# Patient Record
Sex: FEMALE | Race: WHITE | NOT HISPANIC OR LATINO | Employment: FULL TIME | URBAN - METROPOLITAN AREA
[De-identification: names, ages, dates, MRNs, and addresses within clinical notes are randomized per-mention and may not be internally consistent; named-entity substitution may affect disease eponyms.]

---

## 2017-03-22 ENCOUNTER — OFFICE VISIT (OUTPATIENT)
Dept: INTERNAL MEDICINE | Facility: CLINIC | Age: 32
End: 2017-03-22
Payer: COMMERCIAL

## 2017-03-22 VITALS
RESPIRATION RATE: 17 BRPM | WEIGHT: 101 LBS | HEART RATE: 78 BPM | SYSTOLIC BLOOD PRESSURE: 110 MMHG | DIASTOLIC BLOOD PRESSURE: 72 MMHG | BODY MASS INDEX: 16.83 KG/M2 | TEMPERATURE: 98 F | HEIGHT: 65 IN

## 2017-03-22 DIAGNOSIS — Z23 NEED FOR TDAP VACCINATION: ICD-10-CM

## 2017-03-22 DIAGNOSIS — Z00.00 ANNUAL PHYSICAL EXAM: Primary | ICD-10-CM

## 2017-03-22 DIAGNOSIS — B00.1 FEVER BLISTER: ICD-10-CM

## 2017-03-22 PROCEDURE — 90715 TDAP VACCINE 7 YRS/> IM: CPT | Mod: S$GLB,,, | Performed by: INTERNAL MEDICINE

## 2017-03-22 PROCEDURE — 99999 PR PBB SHADOW E&M-NEW PATIENT-LVL III: CPT | Mod: PBBFAC,,, | Performed by: INTERNAL MEDICINE

## 2017-03-22 PROCEDURE — 90471 IMMUNIZATION ADMIN: CPT | Mod: S$GLB,,, | Performed by: INTERNAL MEDICINE

## 2017-03-22 PROCEDURE — 99385 PREV VISIT NEW AGE 18-39: CPT | Mod: 25,S$GLB,, | Performed by: INTERNAL MEDICINE

## 2017-03-22 RX ORDER — SODIUM SULFACETAMIDE AND SULFUR 80; 40 MG/ML; MG/ML
SOLUTION TOPICAL
COMMUNITY
Start: 2017-03-21 | End: 2022-06-22

## 2017-03-22 RX ORDER — VALACYCLOVIR HYDROCHLORIDE 500 MG/1
TABLET, FILM COATED ORAL
Qty: 30 TABLET | Refills: 0 | Status: SHIPPED | OUTPATIENT
Start: 2017-03-22 | End: 2021-06-04

## 2017-03-22 RX ORDER — VALACYCLOVIR HYDROCHLORIDE 1 G/1
TABLET, FILM COATED ORAL
COMMUNITY
Start: 2017-03-11 | End: 2017-03-22

## 2017-03-22 RX ORDER — DOXYCYCLINE 100 MG/1
100 CAPSULE ORAL DAILY PRN
COMMUNITY
Start: 2017-03-13 | End: 2020-06-10

## 2017-03-22 RX ORDER — TRETINOIN 0.05 G/100G
GEL TOPICAL 2 TIMES DAILY PRN
COMMUNITY
Start: 2017-01-17

## 2017-03-22 NOTE — PROGRESS NOTES
INTERNAL MEDICINE INITIAL VISIT NOTE      CHIEF COMPLAINT     Chief Complaint   Patient presents with    [facility]     HPI     Michelle Gage is a 31 y.o. c female who presents to establish care.     Took a new job within the last yr. Stress related. Eats sandwiches at the desk. Reports eats a good amount. Weight loss of 5lbs over the past 6 months. Always w/ fast metabolism and BMI was always low.     Smokes about 5 cigarettes socially (not every week)    Acne - takes doxy prn acne.  Valtrex prn for fever blisters.     Past Medical History:  History reviewed. No pertinent past medical history.    Past Surgical History:  Past Surgical History:   Procedure Laterality Date    WISDOM TOOTH EXTRACTION         Allergies:  Review of patient's allergies indicates:  No Known Allergies    Home Medications:  Prior to Admission medications    Medication Sig Start Date End Date Taking? Authorizing Provider   ATRALIN 0.05 % gel  1/17/17  Yes Historical Provider, MD   doxycycline (VIBRAMYCIN) 100 MG Cap Take 100 mg by mouth daily as needed. 3/13/17  Yes Historical Provider, MD   sulfacetamide sodium-sulfur 8-4 % Susp  3/21/17  Yes Historical Provider, MD   valacyclovir (VALTREX) 1000 MG tablet  3/11/17  Yes Historical Provider, MD       Family History:  Family History   Problem Relation Age of Onset    Osteoporosis Mother     No Known Problems Father     Alzheimer's disease Maternal Grandmother     Diabetes Maternal Grandfather     Cancer Paternal Grandmother 55     breast CA    Diabetes Paternal Grandfather        Social History:  Social History   Substance Use Topics    Smoking status: Light Tobacco Smoker    Smokeless tobacco: None    Alcohol use Yes      Comment: socially       Review of Systems:  Review of Systems   Constitutional: Negative for chills, fever and unexpected weight change.   Eyes: Negative for visual disturbance.   Respiratory: Negative for chest tightness, shortness of breath and wheezing.   "  Cardiovascular: Negative for chest pain and palpitations.   Gastrointestinal: Negative for abdominal pain, blood in stool, constipation, diarrhea, nausea and vomiting.   Endocrine: Negative for polydipsia and polyuria.   Genitourinary: Negative for dysuria, frequency and hematuria.   Musculoskeletal: Negative for arthralgias and joint swelling.   Skin: Negative for rash.        4 yrs ago, was given medrol dosepack and tamiflu. Caused acne break out.  Reports gaining hair around the hairline   Neurological: Positive for headaches (during menses. takes excedrin/advil prn and works. no morning HA). Negative for dizziness, weakness and light-headedness.   Psychiatric/Behavioral: Negative for dysphoric mood and sleep disturbance. The patient is not nervous/anxious.        Health Maintainence:   Immunizations:   TDap is up to date (8/2007), Influenza is up to date  Cancer Screening:  PAP: is up to date. Dr. Montelongo at  - Pap about 2 yrs ago.     PHYSICAL EXAM     /72  Pulse 78  Temp 98.4 °F (36.9 °C) (Oral)   Resp 17  Ht 5' 5" (1.651 m)  Wt 45.8 kg (100 lb 15.5 oz)  LMP 03/21/2017  BMI 16.8 kg/m2    GEN - A+OX4, NAD   HEENT - PERRL, EOMI, OP clear. MMM. TM normal.  Neck - No thyromegaly or cervical LAD. No thyroid masses felt.  CV - RRR, no m/r   Chest - CTAB, no wheezing or rhonchi  Abd - S/NT/ND/+BS.   Ext - 2+BDP and radial pulses. No LE edema.   Neuro - PERRL, EOMI, no nystagmus, eyebrow raise, facial sensation, hearing, m of mastication, smile, palatal raise, shoulder shrug, tongue protrusion symmetric and intact. 5/5 BUE and BLE strength. Sensation to light touch intact throughout. 2+ DTRs.   LN - No axillary LAD appreciated.  Skin - No rash.      LABS     No labs in system.     ASSESSMENT/PLAN     Michelle Gage is a 31 y.o. female with  Michelle was seen today for establish care.    Diagnoses and all orders for this visit:    Annual physical exam  -     CBC auto differential; Future; Expected " date: 3/22/17  -     Comprehensive metabolic panel; Future; Expected date: 3/22/17  -     Hemoglobin A1c; Future; Expected date: 3/22/17  -     Lipid panel; Future; Expected date: 3/22/17  -     TSH; Future; Expected date: 3/22/17    Fever blister  -     valacyclovir (VALTREX) 500 MG tablet; Take 1 pill twice daily x 3 days as needed for fever blister.    Need for Tdap vaccination  -     Tdap Vaccine    Other orders  -     doxycycline (VIBRAMYCIN) 100 MG Cap; Take 100 mg by mouth daily as needed.  -     sulfacetamide sodium-sulfur 8-4 % Susp;   -     ATRALIN 0.05 % gel;   -     Discontinue: valacyclovir (VALTREX) 1000 MG tablet;     RTC in 12 months, sooner if needed and depending on labs.    Gwen Garcia MD  Department of Internal Medicine - [facility] [facility]  8:18 AM

## 2017-03-22 NOTE — MR AVS SNAPSHOT
"    [facility]  [address]  [place] [address]  Phone: [phone]  Fax: [phone]                  Michelle Gage   3/22/2017 8:00 AM   Office Visit    Description:  Female : 1985   Provider:  Gwen Garcia MD   Department:  [facility]           Reason for Visit     Establish Care           Diagnoses this Visit        Comments    Annual physical exam    -  Primary     Fever blister         Need for Tdap vaccination                To Do List           Goals (5 Years of Data)     None      [facility]     [facility] -  Assistance  Registered nurses in the [facility] provide clinical advisement, health education, appointment booking, and other advisory services.  Call for this free service at [phone].             Medications           Message regarding Medications     Verify the changes and/or additions to your medication regime listed below are the same as discussed with your clinician today.  If any of these changes or additions are incorrect, please notify your healthcare provider.             Verify that the below list of medications is an accurate representation of the medications you are currently taking.  If none reported, the list may be blank. If incorrect, please contact your healthcare provider. Carry this list with you in case of emergency.           Current Medications     ATRALIN 0.05 % gel     doxycycline (VIBRAMYCIN) 100 MG Cap Take 100 mg by mouth daily as needed.    sulfacetamide sodium-sulfur 8-4 % Susp     valacyclovir (VALTREX) 1000 MG tablet            Clinical Reference Information           Your Vitals Were     BP Pulse Temp Resp Height Weight    110/72 78 98.4 °F (36.9 °C) (Oral) 17 5' 5" (1.651 m) 45.8 kg (100 lb 15.5 oz)    Last Period BMI             2017 16.8 kg/m2         Blood Pressure          Most Recent Value    BP  110/72      Allergies as of 3/22/2017     No Known " Allergies      Immunizations Administered on Date of Encounter - 3/22/2017     Name Date Dose VIS Date Route    TDAP  Incomplete 0.5 mL 2/24/2015 Intramuscular      Orders Placed During Today's Visit      Normal Orders This Visit    Tdap Vaccine     Future Labs/Procedures Expected by Expires    CBC auto differential  3/22/2017 5/21/2018    Comprehensive metabolic panel  3/22/2017 5/21/2018    Hemoglobin A1c  3/22/2017 5/21/2018    Lipid panel  3/22/2017 5/21/2018    TSH  3/22/2017 5/21/2018      [facility] Sign-Up     Activating your [facility] account is as easy as 1-2-3!     1) Visit [email or web address], select Sign Up Now, enter this activation code and your date of birth, then select Next.  [email or web address]  Expires: 5/6/2017  8:40 AM      2) Create a username and password to use when you visit [facility] in the future and select a security question in case you lose your password and select Next.    3) Enter your e-mail address and click Sign Up!    Additional Information  If you have questions, please e-mail [email or web address] or call [phone] to talk to our [facility] staff. Remember, [facility] is NOT to be used for urgent needs. For medical emergencies, dial 911.         Smoking Cessation     If you would like to quit smoking:   You may be eligible for free services if you are a [place] resident and started smoking cigarettes before September 1, 1988.  Call the Smoking Cessation Trust ([facility]) toll free at (413) 445-7199 or (021) 737-0100.   Call 1-800-QUIT-NOW if you do not meet the above criteria.            Language Assistance Services     ATTENTION: Language assistance services are available, free of charge. Please call [phone].      ATENCIÓN: Si habla jarrett, tiene a llamas disposición servicios gratuitos de asistencia lingüística. Llame al [phone].     CHÚ Ý: N?u b?n nói Ti?ng Vi?t, có các d?ch v? h? tr? ngôn ng? mi?n phí dành cho b?n. G?i s? [phone].         [facility] complies with applicable Federal civil rights laws and does not discriminate on the basis of race, color, national origin, age, disability, or sex.

## 2019-04-16 ENCOUNTER — OFFICE VISIT (OUTPATIENT)
Dept: INTERNAL MEDICINE | Facility: CLINIC | Age: 34
End: 2019-04-16
Payer: COMMERCIAL

## 2019-04-16 ENCOUNTER — LAB VISIT (OUTPATIENT)
Dept: LAB | Facility: HOSPITAL | Age: 34
End: 2019-04-16
Payer: COMMERCIAL

## 2019-04-16 VITALS
BODY MASS INDEX: 17.96 KG/M2 | HEART RATE: 60 BPM | OXYGEN SATURATION: 99 % | WEIGHT: 107.81 LBS | DIASTOLIC BLOOD PRESSURE: 62 MMHG | HEIGHT: 65 IN | SYSTOLIC BLOOD PRESSURE: 104 MMHG

## 2019-04-16 DIAGNOSIS — R10.13 EPIGASTRIC PAIN: ICD-10-CM

## 2019-04-16 DIAGNOSIS — R10.13 EPIGASTRIC PAIN: Primary | ICD-10-CM

## 2019-04-16 DIAGNOSIS — R10.10 UPPER ABDOMINAL PAIN: ICD-10-CM

## 2019-04-16 LAB
ALBUMIN SERPL BCP-MCNC: 3.7 G/DL (ref 3.5–5.2)
ALP SERPL-CCNC: 35 U/L (ref 55–135)
ALT SERPL W/O P-5'-P-CCNC: 15 U/L (ref 10–44)
ANION GAP SERPL CALC-SCNC: 8 MMOL/L (ref 8–16)
AST SERPL-CCNC: 14 U/L (ref 10–40)
BASOPHILS # BLD AUTO: 0.03 K/UL (ref 0–0.2)
BASOPHILS NFR BLD: 0.6 % (ref 0–1.9)
BILIRUB SERPL-MCNC: 0.2 MG/DL (ref 0.1–1)
BUN SERPL-MCNC: 6 MG/DL (ref 6–20)
CALCIUM SERPL-MCNC: 9.6 MG/DL (ref 8.7–10.5)
CHLORIDE SERPL-SCNC: 107 MMOL/L (ref 95–110)
CO2 SERPL-SCNC: 26 MMOL/L (ref 23–29)
CREAT SERPL-MCNC: 0.8 MG/DL (ref 0.5–1.4)
DIFFERENTIAL METHOD: ABNORMAL
EOSINOPHIL # BLD AUTO: 0.4 K/UL (ref 0–0.5)
EOSINOPHIL NFR BLD: 6.8 % (ref 0–8)
ERYTHROCYTE [DISTWIDTH] IN BLOOD BY AUTOMATED COUNT: 14.4 % (ref 11.5–14.5)
EST. GFR  (AFRICAN AMERICAN): >60 ML/MIN/1.73 M^2
EST. GFR  (NON AFRICAN AMERICAN): >60 ML/MIN/1.73 M^2
GLUCOSE SERPL-MCNC: 92 MG/DL (ref 70–110)
HCT VFR BLD AUTO: 41.6 % (ref 37–48.5)
HGB BLD-MCNC: 14.1 G/DL (ref 12–16)
LIPASE SERPL-CCNC: 36 U/L (ref 4–60)
LYMPHOCYTES # BLD AUTO: 2.3 K/UL (ref 1–4.8)
LYMPHOCYTES NFR BLD: 44.5 % (ref 18–48)
MCH RBC QN AUTO: 32.2 PG (ref 27–31)
MCHC RBC AUTO-ENTMCNC: 33.9 G/DL (ref 32–36)
MCV RBC AUTO: 95 FL (ref 82–98)
MONOCYTES # BLD AUTO: 0.8 K/UL (ref 0.3–1)
MONOCYTES NFR BLD: 15.1 % (ref 4–15)
NEUTROPHILS # BLD AUTO: 1.7 K/UL (ref 1.8–7.7)
NEUTROPHILS NFR BLD: 33 % (ref 38–73)
PLATELET # BLD AUTO: 129 K/UL (ref 150–350)
PMV BLD AUTO: 12.1 FL (ref 9.2–12.9)
POTASSIUM SERPL-SCNC: 3.8 MMOL/L (ref 3.5–5.1)
PROT SERPL-MCNC: 7.1 G/DL (ref 6–8.4)
RBC # BLD AUTO: 4.38 M/UL (ref 4–5.4)
SODIUM SERPL-SCNC: 141 MMOL/L (ref 136–145)
WBC # BLD AUTO: 5.17 K/UL (ref 3.9–12.7)

## 2019-04-16 PROCEDURE — 86677 HELICOBACTER PYLORI ANTIBODY: CPT

## 2019-04-16 PROCEDURE — 99214 OFFICE O/P EST MOD 30 MIN: CPT | Mod: S$GLB,,, | Performed by: PHYSICIAN ASSISTANT

## 2019-04-16 PROCEDURE — 3008F PR BODY MASS INDEX (BMI) DOCUMENTED: ICD-10-PCS | Mod: CPTII,S$GLB,, | Performed by: PHYSICIAN ASSISTANT

## 2019-04-16 PROCEDURE — 99999 PR PBB SHADOW E&M-EST. PATIENT-LVL IV: CPT | Mod: PBBFAC,,, | Performed by: PHYSICIAN ASSISTANT

## 2019-04-16 PROCEDURE — 83690 ASSAY OF LIPASE: CPT

## 2019-04-16 PROCEDURE — 80053 COMPREHEN METABOLIC PANEL: CPT

## 2019-04-16 PROCEDURE — 85025 COMPLETE CBC W/AUTO DIFF WBC: CPT

## 2019-04-16 PROCEDURE — 36415 COLL VENOUS BLD VENIPUNCTURE: CPT

## 2019-04-16 PROCEDURE — 3008F BODY MASS INDEX DOCD: CPT | Mod: CPTII,S$GLB,, | Performed by: PHYSICIAN ASSISTANT

## 2019-04-16 PROCEDURE — 99214 PR OFFICE/OUTPT VISIT, EST, LEVL IV, 30-39 MIN: ICD-10-PCS | Mod: S$GLB,,, | Performed by: PHYSICIAN ASSISTANT

## 2019-04-16 PROCEDURE — 99999 PR PBB SHADOW E&M-EST. PATIENT-LVL IV: ICD-10-PCS | Mod: PBBFAC,,, | Performed by: PHYSICIAN ASSISTANT

## 2019-04-16 RX ORDER — DICYCLOMINE HYDROCHLORIDE 10 MG/1
10 CAPSULE ORAL 3 TIMES DAILY
Qty: 10 CAPSULE | Refills: 0 | Status: SHIPPED | OUTPATIENT
Start: 2019-04-16 | End: 2019-04-19

## 2019-04-16 RX ORDER — VALACYCLOVIR HYDROCHLORIDE 1 G/1
1000 TABLET, FILM COATED ORAL 2 TIMES DAILY
COMMUNITY
End: 2020-11-19 | Stop reason: SDUPTHER

## 2019-04-16 NOTE — PROGRESS NOTES
Subjective:       Patient ID: Michelle Gage is a 33 y.o. female.    Chief Complaint: Abdominal Pain (Since saturday); Diarrhea; Fever; and Generalized Body Aches    Established pt of Gwen Garcia MD (new to me)    C/o upper abdominal pain and diarrhea that started 4 days ago. Seen at outside Urgent Care, diagnosed with viral gastroenteritis. Treated with zofran and zantac. Abdominal pain has improved but concerned that no lab testing/imaging was done at urgent care visit. Tested for Flu as well which was negative per pt. Concerned about gallbladder.     Abdominal Pain   This is a new problem. The current episode started in the past 7 days. The problem occurs daily. The problem has been gradually improving. The pain is located in the epigastric region. The pain is at a severity of 4/10. The quality of the pain is cramping. Associated symptoms include arthralgias (now resolved), belching, diarrhea, a fever (on Saturday 101F (now resolved)) and nausea. Pertinent negatives include no anorexia, constipation, dysuria, headaches, hematochezia, hematuria, melena, vomiting or weight loss. Nothing aggravates the pain. The pain is relieved by nothing. Treatments tried: Zantac, Zofran, OTC imodium. The treatment provided moderate relief. There is no history of abdominal surgery, GERD or irritable bowel syndrome. Patient's medical history does not include kidney stones and UTI.       PMH: none    Social History     Tobacco Use    Smoking status: Light Tobacco Smoker   Substance Use Topics    Alcohol use: Yes     Comment: socially    Drug use: No         Review of Systems   Constitutional: Positive for fever (on Saturday 101F (now resolved)). Negative for weight loss.   Gastrointestinal: Positive for abdominal pain, diarrhea and nausea. Negative for anorexia, constipation, hematochezia, melena and vomiting.   Genitourinary: Negative for dysuria and hematuria.   Musculoskeletal: Positive for arthralgias (now resolved).  "  Neurological: Negative for headaches.       Objective: /62   Pulse 60   Ht 5' 5" (1.651 m)   Wt 48.9 kg (107 lb 12.9 oz)   LMP 04/02/2019   SpO2 99%   BMI 17.94 kg/m²         Physical Exam   Constitutional: She is oriented to person, place, and time. She appears well-developed and well-nourished. No distress.   HENT:   Head: Normocephalic and atraumatic.   Mouth/Throat: Oropharynx is clear and moist.   Eyes: Pupils are equal, round, and reactive to light.   Cardiovascular: Normal rate and regular rhythm. Exam reveals no friction rub.   No murmur heard.  Pulmonary/Chest: Effort normal and breath sounds normal. She has no wheezes. She has no rales.   Abdominal: Soft. Normal appearance and bowel sounds are normal. She exhibits no distension and no mass. There is tenderness in the epigastric area. There is no rigidity and no guarding.   Musculoskeletal: Normal range of motion.   Neurological: She is alert and oriented to person, place, and time.   Skin: Skin is warm and dry. Capillary refill takes less than 2 seconds. No rash noted.   Psychiatric: She has a normal mood and affect.   Vitals reviewed.      Assessment:       1. Epigastric pain    2. Upper abdominal pain        Plan:         Michelle was seen today for abdominal pain, diarrhea, fever and generalized body aches.    Diagnoses and all orders for this visit:    Epigastric pain  Upper abdominal pain  -Likely bout of viral gastroenteritis that is improving  -Lab/imaging as below for further eval  -Trial of Bentyl prn  -Continue imodium  -Increase fluids  -[place] diet (avoid fried, fatty, processed foods)  -RTC if symptoms worsen or fail to improve  -ED precautions discussed  -     Comprehensive metabolic panel; Future  -     CBC auto differential; Future  -     H. PYLORI ANTIBODY, IGG; Future  -     US Abdomen Complete; Future  -     Lipase; Future  -     dicyclomine (BENTYL) 10 MG capsule; Take 1 capsule (10 mg total) by mouth 3 (three) times daily. " for 3 days        Zohreh Garcia PA-C

## 2019-04-16 NOTE — PATIENT INSTRUCTIONS
Please follow the instructions below to securely access your online medical record. [facility] allows you to send messages to your doctor, view your test results, renew your prescriptions, schedule appointments, and more.     How Do I Sign Up?  1. In your Internet browser, go to [email or web address]  2. In the lower right of the page, click the Activate Now link located under the Have Access Code? .  3. Enter your [facility] Access Code exactly as it appears below. You will not need to use this code after youve completed the sign-up process.  [facility] Access Code: [email or web address]  Expires: 5/31/2019  2:33 PM    4. Enter Date of Birth (mm/dd/yyyy) as indicated and click the Next button. You will be taken to the next sign-up page.  5. Create a [facility] ID. This will be your new [facility] login ID and cannot be changed, so think of one that is secure and easy to remember.  6. Create a [facility] password.  Your password must be at least 8 characters long and contain at least 1 letter and 1 number.  You can change your password at any time.  Your password is case sensitive.  7. Enter your Password Reset Question and Answer, then click the Next button.   8. Enter your e-mail address. You will receive e-mail notification when new information is available in [facility].  9. Click Sign Up. You can now view your medical record.     Additional Information  If you have questions, you can e-mail [email or web address] or call [phone] to talk to our [facility] staff. Remember, [facility] is NOT to be used for urgent needs. For medical emergencies, dial 911.    Thank you for enrolling in [facility].

## 2019-04-17 LAB — H PYLORI IGG SERPL QL IA: NEGATIVE

## 2019-04-24 ENCOUNTER — HOSPITAL ENCOUNTER (OUTPATIENT)
Dept: RADIOLOGY | Facility: HOSPITAL | Age: 34
Discharge: HOME | End: 2019-04-24
Attending: PHYSICIAN ASSISTANT
Payer: COMMERCIAL

## 2019-04-24 DIAGNOSIS — R10.13 EPIGASTRIC PAIN: ICD-10-CM

## 2019-04-24 PROCEDURE — 76700 US ABDOMEN COMPLETE: ICD-10-PCS | Mod: 26,,, | Performed by: RADIOLOGY

## 2019-04-24 PROCEDURE — 76700 US EXAM ABDOM COMPLETE: CPT | Mod: TC

## 2019-04-24 PROCEDURE — 76700 US EXAM ABDOM COMPLETE: CPT | Mod: 26,,, | Performed by: RADIOLOGY

## 2020-04-29 DIAGNOSIS — N63.0 BREAST MASS: Primary | ICD-10-CM

## 2020-05-01 ENCOUNTER — TELEPHONE (OUTPATIENT)
Dept: SURGERY | Facility: CLINIC | Age: 35
End: 2020-05-01

## 2020-05-01 NOTE — TELEPHONE ENCOUNTER
Left VM with my direct contact information, patient advised to give a return call with confirmation of appointment date and time (5/11/20 0900).

## 2020-05-10 NOTE — PROGRESS NOTES
[facility]  [facility]  5/11/2020      REFERRING PROVIDER: Kristin Blount MD  [address] [place] [address]    SUBJECTIVE:   Ms. Michelle Gage is a 34 y.o. female who presents today complaining of a LEFT breast mass.    History of Present Illness: Patient states she first noticed this mass at her LEFT breast about 1.5 years ago.  She describes it as tender, itchy, and growing.  She presented to her OBGYN at [facility] who ordered imaging.  On 5/2019 she had a left breast u/s which showed an 0.6 cm round nodule at the left axillary tail, 9 cm from the nipple.  It was read as BIRADS 3, probably benign. In 9/2019 she had a follow up u/s which showed a 1 cm hypoechoic lesion at the 1 o'clock left axillary tail.  Several small cysts were also seen bilaterally.  This was also read as BIRADS 3, probably benign and follow up imaging in 6 months was recommended.    Patient states she thinks this mass has increased in size and she wants it removed.    She denies any history of breast biopsies.  She denies any nipple discharge or nipple inversion.  She denies palpating any right breast masses and denies any skin changes.  She has no personal history of cancer.    History reviewed. No pertinent past medical history.  Past Surgical History:   Procedure Laterality Date    WISDOM TOOTH EXTRACTION       Social History     Socioeconomic History    Marital status:      Spouse name: Not on file    Number of children: Not on file    Years of education: Not on file    Highest education level: Not on file   Occupational History    Occupation:    Social Needs    Financial resource strain: Not on file    Food insecurity:     Worry: Not on file     Inability: Not on file    Transportation needs:     Medical: Not on file     Non-medical: Not on file   Tobacco Use    Smoking status: Light Tobacco Smoker   Substance and Sexual Activity    Alcohol use: Yes     Comment: socially     Drug use: No    Sexual activity: Yes     Partners: Male     Birth control/protection: None, Other-see comments     Comment: spouse   Lifestyle    Physical activity:     Days per week: Not on file     Minutes per session: Not on file    Stress: Not on file   Relationships    Social connections:     Talks on phone: Not on file     Gets together: Not on file     Attends Latter day service: Not on file     Active member of club or organization: Not on file     Attends meetings of clubs or organizations: Not on file     Relationship status: Not on file   Other Topics Concern    Not on file   Social History Narrative    Not on file     Review of patient's allergies indicates:  No Known Allergies     Family History: Paternal grandmother had breast cancer in her late 40's and again at age 60.   Genetic testing: none  OB/Gyn history:    Number of pregnancies & age at first gestation:    Age of menarche: 11   Last menstrual period: 3 weeks ago   Body mass index is 17.33 kg/m².   Contraceptive Use/ HRT: OCP since age 15; denies HRT.   Breastfeeding: N/A   Number of previous biopsies:0    Tyrer-Cuzick Score: not calculated in clinic today.    Review of Systems: Denies any chest pain or shortness of breath.  Denies any fever or chills.  See HPI/ Interval History for other systems reviewed.    OBJECTIVE:   Vitals:    20 0902   BP: 105/73   Pulse: 77      Physical Exam:  HEENT: Normocephalic, atraumatic.    General: alert and oriented; no acute distress.  Breast: 1.5 cm firm, mobile mass at the UOQ of the left breast.  No right breast masses. Normal color and contour of right and left breast.  No nipple inversion or discharge bilaterally.    Lymph: No palpable adjacent axillary lymph nodes.      ASSESSMENT:  Ms. Michelle Gage is a 34 y.o. year old female with a LEFT breast mass.    PLAN:   We discussed that this is likely a fibroadenoma; however it is somewhat firm, suspicious for malignancy.  We will repeat  her imaging today at [facility] with a left diagnostic mammogram and ultrasound with subsequent biopsy.  We will also request her previous imaging from [facility].    We discussed that a biopsy is preferred rather than surgical excision due to the possibility of positive margins for a breast cancer.  We will call her with the results and discuss the treatment plan from there.      ~Elizabeth Hayden PA-C      Surgical Oncology            5/11/2020

## 2020-05-11 ENCOUNTER — OFFICE VISIT (OUTPATIENT)
Dept: SURGERY | Facility: CLINIC | Age: 35
End: 2020-05-11
Payer: COMMERCIAL

## 2020-05-11 ENCOUNTER — HOSPITAL ENCOUNTER (OUTPATIENT)
Dept: RADIOLOGY | Facility: HOSPITAL | Age: 35
Discharge: HOME | End: 2020-05-11
Attending: SURGERY
Payer: COMMERCIAL

## 2020-05-11 VITALS
WEIGHT: 107.38 LBS | BODY MASS INDEX: 17.26 KG/M2 | HEART RATE: 77 BPM | DIASTOLIC BLOOD PRESSURE: 73 MMHG | SYSTOLIC BLOOD PRESSURE: 105 MMHG | HEIGHT: 66 IN

## 2020-05-11 DIAGNOSIS — N63.20 LEFT BREAST MASS: ICD-10-CM

## 2020-05-11 DIAGNOSIS — N63.0 BREAST MASS: ICD-10-CM

## 2020-05-11 DIAGNOSIS — R92.8 ABNORMAL MAMMOGRAM: ICD-10-CM

## 2020-05-11 PROCEDURE — 3008F BODY MASS INDEX DOCD: CPT | Mod: CPTII,S$GLB,, | Performed by: SURGERY

## 2020-05-11 PROCEDURE — 19083 BX BREAST 1ST LESION US IMAG: CPT | Mod: LT,,, | Performed by: RADIOLOGY

## 2020-05-11 PROCEDURE — 99999 PR PBB SHADOW E&M-EST. PATIENT-LVL III: ICD-10-PCS | Mod: PBBFAC,,, | Performed by: SURGERY

## 2020-05-11 PROCEDURE — 99999 PR PBB SHADOW E&M-EST. PATIENT-LVL III: CPT | Mod: PBBFAC,,, | Performed by: SURGERY

## 2020-05-11 PROCEDURE — 19083 US BREAST BIOPSY WITH IMAGING 1ST SITE LEFT: ICD-10-PCS | Mod: LT,,, | Performed by: RADIOLOGY

## 2020-05-11 PROCEDURE — 25000003 PHARM REV CODE 250: Mod: PO | Performed by: SURGERY

## 2020-05-11 PROCEDURE — 77062 BREAST TOMOSYNTHESIS BI: CPT | Mod: TC,PO

## 2020-05-11 PROCEDURE — 99204 OFFICE O/P NEW MOD 45 MIN: CPT | Mod: S$GLB,,, | Performed by: SURGERY

## 2020-05-11 PROCEDURE — 38505 NEEDLE BIOPSY LYMPH NODES: CPT | Mod: ,,, | Performed by: RADIOLOGY

## 2020-05-11 PROCEDURE — 38505 US BIOPSY LYMPH NODE AXILLA: ICD-10-PCS | Mod: ,,, | Performed by: RADIOLOGY

## 2020-05-11 PROCEDURE — 76642 US BREAST LEFT LIMITED: ICD-10-PCS | Mod: 26,LT,, | Performed by: RADIOLOGY

## 2020-05-11 PROCEDURE — 27201068 US BREAST BIOPSY WITH IMAGING 1ST SITE LEFT: Mod: PO

## 2020-05-11 PROCEDURE — 88360 PR  TUMOR IMMUNOHISTOCHEM/MANUAL: ICD-10-PCS | Mod: 26,,, | Performed by: PATHOLOGY

## 2020-05-11 PROCEDURE — 77066 MAMMO DIGITAL DIAGNOSTIC BILAT WITH TOMOSYNTHESIS_CAD: ICD-10-PCS | Mod: 26,,, | Performed by: RADIOLOGY

## 2020-05-11 PROCEDURE — 3008F PR BODY MASS INDEX (BMI) DOCUMENTED: ICD-10-PCS | Mod: CPTII,S$GLB,, | Performed by: SURGERY

## 2020-05-11 PROCEDURE — 76642 ULTRASOUND BREAST LIMITED: CPT | Mod: TC,PO,LT

## 2020-05-11 PROCEDURE — 88305 TISSUE EXAM BY PATHOLOGIST: CPT | Mod: 26,,, | Performed by: PATHOLOGY

## 2020-05-11 PROCEDURE — 38505 NEEDLE BIOPSY LYMPH NODES: CPT | Mod: PO

## 2020-05-11 PROCEDURE — 77066 DX MAMMO INCL CAD BI: CPT | Mod: 26,,, | Performed by: RADIOLOGY

## 2020-05-11 PROCEDURE — 76642 ULTRASOUND BREAST LIMITED: CPT | Mod: 26,LT,, | Performed by: RADIOLOGY

## 2020-05-11 PROCEDURE — 88360 TUMOR IMMUNOHISTOCHEM/MANUAL: CPT | Mod: 26,,, | Performed by: PATHOLOGY

## 2020-05-11 PROCEDURE — 88360 TUMOR IMMUNOHISTOCHEM/MANUAL: CPT | Performed by: PATHOLOGY

## 2020-05-11 PROCEDURE — 88305 TISSUE EXAM BY PATHOLOGIST: CPT | Mod: 59 | Performed by: PATHOLOGY

## 2020-05-11 PROCEDURE — 77062 BREAST TOMOSYNTHESIS BI: CPT | Mod: 26,,, | Performed by: RADIOLOGY

## 2020-05-11 PROCEDURE — 99204 PR OFFICE/OUTPT VISIT, NEW, LEVL IV, 45-59 MIN: ICD-10-PCS | Mod: S$GLB,,, | Performed by: SURGERY

## 2020-05-11 PROCEDURE — 77062 MAMMO DIGITAL DIAGNOSTIC BILAT WITH TOMOSYNTHESIS_CAD: ICD-10-PCS | Mod: 26,,, | Performed by: RADIOLOGY

## 2020-05-11 PROCEDURE — 27201068 MAMMO DIGITAL DIAGNOSTIC LEFT WITH CAD: Mod: PO

## 2020-05-11 PROCEDURE — 88305 TISSUE EXAM BY PATHOLOGIST: ICD-10-PCS | Mod: 26,,, | Performed by: PATHOLOGY

## 2020-05-11 RX ORDER — LIDOCAINE HYDROCHLORIDE 10 MG/ML
5 INJECTION, SOLUTION EPIDURAL; INFILTRATION; INTRACAUDAL; PERINEURAL ONCE
Status: COMPLETED | OUTPATIENT
Start: 2020-05-11 | End: 2020-05-11

## 2020-05-11 RX ORDER — LIDOCAINE HYDROCHLORIDE AND EPINEPHRINE 20; 10 MG/ML; UG/ML
10 INJECTION, SOLUTION INFILTRATION; PERINEURAL ONCE
Status: COMPLETED | OUTPATIENT
Start: 2020-05-11 | End: 2020-05-11

## 2020-05-11 RX ADMIN — LIDOCAINE HYDROCHLORIDE,EPINEPHRINE BITARTRATE 20 ML: 20; .01 INJECTION, SOLUTION INFILTRATION; PERINEURAL at 01:05

## 2020-05-11 RX ADMIN — LIDOCAINE HYDROCHLORIDE 60 MG: 10 INJECTION, SOLUTION EPIDURAL; INFILTRATION; INTRACAUDAL; PERINEURAL at 01:05

## 2020-05-11 NOTE — LETTER
May 11, 2020      Kristin Blount MD  [address] [facility] [address]           [facility]  [address]  [facility] [address]  Phone: [phone]  Fax: [phone]          Patient: Michelle Gage   MR Number: [id number]   YOB: 1985   Date of Visit: 5/11/2020       Dear Dr. Kristin Blount:    Thank you for referring Michelle Gage to me for evaluation. Attached you will find relevant portions of my assessment and plan of care.    If you have questions, please do not hesitate to call me. I look forward to following Michelle Gage along with you.    Sincerely,    Jessica Dumont MD    Enclosure  CC:  No Recipients    If you would like to receive this communication electronically, please contact [email or web address] or [phone] to request more information on [employer] access.    For providers and/or their staff who would like to refer a patient to [facility], please contact us through our one-stop-shop provider referral line, [facility], at [phone].    If you feel you have received this communication in error or would no longer like to receive these types of communications, please e-mail [email or web address]

## 2020-05-13 ENCOUNTER — DOCUMENTATION ONLY (OUTPATIENT)
Dept: SURGERY | Facility: CLINIC | Age: 35
End: 2020-05-13

## 2020-05-13 LAB
COMMENT: NORMAL
FINAL PATHOLOGIC DIAGNOSIS: NORMAL
GROSS: NORMAL
Lab: NORMAL
SUPPLEMENTAL DIAGNOSIS: NORMAL

## 2020-05-13 NOTE — NURSING
Called Patient with results of breast biopsy from 5/11/2020.  Explained that the biopsy showed invasive ductal carcinoma . Discussed what this means and that the next step is to meet with a breast surgeon. An appt was made for 5/14/2020 with Dr. Dumont.  Reviewed location of breast center. Patient verbalized understanding.  E mailed patient appt and educational information.  Oncology Navigation   Intake  Date of Diagnosis: 05/11/20  Cancer Type: Breast  MD Assigned: Dr. Dumont  Internal / External Referral: Internal  Initial Nurse Navigator Contact: 05/13/20  Diagnosis to Initial Contact Timeline (days): 2 days  Contact Method: Phone  Date Worked: 05/13/20  First Appointment Available: 05/14/20  Appointment Date: 05/14/20  Schedule to Appointment Timeline (days): 1  First Available Date vs. Scheduled Date (days): 0     Treatment  Current Status: Staging work-up       Procedures: Biopsy  Biopsy Schedule Date: 05/11/20           Acuity      Follow Up  Follow up in about 1 day (around 5/14/2020) for meet with patient after Dr visit.

## 2020-05-14 ENCOUNTER — DOCUMENTATION ONLY (OUTPATIENT)
Dept: SURGERY | Facility: CLINIC | Age: 35
End: 2020-05-14

## 2020-05-14 ENCOUNTER — LAB VISIT (OUTPATIENT)
Dept: LAB | Facility: HOSPITAL | Age: 35
End: 2020-05-14
Attending: SURGERY
Payer: COMMERCIAL

## 2020-05-14 ENCOUNTER — OFFICE VISIT (OUTPATIENT)
Dept: SURGERY | Facility: CLINIC | Age: 35
End: 2020-05-14
Payer: COMMERCIAL

## 2020-05-14 VITALS
BODY MASS INDEX: 17.73 KG/M2 | HEIGHT: 66 IN | SYSTOLIC BLOOD PRESSURE: 108 MMHG | HEART RATE: 67 BPM | WEIGHT: 110.31 LBS | DIASTOLIC BLOOD PRESSURE: 67 MMHG

## 2020-05-14 DIAGNOSIS — C50.912 INFILTRATING DUCTAL CARCINOMA OF LEFT BREAST: Primary | ICD-10-CM

## 2020-05-14 DIAGNOSIS — C50.912 INFILTRATING DUCTAL CARCINOMA OF LEFT BREAST: ICD-10-CM

## 2020-05-14 PROCEDURE — 99999 PR PBB SHADOW E&M-EST. PATIENT-LVL III: ICD-10-PCS | Mod: PBBFAC,,, | Performed by: SURGERY

## 2020-05-14 PROCEDURE — 3008F PR BODY MASS INDEX (BMI) DOCUMENTED: ICD-10-PCS | Mod: CPTII,S$GLB,, | Performed by: SURGERY

## 2020-05-14 PROCEDURE — 36415 COLL VENOUS BLD VENIPUNCTURE: CPT

## 2020-05-14 PROCEDURE — 99215 PR OFFICE/OUTPT VISIT, EST, LEVL V, 40-54 MIN: ICD-10-PCS | Mod: S$GLB,,, | Performed by: SURGERY

## 2020-05-14 PROCEDURE — 99215 OFFICE O/P EST HI 40 MIN: CPT | Mod: S$GLB,,, | Performed by: SURGERY

## 2020-05-14 PROCEDURE — 99999 PR PBB SHADOW E&M-EST. PATIENT-LVL III: CPT | Mod: PBBFAC,,, | Performed by: SURGERY

## 2020-05-14 PROCEDURE — 3008F BODY MASS INDEX DOCD: CPT | Mod: CPTII,S$GLB,, | Performed by: SURGERY

## 2020-05-14 NOTE — NURSING
Nurse Navigator Note:     Met with patient during her consult with Dr. Dumont.  Patient and I reviewed the information she discussed with Dr. Dumont, including treatment options, diagnosis, and future plans for workup. Patient and I went through the new patient binder, explained some of the information and why it is provided.     Also offered patient consults with our other specialty clinics: Dr. Warren for gynecological health during treatment, our breast physical therapy department for pre-op and post-operative assessments, Dr. Hess for psychological support, and Susie Boothe for nutritional counseling. Explained to patient that all of these support services are completely optional. Discussed that physical therapy may call patient to offer pre-op appt, and what that appt would entail.     Patient was given a copy of her appointments, Dr. Dumont's card, and my card. Encouraged her to call me if she has any questions or concerns or would like to schedule any additional appointments. Verbalized understanding of all information.     MRI  And appt with Dr. Salazar scheduled. Information for oncofertility provided to the patient.     Oncology Navigation   Intake  Date of Diagnosis: 05/11/20  Cancer Type: Breast  MD Assigned: Dr. Dumont  Internal / External Referral: Internal  Initial Nurse Navigator Contact: 05/13/20  Diagnosis to Initial Contact Timeline (days): 2 days  Contact Method: Phone  Date Worked: 05/13/20  First Appointment Available: 05/14/20  Appointment Date: 05/14/20  Schedule to Appointment Timeline (days): 1  First Available Date vs. Scheduled Date (days): 0     Treatment  Current Status: Staging work-up       Procedures: Genetic test;MRI  Biopsy Schedule Date: 05/11/20  Genetic Test Schedule Date: 05/14/20  MRI Schedule Date: 05/18/20    General Referrals: Fertility specialist;Other (5/15/20 appt  Dr. Salazar)    ER: Positive  MO: Positive  Her2: Postive     Acuity      Follow Up  Follow up in about 5  days (around 5/19/2020) for f/u after MRI.

## 2020-05-14 NOTE — PROGRESS NOTES
[facility]  [facility] [place]  5/14/2020    REFERRING PROVIDER: Jessica Dumont MD  [address]  [facility] [address]    SUBJECTIVE:   Ms. Michelle Gage is a 34 y.o. female with invasive ductal carcinoma, grade 3, ER positive/AL positive/Eac0uqv positive    History of Present Illness: Patient states she first noticed this mass at her left breast about 1.5 years ago.  She describes it as tender, itchy, and growing.  She presented to her OBGYN at [facility] who ordered imaging. Left breast U/S 5/2019  showed an 0.6 cm round nodule at the left axillary tail, 9 cm from the nipple, BIRADS 3. In 9/2019 she had a follow up u/s which showed a 1 cm hypoechoic lesion at the 1 o'clock left axillary tail.  Several small cysts were also seen bilaterally, BIRADS 3. She denies any history of breast biopsies.  She denies any nipple discharge or nipple inversion.  She denies palpating any right breast masses and denies any skin changes. She has no personal history of cancer.    At last clinic visit on 5/11/20 with YUMIKO, repeated imaging at [facility], left diagnostic mammogram and ultrasound with subsequent biopsy.  We will also request her previous imaging from [facility]. MMG reveals irregular left breast density at 1 OC posterior depth with indistinct margins and mild left axillary adenopathy. U/S correlates with 1 OC  Mass, 8 cm from nipple, irregular hypoechoic mass, posterior shadowing, with indistinct margins measuring 1.4cm.     Biopsy of 1 OC left breast lesion and left axillary lymph node 5/11/20. Pathology reveals invasive ductal carcinoma, grade 3, ER positive/AL positive/Cdg5xay positive. Ki-67 25%.    Findings at that time were the following:   Tumor size: 1.4 cm   Tumor thgthrthathdtheth:th th4th Estrogen Receptor: Positive (95%)  Progesterone Receptor: Positive (90%)  Her-2 delphine: Positive (3+)  Lymph node status: Negative for carcinoma      Family History: Paternal  grandmother had breast cancer in her late 40's and again at age 60.   Genetic testing: none  OB/Gyn history:    Number of pregnancies & age at first gestation:    Age of menarche: 11   Last menstrual period: 3 weeks ago   There is no height or weight on file to calculate BMI.   Contraceptive Use/ HRT: OCP since age 15; denies HRT.   Breastfeeding: N/A   Number of previous biopsies:0    No past medical history on file.  Past Surgical History:   Procedure Laterality Date    WISDOM TOOTH EXTRACTION       Social History     Socioeconomic History    Marital status:      Spouse name: Not on file    Number of children: Not on file    Years of education: Not on file    Highest education level: Not on file   Occupational History    Occupation:    Social Needs    Financial resource strain: Not on file    Food insecurity:     Worry: Not on file     Inability: Not on file    Transportation needs:     Medical: Not on file     Non-medical: Not on file   Tobacco Use    Smoking status: Light Tobacco Smoker   Substance and Sexual Activity    Alcohol use: Yes     Comment: socially    Drug use: No    Sexual activity: Yes     Partners: Male     Birth control/protection: None, Other-see comments     Comment: spouse   Lifestyle    Physical activity:     Days per week: Not on file     Minutes per session: Not on file    Stress: Not on file   Relationships    Social connections:     Talks on phone: Not on file     Gets together: Not on file     Attends Taoism service: Not on file     Active member of club or organization: Not on file     Attends meetings of clubs or organizations: Not on file     Relationship status: Not on file   Other Topics Concern    Not on file   Social History Narrative    Not on file     Review of patient's allergies indicates:  No Known Allergies       Review of Systems: Denies any chest pain or shortness of breath.  Denies any fever or chills.  See HPI/ Interval History for  other systems reviewed.    OBJECTIVE:   Vitals:    05/14/20 1418   BP: 108/67   Pulse: 67      Physical Exam:  HEENT: Normocephalic, atraumatic.    General: alert and oriented; no acute distress.  Breast: 1.5 cm firm, mobile mass at the UOQ of the left breast.  No right breast masses. Normal color and contour of right and left breast.  No nipple inversion or discharge bilaterally.    Lymph: No palpable adjacent axillary lymph nodes.      IMAGING:  MMG (5/11/20)  Findings:  This procedure was performed using tomosynthesis. Computer-aided detection was utilized in the interpretation of this examination.  The breasts are extremely dense, which lowers the sensitivity of mammography.      The mammogram images are mildly degraded by motion artifacts.  The patient was having trouble holding still for the mammogram images.  Best possible mammogram images were taken.      There are milk of calcium type calcifications in both breasts, benign.  No suspicious mammographic finding in the right breast.      In the left breast upper outer quadrant 01:00 o'clock axis posterior depth, there is an irregular equal density mass with indistinct margins.  This corresponds to the area of palpable concern.      Limited left breast ultrasound:   In the left breast 01:00 o'clock axis 8 cm from the nipple, there is an irregular hypoechoic mass with indistinct margins measuring 1.4 cm.  This corresponds to the area of palpable concern.  Associated mild posterior acoustic enhancement.  Mild rim hypervascularity.      In the left breast 12:00 o'clock axis 3 cm from the nipple, there is an oval complicated cyst with internal layering milk of calcium, benign.  This corresponds to some of the milk of calcium seen by mammogram.      Mild left axillary adenopathy.  The most abnormal node measures 0.9 cm, and demonstrates abnormal morphology with cortical thickening and loss of fatty hilum.      Impression:  1. Left breast 01:00 o'clock axis mass  corresponds to the area of palpable concern. BI-RADS 4: Suspicious. Recommend ultrasound-guided biopsy.      2. Mild left axillary adenopathy. BI-RADS 4: Suspicious. Recommend ultrasound-guided biopsy for the most abnormal node.      Both biopsies will be performed today.     BI-RADS Category:   Overall: 4 - Suspicious     Recommendation:  1. Ultrasound-guided biopsy for the left breast 01:00 o'clock axis mass.   2. Ultrasound-guided biopsy for the most abnormal left axillary node.     U/S (5/11/20)  1. Left breast 01:00 o'clock axis mass corresponds to the area of palpable concern. BI-RADS 4: Suspicious. Recommend ultrasound-guided biopsy.      2. Mild left axillary adenopathy. BI-RADS 4: Suspicious. Recommend ultrasound-guided biopsy for the most abnormal node.       ASSESSMENT:  Ms. Michelle Gage is a 34 y.o. year old female with a new left breast 1.4cm grade 3 ER +/ID+/Ghu1hxo positive invasive ductal carcinoma; here today to discuss treatment options including chemotherapy, radiation, and surgical options lumpectomy versus mastectomy.     Plan:    Options for management were discussed with the patient and her family. We reviewed the existing data noting the equivalency of breast conserving surgery with radiation therapy and mastectomy. We also reviewed the guidelines of the National Comprehensive Cancer Network for Stage 1 breast carcinoma. We discussed the need for lumpectomy margins to be negative for carcinoma, the necessity for postoperative radiation therapy after breast conservation in most cases, the possibility of a failed or false negative sentinel lymph node biopsy and the potential need for complete lymphadenectomy for a failed or positive sentinel lymph node biopsy were fully discussed. In the setting of mastectomy, delayed or immediate reconstruction options are available and were discussed.     In the setting of lumpectomy, radiation therapy would be recommended majority of the time.  The  duration and treatment side effects were discussed with the patient.  This will coordinated with the radiation oncologist pending final pathology.    We also discussed the role of systemic therapy in the treatment of early stage breast cancer.  We discussed that this is based on tumor biology and aamir status and will be determined based on final pathology.  We discussed that if the cancer is hormone positive, endocrine therapy would be recommended in most cases and its use can reduce the risk of recurrence as well as improve survival. Side effects of treatment were briefly discussed. We also discussed the potential role for chemotherapy based on a number of factors such as tumor phenotype (ER+ vs. triple negative vs. Cwn9xox+) and this would be determined in coordination with the medical oncologist.    Patient was educated on breast cancer, receptors, wire localization lumpectomy, mastectomy, sentinel lymph node mapping and biopsy, axillary lymph node dissection, reconstruction, breast prosthesis with post-mastectomy bra and radiation therapy. Patient was given patient information binder including [facility] breast cancer treatment brochure.  All her questions were answered.    Total time spent with the patient: 60 minutes.  45 minutes of face to face consultation and 15 minutes of chart review and coordination of care.        - Referral to Dr. Salazar to discuss chemotherapy (possible neoadjuvant) and endocrine therapy  - Discussed fertility options --she is interested.  - Genetics today  - Breast MRI    I do lean towards neoadjuvant for her given the location, I think it will help improve margins.

## 2020-05-15 ENCOUNTER — OFFICE VISIT (OUTPATIENT)
Dept: HEMATOLOGY/ONCOLOGY | Facility: CLINIC | Age: 35
End: 2020-05-15
Payer: COMMERCIAL

## 2020-05-15 ENCOUNTER — TELEPHONE (OUTPATIENT)
Dept: HEMATOLOGY/ONCOLOGY | Facility: CLINIC | Age: 35
End: 2020-05-15

## 2020-05-15 DIAGNOSIS — Z17.0 MALIGNANT NEOPLASM OF UPPER-OUTER QUADRANT OF LEFT BREAST IN FEMALE, ESTROGEN RECEPTOR POSITIVE: ICD-10-CM

## 2020-05-15 DIAGNOSIS — C50.912 INFILTRATING DUCTAL CARCINOMA OF LEFT BREAST: Primary | ICD-10-CM

## 2020-05-15 DIAGNOSIS — T45.1X5A CHEMOTHERAPY INDUCED NEUTROPENIA: ICD-10-CM

## 2020-05-15 DIAGNOSIS — D70.1 CHEMOTHERAPY INDUCED NEUTROPENIA: ICD-10-CM

## 2020-05-15 DIAGNOSIS — T45.1X5A CHEMOTHERAPY-INDUCED NAUSEA: ICD-10-CM

## 2020-05-15 DIAGNOSIS — C50.412 MALIGNANT NEOPLASM OF UPPER-OUTER QUADRANT OF LEFT BREAST IN FEMALE, ESTROGEN RECEPTOR POSITIVE: ICD-10-CM

## 2020-05-15 DIAGNOSIS — R11.0 CHEMOTHERAPY-INDUCED NAUSEA: ICD-10-CM

## 2020-05-15 DIAGNOSIS — Z51.11 ENCOUNTER FOR CHEMOTHERAPY MANAGEMENT: ICD-10-CM

## 2020-05-15 DIAGNOSIS — E28.39 SUPPRESSION OF OVARIAN SECRETION: ICD-10-CM

## 2020-05-15 PROCEDURE — 99205 OFFICE O/P NEW HI 60 MIN: CPT | Mod: 95,,, | Performed by: INTERNAL MEDICINE

## 2020-05-15 PROCEDURE — 99205 PR OFFICE/OUTPT VISIT, NEW, LEVL V, 60-74 MIN: ICD-10-PCS | Mod: 95,,, | Performed by: INTERNAL MEDICINE

## 2020-05-15 RX ORDER — ONDANSETRON HYDROCHLORIDE 8 MG/1
8 TABLET, FILM COATED ORAL EVERY 8 HOURS PRN
Qty: 30 TABLET | Refills: 2 | Status: SHIPPED | OUTPATIENT
Start: 2020-05-15 | End: 2022-06-22

## 2020-05-15 RX ORDER — DEXAMETHASONE 4 MG/1
TABLET ORAL
Qty: 32 TABLET | Refills: 0 | Status: SHIPPED | OUTPATIENT
Start: 2020-05-15 | End: 2020-09-08 | Stop reason: SDUPTHER

## 2020-05-15 NOTE — PROGRESS NOTES
History:     Reason For Consultation:   1. Stage I ([id number]) invasive ductal carcinoma of left breast, upper outer quadrant, ER 95%, IL 90%, Her2 3+, Grade 3, Ki67 25%    Referring Provider:   Jessica Dumont MD  [address] [facility] [place] [address]    Records Obtained: Records of the patients history including those obtained from the referring provider were reviewed and summarized in detail.    HPI:   Michelle Gage presents for consultation breast cancer. She is premenopausal. Her oncologic history is detailed below. Today, she feels well without new concerns. She is interested in egg harvesting but doesn't want it to significantly delay chemo. She has MRI scheduled. No bony pain, shortness of breath. Is an  and has some flexible scheduling available. Discussed case with Dr Dumont yesterday.     Oncologic History:  Presentation  - [id number] - presented for palpable left breast mass around 2018 - was being watched on imaging at outside hospital.    - 5/11/2020 - Diagnostic bilateral mammogram and left breast US at [facility] showed left breast 1:00 mass, 1.4 cm, 8 CFN, mild left axillary adenopathy  - 5/11/2020 - Biopsy - Grade 3 IDC, estrogen receptor 95%, progesterone receptor 90%, Her2 delphine 3+, Ki67 25%. LN biopsy negative  Surgery consultation with Dr Dumont on 5/14. Breast cancer is far in axillary tail and felt to be difficult to obtain clear margins without neoadjuvant therapy.    - 5/14/2020 - Genetics [employer] [facility] ELLISONAbielis pending   Medical oncology consultation on 5/15/2020 -  This case was discussed with Dr. Dumont.  She does feel like the patient will benefit from neoadjuvant therapy to help improve her surgical margins as patient is wanting mastectomy.  Since the tumor is less than 2 cm and clinically lymph node negative (MRI pending), I recommend treatment with Taxotere, carboplatin and Herceptin without Perjeta.  Discussed with her  that there is data in tumors less than 2 cm to consider Taxol/Herceptin however would not typically use this in a neoadjuvant setting for concern for under treatment.         Past Medical   No past medical history on file.  Patient Active Problem List   Diagnosis    Upper abdominal pain    Malignant neoplasm of upper-outer quadrant of left breast in female, estrogen receptor positive    Chemotherapy-induced nausea    Chemotherapy induced neutropenia    Encounter for chemotherapy management     Social History   Social History     Socioeconomic History    Marital status:      Spouse name: Not on file    Number of children: Not on file    Years of education: Not on file    Highest education level: Not on file   Occupational History    Occupation:    Social Needs    Financial resource strain: Not on file    Food insecurity:     Worry: Not on file     Inability: Not on file    Transportation needs:     Medical: Not on file     Non-medical: Not on file   Tobacco Use    Smoking status: Light Tobacco Smoker    Smokeless tobacco: Never Used   Substance and Sexual Activity    Alcohol use: Yes     Comment: socially    Drug use: No    Sexual activity: Yes     Partners: Male     Birth control/protection: None, Other-see comments     Comment: spouse   Lifestyle    Physical activity:     Days per week: Not on file     Minutes per session: Not on file    Stress: Not on file   Relationships    Social connections:     Talks on phone: Not on file     Gets together: Not on file     Attends Gnosticist service: Not on file     Active member of club or organization: Not on file     Attends meetings of clubs or organizations: Not on file     Relationship status: Not on file   Other Topics Concern    Not on file   Social History Narrative    Not on file     Family History  Family History   Problem Relation Age of Onset    Osteoporosis Mother     No Known Problems Father     Alzheimer's disease  Maternal Grandmother     Diabetes Maternal Grandfather     Cancer Paternal Grandmother 55        breast CA    Breast cancer Paternal Grandmother     Diabetes Paternal Grandfather      Medications    Current Outpatient Medications:     ATRALIN 0.05 % gel, , Disp: , Rfl:     doxycycline (VIBRAMYCIN) 100 MG Cap, Take 100 mg by mouth daily as needed., Disp: , Rfl:     norgestrel-ethinyl estradiol (LO/OVRAL) 0.3-30 mg-mcg per tablet, Take 1 tablet by mouth once daily., Disp: , Rfl:     sulfacetamide sodium-sulfur 8-4 % Susp, , Disp: , Rfl:     valACYclovir (VALTREX) 1000 MG tablet, Take 1,000 mg by mouth 2 (two) times daily., Disp: , Rfl:     valacyclovir (VALTREX) 500 MG tablet, Take 1 pill twice daily x 3 days as needed for fever blister., Disp: 30 tablet, Rfl: 0  Allergies  Review of patient's allergies indicates:  No Known Allergies  Review of Systems  Review of Systems   Constitutional: Negative for activity change, appetite change, chills, fatigue, fever and unexpected weight change.   HENT: Negative for congestion, hearing loss, nosebleeds, sinus pressure and trouble swallowing.    Eyes: Negative for pain, discharge and itching.   Respiratory: Negative for cough, chest tightness and shortness of breath.    Cardiovascular: Negative for chest pain, palpitations and leg swelling.   Gastrointestinal: Negative for abdominal distention, abdominal pain, blood in stool, diarrhea, nausea, rectal pain and vomiting.   Endocrine: Negative for heat intolerance and polydipsia.   Genitourinary: Negative for difficulty urinating, dysuria, flank pain, frequency, hematuria and urgency.   Musculoskeletal: Negative for arthralgias, back pain and neck pain.   Skin: Negative for color change, pallor and rash.   Neurological: Negative for dizziness, numbness and headaches.   Hematological: Negative for adenopathy. Does not bruise/bleed easily.   Psychiatric/Behavioral: Negative for confusion and decreased concentration. The  patient is not nervous/anxious.         Objective     Objective:     There were no vitals filed for this visit.  There is no height or weight on file to calculate BSA.  Physical Exam    Breast exam by Dr Dumont on 5/14/2020 -   Breast: 1.5 cm firm, mobile mass at the UOQ of the left breast.  No right breast masses. Normal color and contour of right and left breast.  No nipple inversion or discharge bilaterally.    Lymph: No palpable adjacent axillary lymph nodes.      Labs and Imaging  Results for orders placed or performed during the hospital encounter of 05/11/20   Specimen to Pathology, Radiology Breast, needle biopsy (Left breast)   Result Value Ref Range    Final Pathologic Diagnosis       1.  BREAST, LEFT, 01:00, 8 CM FROM NIPPLE, MASS, ULTRASOUND-GUIDED BIOPSY:  - Invasive ductal carcinoma of breast.  - Size of invasive carcinoma:  8 MM in greatest linear dimension within a  single core biopsy fragment.  - Ciara Histologic Score: Grade 3 of 3.                    Tubule Formation:  3                    Nuclear Pleomorphism:  3                    Mitotic Activity:  3  - No definitive in-situ component identified.  - No lymphovascular invasion.  - Microcalcifications:  Not identified.  - Breast biomarkers are pending and the findings will be included in a  supplemental report.  2.  AXILLARY LYMPH NODE, LEFT, ULTRASOUND-GUIDED BIOPSY:  - Negative for metastatic carcinoma.  - Benign lymphoid tissue with reactive changes.      Supplemental Diagnosis       BREAST BIOMARKERS:  ER:  Positive (95% of tumor cells demonstrating moderate to strong nuclear  immunoreactivity).  DE:  Positive (90% of tumor cells demonstrating moderate nuclear  immunoreactivity).  HER2 IHC:  Positive (3+).  Ki-67 proliferative index:  25%.  Immunohistochemical staining is interpreted in the setting of appropriate  positive and negative controls.      Gross       Received are 2 containers:  Container Label and Clinic/AP Number:   "[id number]  Part 1  Received in formalin labeled "left breast" is a white Telfa pad containing  approximately 5 tan-white, 0.2 cm-diameter fibrous needle cores ranging from  0.1-1.2 cm in length.  Entirely submitted in 65852-9.  Part 2  Received in formalin labeled "left axilla" is a white Telfa pad containing 4  pink-tan to tan-yellow, 0.1 cm-diameter fibrofatty needle cores ranging from  0.3-0.6 cm in length.  Entirely submitted in 50932-9.  Cold ischemic time:  6 min  Gross by: Barbi Monreal      Comment       Dr. JASON La has reviewed the case and concurs with the diagnosis.    Disclaimer       ER immunohistochemical staining (clone SP1, DAB detection method) is  performed on formalin-fixed, parafin embedded tissue sections. The percentage  of cell nuclei stained and the strength of staining is reported (weak,  moderate, strong), using the 2010 ACSO/CAP scoring guidelines. Tumors used  for determing predictive markers are fixed 10% buffered formalin for 6-72  hours. It has been cleared by the [place].S. FDA for use as an IVD test. This assay  has not been validated decalcified tissues. Results should be interpreted  with caution given the likelihood of falst negativity on decalcified  specimens.  HER-2/delphine IHC (4B5) clone, DAB detection method) is done on 10% buffered  formalin-fixed (for 6-72 hrs), paraffin embedded tissue sections. The scoring  is completed on a 4-tiered scoring system of membrane staining using the 2014  ASCO/CAP scoring guidelines.. It has been cleared by the [place] FDA for [place] as  an IVD test. This assay has not been validated on decalcified tissues.  Results sh ould be interpreted with caution given the likelihood of false  negativity on decalcified specimens..  PgR immunohistochemical staining (clone 1E2, DAB detection method) is  performed on formalin-fixed, parafin embedded tissue sections. The percentage  of cell nuclei stained and the strength of staining is report (weak,  moderate, strong), " using 2010 ASCO/CAP scoring guidelines. Tumors used for  determing predictive markers are fixed in 10% neutral buffered formalin for  6-72 hours. It has been cleared by the [place] FDA for use as an IVD test. This  assay has not octavio valided on decalcified tissues. Results should be  interpreted with caution given the likelihood of false negativity on  decalfified specimens.  ER immunohistochemical staining (clone SP1, DAB detection method) is  performed on formalin-fixed, parafin embedded tissue sections. The percentage  of cell nuclei stained and the strength of staining is reported (weak,  moderate, strong), using the 2010 ACSO/CAP scoring guidelines.  Tumors used  for determing predictive markers are fixed 10% buffered formalin for 6-72  hours. It has been cleared by the [place].S. FDA for use as an IVD test. This assay  has not been validated decalcified tissues. Results should be interpreted  with caution given the likelihood of falst negativity on decalcified  specimens.  HER-2/delphine IHC (4B5) clone, DAB detection method) is done on 10% buffered  formalin-fixed (for 6-72 hrs), paraffin embedded tissue sections. The scoring  is completed on a 4-tiered scoring system of membrane staining using the 2014  ASCO/CAP scoring guidelines.. It has been cleared by the [place] FDA for [place] as  an IVD test. This assay has not been validated on decalcified tissues.  Results should be interpreted with caution given the likelihood of false  negativity on decalcified specimens..  PgR immunohistochemical staining (clone 1E2, DAB detection method) is  performed on formalin-fixed, parafin embedded tissue sections. The percentage  of cell nuclei stained and the  strength of staining is report (weak,  moderate, strong), using 2010 ASCO/CAP scoring guidelines. Tumors used for  determing predictive markers are fixed in 10% neutral buffered formalin for  6-72 hours. It has been cleared by the [place].S. FDA for use as an IVD test. This  assay has not octavio  valided on decalcified tissues. Results should be  interpreted with caution given the likelihood of false negativity on  decalfified specimens.         Breast pathology and imaging as above.     Assessment     Assessment:     1. Stage I ([id number]) invasive ductal carcinoma of left breast, upper outer quadrant, ER 95%, DE 90%, Her2 3+, Grade 3, Ki67 25%   * Genetics pending   * We discussed the prognosis for her breast cancer, particularly in terms of risks of local and distant recurrences. We reviewed treatment recommendations as detailed below.    * Preservation of fertility desired. Need for contraception during chemotherapy discussed. Urine pregnancy test prior to chemo.   * This case was discussed with Dr. Dumont.  She does feel like the patient will benefit from neoadjuvant therapy to help improve her surgical margins as patient is wanting mastectomy.  Since the tumor is less than 2 cm and clinically lymph node negative (MRI pending), I recommend treatment with Taxotere, carboplatin and Herceptin without Perjeta.  I did discuss with her that there is data in tumors less than 2 cm to consider Taxol/Herceptin however would not typically use this in a neoadjuvant setting for concern for under treatment.     Plan:     1. Echo  2. Port - IR  3. Chemo education and prior auth for Taxotere/Carboplatin/herceptin  4. Antiemetics/steroids to pharmacy  5. Fertility - referral to Dr Weston with [facility]  6. Start zoladex 1 wk before chemo starts but after egg preservation  7. Follow up MRI breast      Follow-up in with me to start neoadjuvant TCH. I asked the patient to call for any questions, concerns, or new symptoms.    The patient location is: home  The chief complaint leading to consultation is: breast ca  Visit type: audiovisual  Total time spent with patient: 35 min with additional 45 min in coordination of care.   Each patient to whom he or she provides medical services by telemedicine is:  (1) informed of  the relationship between the physician and patient and the respective role of any other health care provider with respect to management of the patient; and (2) notified that he or she may decline to receive medical services by telemedicine and may withdraw from such care at any time.

## 2020-05-15 NOTE — PLAN OF CARE
START OFF PATHWAY REGIMEN - Breast            Trastuzumab-xxxx       Trastuzumab-xxxx       Docetaxel (Taxotere)       Carboplatin (Paraplatin)           Additional Orders: Premedicate with dexamethasone 8 mg PO bid for three   days beginning 1 day prior to therapy  * All AUC calculations intended to be   used in Dunbar formula.    **Always confirm dose/schedule in your pharmacy ordering system**    Patient Characteristics:  Preoperative or Nonsurgical Candidate (Clinical Staging), Neoadjuvant Therapy   followed by Surgery, Invasive Disease, Chemotherapy, HER2 Positive, ER Positive  Therapeutic Status: Preoperative or Nonsurgical Candidate (Clinical Staging)  AJCC M Category: cM0  AJCC Grade: G3  Breast Surgical Plan: Neoadjuvant Therapy followed by Surgery  ER Status: Positive (+)  AJCC 8 Stage Grouping: IA  HER2 Status: Positive (+)  AJCC T Category: cT1c  AJCC N Category: cN0  NC Status: Positive (+)  Intent of Therapy:  Curative Intent, Discussed with Patient

## 2020-05-15 NOTE — TELEPHONE ENCOUNTER
Call to [facility] for appt with Dr. Weston.   Records faxed.   [facility] to call pt to schedule pt and will f/u with our office with time/date.        ----- Message from Telma Cortez sent at 5/15/2020  9:04 AM CDT -----  Echo   Port - IR   Chemo education and prior auth for Taxotere/Carboplatin/herceptin   Fertility - referral to Dr Weston with [facility] - please call today   Start zoladex 1 wk before chemo starts but after egg preservation     Appt with me on first day of chemo     Cbc, cmp today and then first day of chemo

## 2020-05-15 NOTE — Clinical Note
Marcelino - IRChemo education and prior auth for Taxotere/Carboplatin/herceptinFertility - referral to Dr Weston with [facility] - please call todayStart zoladex 1 wk before chemo starts but after egg preservationAppt with me on first day of chemo Cbc, cmp today and then first day of chemo

## 2020-05-15 NOTE — LETTER
May 15, 2020      Jessica Dumont MD  [address]  [facility] [address]           [place] H[facility]ology [facility]cology  [address]  [facility] [address]  Phone: [phone]          Patient: Michelle Gage   MR Number: [id number]   YOB: 1985   Date of Visit: 5/15/2020       Dear Dr. Jessica Dumont:    Thank you for referring Michelle Gage to me for evaluation. Attached you will find relevant portions of my assessment and plan of care.    If you have questions, please do not hesitate to call me. I look forward to following Michelle Gage along with you.    Sincerely,    Christy Salazar MD    Enclosure  CC:  No Recipients    If you would like to receive this communication electronically, please contact [email or web address] or [phone] to request more information on [employer] Link access.    For providers and/or their staff who would like to refer a patient to [facility], please contact us through our one-stop-shop provider referral line, [facility], at [phone].    If you feel you have received this communication in error or would no longer like to receive these types of communications, please e-mail [email or web address]

## 2020-05-18 ENCOUNTER — HOSPITAL ENCOUNTER (OUTPATIENT)
Dept: CARDIOLOGY | Facility: CLINIC | Age: 35
Discharge: HOME | End: 2020-05-18
Attending: INTERNAL MEDICINE
Payer: COMMERCIAL

## 2020-05-18 ENCOUNTER — CLINICAL SUPPORT (OUTPATIENT)
Dept: HEMATOLOGY/ONCOLOGY | Facility: CLINIC | Age: 35
End: 2020-05-18
Payer: COMMERCIAL

## 2020-05-18 ENCOUNTER — HOSPITAL ENCOUNTER (OUTPATIENT)
Dept: RADIOLOGY | Facility: HOSPITAL | Age: 35
Discharge: HOME | End: 2020-05-18
Attending: PHYSICIAN ASSISTANT
Payer: COMMERCIAL

## 2020-05-18 VITALS
SYSTOLIC BLOOD PRESSURE: 100 MMHG | HEART RATE: 68 BPM | HEIGHT: 66 IN | BODY MASS INDEX: 17.68 KG/M2 | WEIGHT: 110 LBS | DIASTOLIC BLOOD PRESSURE: 60 MMHG

## 2020-05-18 DIAGNOSIS — C50.412 MALIGNANT NEOPLASM OF UPPER-OUTER QUADRANT OF LEFT BREAST IN FEMALE, ESTROGEN RECEPTOR POSITIVE: ICD-10-CM

## 2020-05-18 DIAGNOSIS — Z17.0 MALIGNANT NEOPLASM OF UPPER-OUTER QUADRANT OF LEFT BREAST IN FEMALE, ESTROGEN RECEPTOR POSITIVE: ICD-10-CM

## 2020-05-18 DIAGNOSIS — C50.912 INFILTRATING DUCTAL CARCINOMA OF LEFT BREAST: ICD-10-CM

## 2020-05-18 DIAGNOSIS — Z51.11 ENCOUNTER FOR CHEMOTHERAPY MANAGEMENT: ICD-10-CM

## 2020-05-18 DIAGNOSIS — C50.912 INFILTRATING DUCTAL CARCINOMA OF LEFT BREAST: Primary | ICD-10-CM

## 2020-05-18 LAB
ASCENDING AORTA: 2.35 CM
AV INDEX (PROSTH): 0.84
AV MEAN GRADIENT: 3 MMHG
AV PEAK GRADIENT: 5 MMHG
AV VALVE AREA: 2.41 CM2
AV VELOCITY RATIO: 0.84
BSA FOR ECHO PROCEDURE: 1.52 M2
CV ECHO LV RWT: 0.3 CM
DOP CALC AO PEAK VEL: 1.07 M/S
DOP CALC AO VTI: 22.7 CM
DOP CALC LVOT AREA: 2.9 CM2
DOP CALC LVOT DIAMETER: 1.91 CM
DOP CALC LVOT PEAK VEL: 0.9 M/S
DOP CALC LVOT STROKE VOLUME: 54.76 CM3
DOP CALCLVOT PEAK VEL VTI: 19.12 CM
E WAVE DECELERATION TIME: 157.33 MSEC
E/A RATIO: 1.51
E/E' RATIO: 5.7 M/S
ECHO LV POSTERIOR WALL: 0.67 CM (ref 0.6–1.1)
FRACTIONAL SHORTENING: 25 % (ref 28–44)
INTERVENTRICULAR SEPTUM: 0.6 CM (ref 0.6–1.1)
LA MAJOR: 3.92 CM
LA MINOR: 3.9 CM
LA WIDTH: 3.66 CM
LEFT ATRIUM SIZE: 2.82 CM
LEFT ATRIUM VOLUME INDEX: 22.1 ML/M2
LEFT ATRIUM VOLUME: 34.3 CM3
LEFT INTERNAL DIMENSION IN SYSTOLE: 3.28 CM (ref 2.1–4)
LEFT VENTRICLE DIASTOLIC VOLUME INDEX: 56.58 ML/M2
LEFT VENTRICLE DIASTOLIC VOLUME: 87.77 ML
LEFT VENTRICLE MASS INDEX: 52 G/M2
LEFT VENTRICLE SYSTOLIC VOLUME INDEX: 28.1 ML/M2
LEFT VENTRICLE SYSTOLIC VOLUME: 43.57 ML
LEFT VENTRICULAR INTERNAL DIMENSION IN DIASTOLE: 4.4 CM (ref 3.5–6)
LEFT VENTRICULAR MASS: 81.39 G
LV LATERAL E/E' RATIO: 4.81 M/S
LV SEPTAL E/E' RATIO: 7 M/S
MV PEAK A VEL: 0.51 M/S
MV PEAK E VEL: 0.77 M/S
PISA TR MAX VEL: 1.97 M/S
PULM VEIN S/D RATIO: 1.14
PV PEAK D VEL: 0.42 M/S
PV PEAK S VEL: 0.48 M/S
RA MAJOR: 3.86 CM
RA PRESSURE: 3 MMHG
RA WIDTH: 3.17 CM
RETIRED EF AND QEF - SEE NOTES: 60 %
RIGHT VENTRICULAR END-DIASTOLIC DIMENSION: 2.92 CM
RV TISSUE DOPPLER FREE WALL SYSTOLIC VELOCITY 1 (APICAL 4 CHAMBER VIEW): 12.33 CM/S
SINUS: 2.77 CM
STJ: 2.33 CM
TDI LATERAL: 0.16 M/S
TDI SEPTAL: 0.11 M/S
TDI: 0.14 M/S
TR MAX PG: 16 MMHG
TRICUSPID ANNULAR PLANE SYSTOLIC EXCURSION: 2.12 CM
TV REST PULMONARY ARTERY PRESSURE: 19 MMHG

## 2020-05-18 PROCEDURE — 93306 TTE W/DOPPLER COMPLETE: CPT | Mod: 26,,, | Performed by: INTERNAL MEDICINE

## 2020-05-18 PROCEDURE — 93306 TTE W/DOPPLER COMPLETE: CPT

## 2020-05-18 PROCEDURE — 25500020 PHARM REV CODE 255: Performed by: PHYSICIAN ASSISTANT

## 2020-05-18 PROCEDURE — A9577 INJ MULTIHANCE: HCPCS | Performed by: PHYSICIAN ASSISTANT

## 2020-05-18 PROCEDURE — 93356 MYOCRD STRAIN IMG SPCKL TRCK: CPT

## 2020-05-18 PROCEDURE — 77049 MRI BREAST C-+ W/CAD BI: CPT | Mod: TC

## 2020-05-18 PROCEDURE — 93356 PR IMG, MYOCARDIAL STRAIN, SPECKLE TRACKING: ICD-10-PCS | Mod: ,,, | Performed by: INTERNAL MEDICINE

## 2020-05-18 PROCEDURE — 93306 ECHO (CUPID ONLY): ICD-10-PCS | Mod: 26,,, | Performed by: INTERNAL MEDICINE

## 2020-05-18 PROCEDURE — 77049 MRI BREAST C-+ W/CAD BI: CPT | Mod: 26,,, | Performed by: RADIOLOGY

## 2020-05-18 PROCEDURE — 93356 MYOCRD STRAIN IMG SPCKL TRCK: CPT | Mod: ,,, | Performed by: INTERNAL MEDICINE

## 2020-05-18 PROCEDURE — 77049 MRI BREAST W/WO CONTRAST, W/CAD, BILATERAL: ICD-10-PCS | Mod: 26,,, | Performed by: RADIOLOGY

## 2020-05-18 PROCEDURE — 99999 PR PBB SHADOW E&M-EST. PATIENT-LVL I: CPT | Mod: PBBFAC,,,

## 2020-05-18 PROCEDURE — 99999 PR PBB SHADOW E&M-EST. PATIENT-LVL I: ICD-10-PCS | Mod: PBBFAC,,,

## 2020-05-18 RX ADMIN — GADOBENATE DIMEGLUMINE 11 ML: 529 INJECTION, SOLUTION INTRAVENOUS at 01:05

## 2020-05-18 NOTE — NURSING
Oncology Navigation   Intake  Date of Diagnosis: 05/11/20  Cancer Type: Breast  MD Assigned: Dr.Blakely Salazar  Internal / External Referral: Internal  Initial Nurse Navigator Contact: 05/13/20  Diagnosis to Initial Contact Timeline (days): 2 days  Contact Method: Phone  Date Worked: 05/13/20  First Appointment Available: 05/14/20  Appointment Date: 05/14/20  Schedule to Appointment Timeline (days): 1  First Available Date vs. Scheduled Date (days): 0     Treatment  Current Status: Active  Treatment Type(s): Chemotherapy  Chemotherapy Regimen: Taxotere, Carboplatin, Herceptin       Procedures: Port / PICC;MRI  Biopsy Schedule Date: 05/11/20  Genetic Test Schedule Date: 05/14/20  MRI Schedule Date: 05/18/20    General Referrals: Fertility specialist    ER: Positive  MS: Positive  Her2: Postive     Acuity  Stage: 1  Systemic Treatment - predicted or initiated: Chemotherapy regimen with multiple drugs (+1)  Treatment Tolerability: Has not started treatment yet/treatment fully completed and side effects resolved  Psychological Factors (+1 each): Emotional during conversation  Navigation Acuity: 3     Follow Up  Follow up in about 2 weeks (around 6/1/2020) for check in.

## 2020-05-18 NOTE — PROGRESS NOTES
Chemotherapy Education    Michelle Gage attended chemotherapy class at [facility] by herself.  We reviewed the [facility] chemotherapy education power point in detail. She watched the video presentation. Topics discussed include: infection prevention, contraception, proper hydration, food safety, good nutrition, exercise, frequency of regimen & length of infusion. Patient was also educated on most common side effects including hair loss, nausea, vomiting, diarrhea, abnormal blood counts, fatigue, peripheral neuropathy & allergic reaction. She was given an [facility] patient binder with specific drug handouts from [employer]. She was allowed time to ask questions & all questions were answered. I informed her of all of our supportive services available including the dietitian, , psychologist, financial counselor,& nurse navigators. Maximum support offered. Verified that pt was given steroid prescription, antiemetics, & lidocaine cream for port & that she understands instructions for use. She was also given information on the [facility] cold cap. Provided her with my direct contact information should she have any future questions, needs, or concerns. Lastly, offered her information for online/virtual support groups that are available. Approximate length of appt one hour.

## 2020-05-19 ENCOUNTER — TELEPHONE (OUTPATIENT)
Dept: INTERVENTIONAL RADIOLOGY/VASCULAR | Facility: HOSPITAL | Age: 35
End: 2020-05-19

## 2020-05-19 DIAGNOSIS — Z17.0 MALIGNANT NEOPLASM OF UPPER-OUTER QUADRANT OF LEFT BREAST, ESTROGEN RECEPTOR POSITIVE: Primary | ICD-10-CM

## 2020-05-19 DIAGNOSIS — C50.412 MALIGNANT NEOPLASM OF UPPER-OUTER QUADRANT OF LEFT BREAST, ESTROGEN RECEPTOR POSITIVE: Primary | ICD-10-CM

## 2020-05-21 ENCOUNTER — TELEPHONE (OUTPATIENT)
Dept: HEMATOLOGY/ONCOLOGY | Facility: CLINIC | Age: 35
End: 2020-05-21

## 2020-05-21 ENCOUNTER — PATIENT MESSAGE (OUTPATIENT)
Dept: HEMATOLOGY/ONCOLOGY | Facility: CLINIC | Age: 35
End: 2020-05-21

## 2020-05-21 DIAGNOSIS — F41.9 ANXIETY: Primary | ICD-10-CM

## 2020-05-21 DIAGNOSIS — R45.89 ANXIETY ABOUT HEALTH: Primary | ICD-10-CM

## 2020-05-21 DIAGNOSIS — Z17.0 MALIGNANT NEOPLASM OF UPPER-OUTER QUADRANT OF LEFT BREAST IN FEMALE, ESTROGEN RECEPTOR POSITIVE: Primary | ICD-10-CM

## 2020-05-21 DIAGNOSIS — C50.412 MALIGNANT NEOPLASM OF UPPER-OUTER QUADRANT OF LEFT BREAST IN FEMALE, ESTROGEN RECEPTOR POSITIVE: Primary | ICD-10-CM

## 2020-05-21 DIAGNOSIS — R45.89 ANXIETY ABOUT HEALTH: ICD-10-CM

## 2020-05-21 RX ORDER — ALPRAZOLAM 0.5 MG/1
0.5 TABLET ORAL 3 TIMES DAILY PRN
Qty: 30 TABLET | Refills: 0 | Status: SHIPPED | OUTPATIENT
Start: 2020-05-21 | End: 2020-08-24 | Stop reason: SDUPTHER

## 2020-05-21 NOTE — TELEPHONE ENCOUNTER
"Returned call to pt.   Pt stated Dr. Weston' office just faxed over referral form for assistance for fertility payments.   Informed pt would monitor for fax and have completed.  Pt verbalized understanding.     ----- Message from Shannon Hunt sent at 5/21/2020 10:08 AM CDT -----  Patient Assist    Name of caller: Michelle   Provider name: Martin Harrison MD   Contact Preference:  [phone]  Is this regarding current patient or new patient?:  What is the nature of the call?    Form faxed from [facility]/ Dr. Weston today, and   pt states that its important that its returned and if possible to status her.      Additional Notes:   "Thank you for all that you do for our patients'"       "

## 2020-05-21 NOTE — TELEPHONE ENCOUNTER
"Returned call to pt.   Pt stated that she is very overwhelmed with new dx and asked if Dr. Salazar can send in rx for anxiety.   Informed pt that dept has 2 [facility] psych[facility] and asked if pt agreeable to referral- pt agreeable.   Informed pt would also send message to Dr. Salazar's NP to see if willing to send in rx to help as well.   Informed pt that has not received her paperwork but would continue to monitor for- pt stated if not received she will come drop off in clinic; nurse provided dept location.   Pt verbalized understanding.       Message fwd.           ----- Message from María Elena Nova sent at 5/21/2020 12:23 PM CDT -----  Contact: Michelle  Consult/Advisory:    Name Of Caller: Michelle  Provider Name: Dr. Salazar  Does patient feel the need to be seen today? No  Relationship to the Pt?: Self  Contact Preference?: [phone]  What is the nature of the call?:  Patient request a callback to discuss medication for anxiety    Additional Notes:  "Thank you for all that you do for our patients'"        "

## 2020-05-22 ENCOUNTER — TELEPHONE (OUTPATIENT)
Dept: OBSTETRICS AND GYNECOLOGY | Facility: CLINIC | Age: 35
End: 2020-05-22

## 2020-05-22 ENCOUNTER — PATIENT MESSAGE (OUTPATIENT)
Dept: HEMATOLOGY/ONCOLOGY | Facility: CLINIC | Age: 35
End: 2020-05-22

## 2020-05-22 DIAGNOSIS — Z13.9 SCREENING FOR CONDITION: Primary | ICD-10-CM

## 2020-05-22 NOTE — TELEPHONE ENCOUNTER
Patient phoned to discuss Dr. Warren's referrral and her role in supporting patient through Survivorship. Patient will begin egg harvesting 5/25/20 prior to chemotherapy under the care of Dr. Weston at [facility]. Patient offered an appt for 06/12/20 at 11am with Dr. Warren. Will discuss patient's case with Dr. Warren if earlier availability. PEYTON Deras.

## 2020-05-27 ENCOUNTER — PATIENT MESSAGE (OUTPATIENT)
Dept: SURGERY | Facility: CLINIC | Age: 35
End: 2020-05-27

## 2020-05-27 RX ORDER — FENTANYL CITRATE 50 UG/ML
50 INJECTION, SOLUTION INTRAMUSCULAR; INTRAVENOUS
Status: CANCELLED | OUTPATIENT
Start: 2020-05-27

## 2020-05-27 RX ORDER — MIDAZOLAM HYDROCHLORIDE 1 MG/ML
1 INJECTION INTRAMUSCULAR; INTRAVENOUS
Status: CANCELLED | OUTPATIENT
Start: 2020-05-27

## 2020-05-28 ENCOUNTER — HOSPITAL ENCOUNTER (OUTPATIENT)
Facility: HOSPITAL | Age: 35
Discharge: HOME | End: 2020-05-28
Attending: INTERNAL MEDICINE | Admitting: INTERNAL MEDICINE
Payer: COMMERCIAL

## 2020-05-28 VITALS
OXYGEN SATURATION: 100 % | SYSTOLIC BLOOD PRESSURE: 101 MMHG | HEART RATE: 74 BPM | BODY MASS INDEX: 18.33 KG/M2 | TEMPERATURE: 98 F | DIASTOLIC BLOOD PRESSURE: 56 MMHG | WEIGHT: 110 LBS | HEIGHT: 65 IN | RESPIRATION RATE: 17 BRPM

## 2020-05-28 DIAGNOSIS — Z17.0 MALIGNANT NEOPLASM OF UPPER-OUTER QUADRANT OF LEFT BREAST IN FEMALE, ESTROGEN RECEPTOR POSITIVE: ICD-10-CM

## 2020-05-28 DIAGNOSIS — C50.412 MALIGNANT NEOPLASM OF UPPER-OUTER QUADRANT OF LEFT BREAST IN FEMALE, ESTROGEN RECEPTOR POSITIVE: ICD-10-CM

## 2020-05-28 LAB
B-HCG UR QL: POSITIVE
BASOPHILS # BLD AUTO: 0.07 K/UL (ref 0–0.2)
BASOPHILS NFR BLD: 1 % (ref 0–1.9)
CTP QC/QA: YES
DIFFERENTIAL METHOD: ABNORMAL
EOSINOPHIL # BLD AUTO: 0.4 K/UL (ref 0–0.5)
EOSINOPHIL NFR BLD: 5.8 % (ref 0–8)
ERYTHROCYTE [DISTWIDTH] IN BLOOD BY AUTOMATED COUNT: 13.9 % (ref 11.5–14.5)
HCT VFR BLD AUTO: 45.3 % (ref 37–48.5)
HGB BLD-MCNC: 14.8 G/DL (ref 12–16)
IMM GRANULOCYTES # BLD AUTO: 0.01 K/UL (ref 0–0.04)
IMM GRANULOCYTES NFR BLD AUTO: 0.1 % (ref 0–0.5)
INR PPP: 2 (ref 0.8–1.2)
INTEGRATED BRAC ANALYSIS: NORMAL
LYMPHOCYTES # BLD AUTO: 3.5 K/UL (ref 1–4.8)
LYMPHOCYTES NFR BLD: 53.1 % (ref 18–48)
MCH RBC QN AUTO: 32 PG (ref 27–31)
MCHC RBC AUTO-ENTMCNC: 32.7 G/DL (ref 32–36)
MCV RBC AUTO: 98 FL (ref 82–98)
MONOCYTES # BLD AUTO: 0.5 K/UL (ref 0.3–1)
MONOCYTES NFR BLD: 7.2 % (ref 4–15)
NEUTROPHILS # BLD AUTO: 2.2 K/UL (ref 1.8–7.7)
NEUTROPHILS NFR BLD: 32.8 % (ref 38–73)
NRBC BLD-RTO: 0 /100 WBC
PLATELET # BLD AUTO: 122 K/UL (ref 150–350)
PMV BLD AUTO: 12.9 FL (ref 9.2–12.9)
PROTHROMBIN TIME: 19.5 SEC (ref 9–12.5)
RBC # BLD AUTO: 4.63 M/UL (ref 4–5.4)
WBC # BLD AUTO: 6.67 K/UL (ref 3.9–12.7)

## 2020-05-28 PROCEDURE — 63600175 PHARM REV CODE 636 W HCPCS: Performed by: RADIOLOGY

## 2020-05-28 PROCEDURE — 85610 PROTHROMBIN TIME: CPT

## 2020-05-28 PROCEDURE — 81025 URINE PREGNANCY TEST: CPT | Performed by: FAMILY MEDICINE

## 2020-05-28 PROCEDURE — 63600175 PHARM REV CODE 636 W HCPCS: Performed by: FAMILY MEDICINE

## 2020-05-28 PROCEDURE — 85025 COMPLETE CBC W/AUTO DIFF WBC: CPT

## 2020-05-28 PROCEDURE — 25000003 PHARM REV CODE 250: Performed by: FAMILY MEDICINE

## 2020-05-28 RX ORDER — LIDOCAINE HYDROCHLORIDE 5 MG/ML
INJECTION, SOLUTION INFILTRATION; PERINEURAL CODE/TRAUMA/SEDATION MEDICATION
Status: COMPLETED | OUTPATIENT
Start: 2020-05-28 | End: 2020-05-28

## 2020-05-28 RX ORDER — SODIUM CHLORIDE 9 MG/ML
500 INJECTION, SOLUTION INTRAVENOUS ONCE
Status: COMPLETED | OUTPATIENT
Start: 2020-05-28 | End: 2020-05-28

## 2020-05-28 RX ORDER — CEFAZOLIN SODIUM 1 G/3ML
1 INJECTION, POWDER, FOR SOLUTION INTRAMUSCULAR; INTRAVENOUS
Status: COMPLETED | OUTPATIENT
Start: 2020-05-28 | End: 2020-05-28

## 2020-05-28 RX ORDER — HEPARIN 100 UNIT/ML
SYRINGE INTRAVENOUS CODE/TRAUMA/SEDATION MEDICATION
Status: COMPLETED | OUTPATIENT
Start: 2020-05-28 | End: 2020-05-28

## 2020-05-28 RX ORDER — FENTANYL CITRATE 50 UG/ML
INJECTION, SOLUTION INTRAMUSCULAR; INTRAVENOUS CODE/TRAUMA/SEDATION MEDICATION
Status: COMPLETED | OUTPATIENT
Start: 2020-05-28 | End: 2020-05-28

## 2020-05-28 RX ORDER — MIDAZOLAM HYDROCHLORIDE 1 MG/ML
INJECTION INTRAMUSCULAR; INTRAVENOUS CODE/TRAUMA/SEDATION MEDICATION
Status: COMPLETED | OUTPATIENT
Start: 2020-05-28 | End: 2020-05-28

## 2020-05-28 RX ORDER — HEPARIN SODIUM 200 [USP'U]/100ML
INJECTION, SOLUTION INTRAVENOUS
Status: COMPLETED | OUTPATIENT
Start: 2020-05-28 | End: 2020-05-28

## 2020-05-28 RX ADMIN — MIDAZOLAM HYDROCHLORIDE 1 MG: 1 INJECTION, SOLUTION INTRAMUSCULAR; INTRAVENOUS at 09:05

## 2020-05-28 RX ADMIN — FENTANYL CITRATE 50 MCG: 50 INJECTION, SOLUTION INTRAMUSCULAR; INTRAVENOUS at 09:05

## 2020-05-28 RX ADMIN — SODIUM CHLORIDE 500 ML: 0.9 INJECTION, SOLUTION INTRAVENOUS at 08:05

## 2020-05-28 RX ADMIN — LIDOCAINE HYDROCHLORIDE 5 ML: 5 INJECTION, SOLUTION INFILTRATION; PERINEURAL at 09:05

## 2020-05-28 RX ADMIN — HEPARIN SODIUM (PORCINE) LOCK FLUSH IV SOLN 100 UNIT/ML 5 ML: 100 SOLUTION at 09:05

## 2020-05-28 RX ADMIN — CEFAZOLIN 1 G: 330 INJECTION, POWDER, FOR SOLUTION INTRAMUSCULAR; INTRAVENOUS at 08:05

## 2020-05-28 RX ADMIN — HEPARIN SODIUM IN SODIUM CHLORIDE 500 ML/HR: 200 INJECTION INTRAVENOUS at 09:05

## 2020-05-28 NOTE — PROGRESS NOTES
UPT had faint positive result, UPT repeated with same result, pt states she has not been sexually active in 3 months and was just started on fertility treatment last week, she also stated that she has a vaginal US last week with fertility MD and was not pregnant, notified IR MD and she stated she would discuss with patients fertility MD and let me know if she would like to draw a serum HCG level.

## 2020-05-28 NOTE — PROCEDURES
Radiology Post-Procedure Note    Pre Op Diagnosis: Breast cancer  Post Op Diagnosis: Same    Procedure: Port placement    Procedure performed by: Jorge Uribe MD    Written Informed Consent Obtained: Yes  Specimen Removed: NO  Estimated Blood Loss: Minimal    Findings:   R arm port placed without complication.  Total catheter length is 39cm.  No complications.  Port is ready for use.    Patient tolerated procedure well.    Jorge Uribe MD  Diagnostic and Interventional Radiologist  Department of Radiology  Pager: [phone]

## 2020-05-28 NOTE — PLAN OF CARE
Patient arrives to [facility]U bay 1.  Report received from PEYTON Gordon.  Patient awake, alert and oriented resting quietly in stretcher.  Site CDI.  Respirations even and unlabored, no apparent distress.  Stretcher locked, in lowest position, side rail up.  Patient placed on continuous cardiac, BP and pulse ox monitoring.  Plan of care and discharge criteria/plan reviewed with patient.  Denies needs at this time.  Will continue to monitor.

## 2020-05-28 NOTE — PLAN OF CARE
Port placement completed. Pt tolerated well. NAD noted or reported. Site dressed, CDI. Pt to recover in [facility], report given at the bedside. Family notified of d/c time.

## 2020-05-28 NOTE — PLAN OF CARE
PIV removed, bleeding controlled, pressure dressing placed.  Site CDI.  Patient calm and stable, denies needs.  Discharge instruction, and follow up care reviewed.  Patient verbalized understanding, and denies further questions.  Provided with [facility] and after hours number.  Patient awaiting transport via wheelchair.

## 2020-05-28 NOTE — PROGRESS NOTES
Patient spoke to NP in fertility clinic and was told that she received an HCG trigger injection 7 days ago and was told that would cause a false positive UPT, Dr. Antunez spoke to patient and the decision was made to proceed with procedure and that it was unnecessary to do HCG quantitative blood test, pt agreed with decision, pt will go to procedure soon.

## 2020-05-28 NOTE — H&P
Radiology History & Physical      SUBJECTIVE:     Chief Complaint: invasive ductal carcinoma of left breast     History of Present Illness:  Michelle Gage is a 34 y.o. female who presents for arm port placement for chemotherapy.    Past Medical History:   Diagnosis Date    Cancer 05/13/2020    Breast     Past Surgical History:   Procedure Laterality Date    BREAST SURGERY      Breast biopsy    WISDOM TOOTH EXTRACTION         Home Meds:   Prior to Admission medications    Medication Sig Start Date End Date Taking? Authorizing Provider   ALPRAZolam (XANAX) 0.5 MG tablet Take 1 tablet (0.5 mg total) by mouth 3 (three) times daily as needed for Insomnia or Anxiety. 5/21/20 5/21/21 Yes Tali Cueva, NP   ATRALIN 0.05 % gel  1/17/17  Yes Historical Provider, MD   valACYclovir (VALTREX) 1000 MG tablet Take 1,000 mg by mouth 2 (two) times daily.   Yes Historical Provider, MD   valacyclovir (VALTREX) 500 MG tablet Take 1 pill twice daily x 3 days as needed for fever blister. 3/22/17  Yes Gwen Garcia MD   dexAMETHasone (DECADRON) 4 MG Tab Two tabs by mouth twice daily day before and day after Taxotere. 5/15/20   Christy Salazar MD   doxycycline (VIBRAMYCIN) 100 MG Cap Take 100 mg by mouth daily as needed. 3/13/17   Historical Provider, MD   norgestrel-ethinyl estradiol (LO/OVRAL) 0.3-30 mg-mcg per tablet Take 1 tablet by mouth once daily.    Historical Provider, MD   ondansetron (ZOFRAN) 8 MG tablet Take 1 tablet (8 mg total) by mouth every 8 (eight) hours as needed for Nausea. 5/15/20   Christy Salazar MD   sulfacetamide sodium-sulfur 8-4 % Susp  3/21/17   Historical Provider, MD     Anticoagulants/Antiplatelets: no anticoagulation    Allergies: Review of patient's allergies indicates:  No Known Allergies  Sedation History:  no adverse reactions    Review of Systems:   Hematological: no known coagulopathies  Respiratory: no shortness of breath  Cardiovascular: no chest pain  Gastrointestinal: no abdominal  pain  Genito-Urinary: no dysuria  Musculoskeletal: negative  Neurological: no TIA or stroke symptoms         OBJECTIVE:     Vital Signs (Most Recent)  Temp: 98.5 °F (36.9 °C) (05/28/20 0713)  Pulse: 66 (05/28/20 0713)  Resp: 18 (05/28/20 0713)  BP: 98/65 (05/28/20 0713)  SpO2: 100 % (05/28/20 0713)    Physical Exam:  ASA: II  Mallampati: II    General: no acute distress  Mental Status: alert and oriented to person, place and time  HEENT: normocephalic, atraumatic  Chest: unlabored breathing  Heart: regular heart rate  Abdomen: nondistended  Extremity: moves all extremities    Laboratory  No results found for: INR    Lab Results   Component Value Date    WBC 5.17 04/16/2019    HGB 14.1 04/16/2019    HCT 41.6 04/16/2019    MCV 95 04/16/2019     (L) 04/16/2019      Lab Results   Component Value Date    GLU 92 04/16/2019     04/16/2019    K 3.8 04/16/2019     04/16/2019    CO2 26 04/16/2019    BUN 6 04/16/2019    CREATININE 0.8 04/16/2019    CALCIUM 9.6 04/16/2019    ALT 15 04/16/2019    AST 14 04/16/2019    ALBUMIN 3.7 04/16/2019    BILITOT 0.2 04/16/2019       ASSESSMENT/PLAN:     Sedation Plan: up to moderate  Patient will undergo right arm port placement for chemotherapy for invasive ductal carcinoma of the left breast.    Note is made that patient had two faint positive pregnancy tests; however, patient was spoken with and has not been sexually active in 3-4 months and is currently undergoing follicular harvesting and received HCG therapy one week prior. She spoke with Dr. Weston at [facility] who stated that receiving this medication may cause false positive pregnancy tests for 10 days.   Patient understands that although this is true, the fact that she may be pregnant remains and that there are risks of sedation medications in pregnancy and states that she still wishes to proceed with the placement of the port.       Juany Antunez, PGY3

## 2020-05-28 NOTE — DISCHARGE INSTRUCTIONS
"  **After hours and weekends: Call [phone] and ask for "Radiology Resident on call".    For immediate concerns that are not emergent, you may call our radiology clinic at: [phone]    Attached and reviewed Home Instructions for Port Care    "

## 2020-05-28 NOTE — DISCHARGE SUMMARY
Radiology Discharge Summary      Hospital Course: No complications    Admit Date: 5/28/2020  Discharge Date: 05/28/2020     Instructions Given to Patient: Yes  Diet: Resume prior diet  Activity: activity as tolerated    Description of Condition on Discharge: Stable  Vital Signs (Most Recent): Temp: 98.2 °F (36.8 °C) (05/28/20 1000)  Pulse: 74 (05/28/20 1045)  Resp: 17 (05/28/20 1045)  BP: (!) 101/56 (05/28/20 1045)  SpO2: 100 % (05/28/20 1045)    Discharge Disposition: Home    Discharge Diagnosis: Breast cancer s/p port placement     Follow-up: SUKHI Uribe MD  Diagnostic and Interventional Radiologist  Department of Radiology  Pager: [phone]

## 2020-05-29 ENCOUNTER — PATIENT MESSAGE (OUTPATIENT)
Dept: SURGERY | Facility: CLINIC | Age: 35
End: 2020-05-29

## 2020-05-29 ENCOUNTER — PATIENT MESSAGE (OUTPATIENT)
Dept: HEMATOLOGY/ONCOLOGY | Facility: CLINIC | Age: 35
End: 2020-05-29

## 2020-06-05 ENCOUNTER — PATIENT MESSAGE (OUTPATIENT)
Dept: PSYCHIATRY | Facility: CLINIC | Age: 35
End: 2020-06-05

## 2020-06-05 ENCOUNTER — TELEPHONE (OUTPATIENT)
Dept: INFUSION THERAPY | Facility: HOSPITAL | Age: 35
End: 2020-06-05

## 2020-06-05 ENCOUNTER — OFFICE VISIT (OUTPATIENT)
Dept: PSYCHIATRY | Facility: CLINIC | Age: 35
End: 2020-06-05
Payer: COMMERCIAL

## 2020-06-05 ENCOUNTER — TELEPHONE (OUTPATIENT)
Dept: HEMATOLOGY/ONCOLOGY | Facility: CLINIC | Age: 35
End: 2020-06-05

## 2020-06-05 ENCOUNTER — TELEPHONE (OUTPATIENT)
Dept: PHARMACY | Facility: CLINIC | Age: 35
End: 2020-06-05

## 2020-06-05 DIAGNOSIS — Z51.11 CHEMOTHERAPY MANAGEMENT, ENCOUNTER FOR: Primary | ICD-10-CM

## 2020-06-05 DIAGNOSIS — F43.22 ADJUSTMENT DISORDER WITH ANXIOUS MOOD: Primary | ICD-10-CM

## 2020-06-05 DIAGNOSIS — R45.89 ANXIETY ABOUT HEALTH: ICD-10-CM

## 2020-06-05 PROCEDURE — 90791 PSYCH DIAGNOSTIC EVALUATION: CPT | Mod: 95,,, | Performed by: PSYCHOLOGIST

## 2020-06-05 PROCEDURE — 90791 PR PSYCHIATRIC DIAGNOSTIC EVALUATION: ICD-10-PCS | Mod: 95,,, | Performed by: PSYCHOLOGIST

## 2020-06-05 RX ORDER — LIDOCAINE AND PRILOCAINE 25; 25 MG/G; MG/G
CREAM TOPICAL
Qty: 30 G | Refills: 0 | Status: SHIPPED | OUTPATIENT
Start: 2020-06-05 | End: 2020-10-07 | Stop reason: SDUPTHER

## 2020-06-05 NOTE — PROGRESS NOTES
TELEMEDICINE PSYCHO-ONCOLOGY INTAKE    Consultation started: 6/5/2020 at 9:01 AM   The chief complaint leading to consultation is: anxiety about illness  Virtual visit with synchronous audio and video    The patient location is: Patient home    Patient alone at the time of consultation.    Each patient provided medical services by telemedicine is:  (1) informed of the relationship between the physician and patient and the respective role of any other health care provider with respect to management of the patient; and (2) notified that he or she may decline to receive medical services by telemedicine and may withdraw from such care at any time    Crisis Disclaimer: Patient was informed that due to the virtual nature of the visit, that if a crisis develops, protocols will be implemented to ensure patient safety, including but not limited to: 1) Initiating a welfare check with local Law Enforcement, 2) Calling [facility]/National Crisis Hotline, and/or 3) Initiating PEC/CEC procedures.     Closest Hospital:  [facility]  Crisis Contact: Elio Luong (spouse)-[phone]    Diagnostic Interview - CPT 42578    Date: 6/5/2020  Site of Clinician: [facility]     Evaluation Length (direct face-to-face time):  1 hour     Referral Source: Christy Salazar MD   Oncologist:   PCP: Primary Doctor No    Clinical status of patient: Outpatient    Michelle Gage, a 34 y.o. female, seen for initial evaluation visit.  Met with patient.    Chief complaint/reason for encounter: adjustment to illness, anxiety and sleep    Medical/Surgical History:    Patient Active Problem List   Diagnosis    Upper abdominal pain    Malignant neoplasm of upper-outer quadrant of left breast in female, estrogen receptor positive    Chemotherapy-induced nausea    Chemotherapy induced neutropenia    Encounter for chemotherapy management    Suppression of ovarian secretion    Adjustment disorder with anxious mood       Health Behaviors:       ETOH  Use: Yes (social)       Tobacco Use: None since diagnosis, former occasional use while drinking    Illicit Drug Use:  No     Prescription Misuse:No   Caffeine: moderate- 3 c caffeine/day   Exercise:The patient engages in environmental activity only.   Advanced directives:No     Family History:   Psychiatric illness: Yes  (MGM- bipolar)   Alcohol/Drug Abuse: Yes  (MGM-etoh)   Suicide: No      Past Psychiatric History:   Inpatient treatment: No     Outpatient treatment: No     Prior substance abuse treatment: No     Suicide Attempts: No     Psychotropic Medications:  Current: Xanax (since day of fertility consult, PRN- has used 4x)       Past: none    Current medications as per below, allergies reviewed in chart.    Current Outpatient Medications   Medication    ALPRAZolam (XANAX) 0.5 MG tablet    ATRALIN 0.05 % gel    dexAMETHasone (DECADRON) 4 MG Tab    doxycycline (VIBRAMYCIN) 100 MG Cap    norgestrel-ethinyl estradiol (LO/OVRAL) 0.3-30 mg-mcg per tablet    ondansetron (ZOFRAN) 8 MG tablet    sulfacetamide sodium-sulfur 8-4 % Susp    valACYclovir (VALTREX) 1000 MG tablet    valacyclovir (VALTREX) 500 MG tablet     No current facility-administered medications for this visit.         CAM Therapies: None     Screening:   Erin: Denies Psychosis: Denies    Generalized anxiety: Denies Panic Disorder: Denies    Social/specific phobia: Denies OCD: Denies   Sexual Dysfunction:  Denies Trauma: Denies      Social situation/Stressors: Michelle Gage lives with her , Elio, in [place], [place].  She is a full-time construction .  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 She enjoys her work and her firm is being supportive of her during her illness.   Michelle Gage has been  1x (4 years) and she and Elio have been together for 16 years. She describes the relationship as solid and supportive. They have no children, but have undergone fertility preservation and have 3 stored  embryos. Her spouse is an HR professional and can easily work from home to care for her during her illness. Ms. Gage has a number of close friends (couple and individual) in [place] and a supportive extended family in [place].  The patient reports excellent social support.  Michelle Gage's hobbies include reading, spending time with friends, and watching classic movies and [place] mysteries.  Additional stressors:  Cat  week she was diagnosed with cancer    Strengths:Steady employment, financial stability, Housing stability, Able to vocalize needs, Motivation, readiness for change, Setting and pursuing goals, hopes, dreams, aspirations, Resources - social, interpersonal, monetary, Vocational interests, hobbies and/or talents and Interpersonal relationships and supports available - family, relatives, friends  Liabilities: Complicated medical illness    Current Evaluation:     Mental Status Exam: Michelle Gage arrived promptly for the assessment session. The patient was fully cooperative throughout the interview and was an adequate historian   Appearance: age appropriate, casually  dressed, well groomed  Behavior/Cooperation: friendly and cooperative  Speech: normal in rate, volume, and tone and appropriate quality, quantity and organization of sentences  Mood: anxious, dysthymic  Affect: anxious and tearful  Thought Process: goal-directed, logical  Thought Content: normal, no suicidality, no homicidality, delusions, or paranoia;did not appear to be responding to internal stimuli during the interview.   Orientation: grossly intact  Memory: Grossly intact  Attention Span/Concentration: Attends to interview without distraction; reports subjective difficulty  Fund of Knowledge: average  Estimate of Intelligence: above average from verbal skills and history  Cognition: grossly intact  Insight: patient has awareness of illness; good insight into own behavior and behavior of others  Judgment: the patient's  behavior is adequate to circumstances    Distress Score    Distress Score: 7        Practical Problems Physical Problems   : No Appearance: Yes   Housing: No Bathing / Dressing: Yes   Insurance / Financial: No Breathing: No    Transportation: No  Changes in Urination: No    Work / School: Yes  Constipation: Yes   Treatment Decisions: Yes  Diarrhea: No     Eating: No    Family Problems Fatigue: No    Dealing with Children: No Feeling Swollen: No    Dealing with Partner: No Fevers: No    Ability to Have Children: Yes  Getting Around: No       Indigestion: No     Memory / Concentration: Yes   Emotional Problems Mouth Sores: No    Depression: No  Nausea: No    Fears: Yes  Nose Dry / Congested: No    Nervousness: Yes  Pain: No    Sadness: Yes Sexual: No    Worry: Yes Skin Dry / Itchy: No    Loss of Interest in Usual Activities: Yes Sleep: Yes     Substance Abuse: No    Spiritual/Religions Concerns Tingling in Hands / Feet: No   Spritual / Jew Concerns: No         Other Problems    Other Problems:: Anxiety, worry, do not feel good. Had a couple of little surgerys (port and egg retrival) and feel bad. Very worried that if i feel bad now, treatment is going to be awful.       PHQ ANSWERS    Q1. Little interest or pleasure in doing things: Several days (06/03/20 1939)  Q2. Feeling down, depressed, or hopeless: Nearly every day (06/03/20 1939)  Q3. Trouble falling or staying asleep, or sleeping too much: Several days (06/03/20 1939)  Q4. Feeling tired or having little energy: Several days (06/03/20 1939)  Q5. Poor appetite or overeating: Not at all (06/03/20 1939)  Q6. Feeling bad about yourself - or that you are a failure or have let yourself or your family down: Not at all (06/03/20 1939)  Q7. Trouble concentrating on things, such as reading the newspaper or watching television: Several days (06/03/20 1939)  Q8. Moving or speaking so slowly that other people could have noticed. Or the opposite - being so  fidgety or restless that you have been moving around a lot more than usual: Not at all (06/03/20 1939)  Q9.      PHQ8 Score : 7 (06/03/20 1939)  PHQ-9 Total Score: 7 (06/03/20 1939)     JENIFER-7 Answers    GAD7 6/3/2020   1. Feeling nervous, anxious, or on edge? 3   2. Not being able to stop or control worrying? 2   3. Worrying too much about different things? 1   4. Trouble relaxing? 1   5. Being so restless that it is hard to sit still? 0   6. Becoming easily annoyed or irritable? 0   7. Feeling afraid as if something awful might happen? 0   JENIFER-7 Score 7     JENIFER-7 Score: 7  Interpretation: Mild Anxiety     MMSE:     Pain:    History of present illness:       Malignant neoplasm of upper-outer quadrant of left breast in female, estrogen receptor positive    5/15/2020 Initial Diagnosis     Malignant neoplasm of upper-outer quadrant of left breast in female, estrogen receptor positive      5/15/2020 Cancer Staged     Staging form: Breast, AJCC 8th Edition  - Clinical stage from 5/15/2020: Stage IA (cT1c, cN0(f), cM0, G3, ER+, NE+, HER2+)      5/29/2020 -  Chemotherapy     Treatment Summary   Plan Name: OP BREAST DOCETAXEL CARBOPLATIN TRASTUZUMAB Q3W  Treatment Goal: Curative  Status: Active  Start Date: 5/29/2020 (Planned)  End Date: 5/21/2021 (Planned)  Provider: Christy Salazar MD  Chemotherapy: CARBOplatin (PARAPLATIN) in sodium chloride 0.9% 250 mL chemo infusion, , Intravenous, Clinic/HOD 1 time, 0 of 6 cycles  DOCEtaxeL (TAXOTERE) in sodium chloride 0.9% 250 mL chemo infusion, 75 mg/m2, Intravenous, Clinic/HOD 1 time, 0 of 6 cycles  trastuzumab in sodium chloride 0.9% chemo infusion, 8 mg/kg, Intravenous, Clinic/HOD 1 time, 0 of 18 cycles         Michelle Gage has adjusted to illness with difficulty primarily through active coping strategies and focus on work. She has engaged in limited independent information gathering because she does not want to worry herself, but she is receptive to information provided by  "her team. Ms. Gage is "very, very scared" about her ability to cope with treatment as she has "never been sick, at all" and had significant pain with port placement ("If I can't deal with this, how can I deal with chemo?"). She is also very concerned about changes in appearance related to hair loss (but has purchased a "suprisingly good" wig and plans to do Dignicap). The patient has excellent family/friend support.  Her support system is coping very well with the diagnosis/treatment/prognosis. Illness-related psychosocial stressors include changes in fertility options .  The patient has a good partnership with her [facility] [place] [place] [facility]. The patient reports the following barriers to cancer care:none.   Symptoms:   · Mood: depressed mood, insomnia, fatigue, poor concentration and tearfulness;  no prior and no SI/HI; PHQ-9=7  · Anxiety: Feeling nervous, anxious, or on edge, Uncontrollable worry (about illness, coping, and physical symptoms of anxiety), Excessive worry (interfering with sleep, enjoyment) and Difficulty relaxing; no prior;  JENIFER-7=7  · Substance abuse: denied  · Cognitive functioning: none  · Health behaviors: noncontributory   · Sleep: no prior sleep difficulty; non-restorative sleep , no sleep maintenance difficulty and 1 hour+ extended sleep onset latency, no EDS , no reported apneic events and no naps , AM caffeine, PM caffeine and (+) psychophysiological factors no use of OTC/melatonin/hypnotics/benzodiazepines  (Xanax PRN prescribed, but does not use for sleep)      Assessment - Diagnosis - Goals:       ICD-10-CM ICD-9-CM   1. Anxiety about health F41.8 300.09   2. Adjustment disorder with anxious mood F43.22 309.24       Plan:individual psychotherapy and medication management by physician    Summary and Recommendations  Michelle Gage is a 34 y.o. female referred by Dr. Salazar for psychological evaluation and treatment.  Ms. Gage appears to be experiencing increased anxiety due to her " diagnosis and proposed treatment course. She has no history of mental health complaints and has generally good stress management habits.  She is interested in supportive therapy for stress management and will follow up with me for that purpose. Mood protective strategies during cancer treatment were discussed. Pattern of stress coping was discussed.   She was encouraged to continue with pleasant events scheduling and to increase exercise. She was given information about relaxation strategies to combat psychophysiological stress and insomnia.  She was encouraged to update me or her physician if her Xanax increases in frequency.    GOALS:   Increase exercise  Pleasant events scheduling  Be Well Audio relaxation exercises  YouNight          Damian Foote, PhD  Clinical Psychologist  [place] License #[id number]

## 2020-06-05 NOTE — LETTER
June 5, 2020        Christy Salazar MD  [address] [facility] [address]             [facility]  [address] [facility] [address]  Phone: [phone]  Fax: [phone]   Patient: Michelle Gage   MR Number: [id number]   YOB: 1985   Date of Visit: 6/5/2020       Dear Dr. Salazar:    Thank you for referring Michelle Gage to me for evaluation. Below are the relevant portions of my assessment and plan of care.     Michelle Gage is a 34 y.o. female referred by Dr. Salazar for psychological evaluation and treatment.  Ms. Gage appears to be experiencing increased anxiety due to her diagnosis and proposed treatment course. She has no history of mental health complaints and has generally good stress management habits.  She is interested in supportive therapy for stress management and will follow up with me for that purpose. Mood protective strategies during cancer treatment were discussed. Pattern of stress coping was discussed.   She was encouraged to continue with pleasant events scheduling and to increase exercise. She was given information about relaxation strategies to combat psychophysiological stress and insomnia.  She was encouraged to update me or her physician if her Xanax increases in frequency.     If you have questions, please do not hesitate to call me. I look forward to following Michelle along with you.    Sincerely,      Damian Hess, PhD           CC  No Recipients

## 2020-06-10 ENCOUNTER — INFUSION (OUTPATIENT)
Dept: INFUSION THERAPY | Facility: HOSPITAL | Age: 35
End: 2020-06-10
Attending: INTERNAL MEDICINE
Payer: COMMERCIAL

## 2020-06-10 DIAGNOSIS — E28.39 SUPPRESSION OF OVARIAN SECRETION: Primary | ICD-10-CM

## 2020-06-10 PROCEDURE — 96402 CHEMO HORMON ANTINEOPL SQ/IM: CPT

## 2020-06-10 PROCEDURE — 63600175 PHARM REV CODE 636 W HCPCS: Mod: JG | Performed by: INTERNAL MEDICINE

## 2020-06-10 RX ORDER — NORETHINDRONE ACETATE AND ETHINYL ESTRADIOL AND FERROUS FUMARATE 1MG-20(21)
KIT ORAL
COMMUNITY
Start: 2020-06-07 | End: 2020-06-25

## 2020-06-10 RX ORDER — LETROZOLE 2.5 MG/1
TABLET, FILM COATED ORAL
COMMUNITY
Start: 2020-05-21 | End: 2020-09-08

## 2020-06-10 RX ORDER — NAPROXEN SODIUM 550 MG/1
TABLET ORAL
COMMUNITY
Start: 2020-06-02 | End: 2020-11-19 | Stop reason: SDUPTHER

## 2020-06-10 RX ADMIN — GOSERELIN ACETATE 3.6 MG: 3.6 IMPLANT SUBCUTANEOUS at 09:06

## 2020-06-10 NOTE — NURSING
Pt arrrived for 1st zoladex injection.  Medication information given and explained to pt,states understanding.  Pt tolerated injuection SQ to LLA. Pt fitted for cool cap.  Yellow/Yellow with pad to back.  Pt discharged to home with bag of supplies for cool cap.

## 2020-06-12 ENCOUNTER — OFFICE VISIT (OUTPATIENT)
Dept: OBSTETRICS AND GYNECOLOGY | Facility: CLINIC | Age: 35
End: 2020-06-12
Attending: OBSTETRICS & GYNECOLOGY
Payer: COMMERCIAL

## 2020-06-12 ENCOUNTER — TELEPHONE (OUTPATIENT)
Dept: OBSTETRICS AND GYNECOLOGY | Facility: CLINIC | Age: 35
End: 2020-06-12

## 2020-06-12 VITALS
SYSTOLIC BLOOD PRESSURE: 100 MMHG | HEIGHT: 65 IN | DIASTOLIC BLOOD PRESSURE: 60 MMHG | WEIGHT: 103.81 LBS | BODY MASS INDEX: 17.3 KG/M2

## 2020-06-12 DIAGNOSIS — R11.0 CHEMOTHERAPY-INDUCED NAUSEA: ICD-10-CM

## 2020-06-12 DIAGNOSIS — C50.412 MALIGNANT NEOPLASM OF UPPER-OUTER QUADRANT OF LEFT BREAST IN FEMALE, ESTROGEN RECEPTOR POSITIVE: Primary | ICD-10-CM

## 2020-06-12 DIAGNOSIS — F41.9 ANXIETY: ICD-10-CM

## 2020-06-12 DIAGNOSIS — Z17.0 MALIGNANT NEOPLASM OF UPPER-OUTER QUADRANT OF LEFT BREAST IN FEMALE, ESTROGEN RECEPTOR POSITIVE: Primary | ICD-10-CM

## 2020-06-12 DIAGNOSIS — R11.2 CINV (CHEMOTHERAPY-INDUCED NAUSEA AND VOMITING): ICD-10-CM

## 2020-06-12 DIAGNOSIS — T45.1X5A CINV (CHEMOTHERAPY-INDUCED NAUSEA AND VOMITING): ICD-10-CM

## 2020-06-12 DIAGNOSIS — Z30.09 ENCOUNTER FOR OTHER GENERAL COUNSELING AND ADVICE ON CONTRACEPTION: ICD-10-CM

## 2020-06-12 DIAGNOSIS — T45.1X5A CHEMOTHERAPY-INDUCED NAUSEA: ICD-10-CM

## 2020-06-12 PROCEDURE — 99203 PR OFFICE/OUTPT VISIT, NEW, LEVL III, 30-44 MIN: ICD-10-PCS | Mod: S$GLB,,, | Performed by: OBSTETRICS & GYNECOLOGY

## 2020-06-12 PROCEDURE — 3008F PR BODY MASS INDEX (BMI) DOCUMENTED: ICD-10-PCS | Mod: CPTII,S$GLB,, | Performed by: OBSTETRICS & GYNECOLOGY

## 2020-06-12 PROCEDURE — 3008F BODY MASS INDEX DOCD: CPT | Mod: CPTII,S$GLB,, | Performed by: OBSTETRICS & GYNECOLOGY

## 2020-06-12 PROCEDURE — 99203 OFFICE O/P NEW LOW 30 MIN: CPT | Mod: S$GLB,,, | Performed by: OBSTETRICS & GYNECOLOGY

## 2020-06-12 NOTE — PROGRESS NOTES
"SUBJECTIVE:   34 y.o. female   presents today to establish care in survivorship and discuss contraception prior to neoadjuvant chemo. Patient's last menstrual period was 2020. she has infiltrating ductal carcinoma of the lef,t breast ER positive, [place] po,sitive her 2 positive.  She  Reports that she underwent and carb assisting on .  She reports that she has "2 fair embryos".  She did not want to go through any more cycles because she can feel the mass in her left breast and it is enlarging   she will be starting chemotherapy on  and  Will start Zoladex injections   she does report having a "heavy uncomfortable feeling in her pelvis earlier this week   she does report having anxiety about developi ng neuropathy and she has "scared of getting sick during chemotherapy.        Past Medical History:   Diagnosis Date    Breast cancer     Cancer 2020    Breast    Hx of psychiatric care     Xanax     Past Surgical History:   Procedure Laterality Date    BREAST SURGERY      Breast biopsy    INSERTION OF TUNNELED CENTRAL VENOUS CATHETER (CVC) WITH SUBCUTANEOUS PORT N/A 2020    Procedure: [id number];  Surgeon: Dosc Diagnostic Provider;  Location: [facility] OR [address] [place] [address];  Service: Radiology;  Laterality: N/A;  189 port placement    WISDOM TOOTH EXTRACTION       Social History     Socioeconomic History    Marital status:      Spouse name: Elio    Number of children: Not on file    Years of education: Not on file    Highest education level: Not on file   Occupational History    Occupation:    Social Needs    Financial resource strain: Not on file    Food insecurity:     Worry: Not on file     Inability: Not on file    Transportation needs:     Medical: Not on file     Non-medical: Not on file   Tobacco Use    Smoking status: Former Smoker    Smokeless tobacco: Never Used    Tobacco comment: occasional past while drinking   Substance and Sexual Activity "    Alcohol use: Yes     Comment: socially    Drug use: No    Sexual activity: Yes     Partners: Male     Birth control/protection: None, Other-see comments     Comment: spouse   Lifestyle    Physical activity:     Days per week: Not on file     Minutes per session: Not on file    Stress: Not on file   Relationships    Social connections:     Talks on phone: Not on file     Gets together: Not on file     Attends Baptism service: Not on file     Active member of club or organization: Not on file     Attends meetings of clubs or organizations: Not on file     Relationship status: Not on file   Other Topics Concern    Patient feels they ought to cut down on drinking/drug use Not Asked    Patient annoyed by others criticizing their drinking/drug use Not Asked    Patient has felt bad or guilty about drinking/drug use Not Asked    Patient has had a drink/used drugs as an eye opener in the AM Not Asked   Social History Narrative    , no children, construction , originally from [place]     Family History   Problem Relation Age of Onset    Osteoporosis Mother     No Known Problems Father     Alzheimer's disease Maternal Grandmother     Bipolar disorder Maternal Grandmother     Alcohol abuse Maternal Grandmother     Diabetes Maternal Grandfather     Breast cancer Paternal Grandmother 55    Diabetes Paternal Grandfather     Cancer Neg Hx     Colon cancer Neg Hx     Ovarian cancer Neg Hx      OB History    Para Term  AB Living   0 0 0 0 0 0   SAB TAB Ectopic Multiple Live Births   0 0 0 0 0           Current Outpatient Medications   Medication Sig Dispense Refill    ALPRAZolam (XANAX) 0.5 MG tablet Take 1 tablet (0.5 mg total) by mouth 3 (three) times daily as needed for Insomnia or Anxiety. 30 tablet 0    dexAMETHasone (DECADRON) 4 MG Tab Two tabs by mouth twice daily day before and day after Taxotere. 32 tablet 0    lidocaine-prilocaine (EMLA) cream Apply topically as  needed. 30 g 0    ondansetron (ZOFRAN) 8 MG tablet Take 1 tablet (8 mg total) by mouth every 8 (eight) hours as needed for Nausea. 30 tablet 2    sulfacetamide sodium-sulfur 8-4 % Susp       valACYclovir (VALTREX) 1000 MG tablet Take 1,000 mg by mouth 2 (two) times daily.      valacyclovir (VALTREX) 500 MG tablet Take 1 pill twice daily x 3 days as needed for fever blister. 30 tablet 0    ATRALIN 0.05 % gel       letrozole (FEMARA) 2.5 mg Tab       MICROGESTIN FE 1/20, 28, 1 mg-20 mcg (21)/75 mg (7) per tablet       naproxen sodium (ANAPROX) 550 MG tablet       norgestrel-ethinyl estradiol (LO/OVRAL) 0.3-30 mg-mcg per tablet Take 1 tablet by mouth once daily.       No current facility-administered medications for this visit.      Allergies: Patient has no known allergies.     The ASCVD Risk score (Jason DC Jr., et al., 2013) failed to calculate for the following reasons:    The 2013 ASCVD risk score is only valid for ages 40 to 79      ROS:  Constitutional: no weight loss, weight gain, fever, fatigue  Eyes:  No vision changes, glasses/contacts  ENT/Mouth: No ulcers, sinus problems, ears ringing, headache  Cardiovascular: No inability to lie flat, chest pain, exercise intolerance, swelling, heart palpitations  Respiratory: No wheezing, coughing blood, shortness of breath, or cough  Gastrointestinal: No diarrhea, bloody stool, nausea/vomiting, constipation, gas, hemorrhoids  Genitourinary: No blood in urine, painful urination, urgency of urination, frequency of urination, incomplete emptying, incontinence, abnormal bleeding, painful periods, heavy periods, vaginal discharge, vaginal odor, painful intercourse, sexual problems, bleeding after intercourse.  Musculoskeletal: No muscle weakness  Skin/Breast:+breast cancer  Neurological: No passing out, seizures, numbness, headache  Endocrine: No diabetes, hypothyroid, hyperthyroid, hot flashes, hair loss, abnormal hair growth, acne  Psychiatric: No depression,  crying, +anxiety  Hematologic: No bruises, bleeding, swollen lymph nodes, anemia.      Physical Exam  General- well developed, well nourished  Vulva- no masses, no lesions  Vagina-  no masses, no lesions  Cervix- no Cervical motion tenderness, no lesions  Uterus- normal size, nontender  Adnexa- nontender, no masses   abdomen- no re,bound no guarding    ASSESSMENT:    breast cancer   anxiety   contraception    PLAN:  Face-to-face time 30 min majority spent in counseling and arranging follow-up   counseled patient on options for contraception including ParaGard IUD> and c ondoms she would like to pursue ParaGard IUD   counseled her on potential utility of acupuncture to help with ,her anx iety to help prevent chemotherapy-induced ,nausea vomiting and to help with neuropathy.  She is interested in pursuing acupuncture   gave her information on the [employer] blas   reass-urance given pelvic pressure that she felt earlier in the week with likely related to her recent egg retrieval   counseled her on meditation and stress reduction   she is currently taking Xanax p.r.n. which was given to her by her onc.ologist  She does not desire daily medications such as Zoloft at this time   she has seen Dr. Hess and this was helpful

## 2020-06-15 ENCOUNTER — TELEPHONE (OUTPATIENT)
Dept: OBSTETRICS AND GYNECOLOGY | Facility: CLINIC | Age: 35
End: 2020-06-15

## 2020-06-15 NOTE — PROGRESS NOTES
History:     Reason For Follow Up:   1. Stage I ([id number]) invasive ductal carcinoma of left breast, upper outer quadrant, ER 95%, [place] 90%, Her2 3+, Grade 3, Ki67 25%    HPI:   Michelle Gage presents for follow up of breast cancer. She is premenopausal. She harvested her eggs and has two good embryos and is ready to start neoadjuvant chemo. Started her period this morning. Uses Xanax prn, but only taking half tablet and rarely because it makes her quite tired. Echo reviewed.     Oncologic History:  Presentation  - [id number] - presented for palpable left breast mass around 2018 - was being watched on imaging at outside hospital.    - 5/11/2020 - Diagnostic bilateral mammogram and left breast US at [facility] showed left breast 1:00 mass, 1.4 cm, 8 CFN, mild left axillary adenopathy  - 5/11/2020 - Biopsy - Grade 3 IDC, estrogen receptor 95%, progesterone receptor 90%, Her2 delphine 3+, Ki67 25%. LN biopsy negative  Surgery consultation with Dr Dumont on 5/14. Breast cancer is far in axillary tail and felt to be difficult to obtain clear margins without neoadjuvant therapy.    - 5/14/2020 - Genetics [facility] BRACAnalysis neg; VUS AP   Medical oncology consultation on 5/15/2020 -  This case was discussed with Dr. Dumont.  She does feel like the patient will benefit from neoadjuvant therapy to help improve her surgical margins.  Since the tumor is less than 2 cm and clinically lymph node negative (MRI pending), I recommended treatment with Taxotere, carboplatin and Herceptin without Perjeta.  Discussed with her that there is data in tumors less than 2 cm to consider Taxol/Herceptin however would not typically use this in a neoadjuvant setting for concern for under treatment.     - Eggs  Retrieval - has 2 embryos.    * 6/16/2020 started neoadjuvant TCH (no perjeta since < 2 cm and LN negative). Neoadjuvant therapy chosen due to location of tumor.       History: I reviewed, confirmed and updated history (past  medical, social, family) as necessary.    Medications    Current Outpatient Medications:     ALPRAZolam (XANAX) 0.5 MG tablet, Take 1 tablet (0.5 mg total) by mouth 3 (three) times daily as needed for Insomnia or Anxiety., Disp: 30 tablet, Rfl: 0    ATRALIN 0.05 % gel, , Disp: , Rfl:     dexAMETHasone (DECADRON) 4 MG Tab, Two tabs by mouth twice daily day before and day after Taxotere., Disp: 32 tablet, Rfl: 0    letrozole (FEMARA) 2.5 mg Tab, , Disp: , Rfl:     lidocaine-prilocaine (EMLA) cream, Apply topically as needed., Disp: 30 g, Rfl: 0    MICROGESTIN FE 1/20, 28, 1 mg-20 mcg (21)/75 mg (7) per tablet, , Disp: , Rfl:     naproxen sodium (ANAPROX) 550 MG tablet, , Disp: , Rfl:     norgestrel-ethinyl estradiol (LO/OVRAL) 0.3-30 mg-mcg per tablet, Take 1 tablet by mouth once daily., Disp: , Rfl:     ondansetron (ZOFRAN) 8 MG tablet, Take 1 tablet (8 mg total) by mouth every 8 (eight) hours as needed for Nausea., Disp: 30 tablet, Rfl: 2    sulfacetamide sodium-sulfur 8-4 % Susp, , Disp: , Rfl:     valACYclovir (VALTREX) 1000 MG tablet, Take 1,000 mg by mouth 2 (two) times daily., Disp: , Rfl:     valacyclovir (VALTREX) 500 MG tablet, Take 1 pill twice daily x 3 days as needed for fever blister., Disp: 30 tablet, Rfl: 0    venlafaxine (EFFEXOR XR) 37.5 MG 24 hr capsule, Take 1 capsule (37.5 mg total) by mouth once daily., Disp: 90 capsule, Rfl: 3  Allergies  Review of patient's allergies indicates:  No Known Allergies  Review of Systems  Review of Systems   Constitutional: Negative for activity change, appetite change, chills, fatigue, fever and unexpected weight change.   HENT: Negative for congestion, hearing loss, nosebleeds, sinus pressure and trouble swallowing.    Eyes: Negative for pain, discharge and itching.   Respiratory: Negative for cough, chest tightness and shortness of breath.    Cardiovascular: Negative for chest pain, palpitations and leg swelling.   Gastrointestinal: Negative for  "abdominal distention, abdominal pain, blood in stool, diarrhea, nausea, rectal pain and vomiting.   Endocrine: Negative for heat intolerance and polydipsia.   Genitourinary: Negative for difficulty urinating, dysuria, flank pain, frequency, hematuria and urgency.   Musculoskeletal: Negative for arthralgias, back pain and neck pain.   Skin: Negative for color change, pallor and rash.   Neurological: Negative for dizziness, numbness and headaches.   Hematological: Negative for adenopathy. Does not bruise/bleed easily.   Psychiatric/Behavioral: Negative for confusion and decreased concentration. The patient is nervous/anxious.         Objective     Objective:     Vitals:    06/16/20 0914   BP: 112/64   BP Location: Left arm   Patient Position: Sitting   BP Method: Large (Automatic)   Pulse: 60   Resp: 18   SpO2: 100%   Weight: 50.4 kg (111 lb 1.8 oz)   Height: 5' 5" (1.651 m)     Body surface area is 1.52 meters squared.  Physical Exam  Vitals signs and nursing note reviewed.   Constitutional:       General: She is not in acute distress.     Appearance: Normal appearance. She is well-developed.   HENT:      Head: Normocephalic and atraumatic.      Mouth/Throat:      Mouth: No oral lesions.   Eyes:      General: No scleral icterus.        Right eye: No discharge.         Left eye: No discharge.      Conjunctiva/sclera: Conjunctivae normal.   Neck:      Musculoskeletal: Neck supple.   Cardiovascular:      Rate and Rhythm: Normal rate and regular rhythm.      Heart sounds: Normal heart sounds. No murmur.   Pulmonary:      Effort: Pulmonary effort is normal. No respiratory distress.      Breath sounds: Normal breath sounds. No wheezing, rhonchi or rales.      Comments: Left breast with 1.5 cm palpable mass in UOQ, close to axilla, very superficial. No palpable nodes.   Chest:      Chest wall: There is no dullness to percussion.   Abdominal:      General: Bowel sounds are normal.      Palpations: Abdomen is soft.      " Tenderness: There is no abdominal tenderness.   Skin:     General: Skin is warm and dry.      Coloration: Skin is not pale.   Neurological:      Mental Status: She is alert and oriented to person, place, and time.   Psychiatric:         Behavior: Behavior normal.         Thought Content: Thought content normal.          Labs and Imaging  Results for orders placed or performed in visit on 06/16/20   CBC q 2 wks x 6   Result Value Ref Range    WBC 6.81 3.90 - 12.70 K/uL    RBC 4.48 4.00 - 5.40 M/uL    Hemoglobin 14.3 12.0 - 16.0 g/dL    Hematocrit 43.1 37.0 - 48.5 %    Mean Corpuscular Volume 96 82 - 98 fL    Mean Corpuscular Hemoglobin 31.9 (H) 27.0 - 31.0 pg    Mean Corpuscular Hemoglobin Conc 33.2 32.0 - 36.0 g/dL    RDW 13.5 11.5 - 14.5 %    Platelets 143 (L) 150 - 350 K/uL    MPV 12.7 9.2 - 12.9 fL    Gran # (ANC) 3.3 1.8 - 7.7 K/uL    Immature Grans (Abs) 0.01 0.00 - 0.04 K/uL       Echo reviewed. MRI breast reviewed.     Assessment     Assessment:     1. Malignant neoplasm of upper-outer quadrant of left breast in female, estrogen receptor positive     2. Suppression of ovarian secretion     3. Anxiety associated with cancer diagnosis  venlafaxine (EFFEXOR XR) 37.5 MG 24 hr capsule   4. Thrombocytopenia         1. Stage I ([id number]) invasive ductal carcinoma of left breast, upper outer quadrant, ER 95%, [place] 90%, Her2 3+, Grade 3, Ki67 25%   * Genetics negative   * 6/16/2020 start neoadjuvant TCH x 6 cycles (no perjeta since < 2 cm and LN negative). Neoadjuvant therapy chosen due to location of tumor.     2. Fertility preservation. Eggs harvested. On monthly Zoladex  3. Anxiety. Seeing psych. Has xanax - add effexor  4. Mild thrombocytopenia - monitor.   Plan:     1. Cycle 1 TCH  2. Add effexor - can increase if needed in future.   3. Zoladex due again in 3 weeks and 7 wks.        Follow-up in 3 wks for cycle 2 with NP/exam/chcemo/zoladex and then MD in 6 wks for cycle 3. Zoladex in 7 wks.   I asked the patient to  call for any questions, concerns, or new symptoms.

## 2020-06-15 NOTE — TELEPHONE ENCOUNTER
----- Message from Leidy Persaud sent at 6/15/2020  4:09 PM CDT -----  Regarding: ACUPUNCTURE  The referral for ACUPUNCTURE is denied because it is excluded on the patient's [employer] policy. I spoke with [employer] with [employer] @ [phone]. Call reference number is [id number]

## 2020-06-15 NOTE — PROGRESS NOTES
Pre-Symptom Score: 5  Post-Symptom Score: 2     Nausea (anticipatory ) and Chemotherapy       Encounter Diagnoses   Name Primary?    Malignant neoplasm of upper-outer quadrant of left breast in female, estrogen receptor positive     CINV (chemotherapy-induced nausea and vomiting)      TCM Diagnosis: Stagnant Liver Qi     Physical Exam   Constitutional:       Psychiatric: Her mood appears anxious.   Patient having trouble emotionally dealing with cancer diagnosis, anxious and afraid of possible severe nausea symptoms due to chemotherapy treatments.        Acupuncture points used:  Li11, Li4, Pc6, St36, Sp6, HEDRICK MEN and YIN IQBAL    NO STIM USED    NEEDLES INSERTED: 10:15 AM  NEEDLES REMOVED: 10:45 AM    NEEDLES IN: 15  NEEDLES OUT: 15

## 2020-06-16 ENCOUNTER — OFFICE VISIT (OUTPATIENT)
Dept: HEMATOLOGY/ONCOLOGY | Facility: CLINIC | Age: 35
End: 2020-06-16
Payer: COMMERCIAL

## 2020-06-16 ENCOUNTER — CLINICAL SUPPORT (OUTPATIENT)
Dept: HEMATOLOGY/ONCOLOGY | Facility: CLINIC | Age: 35
End: 2020-06-16

## 2020-06-16 ENCOUNTER — LAB VISIT (OUTPATIENT)
Dept: LAB | Facility: HOSPITAL | Age: 35
End: 2020-06-16
Attending: INTERNAL MEDICINE
Payer: COMMERCIAL

## 2020-06-16 VITALS
RESPIRATION RATE: 18 BRPM | BODY MASS INDEX: 18.52 KG/M2 | HEART RATE: 60 BPM | OXYGEN SATURATION: 100 % | WEIGHT: 111.13 LBS | DIASTOLIC BLOOD PRESSURE: 64 MMHG | HEIGHT: 65 IN | SYSTOLIC BLOOD PRESSURE: 112 MMHG

## 2020-06-16 DIAGNOSIS — C50.412 MALIGNANT NEOPLASM OF UPPER-OUTER QUADRANT OF LEFT BREAST IN FEMALE, ESTROGEN RECEPTOR POSITIVE: ICD-10-CM

## 2020-06-16 DIAGNOSIS — Z17.0 MALIGNANT NEOPLASM OF UPPER-OUTER QUADRANT OF LEFT BREAST IN FEMALE, ESTROGEN RECEPTOR POSITIVE: Primary | ICD-10-CM

## 2020-06-16 DIAGNOSIS — D69.6 THROMBOCYTOPENIA: ICD-10-CM

## 2020-06-16 DIAGNOSIS — E28.39 SUPPRESSION OF OVARIAN SECRETION: ICD-10-CM

## 2020-06-16 DIAGNOSIS — F41.1 ANXIETY ASSOCIATED WITH CANCER DIAGNOSIS: ICD-10-CM

## 2020-06-16 DIAGNOSIS — C80.1 ANXIETY ASSOCIATED WITH CANCER DIAGNOSIS: ICD-10-CM

## 2020-06-16 DIAGNOSIS — T45.1X5A CINV (CHEMOTHERAPY-INDUCED NAUSEA AND VOMITING): ICD-10-CM

## 2020-06-16 DIAGNOSIS — R11.2 CINV (CHEMOTHERAPY-INDUCED NAUSEA AND VOMITING): ICD-10-CM

## 2020-06-16 DIAGNOSIS — C50.412 MALIGNANT NEOPLASM OF UPPER-OUTER QUADRANT OF LEFT BREAST IN FEMALE, ESTROGEN RECEPTOR POSITIVE: Primary | ICD-10-CM

## 2020-06-16 DIAGNOSIS — Z17.0 MALIGNANT NEOPLASM OF UPPER-OUTER QUADRANT OF LEFT BREAST IN FEMALE, ESTROGEN RECEPTOR POSITIVE: ICD-10-CM

## 2020-06-16 LAB
ALBUMIN SERPL BCP-MCNC: 4 G/DL (ref 3.5–5.2)
ALP SERPL-CCNC: 46 U/L (ref 55–135)
ALT SERPL W/O P-5'-P-CCNC: 18 U/L (ref 10–44)
ANION GAP SERPL CALC-SCNC: 8 MMOL/L (ref 8–16)
AST SERPL-CCNC: 17 U/L (ref 10–40)
BILIRUB SERPL-MCNC: 0.4 MG/DL (ref 0.1–1)
BUN SERPL-MCNC: 6 MG/DL (ref 6–20)
CALCIUM SERPL-MCNC: 9.3 MG/DL (ref 8.7–10.5)
CHLORIDE SERPL-SCNC: 108 MMOL/L (ref 95–110)
CO2 SERPL-SCNC: 22 MMOL/L (ref 23–29)
CREAT SERPL-MCNC: 0.8 MG/DL (ref 0.5–1.4)
ERYTHROCYTE [DISTWIDTH] IN BLOOD BY AUTOMATED COUNT: 13.5 % (ref 11.5–14.5)
EST. GFR  (AFRICAN AMERICAN): >60 ML/MIN/1.73 M^2
EST. GFR  (NON AFRICAN AMERICAN): >60 ML/MIN/1.73 M^2
GLUCOSE SERPL-MCNC: 90 MG/DL (ref 70–110)
HCT VFR BLD AUTO: 43.1 % (ref 37–48.5)
HGB BLD-MCNC: 14.3 G/DL (ref 12–16)
IMM GRANULOCYTES # BLD AUTO: 0.01 K/UL (ref 0–0.04)
MCH RBC QN AUTO: 31.9 PG (ref 27–31)
MCHC RBC AUTO-ENTMCNC: 33.2 G/DL (ref 32–36)
MCV RBC AUTO: 96 FL (ref 82–98)
NEUTROPHILS # BLD AUTO: 3.3 K/UL (ref 1.8–7.7)
PLATELET # BLD AUTO: 143 K/UL (ref 150–350)
PMV BLD AUTO: 12.7 FL (ref 9.2–12.9)
POTASSIUM SERPL-SCNC: 4.3 MMOL/L (ref 3.5–5.1)
PROT SERPL-MCNC: 7.1 G/DL (ref 6–8.4)
RBC # BLD AUTO: 4.48 M/UL (ref 4–5.4)
SODIUM SERPL-SCNC: 138 MMOL/L (ref 136–145)
WBC # BLD AUTO: 6.81 K/UL (ref 3.9–12.7)

## 2020-06-16 PROCEDURE — 97810 ACUP 1/> WO ESTIM 1ST 15 MIN: CPT | Mod: S$GLB,,, | Performed by: ACUPUNCTURIST

## 2020-06-16 PROCEDURE — 85027 COMPLETE CBC AUTOMATED: CPT

## 2020-06-16 PROCEDURE — 3008F BODY MASS INDEX DOCD: CPT | Mod: CPTII,S$GLB,, | Performed by: INTERNAL MEDICINE

## 2020-06-16 PROCEDURE — 97810 PR ACUPUNCT W/O ELEC STIMUL 15 MIN: ICD-10-PCS | Mod: S$GLB,,, | Performed by: ACUPUNCTURIST

## 2020-06-16 PROCEDURE — 99215 OFFICE O/P EST HI 40 MIN: CPT | Mod: S$GLB,,, | Performed by: INTERNAL MEDICINE

## 2020-06-16 PROCEDURE — 97811 ACUP 1/> W/O ESTIM EA ADD 15: CPT | Mod: S$GLB,,, | Performed by: ACUPUNCTURIST

## 2020-06-16 PROCEDURE — 99215 PR OFFICE/OUTPT VISIT, EST, LEVL V, 40-54 MIN: ICD-10-PCS | Mod: S$GLB,,, | Performed by: INTERNAL MEDICINE

## 2020-06-16 PROCEDURE — 99999 PR PBB SHADOW E&M-EST. PATIENT-LVL IV: ICD-10-PCS | Mod: PBBFAC,,, | Performed by: INTERNAL MEDICINE

## 2020-06-16 PROCEDURE — 97811 PR ACUPUNCT W/O ELEC STIMUL ADDL 15M: ICD-10-PCS | Mod: S$GLB,,, | Performed by: ACUPUNCTURIST

## 2020-06-16 PROCEDURE — 3008F PR BODY MASS INDEX (BMI) DOCUMENTED: ICD-10-PCS | Mod: CPTII,S$GLB,, | Performed by: INTERNAL MEDICINE

## 2020-06-16 PROCEDURE — 80053 COMPREHEN METABOLIC PANEL: CPT

## 2020-06-16 PROCEDURE — 99999 PR PBB SHADOW E&M-EST. PATIENT-LVL IV: CPT | Mod: PBBFAC,,, | Performed by: INTERNAL MEDICINE

## 2020-06-16 PROCEDURE — 36415 COLL VENOUS BLD VENIPUNCTURE: CPT

## 2020-06-16 PROCEDURE — 84702 CHORIONIC GONADOTROPIN TEST: CPT

## 2020-06-16 RX ORDER — SODIUM CHLORIDE 0.9 % (FLUSH) 0.9 %
10 SYRINGE (ML) INJECTION
Status: CANCELLED | OUTPATIENT
Start: 2020-06-17

## 2020-06-16 RX ORDER — HEPARIN 100 UNIT/ML
500 SYRINGE INTRAVENOUS
Status: CANCELLED | OUTPATIENT
Start: 2020-07-15

## 2020-06-16 RX ORDER — DIPHENHYDRAMINE HYDROCHLORIDE 50 MG/ML
12.5 INJECTION INTRAMUSCULAR; INTRAVENOUS
Status: CANCELLED
Start: 2020-07-15

## 2020-06-16 RX ORDER — SODIUM CHLORIDE 0.9 % (FLUSH) 0.9 %
10 SYRINGE (ML) INJECTION
Status: CANCELLED | OUTPATIENT
Start: 2020-07-15

## 2020-06-16 RX ORDER — HEPARIN 100 UNIT/ML
500 SYRINGE INTRAVENOUS
Status: CANCELLED | OUTPATIENT
Start: 2020-06-17

## 2020-06-16 RX ORDER — DIPHENHYDRAMINE HYDROCHLORIDE 50 MG/ML
12.5 INJECTION INTRAMUSCULAR; INTRAVENOUS
Status: CANCELLED
Start: 2020-06-17

## 2020-06-16 RX ORDER — VENLAFAXINE HYDROCHLORIDE 37.5 MG/1
37.5 CAPSULE, EXTENDED RELEASE ORAL DAILY
Qty: 90 CAPSULE | Refills: 3 | Status: SHIPPED | OUTPATIENT
Start: 2020-06-16 | End: 2021-06-16 | Stop reason: SDUPTHER

## 2020-06-16 NOTE — PATIENT INSTRUCTIONS
Follow up after 1st chemotherapy treatment tomorrow. Will schedule for day before treatments (every 3 weeks) if mild nausea. If nausea becomes severe, we will schedule weekly.

## 2020-06-17 ENCOUNTER — CLINICAL SUPPORT (OUTPATIENT)
Dept: HEMATOLOGY/ONCOLOGY | Facility: CLINIC | Age: 35
End: 2020-06-17
Payer: COMMERCIAL

## 2020-06-17 ENCOUNTER — INFUSION (OUTPATIENT)
Dept: INFUSION THERAPY | Facility: HOSPITAL | Age: 35
End: 2020-06-17
Attending: INTERNAL MEDICINE
Payer: COMMERCIAL

## 2020-06-17 VITALS
TEMPERATURE: 98 F | HEART RATE: 59 BPM | SYSTOLIC BLOOD PRESSURE: 110 MMHG | DIASTOLIC BLOOD PRESSURE: 62 MMHG | RESPIRATION RATE: 18 BRPM

## 2020-06-17 DIAGNOSIS — T45.1X5A CHEMOTHERAPY-INDUCED NAUSEA: ICD-10-CM

## 2020-06-17 DIAGNOSIS — T45.1X5A CHEMOTHERAPY INDUCED NEUTROPENIA: ICD-10-CM

## 2020-06-17 DIAGNOSIS — R11.0 CHEMOTHERAPY-INDUCED NAUSEA: ICD-10-CM

## 2020-06-17 DIAGNOSIS — Z51.11 ENCOUNTER FOR CHEMOTHERAPY MANAGEMENT: ICD-10-CM

## 2020-06-17 DIAGNOSIS — Z17.0 MALIGNANT NEOPLASM OF UPPER-OUTER QUADRANT OF LEFT BREAST IN FEMALE, ESTROGEN RECEPTOR POSITIVE: ICD-10-CM

## 2020-06-17 DIAGNOSIS — C50.412 MALIGNANT NEOPLASM OF UPPER-OUTER QUADRANT OF LEFT BREAST IN FEMALE, ESTROGEN RECEPTOR POSITIVE: Primary | ICD-10-CM

## 2020-06-17 DIAGNOSIS — C50.412 MALIGNANT NEOPLASM OF UPPER-OUTER QUADRANT OF LEFT BREAST IN FEMALE, ESTROGEN RECEPTOR POSITIVE: ICD-10-CM

## 2020-06-17 DIAGNOSIS — Z13.9 SCREENING FOR CONDITION: Primary | ICD-10-CM

## 2020-06-17 DIAGNOSIS — D70.1 CHEMOTHERAPY INDUCED NEUTROPENIA: ICD-10-CM

## 2020-06-17 DIAGNOSIS — Z17.0 MALIGNANT NEOPLASM OF UPPER-OUTER QUADRANT OF LEFT BREAST IN FEMALE, ESTROGEN RECEPTOR POSITIVE: Primary | ICD-10-CM

## 2020-06-17 LAB
HCG INTACT+B SERPL-ACNC: 1.7 MIU/ML
SARS-COV-2 RDRP RESP QL NAA+PROBE: NEGATIVE

## 2020-06-17 PROCEDURE — 96375 TX/PRO/DX INJ NEW DRUG ADDON: CPT

## 2020-06-17 PROCEDURE — 96413 CHEMO IV INFUSION 1 HR: CPT

## 2020-06-17 PROCEDURE — 96415 CHEMO IV INFUSION ADDL HR: CPT

## 2020-06-17 PROCEDURE — U0002 COVID-19 LAB TEST NON-CDC: HCPCS

## 2020-06-17 PROCEDURE — 63600175 PHARM REV CODE 636 W HCPCS: Performed by: INTERNAL MEDICINE

## 2020-06-17 PROCEDURE — 96417 CHEMO IV INFUS EACH ADDL SEQ: CPT

## 2020-06-17 PROCEDURE — 96367 TX/PROPH/DG ADDL SEQ IV INF: CPT

## 2020-06-17 PROCEDURE — 25000003 PHARM REV CODE 250: Performed by: INTERNAL MEDICINE

## 2020-06-17 PROCEDURE — A4216 STERILE WATER/SALINE, 10 ML: HCPCS | Performed by: INTERNAL MEDICINE

## 2020-06-17 PROCEDURE — 96377 APPLICATON ON-BODY INJECTOR: CPT | Mod: 59

## 2020-06-17 RX ORDER — HEPARIN 100 UNIT/ML
500 SYRINGE INTRAVENOUS
Status: DISCONTINUED | OUTPATIENT
Start: 2020-06-17 | End: 2020-06-17 | Stop reason: HOSPADM

## 2020-06-17 RX ORDER — DIPHENHYDRAMINE HYDROCHLORIDE 50 MG/ML
12.5 INJECTION INTRAMUSCULAR; INTRAVENOUS
Status: COMPLETED | OUTPATIENT
Start: 2020-06-17 | End: 2020-06-17

## 2020-06-17 RX ORDER — SODIUM CHLORIDE 0.9 % (FLUSH) 0.9 %
10 SYRINGE (ML) INJECTION
Status: DISCONTINUED | OUTPATIENT
Start: 2020-06-17 | End: 2020-06-17 | Stop reason: HOSPADM

## 2020-06-17 RX ADMIN — SODIUM CHLORIDE 620 MG: 9 INJECTION, SOLUTION INTRAVENOUS at 12:06

## 2020-06-17 RX ADMIN — DIPHENHYDRAMINE HYDROCHLORIDE 12.5 MG: 50 INJECTION INTRAMUSCULAR; INTRAVENOUS at 10:06

## 2020-06-17 RX ADMIN — Medication 10 ML: at 03:06

## 2020-06-17 RX ADMIN — HEPARIN 500 UNITS: 100 SYRINGE at 03:06

## 2020-06-17 RX ADMIN — PEGFILGRASTIM 6 MG: KIT SUBCUTANEOUS at 03:06

## 2020-06-17 RX ADMIN — DEXAMETHASONE SODIUM PHOSPHATE: 4 INJECTION, SOLUTION INTRA-ARTICULAR; INTRALESIONAL; INTRAMUSCULAR; INTRAVENOUS; SOFT TISSUE at 10:06

## 2020-06-17 RX ADMIN — SODIUM CHLORIDE: 0.9 INJECTION, SOLUTION INTRAVENOUS at 10:06

## 2020-06-17 RX ADMIN — SODIUM CHLORIDE 115 MG: 9 INJECTION, SOLUTION INTRAVENOUS at 11:06

## 2020-06-17 RX ADMIN — TRASTUZUMAB 403 MG: 150 INJECTION, POWDER, LYOPHILIZED, FOR SOLUTION INTRAVENOUS at 01:06

## 2020-06-17 NOTE — PLAN OF CARE
1600-Patient tolerated treatment well, she completed 2 hour post carboplatin cooling time on cool cap and received her herceptin during that time. Patient was administered neulasta onbody device and she verbalized understanding monitoring and use of device Discharged without complaints or S/S of adverse event. AVS given.  Instructed to call provider for any questions or concerns.

## 2020-06-17 NOTE — PROGRESS NOTES
COVID Screening specimen collected    Negative for following Symptoms:  Fever  Cough  Shortness of breath  Difficulty breathing  GI Symptoms such as diarrhea or nausea  Loss of taste  Loss of smell

## 2020-06-17 NOTE — PLAN OF CARE
1000-Labs , hx, and medications reviewed, patient states she took po dex yesterday as instructed and will take tomorrow as instructed, also verbalizes menstrual cycle started monday. Assessment completed. Discussed plan of care with patient. Patient in agreement. Chair reclined and warm blanket and snack offered.

## 2020-06-22 DIAGNOSIS — C50.412 MALIGNANT NEOPLASM OF UPPER-OUTER QUADRANT OF LEFT BREAST IN FEMALE, ESTROGEN RECEPTOR POSITIVE: Primary | ICD-10-CM

## 2020-06-22 DIAGNOSIS — Z17.0 MALIGNANT NEOPLASM OF UPPER-OUTER QUADRANT OF LEFT BREAST IN FEMALE, ESTROGEN RECEPTOR POSITIVE: Primary | ICD-10-CM

## 2020-06-22 RX ORDER — TRAMADOL HYDROCHLORIDE 50 MG/1
50 TABLET ORAL EVERY 6 HOURS PRN
Qty: 30 TABLET | Refills: 0 | Status: SHIPPED | OUTPATIENT
Start: 2020-06-22 | End: 2020-09-08

## 2020-06-23 NOTE — PROGRESS NOTES
History:     Reason For Follow Up:   1. Stage I ([id number]) invasive ductal carcinoma of left breast, upper outer quadrant, ER 95%, [place] 90%, Her2 3+, Grade 3, Ki67 25%    HPI:   Michelle Gage presents for follow up of breast cancer. She notes she had headaches for several days after chemotherapy, thinks they are tension headaches, in the back of the had and throbbing. Has taking tylenol and Claritin - feels like they have helped, Advil helped as well, but nothing has stopped the headaches. Notes a new muscles tightness everywhere and daily - feels slower to get around. Denies pain in the neck and shoulders. She is premenopausal. She has requested benadryl prior to chemo. Fatigue, general bone pain post chemotherapy.       Oncologic History:  Presentation  - [id number] - presented for palpable left breast mass around 2018 - was being watched on imaging at outside hospital.    - 5/11/2020 - Diagnostic bilateral mammogram and left breast US at [facility] showed left breast 1:00 mass, 1.4 cm, 8 CFN, mild left axillary adenopathy  - 5/11/2020 - Biopsy - Grade 3 IDC, estrogen receptor 95%, progesterone receptor 90%, Her2 delphine 3+, Ki67 25%. LN biopsy negative  Surgery consultation with Dr Dumont on 5/14. Breast cancer is far in axillary tail and felt to be difficult to obtain clear margins without neoadjuvant therapy.    - 5/14/2020 - [facility] BRACAnalysis neg; VUS AP   Medical oncology consultation on 5/15/2020 -  This case was discussed with Dr. Dumont.  She does feel like the patient will benefit from neoadjuvant therapy to help improve her surgical margins.  Since the tumor is less than 2 cm and clinically lymph node negative (MRI pending), I recommended treatment with Taxotere, carboplatin and Herceptin without Perjeta.  Discussed with her that there is data in tumors less than 2 cm to consider Taxol/Herceptin however would not typically use this in a neoadjuvant setting for concern for under  "treatment.     - Eggs  Retrieval - has 2 embryos.    * 6/16/2020 started neoadjuvant TCH (no perjeta since < 2 cm and LN negative). Neoadjuvant therapy chosen due to location of tumor.       Review of Systems  Review of Systems   Constitutional: Positive for fatigue. Negative for activity change, appetite change, chills, fever and unexpected weight change.   HENT: Negative for congestion, nosebleeds and trouble swallowing.    Eyes: Negative for pain, discharge and itching.   Respiratory: Negative for cough and shortness of breath.    Cardiovascular: Negative for chest pain, palpitations and leg swelling.   Gastrointestinal: Negative for abdominal distention, abdominal pain, blood in stool, diarrhea, nausea, rectal pain and vomiting.   Endocrine: Negative for heat intolerance and polydipsia.   Genitourinary: Negative for difficulty urinating, dysuria, flank pain, frequency, hematuria and urgency.   Musculoskeletal: Positive for arthralgias (muscle aches). Negative for back pain and neck pain.   Skin: Negative for color change, pallor and rash.   Neurological: Positive for headaches. Negative for dizziness, light-headedness and numbness.   Hematological: Negative for adenopathy. Does not bruise/bleed easily.   Psychiatric/Behavioral: Negative for confusion and decreased concentration. The patient is nervous/anxious.         Objective     Objective:     Vitals:    07/07/20 0712   BP: 103/67   BP Location: Left arm   Patient Position: Sitting   BP Method: Large (Automatic)   Pulse: 68   Resp: 18   Temp: 98 °F (36.7 °C)   TempSrc: Oral   SpO2: 98%   Weight: 49.5 kg (109 lb 2 oz)   Height: 5' 5" (1.651 m)     Body surface area is 1.51 meters squared.  Physical Exam  Vitals signs and nursing note reviewed.   Constitutional:       General: She is not in acute distress.     Appearance: Normal appearance. She is well-developed.   HENT:      Head: Normocephalic and atraumatic.      Mouth/Throat:      Mouth: No oral lesions. "   Eyes:      General: No scleral icterus.        Right eye: No discharge.         Left eye: No discharge.      Conjunctiva/sclera: Conjunctivae normal.   Neck:      Musculoskeletal: Neck supple.   Cardiovascular:      Rate and Rhythm: Normal rate and regular rhythm.      Heart sounds: Normal heart sounds. No murmur.   Pulmonary:      Effort: Pulmonary effort is normal. No respiratory distress.      Breath sounds: Normal breath sounds. No wheezing, rhonchi or rales.      Comments: Left breast with 1 cm palpable mass in UOQ, close to axilla, very superficial. No palpable nodes.   Chest:      Chest wall: There is no dullness to percussion.   Abdominal:      General: Bowel sounds are normal.      Palpations: Abdomen is soft.      Tenderness: There is no abdominal tenderness.   Skin:     General: Skin is warm and dry.      Coloration: Skin is not pale.   Neurological:      Mental Status: She is alert and oriented to person, place, and time.   Psychiatric:         Behavior: Behavior normal.         Thought Content: Thought content normal.          Echo reviewed. MRI breast reviewed.     Assessment     Assessment:     1. Malignant neoplasm of upper-outer quadrant of left breast in female, estrogen receptor positive     2. Encounter for chemotherapy management     3. Suppression of ovarian secretion         1. Stage I ([id number]) invasive ductal carcinoma of left breast, upper outer quadrant, ER 95%, [place] 90%, Her2 3+, Grade 3, Ki67 25%   * Genetics negative   * 6/16/2020 start neoadjuvant TCH x 6 cycles (no perjeta since < 2 cm and LN negative). Neoadjuvant therapy chosen due to location of tumor.     2. Fertility preservation. Eggs harvested. On monthly Zoladex  3. Anxiety. Seeing psych. Has xanax - add effexor  4. Mild thrombocytopenia - monitor.   Plan:     1. Cycle 2 TCH - delay 1 week. Still needs Zoldex tomorrow.  2. Add effexor - can increase if needed in future.   3. Zoladex due again in 4 weeks and 8 wks.         Follow-up in 4 wks for cycle 2 with MD/exam/trippo/zoladex and then NP in 7 wks for cycle 3. Zoladex in 4 wks.   I asked the patient to call for any questions, concerns, or new symptoms.    Return to clinic in 3 weeks with MD appointment and labs.     Patient is in agreement with the proposed treatment plan. All questions were answered to the patient's satisfaction. Patient knows to call clinic for any new or worsening symptoms and if anything is needed before the next clinic visit.          Tali Cueva, KALENP-C  Hematology & Medical Oncology   [facility] [place] [address]  ph. [phone]  Fax. [phone]    Patient dicussed with collaborating physician, Dr. Salazar.

## 2020-06-24 ENCOUNTER — TELEPHONE (OUTPATIENT)
Dept: HEMATOLOGY/ONCOLOGY | Facility: CLINIC | Age: 35
End: 2020-06-24

## 2020-06-24 DIAGNOSIS — T45.1X5A CINV (CHEMOTHERAPY-INDUCED NAUSEA AND VOMITING): Primary | ICD-10-CM

## 2020-06-24 DIAGNOSIS — R11.2 CINV (CHEMOTHERAPY-INDUCED NAUSEA AND VOMITING): Primary | ICD-10-CM

## 2020-06-24 DIAGNOSIS — Z30.430 ENCOUNTER FOR IUD INSERTION: Primary | ICD-10-CM

## 2020-06-24 DIAGNOSIS — R45.89 ANXIETY ABOUT HEALTH: ICD-10-CM

## 2020-06-24 NOTE — TELEPHONE ENCOUNTER
Patient phoned with Cytotec medication instructions reviewed. Patient to take one tab of 200 mcg Cytotec at 9pm this evening and 7am tomorrow prior to 1030 IUD placement in order to soften her cervix. Patient verbalizes understanding and confirms her availability for IUD procedure tomorrow. PEYTON Deras.

## 2020-06-25 ENCOUNTER — PROCEDURE VISIT (OUTPATIENT)
Dept: OBSTETRICS AND GYNECOLOGY | Facility: CLINIC | Age: 35
End: 2020-06-25
Attending: OBSTETRICS & GYNECOLOGY
Payer: COMMERCIAL

## 2020-06-25 ENCOUNTER — CLINICAL SUPPORT (OUTPATIENT)
Dept: HEMATOLOGY/ONCOLOGY | Facility: CLINIC | Age: 35
End: 2020-06-25
Payer: COMMERCIAL

## 2020-06-25 VITALS
WEIGHT: 104.06 LBS | BODY MASS INDEX: 17.34 KG/M2 | HEIGHT: 65 IN | SYSTOLIC BLOOD PRESSURE: 100 MMHG | DIASTOLIC BLOOD PRESSURE: 60 MMHG

## 2020-06-25 DIAGNOSIS — T45.1X5A CINV (CHEMOTHERAPY-INDUCED NAUSEA AND VOMITING): ICD-10-CM

## 2020-06-25 DIAGNOSIS — R11.2 CINV (CHEMOTHERAPY-INDUCED NAUSEA AND VOMITING): ICD-10-CM

## 2020-06-25 DIAGNOSIS — R45.89 ANXIETY ABOUT HEALTH: ICD-10-CM

## 2020-06-25 DIAGNOSIS — Z30.430 ENCOUNTER FOR IUD INSERTION: Primary | ICD-10-CM

## 2020-06-25 LAB
B-HCG UR QL: NEGATIVE
CTP QC/QA: YES

## 2020-06-25 PROCEDURE — 99999 PR PBB SHADOW E&M-EST. PATIENT-LVL II: ICD-10-PCS | Mod: PBBFAC,,, | Performed by: ACUPUNCTURIST

## 2020-06-25 PROCEDURE — 99999 PR PBB SHADOW E&M-EST. PATIENT-LVL II: CPT | Mod: PBBFAC,,, | Performed by: ACUPUNCTURIST

## 2020-06-25 PROCEDURE — 97811 PR ACUPUNCT W/O ELEC STIMUL ADDL 15M: ICD-10-PCS | Mod: S$GLB,,, | Performed by: ACUPUNCTURIST

## 2020-06-25 PROCEDURE — 97810 PR ACUPUNCT W/O ELEC STIMUL 15 MIN: ICD-10-PCS | Mod: S$GLB,,, | Performed by: ACUPUNCTURIST

## 2020-06-25 PROCEDURE — 97811 ACUP 1/> W/O ESTIM EA ADD 15: CPT | Mod: S$GLB,,, | Performed by: ACUPUNCTURIST

## 2020-06-25 PROCEDURE — 58300 PR INSERT INTRAUTERINE DEVICE: ICD-10-PCS | Mod: S$GLB,,, | Performed by: OBSTETRICS & GYNECOLOGY

## 2020-06-25 PROCEDURE — 97810 ACUP 1/> WO ESTIM 1ST 15 MIN: CPT | Mod: S$GLB,,, | Performed by: ACUPUNCTURIST

## 2020-06-25 PROCEDURE — 58300 INSERT INTRAUTERINE DEVICE: CPT | Mod: S$GLB,,, | Performed by: OBSTETRICS & GYNECOLOGY

## 2020-06-25 NOTE — PROGRESS NOTES
Subjective: headaches (occ), bone pain, fatigue and anxiety       Patient ID: Michelle Gage is a 34 y.o. female.    Chief Complaint: Pain (joint and bone ), Cancer (anxiety, depression, fatigue), Nausea (CINV), and Chemotherapy (every 3 weeks 4months)    Pain  Associated symptoms include headaches and nausea.   Cancer  Associated symptoms include headaches and nausea.   Nausea  Associated symptoms include headaches and nausea.     Review of Systems   Gastrointestinal: Positive for nausea.   Neurological: Positive for headaches.         Objective: breast cancer          Assessment:       1. CINV (chemotherapy-induced nausea and vomiting)    2. Anxiety about health        Plan:       Follow up day before next Chemotherapy treatment. Continue 1x a week for symptom management          Pre-Symptom Score: 6  Post-Symptom Score: 3     Pain (joint and bone ), Cancer (anxiety, depression, fatigue), Nausea (CINV), and Chemotherapy (every 3 weeks 4months)       Encounter Diagnoses   Name Primary?    CINV (chemotherapy-induced nausea and vomiting)     Anxiety about health        TCM Diagnosis: toxic heat with wind    Physical Exam   Constitutional:            Acupuncture points used:  Li11, Li4, Sp9, Sp6, Gb34, HEDRICK MEN, YIN IQBAL and 4 JUAREZ, GB21, DU20, SSC, SP10    NO STIM USED    NEEDLES INSERTED: 8:40 AM  NEEDLES REMOVED: 9:10 AM    NEEDLES IN: 24  NEEDLES OUT: 24

## 2020-06-25 NOTE — PROCEDURES
Insertion of IUD-Today    Date/Time: 6/25/2020 10:30 AM  Performed by: Lyndsay Warren MD  Authorized by: Lyndsay Warren MD     Consent:     Consent obtained:  Written    Consent given by:  Patient    Procedure risks and benefits discussed: yes      Patient questions answered: yes      Patient agrees, verbalizes understanding, and wants to proceed: yes      Educational handouts given: yes      Instructions and paperwork completed: yes    Procedure:     Pelvic exam performed: yes      Negative GC/chlamydia test: no      Negative urine pregnancy test: yes      Negative serum pregnancy test: no      Cervix cleaned and prepped: yes      Speculum placed in vagina: yes      Tenaculum applied to cervix: yes      Uterus sounded: yes      Uterus sound depth (cm):  6    IUD inserted with no complications: yes      IUD type:  ParaGard    Strings trimmed: yes    Post-procedure:     Patient tolerated procedure well: yes      Patient will follow up after next period: no    Comments:      Follow up in 3 months

## 2020-06-26 ENCOUNTER — OFFICE VISIT (OUTPATIENT)
Dept: PSYCHIATRY | Facility: CLINIC | Age: 35
End: 2020-06-26
Payer: COMMERCIAL

## 2020-06-26 DIAGNOSIS — F43.22 ADJUSTMENT DISORDER WITH ANXIOUS MOOD: Primary | ICD-10-CM

## 2020-06-26 PROCEDURE — 90834 PR PSYCHOTHERAPY W/PATIENT, 45 MIN: ICD-10-PCS | Mod: 95,,, | Performed by: PSYCHOLOGIST

## 2020-06-26 PROCEDURE — 90834 PSYTX W PT 45 MINUTES: CPT | Mod: 95,,, | Performed by: PSYCHOLOGIST

## 2020-06-26 NOTE — PROGRESS NOTES
Telemedicine PSYCHO-ONCOLOGY NOTE/ Individual Psychotherapy   (patient not on premises due to COVID-19 restrictions)    Consultation started: 6/26/2020 at 10:04 AM   The chief complaint leading to consultation is: adaptation to disease and treatment, anxiety, sleep  The patient location is: Patient home  Virtual visit with synchronous audio and video   Patient alone at the time of consultation      Each patient provided medical services by telemedicine is:  (1) informed of the relationship between the physician and patient and the respective role of any other health care provider with respect to management of the patient; and (2) notified that he or she may decline to receive medical services by telemedicine and may withdraw from such care at any time.    Crisis Disclaimer: Patient was informed that due to the virtual nature of the visit, that if a crisis develops, protocols will be implemented to ensure patient safety, including but not limited to: 1) Initiating a welfare check with local Law Enforcement, 2) Calling 1/National Crisis Hotline, and/or 3) Initiating PEC/CEC procedures.     Closest Hospital:  [facility]  Crisis Contact: Elio Luong (spouse)-[phone]    Date: 6/26/2020   Site of therapist:  [facility]         Therapeutic Intervention: Met with patient.  Outpatient - Behavior modifying psychotherapy 45 min - CPT code 81335    Referring provider:    Chief complaint/reason for encounter: anxiety and sleep   Met with patient to evaluate psychosocial adaptation to survivorship of breast cancer    Patient was last seen by me on 6/5/2020    Objective:      Michelle Gage arrived promptly for the session.   Ms. Gage was independently ambulatory at the time of session. The patient was fully cooperative throughout the session.  Appearance: age appropriate, casually  dressed, well groomed  Behavior/Cooperation: friendly and cooperative  Speech: normal in rate, volume, and tone and appropriate  "quality, quantity and organization of sentences  Mood: anxious, dysthymic  Affect: tearful  Thought Process: goal-directed, logical  Thought Content: normal,  No delusions or paranoia; did not appear to be responding to internal stimuli during the session  Orientation: grossly intact  Memory: Grossly intact  Attention Span/Concentration: Attends to session without distraction; reports moderate difficulty  Fund of Knowledge: average  Estimate of Intelligence: above average from verbal skills and history  Cognition: grossly intact  Insight: patient has awareness of illness; good insight into own behavior and behavior of others  Judgment: the patient's behavior is adequate to circumstances      Interval history and content of current session: Patient discussed coping with 1st chemo cycle. Discussed struggles with pain management, fatigue, break-outs, insomnia, and anticipation of hair loss. Discussed current adaptation to disease and treatment status and family's adaptation to disease and treatment status. Reports to be coping with significant difficulty. Evaluated cognitive response, paying particular attention to negative intrusive thoughts of a persistent and detrimental nature. Thoughts of this type are in evidence with moderate distress. Identified and evaluated psychosocial and environmental stressors secondary to diagnosis and treatment.  Patient processed thoughts that once she "look(s) sick" she will be sicker." Maintenance of regular activities, as much as possible, reinforced.      Examined proactive behaviors that may be implemented to minimize or ameliorate psychosocial stressors secondary to diagnosis and treatment.    Patient did start Effexor 3 days ago. Discussed onset of treatment effect.     Risk parameters:   Patient reports no suicidal ideation  Patient reports no homicidal ideation  Patient reports no self-injurious behavior  Patient reports no violent behavior   Safety needs:  None at this " time      Verbal deficits: None     Patient's response to intervention:The patient's response to intervention is accepting, motivated.     Progress toward goals and other mental status changes:  The patient's progress toward goals is good.      Progress to date:Progress as Expected      Goals from last visit: Attempted, partially met      Patient reported outcomes:      Distress Thermometer:   Distress Score    Distress Score: 8        Practical Problems Physical Problems   : No Appearance: Yes   Housing: No Bathing / Dressing: No   Insurance / Financial: No Breathing: No    Transportation: No  Changes in Urination: No    Work / School: Yes  Constipation: No   Treatment Decisions: Yes  Diarrhea: No     Eating: No    Family Problems Fatigue: Yes    Dealing with Children: No Feeling Swollen: Yes    Dealing with Partner: No Fevers: No    Ability to Have Children: Yes  Getting Around: No       Indigestion: No     Memory / Concentration: Yes   Emotional Problems Mouth Sores: No    Depression: Yes  Nausea: No    Fears: Yes  Nose Dry / Congested: Yes    Nervousness: Yes  Pain: Yes    Sadness: Yes Sexual: No    Worry: Yes Skin Dry / Itchy: No    Loss of Interest in Usual Activities: Yes Sleep: Yes     Substance Abuse: No    Spiritual/Religions Concerns Tingling in Hands / Feet: No   Spritual / Taoist Concerns: No         Other Problems              PHQ-9=  Initial visit: 7 at intake    PHQ ANSWERS    Q1. Little interest or pleasure in doing things: Several days (06/23/20 1935)  Q2. Feeling down, depressed, or hopeless: Nearly every day (06/23/20 1935)  Q3. Trouble falling or staying asleep, or sleeping too much: Nearly every day (06/23/20 1935)  Q4. Feeling tired or having little energy: Nearly every day (06/23/20 1935)  Q5. Poor appetite or overeating: More than half the days (06/23/20 1935)  Q6. Feeling bad about yourself - or that you are a failure or have let yourself or your family down: Several days  (06/23/20 1935)  Q7. Trouble concentrating on things, such as reading the newspaper or watching television: Nearly every day (06/23/20 1935)  Q8. Moving or speaking so slowly that other people could have noticed. Or the opposite - being so fidgety or restless that you have been moving around a lot more than usual: More than half the days (06/23/20 1935)  Q9.      PHQ8 Score : 18 (06/23/20 1935)  PHQ-9 Total Score: 18 (06/23/20 1935)        JENIFER-7= Initial visit: 7 at intake     GAD7 6/23/2020   1. Feeling nervous, anxious, or on edge? 3   2. Not being able to stop or control worrying? 3   3. Worrying too much about different things? 1   4. Trouble relaxing? 3   5. Being so restless that it is hard to sit still? 2   6. Becoming easily annoyed or irritable? 1   7. Feeling afraid as if something awful might happen? 0   JENIFER-7 Score 13         Client Strengths: verbal, intelligent, successful, good social support, good insight, commitment to wellness    Treatment Plan:individual psychotherapy and medication management by physician  · Target symptoms: anxiety , adjustment, insomnia  · Why chosen therapy is appropriate versus another modality: relevant to diagnosis, patient responds to this modality, evidence based practice  · Outcome monitoring methods: self-report, checklist/rating scale  · Therapeutic intervention type: behavior modifying psychotherapy  · Prognosis: Good      Behavioral goals:    Exercise: continue walking, stationary bike daily   Stress management:  Relaxation in bath, try PMR at night   Social engagement:  increase contact with friends, mom   Nutrition:   Smoking Cessation:   Therapy: continue Effexor as per Dr. Salazar    Return to clinic: 3 weeks     Length of Service (minutes direct face-to-face contact): 45      ICD-10-CM ICD-9-CM   1. Adjustment disorder with anxious mood  F43.22 309.24         Damian Hess, PhD  [place] License #[id number]

## 2020-07-01 ENCOUNTER — PATIENT MESSAGE (OUTPATIENT)
Dept: OBSTETRICS AND GYNECOLOGY | Facility: CLINIC | Age: 35
End: 2020-07-01

## 2020-07-02 DIAGNOSIS — G47.00 INSOMNIA: Primary | ICD-10-CM

## 2020-07-02 DIAGNOSIS — L70.9 ACNE: ICD-10-CM

## 2020-07-02 RX ORDER — LORAZEPAM 0.5 MG/1
0.5 TABLET ORAL NIGHTLY PRN
Qty: 15 TABLET | Refills: 0 | Status: SHIPPED | OUTPATIENT
Start: 2020-07-02 | End: 2020-11-19

## 2020-07-02 RX ORDER — SPIRONOLACTONE 50 MG/1
50 TABLET, FILM COATED ORAL NIGHTLY
Qty: 30 TABLET | Refills: 11 | Status: SHIPPED | OUTPATIENT
Start: 2020-07-02 | End: 2021-12-14

## 2020-07-02 NOTE — TELEPHONE ENCOUNTER
Patient mentions acne and insomnia associated with pre-chemotherapy steroids recently. Patient mentions she commonly breaks out with steroid therapy if she is not on OCP's.     RN conferred with Dr. Lyndsay Warren who recommends Aldactone 50mg orally nightly, Disp #30 with 6 refills and ativan 0.5mg orally QHS PRN for insomnia. Disp #15, no refills.     MD orders read back and reviewed. RN will update Dr. Christy Salazar on the above. PEYTON Deras.

## 2020-07-07 ENCOUNTER — OFFICE VISIT (OUTPATIENT)
Dept: HEMATOLOGY/ONCOLOGY | Facility: CLINIC | Age: 35
End: 2020-07-07
Payer: COMMERCIAL

## 2020-07-07 ENCOUNTER — LAB VISIT (OUTPATIENT)
Dept: LAB | Facility: HOSPITAL | Age: 35
End: 2020-07-07
Attending: INTERNAL MEDICINE
Payer: COMMERCIAL

## 2020-07-07 ENCOUNTER — TELEPHONE (OUTPATIENT)
Dept: HEMATOLOGY/ONCOLOGY | Facility: CLINIC | Age: 35
End: 2020-07-07

## 2020-07-07 ENCOUNTER — CLINICAL SUPPORT (OUTPATIENT)
Dept: HEMATOLOGY/ONCOLOGY | Facility: CLINIC | Age: 35
End: 2020-07-07
Payer: COMMERCIAL

## 2020-07-07 VITALS
RESPIRATION RATE: 18 BRPM | TEMPERATURE: 98 F | BODY MASS INDEX: 18.18 KG/M2 | HEART RATE: 68 BPM | SYSTOLIC BLOOD PRESSURE: 103 MMHG | OXYGEN SATURATION: 98 % | WEIGHT: 109.13 LBS | DIASTOLIC BLOOD PRESSURE: 67 MMHG | HEIGHT: 65 IN

## 2020-07-07 DIAGNOSIS — E28.39 SUPPRESSION OF OVARIAN SECRETION: ICD-10-CM

## 2020-07-07 DIAGNOSIS — Z17.0 MALIGNANT NEOPLASM OF UPPER-OUTER QUADRANT OF LEFT BREAST IN FEMALE, ESTROGEN RECEPTOR POSITIVE: ICD-10-CM

## 2020-07-07 DIAGNOSIS — R11.0 CHEMOTHERAPY-INDUCED NAUSEA: ICD-10-CM

## 2020-07-07 DIAGNOSIS — Z51.11 ENCOUNTER FOR CHEMOTHERAPY MANAGEMENT: ICD-10-CM

## 2020-07-07 DIAGNOSIS — T45.1X5A CHEMOTHERAPY-INDUCED NAUSEA: ICD-10-CM

## 2020-07-07 DIAGNOSIS — C50.412 MALIGNANT NEOPLASM OF UPPER-OUTER QUADRANT OF LEFT BREAST IN FEMALE, ESTROGEN RECEPTOR POSITIVE: ICD-10-CM

## 2020-07-07 DIAGNOSIS — C50.412 MALIGNANT NEOPLASM OF UPPER-OUTER QUADRANT OF LEFT BREAST IN FEMALE, ESTROGEN RECEPTOR POSITIVE: Primary | ICD-10-CM

## 2020-07-07 DIAGNOSIS — F41.9 ANXIETY: ICD-10-CM

## 2020-07-07 DIAGNOSIS — Z17.0 MALIGNANT NEOPLASM OF UPPER-OUTER QUADRANT OF LEFT BREAST IN FEMALE, ESTROGEN RECEPTOR POSITIVE: Primary | ICD-10-CM

## 2020-07-07 LAB
ALBUMIN SERPL BCP-MCNC: 3.8 G/DL (ref 3.5–5.2)
ALP SERPL-CCNC: 50 U/L (ref 55–135)
ALT SERPL W/O P-5'-P-CCNC: 46 U/L (ref 10–44)
ANION GAP SERPL CALC-SCNC: 8 MMOL/L (ref 8–16)
AST SERPL-CCNC: 30 U/L (ref 10–40)
BASOPHILS # BLD AUTO: 0.1 K/UL (ref 0–0.2)
BASOPHILS NFR BLD: 2.3 % (ref 0–1.9)
BILIRUB SERPL-MCNC: 0.6 MG/DL (ref 0.1–1)
BUN SERPL-MCNC: 9 MG/DL (ref 6–20)
CALCIUM SERPL-MCNC: 9.8 MG/DL (ref 8.7–10.5)
CHLORIDE SERPL-SCNC: 108 MMOL/L (ref 95–110)
CO2 SERPL-SCNC: 24 MMOL/L (ref 23–29)
CREAT SERPL-MCNC: 0.8 MG/DL (ref 0.5–1.4)
DIFFERENTIAL METHOD: ABNORMAL
EOSINOPHIL # BLD AUTO: 0.1 K/UL (ref 0–0.5)
EOSINOPHIL NFR BLD: 2.8 % (ref 0–8)
ERYTHROCYTE [DISTWIDTH] IN BLOOD BY AUTOMATED COUNT: 13.6 % (ref 11.5–14.5)
ERYTHROCYTE [DISTWIDTH] IN BLOOD BY AUTOMATED COUNT: 13.6 % (ref 11.5–14.5)
EST. GFR  (AFRICAN AMERICAN): >60 ML/MIN/1.73 M^2
EST. GFR  (NON AFRICAN AMERICAN): >60 ML/MIN/1.73 M^2
GLUCOSE SERPL-MCNC: 107 MG/DL (ref 70–110)
HCT VFR BLD AUTO: 38.3 % (ref 37–48.5)
HCT VFR BLD AUTO: 38.3 % (ref 37–48.5)
HGB BLD-MCNC: 12.4 G/DL (ref 12–16)
HGB BLD-MCNC: 12.4 G/DL (ref 12–16)
IMM GRANULOCYTES # BLD AUTO: 0.01 K/UL (ref 0–0.04)
IMM GRANULOCYTES # BLD AUTO: 0.01 K/UL (ref 0–0.04)
IMM GRANULOCYTES NFR BLD AUTO: 0.2 % (ref 0–0.5)
LYMPHOCYTES # BLD AUTO: 2.5 K/UL (ref 1–4.8)
LYMPHOCYTES NFR BLD: 59 % (ref 18–48)
MCH RBC QN AUTO: 31.6 PG (ref 27–31)
MCH RBC QN AUTO: 31.6 PG (ref 27–31)
MCHC RBC AUTO-ENTMCNC: 32.4 G/DL (ref 32–36)
MCHC RBC AUTO-ENTMCNC: 32.4 G/DL (ref 32–36)
MCV RBC AUTO: 98 FL (ref 82–98)
MCV RBC AUTO: 98 FL (ref 82–98)
MONOCYTES # BLD AUTO: 0.7 K/UL (ref 0.3–1)
MONOCYTES NFR BLD: 15.9 % (ref 4–15)
NEUTROPHILS # BLD AUTO: 0.8 K/UL (ref 1.8–7.7)
NEUTROPHILS # BLD AUTO: 0.8 K/UL (ref 1.8–7.7)
NEUTROPHILS NFR BLD: 19.8 % (ref 38–73)
NRBC BLD-RTO: 0 /100 WBC
PLATELET # BLD AUTO: 160 K/UL (ref 150–350)
PLATELET # BLD AUTO: 160 K/UL (ref 150–350)
PLATELET BLD QL SMEAR: ABNORMAL
PMV BLD AUTO: 11.8 FL (ref 9.2–12.9)
PMV BLD AUTO: 11.8 FL (ref 9.2–12.9)
POTASSIUM SERPL-SCNC: 4.5 MMOL/L (ref 3.5–5.1)
PROT SERPL-MCNC: 6.6 G/DL (ref 6–8.4)
RBC # BLD AUTO: 3.93 M/UL (ref 4–5.4)
RBC # BLD AUTO: 3.93 M/UL (ref 4–5.4)
SODIUM SERPL-SCNC: 140 MMOL/L (ref 136–145)
WBC # BLD AUTO: 4.26 K/UL (ref 3.9–12.7)
WBC # BLD AUTO: 4.26 K/UL (ref 3.9–12.7)

## 2020-07-07 PROCEDURE — 97811 ACUP 1/> W/O ESTIM EA ADD 15: CPT | Mod: S$GLB,,, | Performed by: ACUPUNCTURIST

## 2020-07-07 PROCEDURE — 97810 ACUP 1/> WO ESTIM 1ST 15 MIN: CPT | Mod: S$GLB,,, | Performed by: ACUPUNCTURIST

## 2020-07-07 PROCEDURE — 99999 PR PBB SHADOW E&M-EST. PATIENT-LVL V: ICD-10-PCS | Mod: PBBFAC,,, | Performed by: NURSE PRACTITIONER

## 2020-07-07 PROCEDURE — 99999 PR PBB SHADOW E&M-EST. PATIENT-LVL V: CPT | Mod: PBBFAC,,, | Performed by: NURSE PRACTITIONER

## 2020-07-07 PROCEDURE — 99999 PR PBB SHADOW E&M-EST. PATIENT-LVL I: CPT | Mod: PBBFAC,,, | Performed by: ACUPUNCTURIST

## 2020-07-07 PROCEDURE — 97810 PR ACUPUNCT W/O ELEC STIMUL 15 MIN: ICD-10-PCS | Mod: S$GLB,,, | Performed by: ACUPUNCTURIST

## 2020-07-07 PROCEDURE — 99999 PR PBB SHADOW E&M-EST. PATIENT-LVL I: ICD-10-PCS | Mod: PBBFAC,,, | Performed by: ACUPUNCTURIST

## 2020-07-07 PROCEDURE — 36415 COLL VENOUS BLD VENIPUNCTURE: CPT

## 2020-07-07 PROCEDURE — 80053 COMPREHEN METABOLIC PANEL: CPT

## 2020-07-07 PROCEDURE — 99214 OFFICE O/P EST MOD 30 MIN: CPT | Mod: S$GLB,,, | Performed by: NURSE PRACTITIONER

## 2020-07-07 PROCEDURE — 99214 PR OFFICE/OUTPT VISIT, EST, LEVL IV, 30-39 MIN: ICD-10-PCS | Mod: S$GLB,,, | Performed by: NURSE PRACTITIONER

## 2020-07-07 PROCEDURE — 97811 PR ACUPUNCT W/O ELEC STIMUL ADDL 15M: ICD-10-PCS | Mod: S$GLB,,, | Performed by: ACUPUNCTURIST

## 2020-07-07 PROCEDURE — 85025 COMPLETE CBC W/AUTO DIFF WBC: CPT

## 2020-07-07 RX ORDER — MISOPROSTOL 200 UG/1
TABLET ORAL
COMMUNITY
Start: 2020-06-24 | End: 2020-11-19

## 2020-07-07 NOTE — Clinical Note
Needs CBC and chemotherapy rescheduled in 1 week. Needs Zoldex injection tomorrow.  Needs labs CBC and CMP and chemotherapy and MD visit with Dr. Salazar in 4 weeks   Needs labs CBC and CMP and chemotherapy and NP visit with me in 7 weeks.

## 2020-07-07 NOTE — PROGRESS NOTES
Subjective:       Patient ID: Michelle Gage is a 34 y.o. female.    Chief Complaint: Chemotherapy, Breast Cancer, and Pain (stiff, especially in morning. HA (occipital) )    Pain      Review of Systems      Objective:          Assessment:       No diagnosis found.    Plan:       1x a week for 6 weeks.*           Pre-Symptom Score: 5  Post-Symptom Score: 3      No diagnosis found.    TCM Diagnosis: Qi and Blood Stagnation w/ Toxic Heat    Physical Exam   Constitutional:       HENT:   Head:            Acupuncture points used:  Li11, Pc6, St36, Sp9, Sp6, Gb34, HEDRICK MEN, YIN IQBAL and 4 JUAREZ    NO STIM USED    NEEDLING TIME: 8:01 AM insertion         8:22 AM re-insertion/stimulation     NEEDLES IN: 20  NEEDLES OUT: 20

## 2020-07-08 ENCOUNTER — INFUSION (OUTPATIENT)
Dept: INFUSION THERAPY | Facility: HOSPITAL | Age: 35
End: 2020-07-08
Attending: INTERNAL MEDICINE
Payer: COMMERCIAL

## 2020-07-08 DIAGNOSIS — E28.39 SUPPRESSION OF OVARIAN SECRETION: Primary | ICD-10-CM

## 2020-07-08 PROCEDURE — 96402 CHEMO HORMON ANTINEOPL SQ/IM: CPT

## 2020-07-08 PROCEDURE — 63600175 PHARM REV CODE 636 W HCPCS: Mod: JG | Performed by: INTERNAL MEDICINE

## 2020-07-08 RX ADMIN — GOSERELIN ACETATE 3.6 MG: 3.6 IMPLANT SUBCUTANEOUS at 10:07

## 2020-07-10 RX ORDER — MISOPROSTOL 200 UG/1
TABLET ORAL
Qty: 2 TABLET | Refills: 0 | Status: SHIPPED | OUTPATIENT
Start: 2020-07-10 | End: 2020-09-08

## 2020-07-15 ENCOUNTER — INFUSION (OUTPATIENT)
Dept: INFUSION THERAPY | Facility: HOSPITAL | Age: 35
End: 2020-07-15
Attending: NURSE PRACTITIONER
Payer: COMMERCIAL

## 2020-07-15 ENCOUNTER — LAB VISIT (OUTPATIENT)
Dept: LAB | Facility: HOSPITAL | Age: 35
End: 2020-07-15
Attending: INTERNAL MEDICINE
Payer: COMMERCIAL

## 2020-07-15 VITALS
TEMPERATURE: 99 F | HEART RATE: 62 BPM | BODY MASS INDEX: 17.34 KG/M2 | RESPIRATION RATE: 18 BRPM | DIASTOLIC BLOOD PRESSURE: 71 MMHG | HEIGHT: 65 IN | WEIGHT: 104.06 LBS | SYSTOLIC BLOOD PRESSURE: 118 MMHG

## 2020-07-15 DIAGNOSIS — C50.412 MALIGNANT NEOPLASM OF UPPER-OUTER QUADRANT OF LEFT BREAST IN FEMALE, ESTROGEN RECEPTOR POSITIVE: ICD-10-CM

## 2020-07-15 DIAGNOSIS — Z17.0 MALIGNANT NEOPLASM OF UPPER-OUTER QUADRANT OF LEFT BREAST IN FEMALE, ESTROGEN RECEPTOR POSITIVE: Primary | ICD-10-CM

## 2020-07-15 DIAGNOSIS — Z17.0 MALIGNANT NEOPLASM OF UPPER-OUTER QUADRANT OF LEFT BREAST IN FEMALE, ESTROGEN RECEPTOR POSITIVE: ICD-10-CM

## 2020-07-15 DIAGNOSIS — D70.1 CHEMOTHERAPY INDUCED NEUTROPENIA: ICD-10-CM

## 2020-07-15 DIAGNOSIS — T45.1X5A CHEMOTHERAPY-INDUCED NAUSEA: ICD-10-CM

## 2020-07-15 DIAGNOSIS — C50.412 MALIGNANT NEOPLASM OF UPPER-OUTER QUADRANT OF LEFT BREAST IN FEMALE, ESTROGEN RECEPTOR POSITIVE: Primary | ICD-10-CM

## 2020-07-15 DIAGNOSIS — Z51.11 ENCOUNTER FOR CHEMOTHERAPY MANAGEMENT: ICD-10-CM

## 2020-07-15 DIAGNOSIS — T45.1X5A CHEMOTHERAPY INDUCED NEUTROPENIA: ICD-10-CM

## 2020-07-15 DIAGNOSIS — R11.0 CHEMOTHERAPY-INDUCED NAUSEA: ICD-10-CM

## 2020-07-15 LAB
ALBUMIN SERPL BCP-MCNC: 4.3 G/DL (ref 3.5–5.2)
ALP SERPL-CCNC: 50 U/L (ref 55–135)
ALT SERPL W/O P-5'-P-CCNC: 22 U/L (ref 10–44)
ANION GAP SERPL CALC-SCNC: 8 MMOL/L (ref 8–16)
AST SERPL-CCNC: 19 U/L (ref 10–40)
BILIRUB SERPL-MCNC: 0.4 MG/DL (ref 0.1–1)
BUN SERPL-MCNC: 13 MG/DL (ref 6–20)
CALCIUM SERPL-MCNC: 10.2 MG/DL (ref 8.7–10.5)
CHLORIDE SERPL-SCNC: 108 MMOL/L (ref 95–110)
CO2 SERPL-SCNC: 24 MMOL/L (ref 23–29)
CREAT SERPL-MCNC: 0.9 MG/DL (ref 0.5–1.4)
ERYTHROCYTE [DISTWIDTH] IN BLOOD BY AUTOMATED COUNT: 13.5 % (ref 11.5–14.5)
EST. GFR  (AFRICAN AMERICAN): >60 ML/MIN/1.73 M^2
EST. GFR  (NON AFRICAN AMERICAN): >60 ML/MIN/1.73 M^2
GLUCOSE SERPL-MCNC: 89 MG/DL (ref 70–110)
HCT VFR BLD AUTO: 39.5 % (ref 37–48.5)
HGB BLD-MCNC: 13 G/DL (ref 12–16)
IMM GRANULOCYTES # BLD AUTO: 0.02 K/UL (ref 0–0.04)
MCH RBC QN AUTO: 31.6 PG (ref 27–31)
MCHC RBC AUTO-ENTMCNC: 32.9 G/DL (ref 32–36)
MCV RBC AUTO: 96 FL (ref 82–98)
NEUTROPHILS # BLD AUTO: 5.8 K/UL (ref 1.8–7.7)
PLATELET # BLD AUTO: 148 K/UL (ref 150–350)
PMV BLD AUTO: 11.9 FL (ref 9.2–12.9)
POTASSIUM SERPL-SCNC: 4.5 MMOL/L (ref 3.5–5.1)
PROT SERPL-MCNC: 7.3 G/DL (ref 6–8.4)
RBC # BLD AUTO: 4.12 M/UL (ref 4–5.4)
SODIUM SERPL-SCNC: 140 MMOL/L (ref 136–145)
WBC # BLD AUTO: 10.75 K/UL (ref 3.9–12.7)

## 2020-07-15 PROCEDURE — 96365 THER/PROPH/DIAG IV INF INIT: CPT

## 2020-07-15 PROCEDURE — 25000003 PHARM REV CODE 250: Performed by: INTERNAL MEDICINE

## 2020-07-15 PROCEDURE — A4216 STERILE WATER/SALINE, 10 ML: HCPCS | Performed by: INTERNAL MEDICINE

## 2020-07-15 PROCEDURE — 85027 COMPLETE CBC AUTOMATED: CPT

## 2020-07-15 PROCEDURE — 36415 COLL VENOUS BLD VENIPUNCTURE: CPT

## 2020-07-15 PROCEDURE — 96367 TX/PROPH/DG ADDL SEQ IV INF: CPT

## 2020-07-15 PROCEDURE — 63600175 PHARM REV CODE 636 W HCPCS: Performed by: INTERNAL MEDICINE

## 2020-07-15 PROCEDURE — 80053 COMPREHEN METABOLIC PANEL: CPT

## 2020-07-15 PROCEDURE — 96413 CHEMO IV INFUSION 1 HR: CPT

## 2020-07-15 PROCEDURE — 96417 CHEMO IV INFUS EACH ADDL SEQ: CPT

## 2020-07-15 PROCEDURE — 96375 TX/PRO/DX INJ NEW DRUG ADDON: CPT

## 2020-07-15 RX ORDER — DIPHENHYDRAMINE HYDROCHLORIDE 50 MG/ML
12.5 INJECTION INTRAMUSCULAR; INTRAVENOUS
Status: COMPLETED | OUTPATIENT
Start: 2020-07-15 | End: 2020-07-15

## 2020-07-15 RX ORDER — SODIUM CHLORIDE 0.9 % (FLUSH) 0.9 %
10 SYRINGE (ML) INJECTION
Status: DISCONTINUED | OUTPATIENT
Start: 2020-07-15 | End: 2020-07-15 | Stop reason: HOSPADM

## 2020-07-15 RX ORDER — HEPARIN 100 UNIT/ML
500 SYRINGE INTRAVENOUS
Status: DISCONTINUED | OUTPATIENT
Start: 2020-07-15 | End: 2020-07-15 | Stop reason: HOSPADM

## 2020-07-15 RX ADMIN — DOCETAXEL ANHYDROUS 110 MG: 10 INJECTION, SOLUTION INTRAVENOUS at 10:07

## 2020-07-15 RX ADMIN — Medication 10 ML: at 03:07

## 2020-07-15 RX ADMIN — HEPARIN 500 UNITS: 100 SYRINGE at 03:07

## 2020-07-15 RX ADMIN — SODIUM CHLORIDE: 0.9 INJECTION, SOLUTION INTRAVENOUS at 10:07

## 2020-07-15 RX ADMIN — PEGFILGRASTIM 6 MG: KIT SUBCUTANEOUS at 02:07

## 2020-07-15 RX ADMIN — TRASTUZUMAB 283 MG: 150 INJECTION, POWDER, LYOPHILIZED, FOR SOLUTION INTRAVENOUS at 01:07

## 2020-07-15 RX ADMIN — DIPHENHYDRAMINE HYDROCHLORIDE 12.5 MG: 50 INJECTION INTRAMUSCULAR; INTRAVENOUS at 10:07

## 2020-07-15 RX ADMIN — SODIUM CHLORIDE 540 MG: 9 INJECTION, SOLUTION INTRAVENOUS at 12:07

## 2020-07-15 RX ADMIN — DEXAMETHASONE SODIUM PHOSPHATE: 4 INJECTION, SOLUTION INTRA-ARTICULAR; INTRALESIONAL; INTRAMUSCULAR; INTRAVENOUS; SOFT TISSUE at 10:07

## 2020-07-15 NOTE — PLAN OF CARE
1000 pt here for taxotere/carbo/herceptin infusion D1C2, labs, hx, meds, allergies reviewed, pt to wear cooling cap, pt with no complaints at this time, but did have hip pain from Neulasta , pt is taking claritin and took tylenol as well, pt reclined in chair, continue to monitor

## 2020-07-15 NOTE — PLAN OF CARE
1510 pt tolerated taxotere/carboplatin/herceptin without issue, post cooling time complete, cap removed pt tolerated, OBI placed to left abdomen activated and blinking green prior to d/c, no distress noted upon d/c to home

## 2020-07-17 ENCOUNTER — OFFICE VISIT (OUTPATIENT)
Dept: PSYCHIATRY | Facility: CLINIC | Age: 35
End: 2020-07-17
Payer: COMMERCIAL

## 2020-07-17 DIAGNOSIS — F43.22 ADJUSTMENT DISORDER WITH ANXIOUS MOOD: Primary | ICD-10-CM

## 2020-07-17 PROCEDURE — 90834 PSYTX W PT 45 MINUTES: CPT | Mod: 95,,, | Performed by: PSYCHOLOGIST

## 2020-07-17 PROCEDURE — 90834 PR PSYCHOTHERAPY W/PATIENT, 45 MIN: ICD-10-PCS | Mod: 95,,, | Performed by: PSYCHOLOGIST

## 2020-07-17 NOTE — PROGRESS NOTES
Telemedicine PSYCHO-ONCOLOGY NOTE/ Individual Psychotherapy   (patient not on premises due to COVID-19 restrictions)    Consultation started: 7/17/2020 at 10:04 AM   The chief complaint leading to consultation is: adaptation to disease and treatment, anxiety, sleep  The patient location is: Patient home  Virtual visit with synchronous audio and video   Patient alone at the time of consultation      Each patient provided medical services by telemedicine is:  (1) informed of the relationship between the physician and patient and the respective role of any other health care provider with respect to management of the patient; and (2) notified that he or she may decline to receive medical services by telemedicine and may withdraw from such care at any time.    Crisis Disclaimer: Patient was informed that due to the virtual nature of the visit, that if a crisis develops, protocols will be implemented to ensure patient safety, including but not limited to: 1) Initiating a welfare check with local Law Enforcement, 2) Calling [facility]/National Crisis Hotline, and/or 3) Initiating PEC/CEC procedures.     Closest Hospital:  [facility]  Crisis Contact: Elio Luong (spouse)-[phone]    Date: 7/17/2020   Site of therapist:  [facility]         Therapeutic Intervention: Met with patient.  Outpatient - Behavior modifying psychotherapy 45 min - CPT code 06143    Referring provider:    Chief complaint/reason for encounter: anxiety and sleep   Met with patient to evaluate psychosocial adaptation to survivorship of breast cancer    Patient was last seen by me on 6/26/2020    Objective:      Michelle Gage arrived promptly for the session.   Ms. Gage was independently ambulatory at the time of session. The patient was fully cooperative throughout the session.  Appearance: age appropriate, casually  dressed, well groomed  Behavior/Cooperation: friendly and cooperative  Speech: normal in rate, volume, and tone and  "appropriate quality, quantity and organization of sentences  Mood: anxious, dysthymic  Affect: mildly tearful  Thought Process: goal-directed, logical  Thought Content: normal,  No delusions or paranoia; did not appear to be responding to internal stimuli during the session  Orientation: grossly intact  Memory: Grossly intact  Attention Span/Concentration: Attends to session without distraction; reports moderate difficulty  Fund of Knowledge: average  Estimate of Intelligence: above average from verbal skills and history  Cognition: grossly intact  Insight: patient has awareness of illness; good insight into own behavior and behavior of others  Judgment: the patient's behavior is adequate to circumstances      Interval history and content of current session: Patient discussed coping with 2nd chemo cycle. Discussed struggles with pain management, fatigue, and progressive hair loss. Discussed current adaptation to disease and treatment status and family's adaptation to disease and treatment status. Reports to be coping  with improvement. Evaluated cognitive response, paying particular attention to negative intrusive thoughts of a persistent and detrimental nature. Thoughts of this type are in evidence with moderate distress. Identified and evaluated psychosocial and environmental stressors secondary to diagnosis and treatment.  Patient processed thoughts that she might "never be right again." Especially bothersome in areas of appearance and mental acuity.  Maintenance of regular activities, as much as possible, reinforced.      Examined proactive behaviors that may be implemented to minimize or ameliorate psychosocial stressors secondary to diagnosis and treatment.  She is increasing her exercise and contact with loved ones (Covid-safe).  Patient continues on Effexor.     Risk parameters:   Patient reports no suicidal ideation  Patient reports no homicidal ideation  Patient reports no self-injurious behavior  Patient " reports no violent behavior   Safety needs:  None at this time      Verbal deficits: None     Patient's response to intervention:The patient's response to intervention is accepting, motivated.     Progress toward goals and other mental status changes:  The patient's progress toward goals is good.      Progress to date:Progress as Expected      Goals from last visit: Attempted, partially met      Patient reported outcomes:      Distress Thermometer:   Distress Score    Distress Score: 5        Practical Problems Physical Problems   : No Appearance: Yes   Housing: No Bathing / Dressing: No   Insurance / Financial: No Breathing: No    Transportation: No  Changes in Urination: No    Work / School: Yes  Constipation: No   Treatment Decisions: No  Diarrhea: No     Eating: No    Family Problems Fatigue: Yes    Dealing with Children: No Feeling Swollen: No    Dealing with Partner: No Fevers: No    Ability to Have Children: Yes  Getting Around: No       Indigestion: No     Memory / Concentration: Yes   Emotional Problems Mouth Sores: No    Depression: No  Nausea: No    Fears: Yes  Nose Dry / Congested: No    Nervousness: Yes  Pain: Yes    Sadness: Yes Sexual: No    Worry: Yes Skin Dry / Itchy: Yes    Loss of Interest in Usual Activities: Yes Sleep: Yes     Substance Abuse: No    Spiritual/Religions Concerns Tingling in Hands / Feet: No   Spritual / Pentecostal Concerns: No         Other Problems              PHQ-9=  18 highest    PHQ ANSWERS    Q1. Little interest or pleasure in doing things: Several days (07/17/20 0902)  Q2. Feeling down, depressed, or hopeless: Several days (07/17/20 0902)  Q3. Trouble falling or staying asleep, or sleeping too much: More than half the days (07/17/20 0902)  Q4. Feeling tired or having little energy: More than half the days (07/17/20 0902)  Q5. Poor appetite or overeating: Not at all (07/17/20 0902)  Q6. Feeling bad about yourself - or that you are a failure or have let yourself or  your family down: Several days (07/17/20 0902)  Q7. Trouble concentrating on things, such as reading the newspaper or watching television: More than half the days (07/17/20 0902)  Q8. Moving or speaking so slowly that other people could have noticed. Or the opposite - being so fidgety or restless that you have been moving around a lot more than usual: Not at all (07/17/20 0902)  Q9.      PHQ8 Score : 9 (07/17/20 0902)  PHQ-9 Total Score: 9 (07/17/20 0902)        JENIFER-7= 13 highest     GAD7 7/17/2020   1. Feeling nervous, anxious, or on edge? 1   2. Not being able to stop or control worrying? 1   3. Worrying too much about different things? 1   4. Trouble relaxing? 2   5. Being so restless that it is hard to sit still? 1   6. Becoming easily annoyed or irritable? 2   7. Feeling afraid as if something awful might happen? 0   JENIFER-7 Score 8         Client Strengths: verbal, intelligent, successful, good social support, good insight, commitment to wellness    Treatment Plan:individual psychotherapy and medication management by physician  · Target symptoms: anxiety , adjustment, insomnia  · Why chosen therapy is appropriate versus another modality: relevant to diagnosis, patient responds to this modality, evidence based practice  · Outcome monitoring methods: self-report, checklist/rating scale  · Therapeutic intervention type: behavior modifying psychotherapy  · Prognosis: Good      Behavioral goals:    Exercise: continue walking, stationary bike daily   Stress management:     Social engagement:  increase contact with friends- tell them to call her, see family next weekend (COVID precautions)   Nutrition:   Smoking Cessation:   Therapy: continue Effexor as per Dr. Salazar    Return to clinic: 3 weeks     Length of Service (minutes direct face-to-face contact): 45      ICD-10-CM ICD-9-CM   1. Adjustment disorder with anxious mood  F43.22 309.24         Damian Hess, PhD  [place] License #[id number]

## 2020-07-22 ENCOUNTER — CLINICAL SUPPORT (OUTPATIENT)
Dept: HEMATOLOGY/ONCOLOGY | Facility: CLINIC | Age: 35
End: 2020-07-22

## 2020-07-22 DIAGNOSIS — R11.0 CHEMOTHERAPY-INDUCED NAUSEA: Primary | ICD-10-CM

## 2020-07-22 DIAGNOSIS — T45.1X5A CHEMOTHERAPY-INDUCED NAUSEA: Primary | ICD-10-CM

## 2020-07-22 DIAGNOSIS — F41.9 ANXIETY: ICD-10-CM

## 2020-07-22 DIAGNOSIS — C50.412 MALIGNANT NEOPLASM OF UPPER-OUTER QUADRANT OF LEFT BREAST IN FEMALE, ESTROGEN RECEPTOR POSITIVE: ICD-10-CM

## 2020-07-22 DIAGNOSIS — Z17.0 MALIGNANT NEOPLASM OF UPPER-OUTER QUADRANT OF LEFT BREAST IN FEMALE, ESTROGEN RECEPTOR POSITIVE: ICD-10-CM

## 2020-07-22 PROCEDURE — 97811 PR ACUPUNCT W/O ELEC STIMUL ADDL 15M: ICD-10-PCS | Mod: S$GLB,,, | Performed by: ACUPUNCTURIST

## 2020-07-22 PROCEDURE — 99999 PR PBB SHADOW E&M-EST. PATIENT-LVL I: ICD-10-PCS | Mod: PBBFAC,,, | Performed by: ACUPUNCTURIST

## 2020-07-22 PROCEDURE — 97810 ACUP 1/> WO ESTIM 1ST 15 MIN: CPT | Mod: S$GLB,,, | Performed by: ACUPUNCTURIST

## 2020-07-22 PROCEDURE — 97811 ACUP 1/> W/O ESTIM EA ADD 15: CPT | Mod: S$GLB,,, | Performed by: ACUPUNCTURIST

## 2020-07-22 PROCEDURE — 97810 PR ACUPUNCT W/O ELEC STIMUL 15 MIN: ICD-10-PCS | Mod: S$GLB,,, | Performed by: ACUPUNCTURIST

## 2020-07-22 PROCEDURE — 99999 PR PBB SHADOW E&M-EST. PATIENT-LVL I: CPT | Mod: PBBFAC,,, | Performed by: ACUPUNCTURIST

## 2020-07-22 NOTE — PROGRESS NOTES
Subjective:       Patient ID: Michelle Gage is a 34 y.o. female.    Chief Complaint: Pain (legs and hips, delayed), Nausea (delayed, intermitent ), and Breast Cancer    Patient started experiencing delayed nausea symptoms (2-3 days after treatment). Chemo brain/fog, leg and hip and general joint pain is a symptom post chemo. Will start day before chemo treatments w/ high intensity nausea protocol w/ acupuncture.     Review of Systems   Constitutional: Positive for fatigue.   Gastrointestinal: Positive for constipation.   Musculoskeletal: Positive for leg pain.   Psychiatric/Behavioral: Positive for decreased concentration.   All other systems reviewed and are negative.        Objective:          Assessment:       1. Chemotherapy-induced nausea    2. Malignant neoplasm of upper-outer quadrant of left breast in female, estrogen receptor positive    3. Anxiety        Plan:       1x a week during day before chemotherapy appointments for 10 weeks*         Pre-Symptom Score: 6 (nausea) 7/8 (pain)  Post-Symptom Score: 6 (nausea) 7/8 (pain)    Pain (legs and hips, delayed), Nausea (delayed, intermitent ), and Breast Cancer       Encounter Diagnoses   Name Primary?    Chemotherapy-induced nausea Yes    Malignant neoplasm of upper-outer quadrant of left breast in female, estrogen receptor positive     Anxiety        TCM Diagnosis: Qi/LV/Blood stagnation w/ Toxic Heat    Physical Exam   Constitutional:            Acupuncture points used:  Li11, Li4, Pc6, St36, Sp9, Sp6, Du20, Ki3, Gb34 and YIN IQBAL  Plus additional points listed on physical exam chart.    NO STIM USED      NEEDLES IN: 28  NEEDLES OUT: 28    NEEDLES INSERTED: 9:29 AM  NEEDLES REMOVED: 9:59 AM

## 2020-08-04 ENCOUNTER — CLINICAL SUPPORT (OUTPATIENT)
Dept: HEMATOLOGY/ONCOLOGY | Facility: CLINIC | Age: 35
End: 2020-08-04
Payer: COMMERCIAL

## 2020-08-04 ENCOUNTER — OFFICE VISIT (OUTPATIENT)
Dept: HEMATOLOGY/ONCOLOGY | Facility: CLINIC | Age: 35
End: 2020-08-04
Payer: COMMERCIAL

## 2020-08-04 ENCOUNTER — LAB VISIT (OUTPATIENT)
Dept: LAB | Facility: HOSPITAL | Age: 35
End: 2020-08-04
Attending: INTERNAL MEDICINE
Payer: COMMERCIAL

## 2020-08-04 VITALS
HEIGHT: 65 IN | WEIGHT: 108 LBS | RESPIRATION RATE: 16 BRPM | OXYGEN SATURATION: 100 % | DIASTOLIC BLOOD PRESSURE: 55 MMHG | HEART RATE: 80 BPM | TEMPERATURE: 98 F | BODY MASS INDEX: 17.99 KG/M2 | SYSTOLIC BLOOD PRESSURE: 96 MMHG

## 2020-08-04 DIAGNOSIS — R11.0 CHEMOTHERAPY-INDUCED NAUSEA: ICD-10-CM

## 2020-08-04 DIAGNOSIS — E28.39 SUPPRESSION OF OVARIAN SECRETION: ICD-10-CM

## 2020-08-04 DIAGNOSIS — T45.1X5A CHEMOTHERAPY-INDUCED NAUSEA: ICD-10-CM

## 2020-08-04 DIAGNOSIS — Z17.0 MALIGNANT NEOPLASM OF UPPER-OUTER QUADRANT OF LEFT BREAST IN FEMALE, ESTROGEN RECEPTOR POSITIVE: ICD-10-CM

## 2020-08-04 DIAGNOSIS — Z17.0 MALIGNANT NEOPLASM OF UPPER-OUTER QUADRANT OF LEFT BREAST IN FEMALE, ESTROGEN RECEPTOR POSITIVE: Primary | ICD-10-CM

## 2020-08-04 DIAGNOSIS — C50.412 MALIGNANT NEOPLASM OF UPPER-OUTER QUADRANT OF LEFT BREAST IN FEMALE, ESTROGEN RECEPTOR POSITIVE: Primary | ICD-10-CM

## 2020-08-04 DIAGNOSIS — C50.412 MALIGNANT NEOPLASM OF UPPER-OUTER QUADRANT OF LEFT BREAST IN FEMALE, ESTROGEN RECEPTOR POSITIVE: ICD-10-CM

## 2020-08-04 DIAGNOSIS — T45.1X5A CHEMOTHERAPY INDUCED NEUTROPENIA: ICD-10-CM

## 2020-08-04 DIAGNOSIS — F41.9 ANXIETY: ICD-10-CM

## 2020-08-04 DIAGNOSIS — D70.1 CHEMOTHERAPY INDUCED NEUTROPENIA: ICD-10-CM

## 2020-08-04 DIAGNOSIS — C80.1 ANXIETY ASSOCIATED WITH CANCER DIAGNOSIS: ICD-10-CM

## 2020-08-04 DIAGNOSIS — F41.1 ANXIETY ASSOCIATED WITH CANCER DIAGNOSIS: ICD-10-CM

## 2020-08-04 LAB
ALBUMIN SERPL BCP-MCNC: 4.1 G/DL (ref 3.5–5.2)
ALP SERPL-CCNC: 53 U/L (ref 55–135)
ALT SERPL W/O P-5'-P-CCNC: 61 U/L (ref 10–44)
ANION GAP SERPL CALC-SCNC: 6 MMOL/L (ref 8–16)
AST SERPL-CCNC: 34 U/L (ref 10–40)
BILIRUB SERPL-MCNC: 0.4 MG/DL (ref 0.1–1)
BUN SERPL-MCNC: 13 MG/DL (ref 6–20)
CALCIUM SERPL-MCNC: 9.9 MG/DL (ref 8.7–10.5)
CHLORIDE SERPL-SCNC: 109 MMOL/L (ref 95–110)
CO2 SERPL-SCNC: 27 MMOL/L (ref 23–29)
CREAT SERPL-MCNC: 0.7 MG/DL (ref 0.5–1.4)
ERYTHROCYTE [DISTWIDTH] IN BLOOD BY AUTOMATED COUNT: 14.5 % (ref 11.5–14.5)
EST. GFR  (AFRICAN AMERICAN): >60 ML/MIN/1.73 M^2
EST. GFR  (NON AFRICAN AMERICAN): >60 ML/MIN/1.73 M^2
GLUCOSE SERPL-MCNC: 100 MG/DL (ref 70–110)
HCT VFR BLD AUTO: 39 % (ref 37–48.5)
HGB BLD-MCNC: 12.5 G/DL (ref 12–16)
IMM GRANULOCYTES # BLD AUTO: 0.01 K/UL (ref 0–0.04)
MCH RBC QN AUTO: 31.2 PG (ref 27–31)
MCHC RBC AUTO-ENTMCNC: 32.1 G/DL (ref 32–36)
MCV RBC AUTO: 97 FL (ref 82–98)
NEUTROPHILS # BLD AUTO: 0.7 K/UL (ref 1.8–7.7)
PLATELET # BLD AUTO: 161 K/UL (ref 150–350)
PMV BLD AUTO: 11.5 FL (ref 9.2–12.9)
POTASSIUM SERPL-SCNC: 4.4 MMOL/L (ref 3.5–5.1)
PROT SERPL-MCNC: 7 G/DL (ref 6–8.4)
RBC # BLD AUTO: 4.01 M/UL (ref 4–5.4)
SODIUM SERPL-SCNC: 142 MMOL/L (ref 136–145)
WBC # BLD AUTO: 3.43 K/UL (ref 3.9–12.7)

## 2020-08-04 PROCEDURE — 97811 ACUP 1/> W/O ESTIM EA ADD 15: CPT | Mod: S$GLB,,, | Performed by: ACUPUNCTURIST

## 2020-08-04 PROCEDURE — 99999 PR PBB SHADOW E&M-EST. PATIENT-LVL V: CPT | Mod: PBBFAC,,, | Performed by: INTERNAL MEDICINE

## 2020-08-04 PROCEDURE — 99215 OFFICE O/P EST HI 40 MIN: CPT | Mod: S$GLB,,, | Performed by: INTERNAL MEDICINE

## 2020-08-04 PROCEDURE — 85027 COMPLETE CBC AUTOMATED: CPT

## 2020-08-04 PROCEDURE — 99999 PR PBB SHADOW E&M-EST. PATIENT-LVL I: ICD-10-PCS | Mod: PBBFAC,,, | Performed by: ACUPUNCTURIST

## 2020-08-04 PROCEDURE — 99999 PR PBB SHADOW E&M-EST. PATIENT-LVL I: CPT | Mod: PBBFAC,,, | Performed by: ACUPUNCTURIST

## 2020-08-04 PROCEDURE — 99999 PR PBB SHADOW E&M-EST. PATIENT-LVL V: ICD-10-PCS | Mod: PBBFAC,,, | Performed by: INTERNAL MEDICINE

## 2020-08-04 PROCEDURE — 97811 PR ACUPUNCT W/O ELEC STIMUL ADDL 15M: ICD-10-PCS | Mod: S$GLB,,, | Performed by: ACUPUNCTURIST

## 2020-08-04 PROCEDURE — 3008F BODY MASS INDEX DOCD: CPT | Mod: CPTII,S$GLB,, | Performed by: INTERNAL MEDICINE

## 2020-08-04 PROCEDURE — 97810 PR ACUPUNCT W/O ELEC STIMUL 15 MIN: ICD-10-PCS | Mod: S$GLB,,, | Performed by: ACUPUNCTURIST

## 2020-08-04 PROCEDURE — 3008F PR BODY MASS INDEX (BMI) DOCUMENTED: ICD-10-PCS | Mod: CPTII,S$GLB,, | Performed by: INTERNAL MEDICINE

## 2020-08-04 PROCEDURE — 97810 ACUP 1/> WO ESTIM 1ST 15 MIN: CPT | Mod: S$GLB,,, | Performed by: ACUPUNCTURIST

## 2020-08-04 PROCEDURE — 80053 COMPREHEN METABOLIC PANEL: CPT

## 2020-08-04 PROCEDURE — 99215 PR OFFICE/OUTPT VISIT, EST, LEVL V, 40-54 MIN: ICD-10-PCS | Mod: S$GLB,,, | Performed by: INTERNAL MEDICINE

## 2020-08-04 PROCEDURE — 36415 COLL VENOUS BLD VENIPUNCTURE: CPT

## 2020-08-04 RX ORDER — SODIUM CHLORIDE 0.9 % (FLUSH) 0.9 %
10 SYRINGE (ML) INJECTION
Status: CANCELLED | OUTPATIENT
Start: 2020-08-18

## 2020-08-04 RX ORDER — HEPARIN 100 UNIT/ML
500 SYRINGE INTRAVENOUS
Status: CANCELLED | OUTPATIENT
Start: 2020-08-18

## 2020-08-04 RX ORDER — DIPHENHYDRAMINE HYDROCHLORIDE 50 MG/ML
12.5 INJECTION INTRAMUSCULAR; INTRAVENOUS
Status: CANCELLED
Start: 2020-08-18

## 2020-08-04 NOTE — PROGRESS NOTES
"  History:     Reason For Follow Up:   1. Stage I ([id number]) invasive ductal carcinoma of left breast, upper outer quadrant, ER 95%, VA 90%, Her2 3+, Grade 3, Ki67 25%    HPI:   Michelle Gage presents for follow up of breast cancer. She is premenopausal. She's due for cycle 3 TCH. ANC too low for treatment. Nausea after cycle 2, not after cycle 1. It "wasn't bad but it was there". Afebrile. Tumor shrinking. No neuropathy.     Oncologic History:  Presentation  - [id number] - presented for palpable left breast mass around 2018 - was being watched on imaging at outside hospital.    - 5/11/2020 - Diagnostic bilateral mammogram and left breast US at [facility] showed left breast 1:00 mass, 1.4 cm, 8 CFN, mild left axillary adenopathy  - 5/11/2020 - Biopsy - Grade 3 IDC, estrogen receptor 95%, progesterone receptor 90%, Her2 delphine 3+, Ki67 25%. LN biopsy negative  Surgery consultation with Dr Dumont on 5/14. Breast cancer is far in axillary tail and felt to be difficult to obtain clear margins without neoadjuvant therapy.    - 5/14/2020 - Genetics [facility] BRACAnalysis neg; VUS AP   Medical oncology consultation on 5/15/2020 -  This case was discussed with Dr. Dumont.  She does feel like the patient will benefit from neoadjuvant therapy to help improve her surgical margins.  Since the tumor is less than 2 cm and clinically lymph node negative (MRI pending), I recommended treatment with Taxotere, carboplatin and Herceptin without Perjeta.  Discussed with her that there is data in tumors less than 2 cm to consider Taxol/Herceptin however would not typically use this in a neoadjuvant setting for concern for under treatment.     - Eggs Retrieval - has 2 embryos.    * 6/16/2020 started neoadjuvant TCH (no perjeta since < 2 cm and LN negative). Neoadjuvant therapy chosen due to location of tumor.       History: I reviewed, confirmed and updated history (past medical, social, family) as necessary.  "   Medications    Current Outpatient Medications:     ALPRAZolam (XANAX) 0.5 MG tablet, Take 1 tablet (0.5 mg total) by mouth 3 (three) times daily as needed for Insomnia or Anxiety., Disp: 30 tablet, Rfl: 0    ATRALIN 0.05 % gel, , Disp: , Rfl:     dexAMETHasone (DECADRON) 4 MG Tab, Two tabs by mouth twice daily day before and day after Taxotere., Disp: 32 tablet, Rfl: 0    letrozole (FEMARA) 2.5 mg Tab, , Disp: , Rfl:     lidocaine-prilocaine (EMLA) cream, Apply topically as needed., Disp: 30 g, Rfl: 0    LORazepam (ATIVAN) 0.5 MG tablet, Take 1 tablet (0.5 mg total) by mouth nightly as needed (INSOMNIA)., Disp: 15 tablet, Rfl: 0    miSOPROStoL (CYTOTEC) 200 MCG Tab, , Disp: , Rfl:     naproxen sodium (ANAPROX) 550 MG tablet, , Disp: , Rfl:     ondansetron (ZOFRAN) 8 MG tablet, Take 1 tablet (8 mg total) by mouth every 8 (eight) hours as needed for Nausea., Disp: 30 tablet, Rfl: 2    spironolactone (ALDACTONE) 50 MG tablet, Take 1 tablet (50 mg total) by mouth every evening., Disp: 30 tablet, Rfl: 11    sulfacetamide sodium-sulfur 8-4 % Susp, , Disp: , Rfl:     traMADoL (ULTRAM) 50 mg tablet, Take 1 tablet (50 mg total) by mouth every 6 (six) hours as needed for Pain., Disp: 30 tablet, Rfl: 0    valACYclovir (VALTREX) 1000 MG tablet, Take 1,000 mg by mouth 2 (two) times daily., Disp: , Rfl:     valacyclovir (VALTREX) 500 MG tablet, Take 1 pill twice daily x 3 days as needed for fever blister., Disp: 30 tablet, Rfl: 0    venlafaxine (EFFEXOR XR) 37.5 MG 24 hr capsule, Take 1 capsule (37.5 mg total) by mouth once daily., Disp: 90 capsule, Rfl: 3    miSOPROStoL (CYTOTEC) 200 MCG Tab, Please take one tablet orally at 9pm tonight and 7am tomorrow prior to your IUD placement., Disp: 2 tablet, Rfl: 0    Current Facility-Administered Medications:     copper intrauterine device 380 square mm  mm, 380 mm, Intrauterine, , Lyndsay Warren MD, 380 mm at 06/25/20 1030  Allergies  Review of patient's  "allergies indicates:  No Known Allergies  Review of Systems  Review of Systems   Constitutional: Negative for activity change, appetite change, chills, fatigue, fever and unexpected weight change.   HENT: Negative for congestion, hearing loss, nosebleeds, sinus pressure and trouble swallowing.    Eyes: Negative for pain, discharge and itching.   Respiratory: Negative for cough, chest tightness and shortness of breath.    Cardiovascular: Negative for chest pain, palpitations and leg swelling.   Gastrointestinal: Positive for nausea. Negative for abdominal distention, abdominal pain, blood in stool, diarrhea, rectal pain and vomiting.   Endocrine: Negative for heat intolerance and polydipsia.   Genitourinary: Negative for difficulty urinating, dysuria, flank pain, frequency, hematuria and urgency.   Musculoskeletal: Negative for arthralgias, back pain and neck pain.   Skin: Negative for color change, pallor and rash.   Neurological: Negative for dizziness, numbness and headaches.   Hematological: Negative for adenopathy. Does not bruise/bleed easily.   Psychiatric/Behavioral: Negative for confusion and decreased concentration. The patient is not nervous/anxious.         Objective     Objective:     Vitals:    08/04/20 1017   BP: (!) 96/55   BP Location: Left arm   Patient Position: Sitting   BP Method: Large (Automatic)   Pulse: 80   Resp: 16   Temp: 97.9 °F (36.6 °C)   TempSrc: Oral   SpO2: 100%   Weight: 49 kg (108 lb 0.4 oz)   Height: 5' 5" (1.651 m)     Body surface area is 1.5 meters squared.  Physical Exam  Vitals signs and nursing note reviewed.   Constitutional:       General: She is not in acute distress.     Appearance: Normal appearance. She is well-developed.   HENT:      Head: Normocephalic and atraumatic.      Mouth/Throat:      Mouth: No oral lesions.   Eyes:      General: No scleral icterus.        Right eye: No discharge.         Left eye: No discharge.      Conjunctiva/sclera: Conjunctivae normal. "   Neck:      Musculoskeletal: Neck supple.   Cardiovascular:      Rate and Rhythm: Normal rate and regular rhythm.      Heart sounds: Normal heart sounds. No murmur.   Pulmonary:      Effort: Pulmonary effort is normal. No respiratory distress.      Breath sounds: Normal breath sounds. No wheezing, rhonchi or rales.      Comments: Left breast without well defined mass. Prior mass was 1.5 cm palpable mass in UOQ, close to axilla, very superficial. No palpable nodes.   Chest:      Chest wall: There is no dullness to percussion.   Abdominal:      General: Bowel sounds are normal.      Palpations: Abdomen is soft.      Tenderness: There is no abdominal tenderness.   Skin:     General: Skin is warm and dry.      Coloration: Skin is not pale.   Neurological:      Mental Status: She is alert and oriented to person, place, and time.   Psychiatric:         Behavior: Behavior normal.         Thought Content: Thought content normal.          Labs and Imaging  Results for orders placed or performed in visit on 08/04/20   CBC q 2 wks x 6   Result Value Ref Range    WBC 3.43 (L) 3.90 - 12.70 K/uL    RBC 4.01 4.00 - 5.40 M/uL    Hemoglobin 12.5 12.0 - 16.0 g/dL    Hematocrit 39.0 37.0 - 48.5 %    Mean Corpuscular Volume 97 82 - 98 fL    Mean Corpuscular Hemoglobin 31.2 (H) 27.0 - 31.0 pg    Mean Corpuscular Hemoglobin Conc 32.1 32.0 - 36.0 g/dL    RDW 14.5 11.5 - 14.5 %    Platelets 161 150 - 350 K/uL    MPV 11.5 9.2 - 12.9 fL    Gran # (ANC) 0.7 (L) 1.8 - 7.7 K/uL    Immature Grans (Abs) 0.01 0.00 - 0.04 K/uL   CMP q 2 wks x 6   Result Value Ref Range    Sodium 142 136 - 145 mmol/L    Potassium 4.4 3.5 - 5.1 mmol/L    Chloride 109 95 - 110 mmol/L    CO2 27 23 - 29 mmol/L    Glucose 100 70 - 110 mg/dL    BUN, Bld 13 6 - 20 mg/dL    Creatinine 0.7 0.5 - 1.4 mg/dL    Calcium 9.9 8.7 - 10.5 mg/dL    Total Protein 7.0 6.0 - 8.4 g/dL    Albumin 4.1 3.5 - 5.2 g/dL    Total Bilirubin 0.4 0.1 - 1.0 mg/dL    Alkaline Phosphatase 53 (L) 55  - 135 U/L    AST 34 10 - 40 U/L    ALT 61 (H) 10 - 44 U/L    Anion Gap 6 (L) 8 - 16 mmol/L    eGFR if African American >60.0 >60 mL/min/1.73 m^2    eGFR if non African American >60.0 >60 mL/min/1.73 m^2         Assessment     Assessment:     1. Malignant neoplasm of upper-outer quadrant of left breast in female, estrogen receptor positive     2. Suppression of ovarian secretion         1. Stage I ([id number]) invasive ductal carcinoma of left breast, upper outer quadrant, ER 95%, AR 90%, Her2 3+, Grade 3, Ki67 25%   * Genetics negative   * 6/16/2020 start neoadjuvant TCH x 6 cycles (no perjeta since < 2 cm and LN negative). Neoadjuvant therapy chosen due to location of tumor.     - Because of the persistent need to delay, I will change docetaxel to 60mg/m2 and carbo to AUC 5. Tumor responding well.     2. Fertility preservation. Eggs harvested. On monthly Zoladex    3. Anxiety. Seeing psych. Effexor    4. Chemo neutropenia - She has now had delay of cycles 2 and 3 due to neutropenia despite Neulasta. See #1.    5. Chemo nausea - add Cinvanti    Plan:     1. Cycle 3 TCH delayed one week due to neutropenia. Dose adjustments as above.   2. Add cinvanti  3. Effexor  4. Zoladex this week and again in 4 wks when she sees me for cycle 4.   5. Labs/chemo next week and again in 4 wks as long as labs good.       Cancel chemo tomorrow, but she needs Zoladex tomorrow and again 4 and 8 wks later.     I asked the patient to call for any questions, concerns, or new symptoms.    Future Appointments   Date Time Provider Department Center   8/4/2020 11:00 AM Kailyn Castillo LAc [facility] INTGONC [place]   8/5/2020  8:00 AM INJECTION, [facility] INFUSION [facility] CHEMO [place]   8/7/2020 10:00 AM Damian Hess, PhD [facility] CAN PSY [place]   8/11/2020  8:00 AM LAB, [facility] CANCER BLDG [facility] LAB HO [place]   8/12/2020  7:00 AM CHEMO 7 [facility] NOMRUPALI [place]   8/14/2020 10:00 AM Damian Hess, PhD [facility] ROBERT [place]   8/25/2020  9:00 AM Kailyn Castillo LAc [facility] INTGONC [place]   9/1/2020  8:00 AM LAB, HEMONC CANCER BLDG [facility] LAB HO [place]   9/1/2020  9:00 AM Christy Salazar MD [facility] HEMONC3 [place]   9/2/2020  8:00 AM CHEMO 6 [facility] CHE[facility] [place]   9/30/2020  8:00 AM INJECTION, [facility] INFUSION [facility] CHEMO [place]

## 2020-08-04 NOTE — Clinical Note
Cancel chemo tomorrow; add Zoladex tomorrow (not next week)  IN 1 wk - cbc, cmp, TCH  In 4 wks, see me, cbc, cmp, TCH and zoladex.

## 2020-08-05 ENCOUNTER — INFUSION (OUTPATIENT)
Dept: INFUSION THERAPY | Facility: HOSPITAL | Age: 35
End: 2020-08-05
Attending: NURSE PRACTITIONER
Payer: COMMERCIAL

## 2020-08-05 DIAGNOSIS — E28.39 SUPPRESSION OF OVARIAN SECRETION: Primary | ICD-10-CM

## 2020-08-05 PROCEDURE — 96402 CHEMO HORMON ANTINEOPL SQ/IM: CPT

## 2020-08-05 PROCEDURE — 63600175 PHARM REV CODE 636 W HCPCS: Mod: JG | Performed by: INTERNAL MEDICINE

## 2020-08-05 RX ADMIN — GOSERELIN ACETATE 3.6 MG: 3.6 IMPLANT SUBCUTANEOUS at 08:08

## 2020-08-11 ENCOUNTER — LAB VISIT (OUTPATIENT)
Dept: LAB | Facility: HOSPITAL | Age: 35
End: 2020-08-11
Attending: INTERNAL MEDICINE
Payer: COMMERCIAL

## 2020-08-11 ENCOUNTER — CLINICAL SUPPORT (OUTPATIENT)
Dept: HEMATOLOGY/ONCOLOGY | Facility: CLINIC | Age: 35
End: 2020-08-11
Payer: COMMERCIAL

## 2020-08-11 DIAGNOSIS — C50.412 MALIGNANT NEOPLASM OF UPPER-OUTER QUADRANT OF LEFT BREAST IN FEMALE, ESTROGEN RECEPTOR POSITIVE: Primary | ICD-10-CM

## 2020-08-11 DIAGNOSIS — Z17.0 MALIGNANT NEOPLASM OF UPPER-OUTER QUADRANT OF LEFT BREAST IN FEMALE, ESTROGEN RECEPTOR POSITIVE: ICD-10-CM

## 2020-08-11 DIAGNOSIS — Z17.0 MALIGNANT NEOPLASM OF UPPER-OUTER QUADRANT OF LEFT BREAST IN FEMALE, ESTROGEN RECEPTOR POSITIVE: Primary | ICD-10-CM

## 2020-08-11 DIAGNOSIS — T45.1X5A CINV (CHEMOTHERAPY-INDUCED NAUSEA AND VOMITING): ICD-10-CM

## 2020-08-11 DIAGNOSIS — R11.2 CINV (CHEMOTHERAPY-INDUCED NAUSEA AND VOMITING): ICD-10-CM

## 2020-08-11 DIAGNOSIS — Z51.11 ENCOUNTER FOR CHEMOTHERAPY MANAGEMENT: ICD-10-CM

## 2020-08-11 DIAGNOSIS — C50.412 MALIGNANT NEOPLASM OF UPPER-OUTER QUADRANT OF LEFT BREAST IN FEMALE, ESTROGEN RECEPTOR POSITIVE: ICD-10-CM

## 2020-08-11 LAB
ALBUMIN SERPL BCP-MCNC: 4.3 G/DL (ref 3.5–5.2)
ALP SERPL-CCNC: 53 U/L (ref 55–135)
ALT SERPL W/O P-5'-P-CCNC: 37 U/L (ref 10–44)
ANION GAP SERPL CALC-SCNC: 9 MMOL/L (ref 8–16)
AST SERPL-CCNC: 22 U/L (ref 10–40)
BASOPHILS # BLD AUTO: 0.05 K/UL (ref 0–0.2)
BASOPHILS NFR BLD: 1.1 % (ref 0–1.9)
BILIRUB SERPL-MCNC: 0.5 MG/DL (ref 0.1–1)
BUN SERPL-MCNC: 14 MG/DL (ref 6–20)
CALCIUM SERPL-MCNC: 9.6 MG/DL (ref 8.7–10.5)
CHLORIDE SERPL-SCNC: 108 MMOL/L (ref 95–110)
CO2 SERPL-SCNC: 24 MMOL/L (ref 23–29)
CREAT SERPL-MCNC: 0.8 MG/DL (ref 0.5–1.4)
DIFFERENTIAL METHOD: ABNORMAL
EOSINOPHIL # BLD AUTO: 0.3 K/UL (ref 0–0.5)
EOSINOPHIL NFR BLD: 7.3 % (ref 0–8)
ERYTHROCYTE [DISTWIDTH] IN BLOOD BY AUTOMATED COUNT: 15 % (ref 11.5–14.5)
ERYTHROCYTE [DISTWIDTH] IN BLOOD BY AUTOMATED COUNT: 15 % (ref 11.5–14.5)
EST. GFR  (AFRICAN AMERICAN): >60 ML/MIN/1.73 M^2
EST. GFR  (NON AFRICAN AMERICAN): >60 ML/MIN/1.73 M^2
GIANT PLATELETS BLD QL SMEAR: PRESENT
GLUCOSE SERPL-MCNC: 113 MG/DL (ref 70–110)
HCT VFR BLD AUTO: 38.3 % (ref 37–48.5)
HCT VFR BLD AUTO: 38.3 % (ref 37–48.5)
HGB BLD-MCNC: 12.7 G/DL (ref 12–16)
HGB BLD-MCNC: 12.7 G/DL (ref 12–16)
IMM GRANULOCYTES # BLD AUTO: 0.01 K/UL (ref 0–0.04)
IMM GRANULOCYTES # BLD AUTO: 0.01 K/UL (ref 0–0.04)
IMM GRANULOCYTES NFR BLD AUTO: 0 % (ref 0–0.5)
LYMPHOCYTES # BLD AUTO: 2.7 K/UL (ref 1–4.8)
LYMPHOCYTES NFR BLD: 62.2 % (ref 18–48)
MCH RBC QN AUTO: 31.8 PG (ref 27–31)
MCH RBC QN AUTO: 31.8 PG (ref 27–31)
MCHC RBC AUTO-ENTMCNC: 33.2 G/DL (ref 32–36)
MCHC RBC AUTO-ENTMCNC: 33.2 G/DL (ref 32–36)
MCV RBC AUTO: 96 FL (ref 82–98)
MCV RBC AUTO: 96 FL (ref 82–98)
MONOCYTES # BLD AUTO: 0.5 K/UL (ref 0.3–1)
MONOCYTES NFR BLD: 10.5 % (ref 4–15)
NEUTROPHILS # BLD AUTO: 0.8 K/UL (ref 1.8–7.7)
NEUTROPHILS # BLD AUTO: 0.8 K/UL (ref 1.8–7.7)
NEUTROPHILS NFR BLD: 18.9 % (ref 38–73)
NRBC BLD-RTO: 0 /100 WBC
PLATELET # BLD AUTO: 180 K/UL (ref 150–350)
PLATELET # BLD AUTO: 180 K/UL (ref 150–350)
PLATELET BLD QL SMEAR: ABNORMAL
PMV BLD AUTO: 11.5 FL (ref 9.2–12.9)
PMV BLD AUTO: 11.5 FL (ref 9.2–12.9)
POTASSIUM SERPL-SCNC: 4.3 MMOL/L (ref 3.5–5.1)
PROT SERPL-MCNC: 7 G/DL (ref 6–8.4)
RBC # BLD AUTO: 3.99 M/UL (ref 4–5.4)
RBC # BLD AUTO: 3.99 M/UL (ref 4–5.4)
SODIUM SERPL-SCNC: 141 MMOL/L (ref 136–145)
WBC # BLD AUTO: 4.27 K/UL (ref 3.9–12.7)
WBC # BLD AUTO: 4.27 K/UL (ref 3.9–12.7)

## 2020-08-11 PROCEDURE — 99999 PR PBB SHADOW E&M-EST. PATIENT-LVL I: ICD-10-PCS | Mod: PBBFAC,,, | Performed by: ACUPUNCTURIST

## 2020-08-11 PROCEDURE — 97813 PR ACUPUNCT W/ ELEC STIMUL 15 MIN: ICD-10-PCS | Mod: S$GLB,,, | Performed by: ACUPUNCTURIST

## 2020-08-11 PROCEDURE — 80053 COMPREHEN METABOLIC PANEL: CPT

## 2020-08-11 PROCEDURE — 97814 ACUP 1/> W/ESTIM EA ADDL 15: CPT | Mod: S$GLB,,, | Performed by: ACUPUNCTURIST

## 2020-08-11 PROCEDURE — 85025 COMPLETE CBC W/AUTO DIFF WBC: CPT

## 2020-08-11 PROCEDURE — 97814 PR ACUPUNCT W/ ELEC STIMUL ADDL 15M: ICD-10-PCS | Mod: S$GLB,,, | Performed by: ACUPUNCTURIST

## 2020-08-11 PROCEDURE — 36415 COLL VENOUS BLD VENIPUNCTURE: CPT

## 2020-08-11 PROCEDURE — 97813 ACUP 1/> W/ESTIM 1ST 15 MIN: CPT | Mod: S$GLB,,, | Performed by: ACUPUNCTURIST

## 2020-08-11 PROCEDURE — 99999 PR PBB SHADOW E&M-EST. PATIENT-LVL I: CPT | Mod: PBBFAC,,, | Performed by: ACUPUNCTURIST

## 2020-08-11 NOTE — PROGRESS NOTES
Subjective:       Patient ID: Michelle Gage is a 34 y.o. female.    Chief Complaint: Breast Cancer, Nausea (CINV), and Pain (temple headache)    Patient nausea levels are down but sitting at 3 from migraine that has lasted since Saturday (temperal headache). Pain in head was so bad patient couldn't eat and cried. Reduced today from that, however not totally gone. Patient states shes drinking enough water and blood pressure feels OK.     Review of Systems   Constitutional: Positive for fatigue.   Gastrointestinal: Positive for nausea.   Neurological: Positive for headaches.         Objective:          Assessment:       No diagnosis found.    Plan:       1x a week for 10 weeks - adelso during treatment*         Pre-Symptom Score: 3 (nausea)  Post-Symptom Score: 3 (nausea)    Migraine on Saturday night: 9 score. Today is a 3. Hasn't taken anything.    Breast Cancer, Nausea (CINV), and Pain (temple headache)       No diagnosis found.    TCM Diagnosis: Qi and Blood Stagnation w/ Toxic Heat    Physical Exam   Constitutional:            Acupuncture points used:  Li11, Li4, Pc6, St36, Sp9, Sp6, Du20, Lr3 and Gb34   + additional chart points, see above    ADD STIM (PC6/St36)      NEEDLES IN: 21  NEEDLES OUT: 21    NEEDLES AND STIM STARTED: 8:45 AM  NEEDLES AND STIM REMOVED: 9:15 AM

## 2020-08-11 NOTE — PROGRESS NOTES
Remains neutropenic. Chemo already dose reduced for cycle 3.  Please cancel her chemo tomorrow and move to next Tuesday.   Please also add:  1. Neupogen on this Thursday and Friday - needs stat prior auth  2. Labs Monday - cbc  3. TCH on Tuesday  4. Appt with me on 9/8 with cbc, cmp, TCH (cancel labs/chemo/appts on 9/1 and 9/2)    Keep zoladex injections as scheduled.

## 2020-08-12 ENCOUNTER — TELEPHONE (OUTPATIENT)
Dept: HEMATOLOGY/ONCOLOGY | Facility: CLINIC | Age: 35
End: 2020-08-12

## 2020-08-12 DIAGNOSIS — C50.412 MALIGNANT NEOPLASM OF UPPER-OUTER QUADRANT OF LEFT BREAST IN FEMALE, ESTROGEN RECEPTOR POSITIVE: Primary | ICD-10-CM

## 2020-08-12 DIAGNOSIS — Z17.0 MALIGNANT NEOPLASM OF UPPER-OUTER QUADRANT OF LEFT BREAST IN FEMALE, ESTROGEN RECEPTOR POSITIVE: Primary | ICD-10-CM

## 2020-08-12 NOTE — TELEPHONE ENCOUNTER
----- Message from Lev Osborne sent at 8/12/2020  4:13 PM CDT -----  Contact: pt  Calling to speak with nurse regarding push appt time back to later in the say if possible     Call back: [phone]

## 2020-08-13 ENCOUNTER — TELEPHONE (OUTPATIENT)
Dept: HEMATOLOGY/ONCOLOGY | Facility: CLINIC | Age: 35
End: 2020-08-13

## 2020-08-13 ENCOUNTER — INFUSION (OUTPATIENT)
Dept: INFUSION THERAPY | Facility: HOSPITAL | Age: 35
End: 2020-08-13
Attending: NURSE PRACTITIONER
Payer: COMMERCIAL

## 2020-08-13 NOTE — NURSING
Patients WBC within normal limits.  Notified clinic, who said patient OK not to receive injection. Labs printed and patient informed.  Ambulated off unit with steady gait.

## 2020-08-13 NOTE — TELEPHONE ENCOUNTER
Called patient to give her the time for her lab and injection today and Friday.  Have approval from authorization per Zahra Salomon.      ----- Message from Liset Isbell sent at 8/11/2020  2:42 PM CDT -----  Regarding: neupogen  1. Neupogen on this Thursday and Friday - needs stat prior auth

## 2020-08-14 DIAGNOSIS — C50.412 MALIGNANT NEOPLASM OF UPPER-OUTER QUADRANT OF LEFT BREAST IN FEMALE, ESTROGEN RECEPTOR POSITIVE: Primary | ICD-10-CM

## 2020-08-14 DIAGNOSIS — Z17.0 MALIGNANT NEOPLASM OF UPPER-OUTER QUADRANT OF LEFT BREAST IN FEMALE, ESTROGEN RECEPTOR POSITIVE: Primary | ICD-10-CM

## 2020-08-17 ENCOUNTER — LAB VISIT (OUTPATIENT)
Dept: LAB | Facility: HOSPITAL | Age: 35
End: 2020-08-17
Payer: COMMERCIAL

## 2020-08-17 DIAGNOSIS — C50.412 MALIGNANT NEOPLASM OF UPPER-OUTER QUADRANT OF LEFT BREAST IN FEMALE, ESTROGEN RECEPTOR POSITIVE: ICD-10-CM

## 2020-08-17 DIAGNOSIS — Z17.0 MALIGNANT NEOPLASM OF UPPER-OUTER QUADRANT OF LEFT BREAST IN FEMALE, ESTROGEN RECEPTOR POSITIVE: ICD-10-CM

## 2020-08-17 LAB
ALBUMIN SERPL BCP-MCNC: 4.6 G/DL (ref 3.5–5.2)
ALP SERPL-CCNC: 57 U/L (ref 55–135)
ALT SERPL W/O P-5'-P-CCNC: 21 U/L (ref 10–44)
ANION GAP SERPL CALC-SCNC: 10 MMOL/L (ref 8–16)
AST SERPL-CCNC: 15 U/L (ref 10–40)
BILIRUB SERPL-MCNC: 0.5 MG/DL (ref 0.1–1)
BUN SERPL-MCNC: 12 MG/DL (ref 6–20)
CALCIUM SERPL-MCNC: 10.4 MG/DL (ref 8.7–10.5)
CHLORIDE SERPL-SCNC: 105 MMOL/L (ref 95–110)
CO2 SERPL-SCNC: 24 MMOL/L (ref 23–29)
CREAT SERPL-MCNC: 0.8 MG/DL (ref 0.5–1.4)
EST. GFR  (AFRICAN AMERICAN): >60 ML/MIN/1.73 M^2
EST. GFR  (NON AFRICAN AMERICAN): >60 ML/MIN/1.73 M^2
GLUCOSE SERPL-MCNC: 98 MG/DL (ref 70–110)
POTASSIUM SERPL-SCNC: 4.6 MMOL/L (ref 3.5–5.1)
PROT SERPL-MCNC: 7.5 G/DL (ref 6–8.4)
SODIUM SERPL-SCNC: 139 MMOL/L (ref 136–145)

## 2020-08-17 PROCEDURE — 80053 COMPREHEN METABOLIC PANEL: CPT

## 2020-08-17 PROCEDURE — 36415 COLL VENOUS BLD VENIPUNCTURE: CPT

## 2020-08-18 ENCOUNTER — INFUSION (OUTPATIENT)
Dept: INFUSION THERAPY | Facility: HOSPITAL | Age: 35
End: 2020-08-18
Attending: NURSE PRACTITIONER
Payer: COMMERCIAL

## 2020-08-18 VITALS
DIASTOLIC BLOOD PRESSURE: 68 MMHG | SYSTOLIC BLOOD PRESSURE: 118 MMHG | HEIGHT: 65 IN | WEIGHT: 105.19 LBS | OXYGEN SATURATION: 100 % | HEART RATE: 72 BPM | TEMPERATURE: 98 F | BODY MASS INDEX: 17.52 KG/M2 | RESPIRATION RATE: 18 BRPM

## 2020-08-18 DIAGNOSIS — Z17.0 MALIGNANT NEOPLASM OF UPPER-OUTER QUADRANT OF LEFT BREAST IN FEMALE, ESTROGEN RECEPTOR POSITIVE: Primary | ICD-10-CM

## 2020-08-18 DIAGNOSIS — T45.1X5A CHEMOTHERAPY INDUCED NEUTROPENIA: ICD-10-CM

## 2020-08-18 DIAGNOSIS — D70.1 CHEMOTHERAPY INDUCED NEUTROPENIA: ICD-10-CM

## 2020-08-18 DIAGNOSIS — T45.1X5A CHEMOTHERAPY-INDUCED NAUSEA: ICD-10-CM

## 2020-08-18 DIAGNOSIS — Z51.11 ENCOUNTER FOR CHEMOTHERAPY MANAGEMENT: ICD-10-CM

## 2020-08-18 DIAGNOSIS — C50.412 MALIGNANT NEOPLASM OF UPPER-OUTER QUADRANT OF LEFT BREAST IN FEMALE, ESTROGEN RECEPTOR POSITIVE: Primary | ICD-10-CM

## 2020-08-18 DIAGNOSIS — R11.0 CHEMOTHERAPY-INDUCED NAUSEA: ICD-10-CM

## 2020-08-18 PROCEDURE — 63600175 PHARM REV CODE 636 W HCPCS: Mod: JG | Performed by: INTERNAL MEDICINE

## 2020-08-18 PROCEDURE — 96417 CHEMO IV INFUS EACH ADDL SEQ: CPT

## 2020-08-18 PROCEDURE — 96375 TX/PRO/DX INJ NEW DRUG ADDON: CPT

## 2020-08-18 PROCEDURE — 25000003 PHARM REV CODE 250: Performed by: INTERNAL MEDICINE

## 2020-08-18 PROCEDURE — 96413 CHEMO IV INFUSION 1 HR: CPT

## 2020-08-18 PROCEDURE — 96377 APPLICATON ON-BODY INJECTOR: CPT | Mod: 59

## 2020-08-18 PROCEDURE — A4216 STERILE WATER/SALINE, 10 ML: HCPCS | Performed by: INTERNAL MEDICINE

## 2020-08-18 PROCEDURE — 96367 TX/PROPH/DG ADDL SEQ IV INF: CPT

## 2020-08-18 RX ORDER — DIPHENHYDRAMINE HYDROCHLORIDE 50 MG/ML
12.5 INJECTION INTRAMUSCULAR; INTRAVENOUS
Status: COMPLETED | OUTPATIENT
Start: 2020-08-18 | End: 2020-08-18

## 2020-08-18 RX ORDER — SODIUM CHLORIDE 0.9 % (FLUSH) 0.9 %
10 SYRINGE (ML) INJECTION
Status: DISCONTINUED | OUTPATIENT
Start: 2020-08-18 | End: 2020-08-18 | Stop reason: HOSPADM

## 2020-08-18 RX ORDER — HEPARIN 100 UNIT/ML
500 SYRINGE INTRAVENOUS
Status: DISCONTINUED | OUTPATIENT
Start: 2020-08-18 | End: 2020-08-18 | Stop reason: HOSPADM

## 2020-08-18 RX ADMIN — TRASTUZUMAB 286 MG: 150 INJECTION, POWDER, LYOPHILIZED, FOR SOLUTION INTRAVENOUS at 10:08

## 2020-08-18 RX ADMIN — DEXAMETHASONE SODIUM PHOSPHATE: 4 INJECTION, SOLUTION INTRA-ARTICULAR; INTRALESIONAL; INTRAMUSCULAR; INTRAVENOUS; SOFT TISSUE at 07:08

## 2020-08-18 RX ADMIN — PEGFILGRASTIM 6 MG: KIT SUBCUTANEOUS at 11:08

## 2020-08-18 RX ADMIN — DIPHENHYDRAMINE HYDROCHLORIDE 12.5 MG: 50 INJECTION INTRAMUSCULAR; INTRAVENOUS at 07:08

## 2020-08-18 RX ADMIN — CARBOPLATIN 495 MG: 10 INJECTION, SOLUTION INTRAVENOUS at 09:08

## 2020-08-18 RX ADMIN — HEPARIN 500 UNITS: 100 SYRINGE at 11:08

## 2020-08-18 RX ADMIN — DOCETAXEL ANHYDROUS 90 MG: 10 INJECTION, SOLUTION INTRAVENOUS at 08:08

## 2020-08-18 RX ADMIN — Medication 10 ML: at 11:08

## 2020-08-18 RX ADMIN — SODIUM CHLORIDE: 0.9 INJECTION, SOLUTION INTRAVENOUS at 07:08

## 2020-08-18 RX ADMIN — APREPITANT 130 MG: 130 INJECTION, EMULSION INTRAVENOUS at 07:08

## 2020-08-18 NOTE — PLAN OF CARE
Labs , hx, and medications reviewed. Assessment completed. Discussed plan of care with patient. Patient in agreement. VSS.  Cool cap applied for pre chemo 30 min.  Chair reclined and warm blanket and snack offered. Will cont to monitor

## 2020-08-18 NOTE — PLAN OF CARE
Pt tolerated C4 taxotere/carbo/herceptin without adverse effects. VSS. Tolerated cooling cap during chemo & post 2 hour chemo without incident.  Verbalized understanding of RTC date. DC with family ambulating independently.

## 2020-08-24 DIAGNOSIS — R45.89 ANXIETY ABOUT HEALTH: ICD-10-CM

## 2020-08-24 DIAGNOSIS — Z17.0 MALIGNANT NEOPLASM OF UPPER-OUTER QUADRANT OF LEFT BREAST IN FEMALE, ESTROGEN RECEPTOR POSITIVE: ICD-10-CM

## 2020-08-24 DIAGNOSIS — C50.412 MALIGNANT NEOPLASM OF UPPER-OUTER QUADRANT OF LEFT BREAST IN FEMALE, ESTROGEN RECEPTOR POSITIVE: ICD-10-CM

## 2020-08-25 RX ORDER — ALPRAZOLAM 0.5 MG/1
0.5 TABLET ORAL 3 TIMES DAILY PRN
Qty: 30 TABLET | Refills: 0 | Status: SHIPPED | OUTPATIENT
Start: 2020-08-25 | End: 2020-11-19 | Stop reason: SDUPTHER

## 2020-08-26 DIAGNOSIS — R53.0 NEOPLASTIC MALIGNANT RELATED FATIGUE: Primary | ICD-10-CM

## 2020-08-26 RX ORDER — METHYLPHENIDATE HYDROCHLORIDE 10 MG/1
10 TABLET ORAL 2 TIMES DAILY WITH MEALS
Qty: 60 TABLET | Refills: 0 | Status: SHIPPED | OUTPATIENT
Start: 2020-08-26 | End: 2020-10-20 | Stop reason: SDUPTHER

## 2020-09-04 ENCOUNTER — TELEPHONE (OUTPATIENT)
Dept: HEMATOLOGY/ONCOLOGY | Facility: CLINIC | Age: 35
End: 2020-09-04

## 2020-09-04 NOTE — TELEPHONE ENCOUNTER
Message fwd to .         ----- Message from Suyapa Antunez sent at 9/4/2020  1:37 PM CDT -----  Regarding: HEM/ONC Reschedule Request  Contact: PT  PT called in wanting to reschedule appointments on 9/8.     CBN# [phone]

## 2020-09-04 NOTE — TELEPHONE ENCOUNTER
----- Message from Telma Cortez sent at 9/4/2020  1:40 PM CDT -----  Regarding: FW: HEM/ONC Reschedule Request  Contact: PT    ----- Message -----  From: Suyapa Antunez  Sent: 9/4/2020   1:37 PM CDT  To: Martin Harrison ([facility]) Staff  Subject: HEM/ONC Reschedule Request                       PT called in wanting to reschedule appointments on 9/8.     N# [phone]

## 2020-09-08 ENCOUNTER — OFFICE VISIT (OUTPATIENT)
Dept: HEMATOLOGY/ONCOLOGY | Facility: CLINIC | Age: 35
End: 2020-09-08
Payer: COMMERCIAL

## 2020-09-08 ENCOUNTER — CLINICAL SUPPORT (OUTPATIENT)
Dept: HEMATOLOGY/ONCOLOGY | Facility: CLINIC | Age: 35
End: 2020-09-08
Payer: COMMERCIAL

## 2020-09-08 ENCOUNTER — LAB VISIT (OUTPATIENT)
Dept: LAB | Facility: HOSPITAL | Age: 35
End: 2020-09-08
Attending: INTERNAL MEDICINE
Payer: COMMERCIAL

## 2020-09-08 VITALS
TEMPERATURE: 98 F | RESPIRATION RATE: 18 BRPM | HEIGHT: 65 IN | OXYGEN SATURATION: 95 % | WEIGHT: 106.25 LBS | SYSTOLIC BLOOD PRESSURE: 103 MMHG | HEART RATE: 107 BPM | DIASTOLIC BLOOD PRESSURE: 69 MMHG | BODY MASS INDEX: 17.7 KG/M2

## 2020-09-08 DIAGNOSIS — R11.0 CHEMOTHERAPY-INDUCED NAUSEA: ICD-10-CM

## 2020-09-08 DIAGNOSIS — D70.1 CHEMOTHERAPY INDUCED NEUTROPENIA: ICD-10-CM

## 2020-09-08 DIAGNOSIS — C50.412 MALIGNANT NEOPLASM OF UPPER-OUTER QUADRANT OF LEFT BREAST IN FEMALE, ESTROGEN RECEPTOR POSITIVE: ICD-10-CM

## 2020-09-08 DIAGNOSIS — T45.1X5A CHEMOTHERAPY-INDUCED NAUSEA: ICD-10-CM

## 2020-09-08 DIAGNOSIS — Z17.0 MALIGNANT NEOPLASM OF UPPER-OUTER QUADRANT OF LEFT BREAST IN FEMALE, ESTROGEN RECEPTOR POSITIVE: Primary | ICD-10-CM

## 2020-09-08 DIAGNOSIS — Z17.0 MALIGNANT NEOPLASM OF UPPER-OUTER QUADRANT OF LEFT BREAST IN FEMALE, ESTROGEN RECEPTOR POSITIVE: ICD-10-CM

## 2020-09-08 DIAGNOSIS — G89.3 CANCER ASSOCIATED PAIN: ICD-10-CM

## 2020-09-08 DIAGNOSIS — C50.412 MALIGNANT NEOPLASM OF UPPER-OUTER QUADRANT OF LEFT BREAST IN FEMALE, ESTROGEN RECEPTOR POSITIVE: Primary | ICD-10-CM

## 2020-09-08 DIAGNOSIS — Z51.81 ENCOUNTER FOR THERAPEUTIC DRUG MONITORING: ICD-10-CM

## 2020-09-08 DIAGNOSIS — R23.2 HOT FLASHES: ICD-10-CM

## 2020-09-08 DIAGNOSIS — E28.39 SUPPRESSION OF OVARIAN SECRETION: ICD-10-CM

## 2020-09-08 DIAGNOSIS — T45.1X5A CHEMOTHERAPY INDUCED NEUTROPENIA: ICD-10-CM

## 2020-09-08 LAB
ALBUMIN SERPL BCP-MCNC: 4 G/DL (ref 3.5–5.2)
ALP SERPL-CCNC: 60 U/L (ref 55–135)
ALT SERPL W/O P-5'-P-CCNC: 38 U/L (ref 10–44)
ANION GAP SERPL CALC-SCNC: 11 MMOL/L (ref 8–16)
ANISOCYTOSIS BLD QL SMEAR: SLIGHT
AST SERPL-CCNC: 20 U/L (ref 10–40)
BASOPHILS # BLD AUTO: 0.01 K/UL (ref 0–0.2)
BASOPHILS NFR BLD: 0.2 % (ref 0–1.9)
BILIRUB SERPL-MCNC: 0.3 MG/DL (ref 0.1–1)
BUN SERPL-MCNC: 16 MG/DL (ref 6–20)
CALCIUM SERPL-MCNC: 9.5 MG/DL (ref 8.7–10.5)
CHLORIDE SERPL-SCNC: 107 MMOL/L (ref 95–110)
CO2 SERPL-SCNC: 22 MMOL/L (ref 23–29)
CREAT SERPL-MCNC: 0.8 MG/DL (ref 0.5–1.4)
DIFFERENTIAL METHOD: ABNORMAL
EOSINOPHIL # BLD AUTO: 0 K/UL (ref 0–0.5)
EOSINOPHIL NFR BLD: 0 % (ref 0–8)
ERYTHROCYTE [DISTWIDTH] IN BLOOD BY AUTOMATED COUNT: 15.4 % (ref 11.5–14.5)
EST. GFR  (AFRICAN AMERICAN): >60 ML/MIN/1.73 M^2
EST. GFR  (NON AFRICAN AMERICAN): >60 ML/MIN/1.73 M^2
GLUCOSE SERPL-MCNC: 168 MG/DL (ref 70–110)
HCT VFR BLD AUTO: 36.7 % (ref 37–48.5)
HGB BLD-MCNC: 12.1 G/DL (ref 12–16)
HYPOCHROMIA BLD QL SMEAR: ABNORMAL
IMM GRANULOCYTES # BLD AUTO: 0.03 K/UL (ref 0–0.04)
IMM GRANULOCYTES NFR BLD AUTO: 0.6 % (ref 0–0.5)
LYMPHOCYTES # BLD AUTO: 0.4 K/UL (ref 1–4.8)
LYMPHOCYTES NFR BLD: 8 % (ref 18–48)
MCH RBC QN AUTO: 32.3 PG (ref 27–31)
MCHC RBC AUTO-ENTMCNC: 33 G/DL (ref 32–36)
MCV RBC AUTO: 98 FL (ref 82–98)
MONOCYTES # BLD AUTO: 0 K/UL (ref 0.3–1)
MONOCYTES NFR BLD: 0.4 % (ref 4–15)
NEUTROPHILS # BLD AUTO: 4.6 K/UL (ref 1.8–7.7)
NEUTROPHILS NFR BLD: 90.8 % (ref 38–73)
NRBC BLD-RTO: 0 /100 WBC
PLATELET # BLD AUTO: 181 K/UL (ref 150–350)
PLATELET BLD QL SMEAR: ABNORMAL
PMV BLD AUTO: 11.2 FL (ref 9.2–12.9)
POLYCHROMASIA BLD QL SMEAR: ABNORMAL
POTASSIUM SERPL-SCNC: 4.4 MMOL/L (ref 3.5–5.1)
PROT SERPL-MCNC: 7 G/DL (ref 6–8.4)
RBC # BLD AUTO: 3.75 M/UL (ref 4–5.4)
SODIUM SERPL-SCNC: 140 MMOL/L (ref 136–145)
WBC # BLD AUTO: 5.02 K/UL (ref 3.9–12.7)

## 2020-09-08 PROCEDURE — 3008F PR BODY MASS INDEX (BMI) DOCUMENTED: ICD-10-PCS | Mod: CPTII,S$GLB,, | Performed by: INTERNAL MEDICINE

## 2020-09-08 PROCEDURE — 99215 PR OFFICE/OUTPT VISIT, EST, LEVL V, 40-54 MIN: ICD-10-PCS | Mod: S$GLB,,, | Performed by: INTERNAL MEDICINE

## 2020-09-08 PROCEDURE — 97813 ACUP 1/> W/ESTIM 1ST 15 MIN: CPT | Mod: S$GLB,,, | Performed by: ACUPUNCTURIST

## 2020-09-08 PROCEDURE — 97813 PR ACUPUNCT W/ ELEC STIMUL 15 MIN: ICD-10-PCS | Mod: S$GLB,,, | Performed by: ACUPUNCTURIST

## 2020-09-08 PROCEDURE — 97814 PR ACUPUNCT W/ ELEC STIMUL ADDL 15M: ICD-10-PCS | Mod: S$GLB,,, | Performed by: ACUPUNCTURIST

## 2020-09-08 PROCEDURE — 99999 PR PBB SHADOW E&M-EST. PATIENT-LVL V: ICD-10-PCS | Mod: PBBFAC,,, | Performed by: INTERNAL MEDICINE

## 2020-09-08 PROCEDURE — 99999 PR PBB SHADOW E&M-EST. PATIENT-LVL I: CPT | Mod: PBBFAC,,, | Performed by: ACUPUNCTURIST

## 2020-09-08 PROCEDURE — 99999 PR PBB SHADOW E&M-EST. PATIENT-LVL V: CPT | Mod: PBBFAC,,, | Performed by: INTERNAL MEDICINE

## 2020-09-08 PROCEDURE — 36415 COLL VENOUS BLD VENIPUNCTURE: CPT

## 2020-09-08 PROCEDURE — 3008F BODY MASS INDEX DOCD: CPT | Mod: CPTII,S$GLB,, | Performed by: INTERNAL MEDICINE

## 2020-09-08 PROCEDURE — 85025 COMPLETE CBC W/AUTO DIFF WBC: CPT

## 2020-09-08 PROCEDURE — 99999 PR PBB SHADOW E&M-EST. PATIENT-LVL I: ICD-10-PCS | Mod: PBBFAC,,, | Performed by: ACUPUNCTURIST

## 2020-09-08 PROCEDURE — 99215 OFFICE O/P EST HI 40 MIN: CPT | Mod: S$GLB,,, | Performed by: INTERNAL MEDICINE

## 2020-09-08 PROCEDURE — 97814 ACUP 1/> W/ESTIM EA ADDL 15: CPT | Mod: S$GLB,,, | Performed by: ACUPUNCTURIST

## 2020-09-08 PROCEDURE — 80053 COMPREHEN METABOLIC PANEL: CPT

## 2020-09-08 RX ORDER — SODIUM CHLORIDE 0.9 % (FLUSH) 0.9 %
10 SYRINGE (ML) INJECTION
Status: CANCELLED | OUTPATIENT
Start: 2020-09-08

## 2020-09-08 RX ORDER — HYDROCODONE BITARTRATE AND ACETAMINOPHEN 5; 325 MG/1; MG/1
1 TABLET ORAL EVERY 8 HOURS PRN
Qty: 45 TABLET | Refills: 0 | Status: SHIPPED | OUTPATIENT
Start: 2020-09-08 | End: 2020-10-27 | Stop reason: SDUPTHER

## 2020-09-08 RX ORDER — NAPROXEN 500 MG/1
TABLET ORAL
COMMUNITY
Start: 2020-06-23 | End: 2023-06-30

## 2020-09-08 RX ORDER — DIPHENHYDRAMINE HYDROCHLORIDE 50 MG/ML
12.5 INJECTION INTRAMUSCULAR; INTRAVENOUS
Status: CANCELLED
Start: 2020-09-08

## 2020-09-08 RX ORDER — DEXAMETHASONE 4 MG/1
TABLET ORAL
Qty: 32 TABLET | Refills: 0 | Status: SHIPPED | OUTPATIENT
Start: 2020-09-08 | End: 2020-11-19

## 2020-09-08 RX ORDER — HEPARIN 100 UNIT/ML
500 SYRINGE INTRAVENOUS
Status: CANCELLED | OUTPATIENT
Start: 2020-09-08

## 2020-09-08 NOTE — PATIENT INSTRUCTIONS
Try Norco as needed for pain  Use Senna S 2 tabs nightly to prevent constipation.   Will transition care to Dr Crum, Dr Lopez, or Dr Chakraborty for your last chemo.

## 2020-09-08 NOTE — PROGRESS NOTES
History:     Reason For Follow Up:   1. Stage I (C2uD3S7) invasive ductal carcinoma of left breast, upper outer quadrant, ER 95%, AL 90%, Her2 3+, Grade 3, Ki67 25%    HPI:   Michelle Gage presents for follow up of breast cancer. She is premenopausal. She's due for cycle 4 TCH. Still with joint pains - tried tramadol without benefit. Exercise helps but she's in bed most of the first two weeks of each cycle due to pain. Taking claritin and advil. Afebrile. Tumor shrinking.     Oncologic History:  Presentation  - [id number] - presented for palpable left breast mass around 2018 - was being watched on imaging at outside hospital.    - 5/11/2020 - Diagnostic bilateral mammogram and left breast US at [facility] showed left breast 1:00 mass, 1.4 cm, 8 CFN, mild left axillary adenopathy  - 5/11/2020 - Biopsy - Grade 3 IDC, estrogen receptor 95%, progesterone receptor 90%, Her2 delphine 3+, Ki67 25%. LN biopsy negative  Surgery consultation with Dr Dumont on 5/14. Breast cancer is far in axillary tail and felt to be difficult to obtain clear margins without neoadjuvant therapy.    - 5/14/2020 - Genetics Myriad MyRisk BRACAnalysis neg; VUS AP   Medical oncology consultation on 5/15/2020 -  This case was discussed with Dr. Dumont.  She does feel like the patient will benefit from neoadjuvant therapy to help improve her surgical margins.  Since the tumor is less than 2 cm and clinically lymph node negative (MRI pending), I recommended treatment with Taxotere, carboplatin and Herceptin without Perjeta.  Discussed with her that there is data in tumors less than 2 cm to consider Taxol/Herceptin however would not typically use this in a neoadjuvant setting for concern for under treatment.     - Eggs Retrieval - has 2 embryos.    * 6/16/2020 started neoadjuvant TCH (no perjeta since < 2 cm and LN negative). Neoadjuvant therapy chosen due to location of tumor.       History: I reviewed, confirmed and updated history (past  medical, social, family) as necessary.    Medications    Current Outpatient Medications:     ALPRAZolam (XANAX) 0.5 MG tablet, Take 1 tablet (0.5 mg total) by mouth 3 (three) times daily as needed for Insomnia or Anxiety., Disp: 30 tablet, Rfl: 0    ATRALIN 0.05 % gel, , Disp: , Rfl:     dexAMETHasone (DECADRON) 4 MG Tab, Take two tablets by mouth twice daily day before and day after Taxotere., Disp: 32 tablet, Rfl: 0    HYDROcodone-acetaminophen (NORCO) 5-325 mg per tablet, Take 1 tablet by mouth every 8 (eight) hours as needed for Pain., Disp: 45 tablet, Rfl: 0    lidocaine-prilocaine (EMLA) cream, Apply topically as needed., Disp: 30 g, Rfl: 0    LORazepam (ATIVAN) 0.5 MG tablet, Take 1 tablet (0.5 mg total) by mouth nightly as needed (INSOMNIA)., Disp: 15 tablet, Rfl: 0    methylphenidate HCl (RITALIN) 10 MG tablet, Take 1 tablet (10 mg total) by mouth 2 (two) times daily with meals., Disp: 60 tablet, Rfl: 0    miSOPROStoL (CYTOTEC) 200 MCG Tab, , Disp: , Rfl:     naproxen (NAPROSYN) 500 MG tablet, TK 1 T PO Q 8 H PRN, Disp: , Rfl:     naproxen sodium (ANAPROX) 550 MG tablet, , Disp: , Rfl:     ondansetron (ZOFRAN) 8 MG tablet, Take 1 tablet (8 mg total) by mouth every 8 (eight) hours as needed for Nausea., Disp: 30 tablet, Rfl: 2    spironolactone (ALDACTONE) 50 MG tablet, Take 1 tablet (50 mg total) by mouth every evening., Disp: 30 tablet, Rfl: 11    sulfacetamide sodium-sulfur 8-4 % Susp, , Disp: , Rfl:     valACYclovir (VALTREX) 1000 MG tablet, Take 1,000 mg by mouth 2 (two) times daily., Disp: , Rfl:     valacyclovir (VALTREX) 500 MG tablet, Take 1 pill twice daily x 3 days as needed for fever blister., Disp: 30 tablet, Rfl: 0    venlafaxine (EFFEXOR XR) 37.5 MG 24 hr capsule, Take 1 capsule (37.5 mg total) by mouth once daily., Disp: 90 capsule, Rfl: 3    Current Facility-Administered Medications:     copper intrauterine device 380 square mm  mm, 380 mm, Intrauterine, Lyndsay  NURYS Warren MD, 380 mm at 06/25/20 1030    Facility-Administered Medications Ordered in Other Visits:     alteplase injection 2 mg, 2 mg, Intra-Catheter, PRN, Christy Salazar MD    CARBOplatin (PARAPLATIN) 500 mg in sodium chloride 0.9% 250 mL chemo infusion, 500 mg, Intravenous, 1 time in Clinic/HOD, Christy Salazar MD, Last Rate: 250 mL/hr at 09/09/20 0932, 500 mg at 09/09/20 0932    goserelin (ZOLADEX) injection 3.6 mg, 3.6 mg, Subcutaneous, 1 time in Clinic/HOD, Christy Salazar MD    heparin, porcine (PF) 100 unit/mL injection flush 500 Units, 500 Units, Intravenous, PRN, Christy Salazar MD    pegfilgrastim (NEULASTA (ON BODY INJECTOR)) injection 6 mg, 6 mg, Subcutaneous, 1 time in Clinic/HOD, Christy Salazar MD    sodium chloride 0.9% flush 10 mL, 10 mL, Intravenous, PRN, Christy Salazar MD    trastuzumab 289 mg in sodium chloride 0.9% 250 mL chemo infusion, 6 mg/kg, Intravenous, 1 time in Clinic/HOD, Christy Salazar MD  Allergies  Review of patient's allergies indicates:  No Known Allergies  Review of Systems  Review of Systems   Constitutional: Positive for fatigue. Negative for activity change, appetite change, chills, fever and unexpected weight change.   HENT: Negative for congestion, hearing loss, nosebleeds, sinus pressure and trouble swallowing.    Eyes: Negative for pain, discharge and itching.   Respiratory: Negative for cough, chest tightness and shortness of breath.    Cardiovascular: Negative for chest pain, palpitations and leg swelling.   Gastrointestinal: Positive for nausea. Negative for abdominal distention, abdominal pain, blood in stool, diarrhea, rectal pain and vomiting.   Endocrine: Negative for heat intolerance and polydipsia.   Genitourinary: Negative for difficulty urinating, dysuria, flank pain, frequency, hematuria and urgency.   Musculoskeletal: Positive for arthralgias, back pain and myalgias. Negative for neck pain.   Skin: Negative for color change, pallor and rash.  "  Neurological: Negative for dizziness, numbness and headaches.   Hematological: Negative for adenopathy. Does not bruise/bleed easily.   Psychiatric/Behavioral: Negative for confusion and decreased concentration. The patient is not nervous/anxious.         Objective     Objective:     Vitals:    09/08/20 1458   BP: 103/69   BP Location: Left arm   Patient Position: Sitting   BP Method: Large (Automatic)   Pulse: 107   Resp: 18   Temp: 98.4 °F (36.9 °C)   TempSrc: Oral   SpO2: 95%   Weight: 48.2 kg (106 lb 4.2 oz)   Height: 5' 5" (1.651 m)     Body surface area is 1.49 meters squared.  Physical Exam  Vitals signs and nursing note reviewed.   Constitutional:       General: She is not in acute distress.     Appearance: Normal appearance. She is well-developed.   HENT:      Head: Normocephalic and atraumatic.      Mouth/Throat:      Mouth: No oral lesions.   Eyes:      General: No scleral icterus.        Right eye: No discharge.         Left eye: No discharge.      Conjunctiva/sclera: Conjunctivae normal.   Neck:      Musculoskeletal: Neck supple.   Cardiovascular:      Rate and Rhythm: Normal rate and regular rhythm.      Heart sounds: Normal heart sounds. No murmur.   Pulmonary:      Effort: Pulmonary effort is normal. No respiratory distress.      Breath sounds: Normal breath sounds. No wheezing, rhonchi or rales.      Comments: Left breast without palpable mass. Prior mass was 1.5 cm palpable mass in UOQ, close to axilla, very superficial. No palpable nodes.   Chest:      Chest wall: There is no dullness to percussion.   Abdominal:      General: Bowel sounds are normal.      Palpations: Abdomen is soft.      Tenderness: There is no abdominal tenderness.   Skin:     General: Skin is warm and dry.      Coloration: Skin is not pale.   Neurological:      Mental Status: She is alert and oriented to person, place, and time.   Psychiatric:         Behavior: Behavior normal.         Thought Content: Thought content normal. "          Labs and Imaging  Results for orders placed or performed in visit on 09/08/20   CMP q 2 wks x 6   Result Value Ref Range    Sodium 140 136 - 145 mmol/L    Potassium 4.4 3.5 - 5.1 mmol/L    Chloride 107 95 - 110 mmol/L    CO2 22 (L) 23 - 29 mmol/L    Glucose 168 (H) 70 - 110 mg/dL    BUN, Bld 16 6 - 20 mg/dL    Creatinine 0.8 0.5 - 1.4 mg/dL    Calcium 9.5 8.7 - 10.5 mg/dL    Total Protein 7.0 6.0 - 8.4 g/dL    Albumin 4.0 3.5 - 5.2 g/dL    Total Bilirubin 0.3 0.1 - 1.0 mg/dL    Alkaline Phosphatase 60 55 - 135 U/L    AST 20 10 - 40 U/L    ALT 38 10 - 44 U/L    Anion Gap 11 8 - 16 mmol/L    eGFR if African American >60.0 >60 mL/min/1.73 m^2    eGFR if non African American >60.0 >60 mL/min/1.73 m^2   CBC auto differential   Result Value Ref Range    WBC 5.02 3.90 - 12.70 K/uL    RBC 3.75 (L) 4.00 - 5.40 M/uL    Hemoglobin 12.1 12.0 - 16.0 g/dL    Hematocrit 36.7 (L) 37.0 - 48.5 %    Mean Corpuscular Volume 98 82 - 98 fL    Mean Corpuscular Hemoglobin 32.3 (H) 27.0 - 31.0 pg    Mean Corpuscular Hemoglobin Conc 33.0 32.0 - 36.0 g/dL    RDW 15.4 (H) 11.5 - 14.5 %    Platelets 181 150 - 350 K/uL    MPV 11.2 9.2 - 12.9 fL    Immature Granulocytes 0.6 (H) 0.0 - 0.5 %    Gran # (ANC) 4.6 1.8 - 7.7 K/uL    Immature Grans (Abs) 0.03 0.00 - 0.04 K/uL    Lymph # 0.4 (L) 1.0 - 4.8 K/uL    Mono # 0.0 (L) 0.3 - 1.0 K/uL    Eos # 0.0 0.0 - 0.5 K/uL    Baso # 0.01 0.00 - 0.20 K/uL    nRBC 0 0 /100 WBC    Gran% 90.8 (H) 38.0 - 73.0 %    Lymph% 8.0 (L) 18.0 - 48.0 %    Mono% 0.4 (L) 4.0 - 15.0 %    Eosinophil% 0.0 0.0 - 8.0 %    Basophil% 0.2 0.0 - 1.9 %    Platelet Estimate Appears normal     Aniso Slight     Poly Occasional     Hypo Occasional     Differential Method Automated          Assessment     Assessment:     1. Malignant neoplasm of upper-outer quadrant of left breast in female, estrogen receptor positive    2. Suppression of ovarian secretion    3. Chemotherapy induced neutropenia    4. Chemotherapy-induced nausea     5. Cancer associated pain    6. Encounter for therapeutic drug monitoring        1. Stage I ([id number]) invasive ductal carcinoma of left breast, upper outer quadrant, ER 95%, MI 90%, Her2 3+, Grade 3, Ki67 25%   * Genetics negative   * 6/16/2020 start neoadjuvant TCH x 6 cycles (no perjeta since < 2 cm and LN negative). Neoadjuvant therapy chosen due to location of tumor.     - Because of the persistent need to delay, I changed docetaxel to 60mg/m2 and carbo to AUC 5. Tumor responding well.     2. Fertility preservation. Eggs harvested. On monthly Zoladex for ovarian suppression. Can discuss continuing for ovarian suppression with AI later although Tamoxifen alone is a good option for her too since her tumor was small.    3. Anxiety. Seeing psych. Effexor    4. Chemo neutropenia - She has now had delay of cycles 2 and 3 due to neutropenia despite Neulasta. See #1.    5. Chemo nausea - added Cinvanti  6. Chemo pain - severe and limiting. Will try Norco, but discussed today that it will be shortterm and we will stop it within 1 mo after chemo.     Plan:     1. Cycle 4 TCH. Dose adjustments as above.   2. Cinvanti  3. Effexor  4. Zoladex in 4 wks.   5. Norco prn - discussed that we will stop once chemo is over.   6. Echo   7. Discussed adjuvant Her2 based therapy today with patient. Discussed Herceptin if pCR and Kadcyla if residual disease (can still use [place] data for Kadcyla since majority of patients did not have Perjeta as in this situation)      TCH in 3 and 6 wks with cbc, cmp  Zoladex in 4 wks and 8 wks  Next echo without strain report since her insurance doesn't cover it.   I asked the patient to call for any questions, concerns, or new symptoms.      Future Appointments   Date Time Provider Department Center   9/16/2020  3:00 PM Kailyn Castillo L.Ac [facility] [place]   9/28/2020  8:00 AM ECHO, [facility] ECHOSTR Luis Angel Dupont   9/29/2020  8:00 AM LAB, [facility]ON [facility] LAB [facility] [place]    9/29/2020  9:00 AM Christy Salazar MD [facility] HEMONC3 [place]   9/29/2020 10:00 AM Kailyn Castillo L.Ac [facility] INTGONC [place]   9/30/2020  8:00 AM INJECTION, [facility] [place]   9/30/2020  9:00 AM CHEMO 9 [facility] [place]   10/20/2020 10:30 AM LAB, [facility] CANCER BLDG [facility] LAB HO [place]   10/20/2020 11:30 AM Mone Crum MD [facility] HEMONC3 [place]   10/21/2020  9:00 AM CHEMO 9 [facility] [place]   10/28/2020  8:00 AM INJECTION, [facility] [place]

## 2020-09-08 NOTE — PROGRESS NOTES
Subjective:       Patient ID: Michelle Gage is a 34 y.o. female.    Chief Complaint: Breast Cancer (hot flashes, headaches, joint pain) and Nausea (CINV)    Patient finally able to resume chemo - is having issues with chemo brain/fog. Nausea is OK but will know better once she resumes treatment tomorrow. Hot flashes every day, worse around 8PM and soaking through clothes.     Review of Systems   Gastrointestinal: Positive for nausea.   Genitourinary: Positive for hot flashes.   Musculoskeletal: Positive for leg pain.   Neurological: Positive for numbness.         Objective:          Assessment:       1. Malignant neoplasm of upper-outer quadrant of left breast in female, estrogen receptor positive    2. Chemotherapy-induced nausea    3. Cancer associated pain    4. Hot flashes        Plan:       1x a week during treatments*         Pre-Symptom Score: 3  Post-Symptom Score: 3     Breast Cancer (hot flashes, headaches, joint pain) and Nausea (CINV)       Encounter Diagnoses   Name Primary?    Malignant neoplasm of upper-outer quadrant of left breast in female, estrogen receptor positive Yes    Chemotherapy-induced nausea     Cancer associated pain     Hot flashes        TCM Diagnosis: Toxic Heat    Physical Exam   Constitutional:            Acupuncture points used:  Li11, Pc6, St25, Sp6, Du20, Gb34, REN4, REN6, REN12, ER HIRA, RYLEY GUZMAN, ANCELMO IQBAL, BA NATALYA , GALLO KYLE and 4 JUAREZ + see chart above     ADD STIM (PC6/St36)      NEEDLES IN: 39  NEEDLES OUT: 39    NEEDLES AND STIM STARTED: 4:00 PM  NEEDLES AND STIM REMOVED: 4:30 PM

## 2020-09-09 ENCOUNTER — INFUSION (OUTPATIENT)
Dept: INFUSION THERAPY | Facility: HOSPITAL | Age: 35
End: 2020-09-09
Attending: NURSE PRACTITIONER
Payer: COMMERCIAL

## 2020-09-09 VITALS
TEMPERATURE: 98 F | BODY MASS INDEX: 17.7 KG/M2 | SYSTOLIC BLOOD PRESSURE: 113 MMHG | OXYGEN SATURATION: 99 % | HEART RATE: 75 BPM | RESPIRATION RATE: 18 BRPM | DIASTOLIC BLOOD PRESSURE: 70 MMHG | HEIGHT: 65 IN | WEIGHT: 106.25 LBS

## 2020-09-09 DIAGNOSIS — C50.412 MALIGNANT NEOPLASM OF UPPER-OUTER QUADRANT OF LEFT BREAST IN FEMALE, ESTROGEN RECEPTOR POSITIVE: Primary | ICD-10-CM

## 2020-09-09 DIAGNOSIS — Z17.0 MALIGNANT NEOPLASM OF UPPER-OUTER QUADRANT OF LEFT BREAST IN FEMALE, ESTROGEN RECEPTOR POSITIVE: Primary | ICD-10-CM

## 2020-09-09 DIAGNOSIS — Z51.11 ENCOUNTER FOR CHEMOTHERAPY MANAGEMENT: ICD-10-CM

## 2020-09-09 DIAGNOSIS — E28.39 SUPPRESSION OF OVARIAN SECRETION: ICD-10-CM

## 2020-09-09 DIAGNOSIS — T45.1X5A CHEMOTHERAPY INDUCED NEUTROPENIA: ICD-10-CM

## 2020-09-09 DIAGNOSIS — T45.1X5A CHEMOTHERAPY-INDUCED NAUSEA: ICD-10-CM

## 2020-09-09 DIAGNOSIS — D70.1 CHEMOTHERAPY INDUCED NEUTROPENIA: ICD-10-CM

## 2020-09-09 DIAGNOSIS — R11.0 CHEMOTHERAPY-INDUCED NAUSEA: ICD-10-CM

## 2020-09-09 PROCEDURE — A4216 STERILE WATER/SALINE, 10 ML: HCPCS | Performed by: INTERNAL MEDICINE

## 2020-09-09 PROCEDURE — 25000003 PHARM REV CODE 250: Performed by: INTERNAL MEDICINE

## 2020-09-09 PROCEDURE — 96375 TX/PRO/DX INJ NEW DRUG ADDON: CPT

## 2020-09-09 PROCEDURE — 96367 TX/PROPH/DG ADDL SEQ IV INF: CPT

## 2020-09-09 PROCEDURE — 63600175 PHARM REV CODE 636 W HCPCS: Performed by: INTERNAL MEDICINE

## 2020-09-09 PROCEDURE — 96402 CHEMO HORMON ANTINEOPL SQ/IM: CPT

## 2020-09-09 PROCEDURE — 96377 APPLICATON ON-BODY INJECTOR: CPT | Mod: 59

## 2020-09-09 PROCEDURE — 96413 CHEMO IV INFUSION 1 HR: CPT

## 2020-09-09 PROCEDURE — 96417 CHEMO IV INFUS EACH ADDL SEQ: CPT

## 2020-09-09 RX ORDER — HEPARIN 100 UNIT/ML
500 SYRINGE INTRAVENOUS
Status: DISCONTINUED | OUTPATIENT
Start: 2020-09-09 | End: 2020-09-09 | Stop reason: HOSPADM

## 2020-09-09 RX ORDER — SODIUM CHLORIDE 0.9 % (FLUSH) 0.9 %
10 SYRINGE (ML) INJECTION
Status: DISCONTINUED | OUTPATIENT
Start: 2020-09-09 | End: 2020-09-09 | Stop reason: HOSPADM

## 2020-09-09 RX ORDER — DIPHENHYDRAMINE HYDROCHLORIDE 50 MG/ML
12.5 INJECTION INTRAMUSCULAR; INTRAVENOUS
Status: COMPLETED | OUTPATIENT
Start: 2020-09-09 | End: 2020-09-09

## 2020-09-09 RX ADMIN — Medication 10 ML: at 11:09

## 2020-09-09 RX ADMIN — DIPHENHYDRAMINE HYDROCHLORIDE 12.5 MG: 50 INJECTION INTRAMUSCULAR; INTRAVENOUS at 07:09

## 2020-09-09 RX ADMIN — GOSERELIN ACETATE 3.6 MG: 3.6 IMPLANT SUBCUTANEOUS at 12:09

## 2020-09-09 RX ADMIN — DOCETAXEL ANHYDROUS 90 MG: 10 INJECTION, SOLUTION INTRAVENOUS at 08:09

## 2020-09-09 RX ADMIN — DEXAMETHASONE SODIUM PHOSPHATE: 4 INJECTION, SOLUTION INTRA-ARTICULAR; INTRALESIONAL; INTRAMUSCULAR; INTRAVENOUS; SOFT TISSUE at 08:09

## 2020-09-09 RX ADMIN — SODIUM CHLORIDE: 0.9 INJECTION, SOLUTION INTRAVENOUS at 07:09

## 2020-09-09 RX ADMIN — TRASTUZUMAB 289 MG: 150 INJECTION, POWDER, LYOPHILIZED, FOR SOLUTION INTRAVENOUS at 10:09

## 2020-09-09 RX ADMIN — PEGFILGRASTIM 6 MG: KIT SUBCUTANEOUS at 12:09

## 2020-09-09 RX ADMIN — APREPITANT 130 MG: 130 INJECTION, EMULSION INTRAVENOUS at 07:09

## 2020-09-09 RX ADMIN — CARBOPLATIN 500 MG: 10 INJECTION INTRAVENOUS at 09:09

## 2020-09-09 RX ADMIN — HEPARIN 500 UNITS: 100 SYRINGE at 11:09

## 2020-09-09 NOTE — PLAN OF CARE
Pt tolerated C5 Taxotere/carbo/herceptin/OBI/zoladex without adverse effects. VSS. Tolerated cool cap pre chemo 30 min, 2hr during chemo & 2h post chemo without incident.  DC to home with OBI blinking green. Verbalized understanding of RTC date. DC with family ambulating independently.

## 2020-09-16 ENCOUNTER — CLINICAL SUPPORT (OUTPATIENT)
Dept: HEMATOLOGY/ONCOLOGY | Facility: CLINIC | Age: 35
End: 2020-09-16
Payer: COMMERCIAL

## 2020-09-16 DIAGNOSIS — T45.1X5A CHEMOTHERAPY-INDUCED NAUSEA: ICD-10-CM

## 2020-09-16 DIAGNOSIS — G89.3 CANCER ASSOCIATED PAIN: ICD-10-CM

## 2020-09-16 DIAGNOSIS — C50.412 MALIGNANT NEOPLASM OF UPPER-OUTER QUADRANT OF LEFT BREAST IN FEMALE, ESTROGEN RECEPTOR POSITIVE: Primary | ICD-10-CM

## 2020-09-16 DIAGNOSIS — R11.0 CHEMOTHERAPY-INDUCED NAUSEA: ICD-10-CM

## 2020-09-16 DIAGNOSIS — R23.2 HOT FLASHES: ICD-10-CM

## 2020-09-16 DIAGNOSIS — Z17.0 MALIGNANT NEOPLASM OF UPPER-OUTER QUADRANT OF LEFT BREAST IN FEMALE, ESTROGEN RECEPTOR POSITIVE: Primary | ICD-10-CM

## 2020-09-16 PROCEDURE — 97811 ACUP 1/> W/O ESTIM EA ADD 15: CPT | Mod: S$GLB,,, | Performed by: ACUPUNCTURIST

## 2020-09-16 PROCEDURE — 97810 ACUP 1/> WO ESTIM 1ST 15 MIN: CPT | Mod: S$GLB,,, | Performed by: ACUPUNCTURIST

## 2020-09-16 PROCEDURE — 97810 PR ACUPUNCT W/O ELEC STIMUL 15 MIN: ICD-10-PCS | Mod: S$GLB,,, | Performed by: ACUPUNCTURIST

## 2020-09-16 PROCEDURE — 97811 PR ACUPUNCT W/O ELEC STIMUL ADDL 15M: ICD-10-PCS | Mod: S$GLB,,, | Performed by: ACUPUNCTURIST

## 2020-09-16 NOTE — PROGRESS NOTES
Subjective:       Patient ID: Michelle Gage is a 34 y.o. female.    Chief Complaint: Pain (joint, skin sensitivity) and Nausea (CINV)    Patient feels tingly in lips and arms/legs. Bone bone in lower extremities from her hips down. Skin is extremely sensitive to the touch. Nausea is doing ok - no vomiting and waves of nausea pass quickly. Patient was very sensitive with needling today and basic touch especially on legs.     Review of Systems   Gastrointestinal: Positive for nausea.   Genitourinary: Positive for hot flashes.   Musculoskeletal: Positive for leg pain.   All other systems reviewed and are negative.        Objective:          Assessment:       1. Malignant neoplasm of upper-outer quadrant of left breast in female, estrogen receptor positive    2. Chemotherapy-induced nausea    3. Cancer associated pain    4. Hot flashes        Plan:       1x a week for 4 more weeks*         Pre-Symptom Score: 7 (bone pain) 4 (nausea)  Post-Symptom Score: 7 (bone pain) 4 (nausea)    Pain (joint, skin sensitivity) and Nausea (CINV)       Encounter Diagnoses   Name Primary?    Malignant neoplasm of upper-outer quadrant of left breast in female, estrogen receptor positive Yes    Chemotherapy-induced nausea     Cancer associated pain     Hot flashes        TCM Diagnosis: Qi and Blood Stagnation w/ Toxic Heat    Physical Exam   Constitutional:            Acupuncture points used:  Li11, Pc6, St36, Sp9, Sp6, Du20, Gb34, HEDRICK THOMAS, YIN IQBAL, BA NATALYA , GALLO KYLE and 4 JAUREZ + see chart above     NO STIM USED      NEEDLES IN: 37  NEEDLES OUT: 37    NEEDLES STARTED AT: 3:30 PM  NEEDLES REMOVED AT: 4:00 PM

## 2020-09-27 ENCOUNTER — PATIENT MESSAGE (OUTPATIENT)
Dept: SURGERY | Facility: CLINIC | Age: 35
End: 2020-09-27

## 2020-09-28 ENCOUNTER — HOSPITAL ENCOUNTER (OUTPATIENT)
Dept: CARDIOLOGY | Facility: HOSPITAL | Age: 35
Discharge: HOME | End: 2020-09-28
Attending: INTERNAL MEDICINE
Payer: COMMERCIAL

## 2020-09-28 VITALS
BODY MASS INDEX: 17.66 KG/M2 | HEIGHT: 65 IN | HEART RATE: 70 BPM | WEIGHT: 106 LBS | SYSTOLIC BLOOD PRESSURE: 115 MMHG | DIASTOLIC BLOOD PRESSURE: 70 MMHG

## 2020-09-28 DIAGNOSIS — Z51.81 ENCOUNTER FOR THERAPEUTIC DRUG MONITORING: ICD-10-CM

## 2020-09-28 DIAGNOSIS — C50.412 MALIGNANT NEOPLASM OF UPPER-OUTER QUADRANT OF LEFT BREAST IN FEMALE, ESTROGEN RECEPTOR POSITIVE: ICD-10-CM

## 2020-09-28 DIAGNOSIS — Z17.0 MALIGNANT NEOPLASM OF UPPER-OUTER QUADRANT OF LEFT BREAST IN FEMALE, ESTROGEN RECEPTOR POSITIVE: ICD-10-CM

## 2020-09-28 LAB
ASCENDING AORTA: 2.44 CM
AV INDEX (PROSTH): 0.95
AV MEAN GRADIENT: 2 MMHG
AV PEAK GRADIENT: 4 MMHG
AV VALVE AREA: 2.74 CM2
AV VELOCITY RATIO: 0.88
BSA FOR ECHO PROCEDURE: 1.48 M2
CV ECHO LV RWT: 0.26 CM
DOP CALC AO PEAK VEL: 0.99 M/S
DOP CALC AO VTI: 19.7 CM
DOP CALC LVOT AREA: 2.9 CM2
DOP CALC LVOT DIAMETER: 1.92 CM
DOP CALC LVOT PEAK VEL: 0.87 M/S
DOP CALC LVOT STROKE VOLUME: 53.97 CM3
DOP CALCLVOT PEAK VEL VTI: 18.65 CM
E WAVE DECELERATION TIME: 169.96 MSEC
E/A RATIO: 1.57
E/E' RATIO: 5.1 M/S
ECHO LV POSTERIOR WALL: 0.62 CM (ref 0.6–1.1)
FRACTIONAL SHORTENING: 32 % (ref 28–44)
INTERVENTRICULAR SEPTUM: 0.59 CM (ref 0.6–1.1)
IVRT: 77.07 MSEC
LA MAJOR: 4.56 CM
LA MINOR: 4.53 CM
LA WIDTH: 4.2 CM
LEFT ATRIUM SIZE: 3.19 CM
LEFT ATRIUM VOLUME INDEX: 34.3 ML/M2
LEFT ATRIUM VOLUME: 51.76 CM3
LEFT INTERNAL DIMENSION IN SYSTOLE: 3.31 CM (ref 2.1–4)
LEFT VENTRICLE DIASTOLIC VOLUME INDEX: 72.83 ML/M2
LEFT VENTRICLE DIASTOLIC VOLUME: 109.99 ML
LEFT VENTRICLE MASS INDEX: 60 G/M2
LEFT VENTRICLE SYSTOLIC VOLUME INDEX: 29.5 ML/M2
LEFT VENTRICLE SYSTOLIC VOLUME: 44.49 ML
LEFT VENTRICULAR INTERNAL DIMENSION IN DIASTOLE: 4.85 CM (ref 3.5–6)
LEFT VENTRICULAR MASS: 90.84 G
LV LATERAL E/E' RATIO: 4.11 M/S
LV SEPTAL E/E' RATIO: 6.73 M/S
MV PEAK A VEL: 0.47 M/S
MV PEAK E VEL: 0.74 M/S
MV STENOSIS PRESSURE HALF TIME: 49.29 MS
MV VALVE AREA P 1/2 METHOD: 4.46 CM2
PISA TR MAX VEL: 1.89 M/S
PULM VEIN S/D RATIO: 1.38
PV PEAK D VEL: 0.42 M/S
PV PEAK S VEL: 0.58 M/S
RA MAJOR: 4.13 CM
RA PRESSURE: 3 MMHG
RA WIDTH: 3.57 CM
RIGHT VENTRICULAR END-DIASTOLIC DIMENSION: 3.22 CM
RV TISSUE DOPPLER FREE WALL SYSTOLIC VELOCITY 1 (APICAL 4 CHAMBER VIEW): 12.55 CM/S
SINUS: 2.42 CM
STJ: 2.15 CM
TDI LATERAL: 0.18 M/S
TDI SEPTAL: 0.11 M/S
TDI: 0.15 M/S
TR MAX PG: 14 MMHG
TRICUSPID ANNULAR PLANE SYSTOLIC EXCURSION: 2.16 CM
TV REST PULMONARY ARTERY PRESSURE: 17 MMHG

## 2020-09-28 PROCEDURE — 93306 ECHO (CUPID ONLY): ICD-10-PCS | Mod: 26,,, | Performed by: INTERNAL MEDICINE

## 2020-09-28 PROCEDURE — 93306 TTE W/DOPPLER COMPLETE: CPT

## 2020-09-28 PROCEDURE — 93306 TTE W/DOPPLER COMPLETE: CPT | Mod: 26,,, | Performed by: INTERNAL MEDICINE

## 2020-09-28 NOTE — PROGRESS NOTES
History:     Reason For Follow Up:   1. Stage I ([id number]) invasive ductal carcinoma of left breast, upper outer quadrant, ER 95%, IN 90%, Her2 3+, Grade 3, Ki67 25%    HPI:   Michelle Gage presents for follow up of breast cancer. She is premenopausal. She's due for cycle 5 TCH. Typically feels fine until Friday.   Joint pains - still bad during the first two weeks. Reports that she's even sensitive to the touch - even with clothing.   Mild tingling in extremities for 3-5 days that improves.   Used Norco - helped a little, but just made her simply.  ANC remains low.      Oncologic History:  Presentation  - [id number] - presented for palpable left breast mass around 2018 - was being watched on imaging at outside hospital.    - 5/11/2020 - Diagnostic bilateral mammogram and left breast US at [facility] showed left breast 1:00 mass, 1.4 cm, 8 CFN, mild left axillary adenopathy  - 5/11/2020 - Biopsy - Grade 3 IDC, estrogen receptor 95%, progesterone receptor 90%, Her2 delphine 3+, Ki67 25%. LN biopsy negative  Surgery consultation with Dr Dumont on 5/14. Breast cancer is far in axillary tail and felt to be difficult to obtain clear margins without neoadjuvant therapy.    - 5/14/2020 - Genetics [facility] neg; [facility]   Medical oncology consultation on 5/15/2020 -  This case was discussed with Dr. Dumont.  She does feel like the patient will benefit from neoadjuvant therapy to help improve her surgical margins.  Since the tumor is less than 2 cm and clinically lymph node negative (MRI pending), I recommended treatment with Taxotere, carboplatin and Herceptin without Perjeta.  Discussed with her that there is data in tumors less than 2 cm to consider Taxol/Herceptin however would not typically use this in a neoadjuvant setting for concern for under treatment.     - Eggs Retrieval - has 2 embryos.    * 6/16/2020 started neoadjuvant TCH (no perjeta since < 2 cm and LN negative). Neoadjuvant therapy  chosen due to location of tumor.       History: I reviewed, confirmed and updated history (past medical, social, family) as necessary.    Medications    Current Outpatient Medications:     ALPRAZolam (XANAX) 0.5 MG tablet, Take 1 tablet (0.5 mg total) by mouth 3 (three) times daily as needed for Insomnia or Anxiety., Disp: 30 tablet, Rfl: 0    ATRALIN 0.05 % gel, , Disp: , Rfl:     dexAMETHasone (DECADRON) 4 MG Tab, Take two tablets by mouth twice daily day before and day after Taxotere., Disp: 32 tablet, Rfl: 0    HYDROcodone-acetaminophen (NORCO) 5-325 mg per tablet, Take 1 tablet by mouth every 8 (eight) hours as needed for Pain., Disp: 45 tablet, Rfl: 0    lidocaine-prilocaine (EMLA) cream, Apply topically as needed., Disp: 30 g, Rfl: 0    LORazepam (ATIVAN) 0.5 MG tablet, Take 1 tablet (0.5 mg total) by mouth nightly as needed (INSOMNIA)., Disp: 15 tablet, Rfl: 0    methylphenidate HCl (RITALIN) 10 MG tablet, Take 1 tablet (10 mg total) by mouth 2 (two) times daily with meals., Disp: 60 tablet, Rfl: 0    miSOPROStoL (CYTOTEC) 200 MCG Tab, , Disp: , Rfl:     naproxen (NAPROSYN) 500 MG tablet, TK 1 T PO Q 8 H PRN, Disp: , Rfl:     naproxen sodium (ANAPROX) 550 MG tablet, , Disp: , Rfl:     ondansetron (ZOFRAN) 8 MG tablet, Take 1 tablet (8 mg total) by mouth every 8 (eight) hours as needed for Nausea., Disp: 30 tablet, Rfl: 2    spironolactone (ALDACTONE) 50 MG tablet, Take 1 tablet (50 mg total) by mouth every evening., Disp: 30 tablet, Rfl: 11    sulfacetamide sodium-sulfur 8-4 % Susp, , Disp: , Rfl:     valACYclovir (VALTREX) 1000 MG tablet, Take 1,000 mg by mouth 2 (two) times daily., Disp: , Rfl:     valacyclovir (VALTREX) 500 MG tablet, Take 1 pill twice daily x 3 days as needed for fever blister., Disp: 30 tablet, Rfl: 0    venlafaxine (EFFEXOR XR) 37.5 MG 24 hr capsule, Take 1 capsule (37.5 mg total) by mouth once daily., Disp: 90 capsule, Rfl: 3    Current Facility-Administered  "Medications:     copper intrauterine device 380 square mm  mm, 380 mm, Intrauterine, , Lyndsay Warren MD, 380 mm at 06/25/20 1030  Allergies  Review of patient's allergies indicates:  No Known Allergies  Review of Systems  Review of Systems   Constitutional: Positive for fatigue. Negative for activity change, appetite change, chills, fever and unexpected weight change.   HENT: Negative for congestion, hearing loss, nosebleeds, sinus pressure and trouble swallowing.    Eyes: Negative for pain, discharge and itching.   Respiratory: Negative for cough, chest tightness and shortness of breath.    Cardiovascular: Negative for chest pain, palpitations and leg swelling.   Gastrointestinal: Negative for abdominal distention, abdominal pain, blood in stool, diarrhea, nausea, rectal pain and vomiting.   Endocrine: Negative for heat intolerance and polydipsia.   Genitourinary: Negative for difficulty urinating, dysuria, flank pain, frequency, hematuria and urgency.   Musculoskeletal: Positive for arthralgias, back pain and myalgias. Negative for neck pain.   Skin: Negative for color change, pallor and rash.   Neurological: Negative for dizziness, numbness and headaches.   Hematological: Negative for adenopathy. Does not bruise/bleed easily.   Psychiatric/Behavioral: Negative for confusion and decreased concentration. The patient is not nervous/anxious.         Objective     Objective:     Vitals:    09/29/20 0835   BP: (!) 97/53   BP Location: Left arm   Patient Position: Sitting   BP Method: Large (Automatic)   Pulse: 69   Resp: 16   Temp: 97.7 °F (36.5 °C)   TempSrc: Oral   SpO2: 99%   Weight: 50.2 kg (110 lb 10.7 oz)   Height: 5' 5" (1.651 m)     Body surface area is 1.52 meters squared.  Physical Exam  Vitals signs and nursing note reviewed.   Constitutional:       General: She is not in acute distress.     Appearance: Normal appearance. She is well-developed.   HENT:      Head: Normocephalic and atraumatic. "      Mouth/Throat:      Mouth: No oral lesions.   Eyes:      General: No scleral icterus.        Right eye: No discharge.         Left eye: No discharge.      Conjunctiva/sclera: Conjunctivae normal.   Neck:      Musculoskeletal: Neck supple.   Cardiovascular:      Rate and Rhythm: Normal rate and regular rhythm.      Heart sounds: Normal heart sounds. No murmur.   Pulmonary:      Effort: Pulmonary effort is normal. No respiratory distress.      Breath sounds: Normal breath sounds. No wheezing, rhonchi or rales.      Comments: Left breast without palpable mass. (Prior mass was 1.5 cm palpable mass in UOQ, close to axilla, very superficial) No palpable nodes.   Chest:      Chest wall: There is no dullness to percussion.   Abdominal:      General: Bowel sounds are normal.      Palpations: Abdomen is soft.      Tenderness: There is no abdominal tenderness.   Skin:     General: Skin is warm and dry.      Coloration: Skin is not pale.   Neurological:      Mental Status: She is alert and oriented to person, place, and time.   Psychiatric:         Behavior: Behavior normal.         Thought Content: Thought content normal.          Labs and Imaging  Results for orders placed or performed in visit on 09/29/20   CBC q 2 wks x 6   Result Value Ref Range    WBC 3.12 (L) 3.90 - 12.70 K/uL    RBC 3.63 (L) 4.00 - 5.40 M/uL    Hemoglobin 11.7 (L) 12.0 - 16.0 g/dL    Hematocrit 36.2 (L) 37.0 - 48.5 %    Mean Corpuscular Volume 100 (H) 82 - 98 fL    Mean Corpuscular Hemoglobin 32.2 (H) 27.0 - 31.0 pg    Mean Corpuscular Hemoglobin Conc 32.3 32.0 - 36.0 g/dL    RDW 15.7 (H) 11.5 - 14.5 %    Platelets 141 (L) 150 - 350 K/uL    MPV 11.2 9.2 - 12.9 fL    Gran # (ANC) 0.6 (L) 1.8 - 7.7 K/uL    Immature Grans (Abs) 0.00 0.00 - 0.04 K/uL   CBC auto differential   Result Value Ref Range    WBC 3.12 (L) 3.90 - 12.70 K/uL    RBC 3.63 (L) 4.00 - 5.40 M/uL    Hemoglobin 11.7 (L) 12.0 - 16.0 g/dL    Hematocrit 36.2 (L) 37.0 - 48.5 %    Mean  Corpuscular Volume 100 (H) 82 - 98 fL    Mean Corpuscular Hemoglobin 32.2 (H) 27.0 - 31.0 pg    Mean Corpuscular Hemoglobin Conc 32.3 32.0 - 36.0 g/dL    RDW 15.7 (H) 11.5 - 14.5 %    Platelets 141 (L) 150 - 350 K/uL    MPV 11.2 9.2 - 12.9 fL    Immature Granulocytes 0.0 0.0 - 0.5 %    Gran # (ANC) 0.6 (L) 1.8 - 7.7 K/uL    Immature Grans (Abs) 0.00 0.00 - 0.04 K/uL    Lymph # 1.9 1.0 - 4.8 K/uL    Mono # 0.6 0.3 - 1.0 K/uL    Eos # 0.1 0.0 - 0.5 K/uL    Baso # 0.03 0.00 - 0.20 K/uL    nRBC 0 0 /100 WBC    Gran% 18.8 (L) 38.0 - 73.0 %    Lymph% 60.3 (H) 18.0 - 48.0 %    Mono% 18.3 (H) 4.0 - 15.0 %    Eosinophil% 1.6 0.0 - 8.0 %    Basophil% 1.0 0.0 - 1.9 %    Platelet Estimate Decreased (A)     Differential Method Automated          Assessment     Assessment:     1. Malignant neoplasm of upper-outer quadrant of left breast in female, estrogen receptor positive    2. Chemotherapy-induced nausea    3. Chemotherapy induced neutropenia    4. Cancer associated pain    5. Suppression of ovarian secretion        1. Stage I ([id number]) invasive ductal carcinoma of left breast, upper outer quadrant, ER 95%, CO 90%, Her2 3+, Grade 3, Ki67 25%   * Genetics negative   * 6/16/2020 start neoadjuvant TCH x 6 cycles (no perjeta since < 2 cm and LN negative). Neoadjuvant therapy chosen due to location of tumor.     - Because of the persistent need to delay, I changed docetaxel to 60mg/m2 and carbo to AUC 5. Tumor responding well    - Will have to delay chemo again due to neutropenia.     2. Fertility preservation. Eggs harvested. On monthly Zoladex for ovarian suppression. Can discuss continuing for ovarian suppression with AI later although Tamoxifen alone is a good option for her too since her tumor was small.    3. Anxiety. Seeing psych. Effexor    4. Chemo neutropenia - She has now had delay of cycles 2, 3, 5 due to neutropenia despite Neulasta. See #1.    5. Chemo nausea - added Cinvanti  6. Chemo pain - severe and limiting.  Using Norco (for shortterm and we will stop it within 1 mo after chemo)   - Extend steroids    Plan:     · Delay Cycle 5 TCH 1 wk. No further dose reduction (already dose reduced)  · Cinvanti  · Effexor  · Zoladex every 4 wks   · Norco prn - discussed that we will stop once chemo is over.   · Echo due in 3 mo  · Extend dex to Friday/Sat - just 4 mg bid.   · Discussed adjuvant Her2 based therapy with patient. Discussed Herceptin if pCR and Kadcyla if residual disease (can still use Niyah data for Kadcyla since majority of patients did not have Perjeta as in this situation)      TCH in 1 and 4 wks with cbc, cmp  Zoladex in 4 wks  Of note -  echo without strain report since her insurance doesn't cover it.   I asked the patient to call for any questions, concerns, or new symptoms.  Transitioning care to Dr Crum.       Future Appointments   Date Time Provider Department Center   10/6/2020  7:50 AM LAB, [facility] CANCER DG [facility] HO [place]   10/6/2020  8:00 AM LUDWIG [facility] INTGONC [place]   10/7/2020 10:45 AM INJECTION, [facility] INFUSION [facility] [place]   10/7/2020  1:00 PM CHEMO 37, [facility] [place]   10/23/2020 12:30 PM LAB, [facility] CANCER BLDG [facility] [place]   10/23/2020  1:30 PM Mone Crum MD [facility] HEMONC3 [place]   10/26/2020  3:45 PM Jessica Dumont MD [facility]   10/27/2020  8:00 AM LUDWIG [facility] INTGONC [place]   10/28/2020  7:00 AM NURSE 8, [facility] CHEMO [facility] [place]   11/4/2020 11:45 AM INJECTION, [facility] INFUSION [facility] [place]

## 2020-09-29 ENCOUNTER — OFFICE VISIT (OUTPATIENT)
Dept: HEMATOLOGY/ONCOLOGY | Facility: CLINIC | Age: 35
End: 2020-09-29
Payer: COMMERCIAL

## 2020-09-29 ENCOUNTER — LAB VISIT (OUTPATIENT)
Dept: LAB | Facility: HOSPITAL | Age: 35
End: 2020-09-29
Payer: COMMERCIAL

## 2020-09-29 VITALS
RESPIRATION RATE: 16 BRPM | OXYGEN SATURATION: 99 % | HEIGHT: 65 IN | HEART RATE: 69 BPM | WEIGHT: 110.69 LBS | TEMPERATURE: 98 F | BODY MASS INDEX: 18.44 KG/M2 | SYSTOLIC BLOOD PRESSURE: 97 MMHG | DIASTOLIC BLOOD PRESSURE: 53 MMHG

## 2020-09-29 DIAGNOSIS — Z17.0 MALIGNANT NEOPLASM OF UPPER-OUTER QUADRANT OF LEFT BREAST IN FEMALE, ESTROGEN RECEPTOR POSITIVE: ICD-10-CM

## 2020-09-29 DIAGNOSIS — T45.1X5A CHEMOTHERAPY-INDUCED NAUSEA: ICD-10-CM

## 2020-09-29 DIAGNOSIS — T45.1X5A CHEMOTHERAPY INDUCED NEUTROPENIA: ICD-10-CM

## 2020-09-29 DIAGNOSIS — D70.1 CHEMOTHERAPY INDUCED NEUTROPENIA: ICD-10-CM

## 2020-09-29 DIAGNOSIS — Z17.0 MALIGNANT NEOPLASM OF UPPER-OUTER QUADRANT OF LEFT BREAST IN FEMALE, ESTROGEN RECEPTOR POSITIVE: Primary | ICD-10-CM

## 2020-09-29 DIAGNOSIS — C50.412 MALIGNANT NEOPLASM OF UPPER-OUTER QUADRANT OF LEFT BREAST IN FEMALE, ESTROGEN RECEPTOR POSITIVE: ICD-10-CM

## 2020-09-29 DIAGNOSIS — G89.3 CANCER ASSOCIATED PAIN: ICD-10-CM

## 2020-09-29 DIAGNOSIS — C50.412 MALIGNANT NEOPLASM OF UPPER-OUTER QUADRANT OF LEFT BREAST IN FEMALE, ESTROGEN RECEPTOR POSITIVE: Primary | ICD-10-CM

## 2020-09-29 DIAGNOSIS — E28.39 SUPPRESSION OF OVARIAN SECRETION: ICD-10-CM

## 2020-09-29 DIAGNOSIS — R11.0 CHEMOTHERAPY-INDUCED NAUSEA: ICD-10-CM

## 2020-09-29 LAB
BASOPHILS # BLD AUTO: 0.03 K/UL (ref 0–0.2)
BASOPHILS NFR BLD: 1 % (ref 0–1.9)
DIFFERENTIAL METHOD: ABNORMAL
EOSINOPHIL # BLD AUTO: 0.1 K/UL (ref 0–0.5)
EOSINOPHIL NFR BLD: 1.6 % (ref 0–8)
ERYTHROCYTE [DISTWIDTH] IN BLOOD BY AUTOMATED COUNT: 15.7 % (ref 11.5–14.5)
ERYTHROCYTE [DISTWIDTH] IN BLOOD BY AUTOMATED COUNT: 15.7 % (ref 11.5–14.5)
HCT VFR BLD AUTO: 36.2 % (ref 37–48.5)
HCT VFR BLD AUTO: 36.2 % (ref 37–48.5)
HGB BLD-MCNC: 11.7 G/DL (ref 12–16)
HGB BLD-MCNC: 11.7 G/DL (ref 12–16)
IMM GRANULOCYTES # BLD AUTO: 0 K/UL (ref 0–0.04)
IMM GRANULOCYTES # BLD AUTO: 0 K/UL (ref 0–0.04)
IMM GRANULOCYTES NFR BLD AUTO: 0 % (ref 0–0.5)
LYMPHOCYTES # BLD AUTO: 1.9 K/UL (ref 1–4.8)
LYMPHOCYTES NFR BLD: 60.3 % (ref 18–48)
MCH RBC QN AUTO: 32.2 PG (ref 27–31)
MCH RBC QN AUTO: 32.2 PG (ref 27–31)
MCHC RBC AUTO-ENTMCNC: 32.3 G/DL (ref 32–36)
MCHC RBC AUTO-ENTMCNC: 32.3 G/DL (ref 32–36)
MCV RBC AUTO: 100 FL (ref 82–98)
MCV RBC AUTO: 100 FL (ref 82–98)
MONOCYTES # BLD AUTO: 0.6 K/UL (ref 0.3–1)
MONOCYTES NFR BLD: 18.3 % (ref 4–15)
NEUTROPHILS # BLD AUTO: 0.6 K/UL (ref 1.8–7.7)
NEUTROPHILS # BLD AUTO: 0.6 K/UL (ref 1.8–7.7)
NEUTROPHILS NFR BLD: 18.8 % (ref 38–73)
NRBC BLD-RTO: 0 /100 WBC
PLATELET # BLD AUTO: 141 K/UL (ref 150–350)
PLATELET # BLD AUTO: 141 K/UL (ref 150–350)
PLATELET BLD QL SMEAR: ABNORMAL
PMV BLD AUTO: 11.2 FL (ref 9.2–12.9)
PMV BLD AUTO: 11.2 FL (ref 9.2–12.9)
RBC # BLD AUTO: 3.63 M/UL (ref 4–5.4)
RBC # BLD AUTO: 3.63 M/UL (ref 4–5.4)
WBC # BLD AUTO: 3.12 K/UL (ref 3.9–12.7)
WBC # BLD AUTO: 3.12 K/UL (ref 3.9–12.7)

## 2020-09-29 PROCEDURE — 99999 PR PBB SHADOW E&M-EST. PATIENT-LVL V: ICD-10-PCS | Mod: PBBFAC,,, | Performed by: INTERNAL MEDICINE

## 2020-09-29 PROCEDURE — 85025 COMPLETE CBC W/AUTO DIFF WBC: CPT

## 2020-09-29 PROCEDURE — 36415 COLL VENOUS BLD VENIPUNCTURE: CPT

## 2020-09-29 PROCEDURE — 3008F PR BODY MASS INDEX (BMI) DOCUMENTED: ICD-10-PCS | Mod: CPTII,S$GLB,, | Performed by: INTERNAL MEDICINE

## 2020-09-29 PROCEDURE — 99215 OFFICE O/P EST HI 40 MIN: CPT | Mod: S$GLB,,, | Performed by: INTERNAL MEDICINE

## 2020-09-29 PROCEDURE — 99999 PR PBB SHADOW E&M-EST. PATIENT-LVL V: CPT | Mod: PBBFAC,,, | Performed by: INTERNAL MEDICINE

## 2020-09-29 PROCEDURE — 99215 PR OFFICE/OUTPT VISIT, EST, LEVL V, 40-54 MIN: ICD-10-PCS | Mod: S$GLB,,, | Performed by: INTERNAL MEDICINE

## 2020-09-29 PROCEDURE — 3008F BODY MASS INDEX DOCD: CPT | Mod: CPTII,S$GLB,, | Performed by: INTERNAL MEDICINE

## 2020-09-29 NOTE — Clinical Note
Please delay all of our departments appts (except Zoladex) by 1 wk - change chemo, labs, Dr Crum, etc. We have to delay her treatment one week. Will need repeat cbc, cmp next week before chemo.   Please cancel accupuncture today and schedule for next week same day as labs

## 2020-10-05 ENCOUNTER — PATIENT MESSAGE (OUTPATIENT)
Dept: HEMATOLOGY/ONCOLOGY | Facility: CLINIC | Age: 35
End: 2020-10-05

## 2020-10-05 DIAGNOSIS — Z17.0 MALIGNANT NEOPLASM OF UPPER-OUTER QUADRANT OF LEFT BREAST IN FEMALE, ESTROGEN RECEPTOR POSITIVE: Primary | ICD-10-CM

## 2020-10-05 DIAGNOSIS — C50.412 MALIGNANT NEOPLASM OF UPPER-OUTER QUADRANT OF LEFT BREAST IN FEMALE, ESTROGEN RECEPTOR POSITIVE: Primary | ICD-10-CM

## 2020-10-06 ENCOUNTER — PATIENT MESSAGE (OUTPATIENT)
Dept: HEMATOLOGY/ONCOLOGY | Facility: CLINIC | Age: 35
End: 2020-10-06

## 2020-10-06 ENCOUNTER — CLINICAL SUPPORT (OUTPATIENT)
Dept: HEMATOLOGY/ONCOLOGY | Facility: CLINIC | Age: 35
End: 2020-10-06

## 2020-10-06 ENCOUNTER — LAB VISIT (OUTPATIENT)
Dept: LAB | Facility: HOSPITAL | Age: 35
End: 2020-10-06
Attending: INTERNAL MEDICINE
Payer: COMMERCIAL

## 2020-10-06 DIAGNOSIS — Z17.0 MALIGNANT NEOPLASM OF UPPER-OUTER QUADRANT OF LEFT BREAST IN FEMALE, ESTROGEN RECEPTOR POSITIVE: ICD-10-CM

## 2020-10-06 DIAGNOSIS — T45.1X5A CHEMOTHERAPY-INDUCED NAUSEA: Primary | ICD-10-CM

## 2020-10-06 DIAGNOSIS — T45.1X5A CHEMOTHERAPY INDUCED NEUTROPENIA: ICD-10-CM

## 2020-10-06 DIAGNOSIS — D70.1 CHEMOTHERAPY INDUCED NEUTROPENIA: ICD-10-CM

## 2020-10-06 DIAGNOSIS — R11.0 CHEMOTHERAPY-INDUCED NAUSEA: Primary | ICD-10-CM

## 2020-10-06 DIAGNOSIS — C50.412 MALIGNANT NEOPLASM OF UPPER-OUTER QUADRANT OF LEFT BREAST IN FEMALE, ESTROGEN RECEPTOR POSITIVE: ICD-10-CM

## 2020-10-06 DIAGNOSIS — G89.3 CANCER ASSOCIATED PAIN: ICD-10-CM

## 2020-10-06 LAB
ALBUMIN SERPL BCP-MCNC: 4 G/DL (ref 3.5–5.2)
ALP SERPL-CCNC: 59 U/L (ref 55–135)
ALT SERPL W/O P-5'-P-CCNC: 19 U/L (ref 10–44)
ANION GAP SERPL CALC-SCNC: 9 MMOL/L (ref 8–16)
AST SERPL-CCNC: 15 U/L (ref 10–40)
BASOPHILS # BLD AUTO: 0.04 K/UL (ref 0–0.2)
BASOPHILS NFR BLD: 1 % (ref 0–1.9)
BILIRUB SERPL-MCNC: 0.4 MG/DL (ref 0.1–1)
BUN SERPL-MCNC: 11 MG/DL (ref 6–20)
CALCIUM SERPL-MCNC: 9.4 MG/DL (ref 8.7–10.5)
CHLORIDE SERPL-SCNC: 106 MMOL/L (ref 95–110)
CO2 SERPL-SCNC: 25 MMOL/L (ref 23–29)
CREAT SERPL-MCNC: 0.8 MG/DL (ref 0.5–1.4)
DIFFERENTIAL METHOD: ABNORMAL
EOSINOPHIL # BLD AUTO: 0.4 K/UL (ref 0–0.5)
EOSINOPHIL NFR BLD: 10.6 % (ref 0–8)
ERYTHROCYTE [DISTWIDTH] IN BLOOD BY AUTOMATED COUNT: 14.9 % (ref 11.5–14.5)
EST. GFR  (AFRICAN AMERICAN): >60 ML/MIN/1.73 M^2
EST. GFR  (NON AFRICAN AMERICAN): >60 ML/MIN/1.73 M^2
GLUCOSE SERPL-MCNC: 113 MG/DL (ref 70–110)
HCT VFR BLD AUTO: 36.8 % (ref 37–48.5)
HGB BLD-MCNC: 12 G/DL (ref 12–16)
IMM GRANULOCYTES # BLD AUTO: 0 K/UL (ref 0–0.04)
IMM GRANULOCYTES NFR BLD AUTO: 0 % (ref 0–0.5)
LYMPHOCYTES # BLD AUTO: 2 K/UL (ref 1–4.8)
LYMPHOCYTES NFR BLD: 50 % (ref 18–48)
MCH RBC QN AUTO: 32.8 PG (ref 27–31)
MCHC RBC AUTO-ENTMCNC: 32.6 G/DL (ref 32–36)
MCV RBC AUTO: 101 FL (ref 82–98)
MONOCYTES # BLD AUTO: 0.4 K/UL (ref 0.3–1)
MONOCYTES NFR BLD: 9.2 % (ref 4–15)
NEUTROPHILS # BLD AUTO: 1.2 K/UL (ref 1.8–7.7)
NEUTROPHILS NFR BLD: 29.2 % (ref 38–73)
NRBC BLD-RTO: 0 /100 WBC
PLATELET # BLD AUTO: 143 K/UL (ref 150–350)
PMV BLD AUTO: 11.1 FL (ref 9.2–12.9)
POTASSIUM SERPL-SCNC: 4.2 MMOL/L (ref 3.5–5.1)
PROT SERPL-MCNC: 6.6 G/DL (ref 6–8.4)
RBC # BLD AUTO: 3.66 M/UL (ref 4–5.4)
SODIUM SERPL-SCNC: 140 MMOL/L (ref 136–145)
WBC # BLD AUTO: 4.04 K/UL (ref 3.9–12.7)

## 2020-10-06 PROCEDURE — 97810 PR ACUPUNCT W/O ELEC STIMUL 15 MIN: ICD-10-PCS | Mod: S$GLB,,, | Performed by: ACUPUNCTURIST

## 2020-10-06 PROCEDURE — 85025 COMPLETE CBC W/AUTO DIFF WBC: CPT

## 2020-10-06 PROCEDURE — 97810 ACUP 1/> WO ESTIM 1ST 15 MIN: CPT | Mod: S$GLB,,, | Performed by: ACUPUNCTURIST

## 2020-10-06 PROCEDURE — 97811 ACUP 1/> W/O ESTIM EA ADD 15: CPT | Mod: S$GLB,,, | Performed by: ACUPUNCTURIST

## 2020-10-06 PROCEDURE — 36415 COLL VENOUS BLD VENIPUNCTURE: CPT

## 2020-10-06 PROCEDURE — 97811 PR ACUPUNCT W/O ELEC STIMUL ADDL 15M: ICD-10-PCS | Mod: S$GLB,,, | Performed by: ACUPUNCTURIST

## 2020-10-06 PROCEDURE — 80053 COMPREHEN METABOLIC PANEL: CPT

## 2020-10-06 RX ORDER — DIPHENHYDRAMINE HYDROCHLORIDE 50 MG/ML
12.5 INJECTION INTRAMUSCULAR; INTRAVENOUS
Status: CANCELLED
Start: 2020-10-06

## 2020-10-06 RX ORDER — HEPARIN 100 UNIT/ML
500 SYRINGE INTRAVENOUS
Status: CANCELLED | OUTPATIENT
Start: 2020-10-06

## 2020-10-06 RX ORDER — SODIUM CHLORIDE 0.9 % (FLUSH) 0.9 %
10 SYRINGE (ML) INJECTION
Status: CANCELLED | OUTPATIENT
Start: 2020-10-06

## 2020-10-06 NOTE — PROGRESS NOTES
Subjective:       Patient ID: Michelle Gage is a 34 y.o. female.    Chief Complaint: Nausea (CINV ), Pain (joints), and Cancer    Patient is having a hard time emotionally from having to delay treatments. She states that she overall feels ok, but after chemo is getting knocked down and feels sick for about 10 days. The sick feeling is exhaustion, joint aches and minimal nausea.     Review of Systems   Constitutional: Positive for fatigue.   Gastrointestinal: Positive for nausea.   Genitourinary: Positive for hot flashes.   Musculoskeletal: Positive for myalgias.         Objective:          Assessment:       1. Chemotherapy-induced nausea    2. Chemotherapy induced neutropenia    3. Cancer associated pain    4. Malignant neoplasm of upper-outer quadrant of left breast in female, estrogen receptor positive        Plan:       Pre-treatment appointments*         Pre-Symptom Score: 7 (anxiety and symptoms)  Post-Symptom Score: 7     Nausea (CINV ), Pain (joints), and Cancer       Encounter Diagnoses   Name Primary?    Chemotherapy-induced nausea Yes    Chemotherapy induced neutropenia     Cancer associated pain     Malignant neoplasm of upper-outer quadrant of left breast in female, estrogen receptor positive        TCM Diagnosis: Toxic Heat + Lv Qi Rising    Physical Exam   Constitutional:            Acupuncture points used:  Li11, Pc6, St36, St25, Sp9, Sp6, Du20, Lr3, Gb34, REN12, ER HIRA, HEDRICK MEN and YIN IQBAL    NO STIM USED      NEEDLES IN: 26  NEEDLES OUT: 26    NEEDLES STARTED: 8:20 AM  NEEDLES REMOVED: 8:50 AM

## 2020-10-07 ENCOUNTER — INFUSION (OUTPATIENT)
Dept: INFUSION THERAPY | Facility: HOSPITAL | Age: 35
End: 2020-10-07
Attending: INTERNAL MEDICINE
Payer: COMMERCIAL

## 2020-10-07 VITALS
HEIGHT: 65 IN | SYSTOLIC BLOOD PRESSURE: 119 MMHG | DIASTOLIC BLOOD PRESSURE: 69 MMHG | HEART RATE: 67 BPM | OXYGEN SATURATION: 99 % | BODY MASS INDEX: 18.44 KG/M2 | TEMPERATURE: 98 F | RESPIRATION RATE: 16 BRPM | WEIGHT: 110.69 LBS

## 2020-10-07 DIAGNOSIS — Z17.0 MALIGNANT NEOPLASM OF UPPER-OUTER QUADRANT OF LEFT BREAST IN FEMALE, ESTROGEN RECEPTOR POSITIVE: Primary | ICD-10-CM

## 2020-10-07 DIAGNOSIS — C50.412 MALIGNANT NEOPLASM OF UPPER-OUTER QUADRANT OF LEFT BREAST IN FEMALE, ESTROGEN RECEPTOR POSITIVE: Primary | ICD-10-CM

## 2020-10-07 DIAGNOSIS — T45.1X5A CHEMOTHERAPY-INDUCED NAUSEA: ICD-10-CM

## 2020-10-07 DIAGNOSIS — T45.1X5A CHEMOTHERAPY INDUCED NEUTROPENIA: ICD-10-CM

## 2020-10-07 DIAGNOSIS — Z51.11 ENCOUNTER FOR CHEMOTHERAPY MANAGEMENT: ICD-10-CM

## 2020-10-07 DIAGNOSIS — D70.1 CHEMOTHERAPY INDUCED NEUTROPENIA: ICD-10-CM

## 2020-10-07 DIAGNOSIS — R11.0 CHEMOTHERAPY-INDUCED NAUSEA: ICD-10-CM

## 2020-10-07 DIAGNOSIS — E28.39 SUPPRESSION OF OVARIAN SECRETION: ICD-10-CM

## 2020-10-07 DIAGNOSIS — Z51.11 CHEMOTHERAPY MANAGEMENT, ENCOUNTER FOR: ICD-10-CM

## 2020-10-07 PROCEDURE — 96413 CHEMO IV INFUSION 1 HR: CPT

## 2020-10-07 PROCEDURE — 96375 TX/PRO/DX INJ NEW DRUG ADDON: CPT

## 2020-10-07 PROCEDURE — A4216 STERILE WATER/SALINE, 10 ML: HCPCS | Performed by: INTERNAL MEDICINE

## 2020-10-07 PROCEDURE — 96417 CHEMO IV INFUS EACH ADDL SEQ: CPT

## 2020-10-07 PROCEDURE — 63600175 PHARM REV CODE 636 W HCPCS: Mod: JG | Performed by: INTERNAL MEDICINE

## 2020-10-07 PROCEDURE — 25000003 PHARM REV CODE 250: Performed by: INTERNAL MEDICINE

## 2020-10-07 PROCEDURE — 96367 TX/PROPH/DG ADDL SEQ IV INF: CPT

## 2020-10-07 PROCEDURE — 96402 CHEMO HORMON ANTINEOPL SQ/IM: CPT

## 2020-10-07 PROCEDURE — 96377 APPLICATON ON-BODY INJECTOR: CPT

## 2020-10-07 RX ORDER — HEPARIN 100 UNIT/ML
500 SYRINGE INTRAVENOUS
Status: DISCONTINUED | OUTPATIENT
Start: 2020-10-07 | End: 2020-10-07 | Stop reason: HOSPADM

## 2020-10-07 RX ORDER — DIPHENHYDRAMINE HYDROCHLORIDE 50 MG/ML
12.5 INJECTION INTRAMUSCULAR; INTRAVENOUS
Status: COMPLETED | OUTPATIENT
Start: 2020-10-07 | End: 2020-10-07

## 2020-10-07 RX ORDER — LIDOCAINE AND PRILOCAINE 25; 25 MG/G; MG/G
CREAM TOPICAL
Qty: 30 G | Refills: 0 | Status: SHIPPED | OUTPATIENT
Start: 2020-10-07 | End: 2021-03-23 | Stop reason: SDUPTHER

## 2020-10-07 RX ORDER — SODIUM CHLORIDE 0.9 % (FLUSH) 0.9 %
10 SYRINGE (ML) INJECTION
Status: DISCONTINUED | OUTPATIENT
Start: 2020-10-07 | End: 2020-10-07 | Stop reason: HOSPADM

## 2020-10-07 RX ADMIN — HEPARIN 500 UNITS: 100 SYRINGE at 04:10

## 2020-10-07 RX ADMIN — CARBOPLATIN 515 MG: 10 INJECTION, SOLUTION INTRAVENOUS at 03:10

## 2020-10-07 RX ADMIN — DOCETAXEL ANHYDROUS 90 MG: 10 INJECTION, SOLUTION INTRAVENOUS at 02:10

## 2020-10-07 RX ADMIN — APREPITANT 130 MG: 130 INJECTION, EMULSION INTRAVENOUS at 02:10

## 2020-10-07 RX ADMIN — Medication 10 ML: at 04:10

## 2020-10-07 RX ADMIN — DEXAMETHASONE SODIUM PHOSPHATE: 4 INJECTION, SOLUTION INTRA-ARTICULAR; INTRALESIONAL; INTRAMUSCULAR; INTRAVENOUS; SOFT TISSUE at 01:10

## 2020-10-07 RX ADMIN — DIPHENHYDRAMINE HYDROCHLORIDE 12.5 MG: 50 INJECTION INTRAMUSCULAR; INTRAVENOUS at 01:10

## 2020-10-07 RX ADMIN — GOSERELIN ACETATE 3.6 MG: 3.6 IMPLANT SUBCUTANEOUS at 01:10

## 2020-10-07 RX ADMIN — PEGFILGRASTIM 6 MG: KIT SUBCUTANEOUS at 04:10

## 2020-10-07 RX ADMIN — TRASTUZUMAB 300 MG: 150 INJECTION, POWDER, LYOPHILIZED, FOR SOLUTION INTRAVENOUS at 04:10

## 2020-10-07 RX ADMIN — SODIUM CHLORIDE: 0.9 INJECTION, SOLUTION INTRAVENOUS at 01:10

## 2020-10-07 NOTE — PLAN OF CARE
1700 accepted pt care, in post cooling mode only for [employer].  Port positive for blood return, saline flushed, Heparin flush instilled to dwell and de accessed prior to DC. Pt DC with no distress noted, ambulated off unit w/o event w/ family, pleased.

## 2020-10-07 NOTE — NURSING
Pt here for taxatere carbo herceptin Also having cooling cap.Leidy harvey helped me with application of cooling cap.pt tolerating well.cooling cap removed 1820. Tolerated well.

## 2020-10-15 ENCOUNTER — PATIENT MESSAGE (OUTPATIENT)
Dept: HEMATOLOGY/ONCOLOGY | Facility: CLINIC | Age: 35
End: 2020-10-15

## 2020-10-15 DIAGNOSIS — R53.0 NEOPLASTIC MALIGNANT RELATED FATIGUE: ICD-10-CM

## 2020-10-15 DIAGNOSIS — F43.22 ADJUSTMENT DISORDER WITH ANXIOUS MOOD: Primary | ICD-10-CM

## 2020-10-15 RX ORDER — METHYLPHENIDATE HYDROCHLORIDE 10 MG/1
10 TABLET ORAL 2 TIMES DAILY WITH MEALS
Qty: 60 TABLET | Refills: 0 | Status: CANCELLED | OUTPATIENT
Start: 2020-10-15 | End: 2021-10-15

## 2020-10-20 ENCOUNTER — PATIENT MESSAGE (OUTPATIENT)
Dept: HEMATOLOGY/ONCOLOGY | Facility: CLINIC | Age: 35
End: 2020-10-20

## 2020-10-20 RX ORDER — METHYLPHENIDATE HYDROCHLORIDE 10 MG/1
10 TABLET ORAL 2 TIMES DAILY WITH MEALS
Qty: 60 TABLET | Refills: 0 | Status: SHIPPED | OUTPATIENT
Start: 2020-10-20 | End: 2022-04-29 | Stop reason: SDUPTHER

## 2020-10-27 ENCOUNTER — OFFICE VISIT (OUTPATIENT)
Dept: HEMATOLOGY/ONCOLOGY | Facility: CLINIC | Age: 35
End: 2020-10-27
Payer: COMMERCIAL

## 2020-10-27 ENCOUNTER — TELEPHONE (OUTPATIENT)
Dept: HEMATOLOGY/ONCOLOGY | Facility: CLINIC | Age: 35
End: 2020-10-27

## 2020-10-27 ENCOUNTER — CLINICAL SUPPORT (OUTPATIENT)
Dept: HEMATOLOGY/ONCOLOGY | Facility: CLINIC | Age: 35
End: 2020-10-27
Payer: COMMERCIAL

## 2020-10-27 ENCOUNTER — LAB VISIT (OUTPATIENT)
Dept: LAB | Facility: HOSPITAL | Age: 35
End: 2020-10-27
Attending: INTERNAL MEDICINE
Payer: COMMERCIAL

## 2020-10-27 VITALS
HEIGHT: 65 IN | RESPIRATION RATE: 16 BRPM | HEART RATE: 88 BPM | TEMPERATURE: 99 F | DIASTOLIC BLOOD PRESSURE: 66 MMHG | SYSTOLIC BLOOD PRESSURE: 109 MMHG | BODY MASS INDEX: 18.44 KG/M2 | OXYGEN SATURATION: 100 % | WEIGHT: 110.69 LBS

## 2020-10-27 DIAGNOSIS — T45.1X5A CHEMOTHERAPY INDUCED NEUTROPENIA: ICD-10-CM

## 2020-10-27 DIAGNOSIS — Z17.0 MALIGNANT NEOPLASM OF UPPER-OUTER QUADRANT OF LEFT BREAST IN FEMALE, ESTROGEN RECEPTOR POSITIVE: ICD-10-CM

## 2020-10-27 DIAGNOSIS — Z17.0 MALIGNANT NEOPLASM OF UPPER-OUTER QUADRANT OF LEFT BREAST IN FEMALE, ESTROGEN RECEPTOR POSITIVE: Primary | ICD-10-CM

## 2020-10-27 DIAGNOSIS — T45.1X5A CHEMOTHERAPY-INDUCED NAUSEA: Primary | ICD-10-CM

## 2020-10-27 DIAGNOSIS — D70.1 CHEMOTHERAPY INDUCED NEUTROPENIA: ICD-10-CM

## 2020-10-27 DIAGNOSIS — G89.3 CANCER ASSOCIATED PAIN: ICD-10-CM

## 2020-10-27 DIAGNOSIS — E28.39 SUPPRESSION OF OVARIAN SECRETION: ICD-10-CM

## 2020-10-27 DIAGNOSIS — C50.412 MALIGNANT NEOPLASM OF UPPER-OUTER QUADRANT OF LEFT BREAST IN FEMALE, ESTROGEN RECEPTOR POSITIVE: ICD-10-CM

## 2020-10-27 DIAGNOSIS — C50.412 MALIGNANT NEOPLASM OF UPPER-OUTER QUADRANT OF LEFT BREAST IN FEMALE, ESTROGEN RECEPTOR POSITIVE: Primary | ICD-10-CM

## 2020-10-27 DIAGNOSIS — R11.0 CHEMOTHERAPY-INDUCED NAUSEA: Primary | ICD-10-CM

## 2020-10-27 LAB
ALBUMIN SERPL BCP-MCNC: 4.1 G/DL (ref 3.5–5.2)
ALP SERPL-CCNC: 68 U/L (ref 55–135)
ALT SERPL W/O P-5'-P-CCNC: 30 U/L (ref 10–44)
ANION GAP SERPL CALC-SCNC: 10 MMOL/L (ref 8–16)
AST SERPL-CCNC: 20 U/L (ref 10–40)
BILIRUB SERPL-MCNC: 0.4 MG/DL (ref 0.1–1)
BUN SERPL-MCNC: 10 MG/DL (ref 6–20)
CALCIUM SERPL-MCNC: 10 MG/DL (ref 8.7–10.5)
CHLORIDE SERPL-SCNC: 109 MMOL/L (ref 95–110)
CO2 SERPL-SCNC: 26 MMOL/L (ref 23–29)
CREAT SERPL-MCNC: 0.8 MG/DL (ref 0.5–1.4)
EST. GFR  (AFRICAN AMERICAN): >60 ML/MIN/1.73 M^2
EST. GFR  (NON AFRICAN AMERICAN): >60 ML/MIN/1.73 M^2
GLUCOSE SERPL-MCNC: 92 MG/DL (ref 70–110)
POTASSIUM SERPL-SCNC: 4.3 MMOL/L (ref 3.5–5.1)
PROT SERPL-MCNC: 6.9 G/DL (ref 6–8.4)
SODIUM SERPL-SCNC: 145 MMOL/L (ref 136–145)

## 2020-10-27 PROCEDURE — 99999 PR PBB SHADOW E&M-EST. PATIENT-LVL V: ICD-10-PCS | Mod: PBBFAC,,, | Performed by: INTERNAL MEDICINE

## 2020-10-27 PROCEDURE — 97811 PR ACUPUNCT W/O ELEC STIMUL ADDL 15M: ICD-10-PCS | Mod: S$GLB,,, | Performed by: ACUPUNCTURIST

## 2020-10-27 PROCEDURE — 99215 OFFICE O/P EST HI 40 MIN: CPT | Mod: S$GLB,,, | Performed by: INTERNAL MEDICINE

## 2020-10-27 PROCEDURE — 80053 COMPREHEN METABOLIC PANEL: CPT

## 2020-10-27 PROCEDURE — 99999 PR PBB SHADOW E&M-EST. PATIENT-LVL I: ICD-10-PCS | Mod: PBBFAC,,, | Performed by: ACUPUNCTURIST

## 2020-10-27 PROCEDURE — 3008F PR BODY MASS INDEX (BMI) DOCUMENTED: ICD-10-PCS | Mod: CPTII,S$GLB,, | Performed by: INTERNAL MEDICINE

## 2020-10-27 PROCEDURE — 99215 PR OFFICE/OUTPT VISIT, EST, LEVL V, 40-54 MIN: ICD-10-PCS | Mod: S$GLB,,, | Performed by: INTERNAL MEDICINE

## 2020-10-27 PROCEDURE — 97811 ACUP 1/> W/O ESTIM EA ADD 15: CPT | Mod: S$GLB,,, | Performed by: ACUPUNCTURIST

## 2020-10-27 PROCEDURE — 99999 PR PBB SHADOW E&M-EST. PATIENT-LVL V: CPT | Mod: PBBFAC,,, | Performed by: INTERNAL MEDICINE

## 2020-10-27 PROCEDURE — 97810 PR ACUPUNCT W/O ELEC STIMUL 15 MIN: ICD-10-PCS | Mod: S$GLB,,, | Performed by: ACUPUNCTURIST

## 2020-10-27 PROCEDURE — 3008F BODY MASS INDEX DOCD: CPT | Mod: CPTII,S$GLB,, | Performed by: INTERNAL MEDICINE

## 2020-10-27 PROCEDURE — 97810 ACUP 1/> WO ESTIM 1ST 15 MIN: CPT | Mod: S$GLB,,, | Performed by: ACUPUNCTURIST

## 2020-10-27 PROCEDURE — 99999 PR PBB SHADOW E&M-EST. PATIENT-LVL I: CPT | Mod: PBBFAC,,, | Performed by: ACUPUNCTURIST

## 2020-10-27 PROCEDURE — 36415 COLL VENOUS BLD VENIPUNCTURE: CPT

## 2020-10-27 RX ORDER — HYDROCODONE BITARTRATE AND ACETAMINOPHEN 5; 325 MG/1; MG/1
1 TABLET ORAL EVERY 8 HOURS PRN
Qty: 45 TABLET | Refills: 0 | Status: SHIPPED | OUTPATIENT
Start: 2020-10-27 | End: 2021-03-23 | Stop reason: SDUPTHER

## 2020-10-27 RX ORDER — SODIUM CHLORIDE 0.9 % (FLUSH) 0.9 %
10 SYRINGE (ML) INJECTION
Status: CANCELLED | OUTPATIENT
Start: 2020-10-27

## 2020-10-27 RX ORDER — HEPARIN 100 UNIT/ML
500 SYRINGE INTRAVENOUS
Status: CANCELLED | OUTPATIENT
Start: 2020-10-27

## 2020-10-27 RX ORDER — DIPHENHYDRAMINE HYDROCHLORIDE 50 MG/ML
12.5 INJECTION INTRAMUSCULAR; INTRAVENOUS
Status: CANCELLED
Start: 2020-10-27

## 2020-10-27 NOTE — PROGRESS NOTES
Subjective:       Patient ID: Michelle Gage is a 34 y.o. female.    Chief Complaint: Nausea (CINV) and Pain (joint pain)    Patient has last chemo treatment tomorrow. She says she feels sick / nauseous / bad for about 14 days after infusion, but mostly related to constipation. Overall still has energy and is able to continue working.     Review of Systems   Gastrointestinal: Positive for constipation and nausea.   Musculoskeletal: Positive for arthralgias.         Objective:          Assessment:       1. Chemotherapy-induced nausea        Plan:     Follow up as needed*         Pre-Symptom Score: 7  Post-Symptom Score: 5     Nausea (CINV) and Pain (joint pain)       Encounter Diagnoses   Name Primary?    Chemotherapy-induced nausea Yes       TCM Diagnosis: Toxic Heat / ST Qi Rebellion    Physical Exam   Constitutional:            Acupuncture points used:  Li11, Pc6, St36, St25, Sp9, Sp6, Du20, Gb34, REN12, YIN IQBAL and 4 JUAREZ    NO STIM USED      NEEDLES IN: 24  NEEDLES OUT: 24    NEEDLES STARTED AT: 8:15 AM  NEEDLES REMOVED AT: 8:45 AM

## 2020-10-27 NOTE — TELEPHONE ENCOUNTER
"Returned call to pt.   Informed pt nurse had left VM (pt was in acupuncture appt) that only CMP was drawn this AM (despite CBC and CMP linked to appt- nurse called client service to verify and only had a CMP).   Apologized to pt and informed that CBC will need to be re-drawn- pt aware to stop back in lab prior to 11:30 appt w/ Dr. Crum as CBC needed to clear for chemo tomorrow.  Pt verbalized understanding and thanked nurse.        ----- Message from María Elena Nova sent at 10/27/2020 10:40 AM CDT -----  Consult/Advisory:    Name Of Caller: Michelle   Contact Preference?: [phone]    What is the nature of the call?:  Pt returning missed call patient completed labs this morning but shows another lab scheduled for 1010 please contact to discuss     Additional Notes:  "Thank you for all that you do for our patients'"             "

## 2020-10-27 NOTE — Clinical Note
She will need to return around 6-7 weeks (at least 2 weeks post surgery)  She should have a repeat ECHO at end of December

## 2020-10-27 NOTE — PROGRESS NOTES
Subjective:       Patient ID: Michelle Gage is a 34 y.o. female.    Chief Complaint: No chief complaint on file.    HPI     Presents for follow up and labs prior to cycle # 6 of TCH in preoperative setting.  She reports continued diffuse achiness for 2 weeks post chemotherapy that she manages with Claritin, rest and prn Norco  Continued fatigue  Eating well and weight stable  + hot flashes but no fevers, chills or concerns for infection  Denies new pain or current pain  Off and on neuropathy    This is a former patient of Dr. Christy Salazar's    Diagnosis: Stage I ([id number]) invasive ductal carcinoma of left breast, upper outer quadrant, ER 95%, DE 90%, Her2 3+, Grade 3, Ki67 25%  Premenopausal status.     Oncologic History:  Presentation  - [id number] - presented for palpable left breast mass around 2018 - was being watched on imaging at outside hospital.    - 5/11/2020 - Diagnostic bilateral mammogram and left breast US at [facility] showed left breast 1:00 mass, 1.4 cm, 8 CFN, mild left axillary adenopathy  - 5/11/2020 - Biopsy - Grade 3 IDC, estrogen receptor 95%, progesterone receptor 90%, Her2 delphine 3+, Ki67 25%. LN biopsy negative  Surgery consultation with Dr Dumont on 5/14. Breast cancer is far in axillary tail and felt to be difficult to obtain clear margins without neoadjuvant therapy.               - 5/14/2020 - Genetics [facility] BRACAnalysis neg; VUS AP             Medical oncology consultation on 5/15/2020 -  This case was discussed with Dr. Dumont.  She does feel like the patient will benefit from neoadjuvant therapy to help improve her surgical margins.  Since the tumor is less than 2 cm and clinically lymph node negative (MRI pending), I recommended treatment with Taxotere, carboplatin and Herceptin without Perjeta.  Discussed with her that there is data in tumors less than 2 cm to consider Taxol/Herceptin however would not typically use this in a neoadjuvant setting for concern for under  treatment.                - Eggs Retrieval - has 2 embryos.               * 6/16/2020 started neoadjuvant TCH (no perjeta since < 2 cm and LN negative). Neoadjuvant therapy chosen due to location of tumor.    Review of Systems   Constitutional: Positive for fatigue. Negative for activity change, appetite change, chills, fever and unexpected weight change.   HENT: Negative for nasal congestion and trouble swallowing.    Respiratory: Negative for cough, shortness of breath and wheezing.    Cardiovascular: Negative for chest pain, palpitations and leg swelling.   Gastrointestinal: Positive for nausea. Negative for abdominal distention, abdominal pain, constipation, diarrhea and vomiting.   Genitourinary: Positive for hot flashes. Negative for decreased urine volume, difficulty urinating, frequency, nocturia and urgency.   Musculoskeletal: Positive for arthralgias (resolved now) and myalgias. Negative for back pain, joint swelling, neck pain and joint deformity.   Integumentary:  Negative for color change, breast mass (reports no longer palpable) and breast tenderness.   Neurological: Positive for numbness (comes and goes). Negative for dizziness, weakness, light-headedness, headaches, disturbances in coordination and coordination difficulties.   Hematological: Negative for adenopathy. Does not bruise/bleed easily.   Psychiatric/Behavioral: Negative for dysphoric mood. The patient is not nervous/anxious.    Breast: Negative for mass (reports no longer palpable) and tenderness        Objective:      Physical Exam  Vitals signs and nursing note reviewed.   Constitutional:       General: She is not in acute distress.     Appearance: Normal appearance. She is well-developed and normal weight.   HENT:      Head: Normocephalic and atraumatic.      Mouth/Throat:      Mouth: No oral lesions.   Eyes:      General: No scleral icterus.     Extraocular Movements: Extraocular movements intact.      Conjunctiva/sclera: Conjunctivae  normal.      Pupils: Pupils are equal, round, and reactive to light.   Neck:      Musculoskeletal: Normal range of motion and neck supple. No neck rigidity.   Cardiovascular:      Rate and Rhythm: Normal rate and regular rhythm.      Heart sounds: Normal heart sounds. No murmur. No friction rub. No gallop.    Pulmonary:      Effort: Pulmonary effort is normal. No respiratory distress.      Breath sounds: Normal breath sounds. No wheezing, rhonchi or rales.      Comments: No palpable masses or LAD  Chest:      Chest wall: There is no dullness to percussion.   Abdominal:      General: Abdomen is flat. Bowel sounds are normal. There is no distension.      Palpations: Abdomen is soft. There is no mass.      Tenderness: There is no abdominal tenderness. There is no guarding or rebound.      Comments: No organomegaly   Musculoskeletal: Normal range of motion.         General: No swelling, tenderness or deformity.      Right lower leg: No edema.      Left lower leg: No edema.   Skin:     General: Skin is warm and dry.      Coloration: Skin is not jaundiced or pale.      Findings: No bruising, erythema, lesion or rash.   Neurological:      Mental Status: She is alert and oriented to person, place, and time.   Psychiatric:         Mood and Affect: Mood normal.         Behavior: Behavior normal.         Thought Content: Thought content normal.         Judgment: Judgment normal.       Labs- reviewed  Assessment:       1. Malignant neoplasm of upper-outer quadrant of left breast in female, estrogen receptor positive    2. Cancer associated pain    3. Chemotherapy induced neutropenia    4. Suppression of ovarian secretion        Plan:     1. Proceed with Cycle # 6 of TCH  Counts adequate to proceed (has had prior dose adjustments)  Sees Dr. Dumont next week  Will return to clinic post surgery to discuss adjuvant management  2. Norco refilled  Pain related to Taxotere and growth factor  She is also taking prophylactic  Claritin

## 2020-10-28 ENCOUNTER — PATIENT MESSAGE (OUTPATIENT)
Dept: HEMATOLOGY/ONCOLOGY | Facility: CLINIC | Age: 35
End: 2020-10-28

## 2020-10-30 ENCOUNTER — INFUSION (OUTPATIENT)
Dept: INFUSION THERAPY | Facility: HOSPITAL | Age: 35
End: 2020-10-30
Attending: NURSE PRACTITIONER
Payer: COMMERCIAL

## 2020-10-30 VITALS
TEMPERATURE: 98 F | SYSTOLIC BLOOD PRESSURE: 119 MMHG | RESPIRATION RATE: 18 BRPM | HEART RATE: 73 BPM | DIASTOLIC BLOOD PRESSURE: 78 MMHG

## 2020-10-30 DIAGNOSIS — Z17.0 MALIGNANT NEOPLASM OF UPPER-OUTER QUADRANT OF LEFT BREAST IN FEMALE, ESTROGEN RECEPTOR POSITIVE: Primary | ICD-10-CM

## 2020-10-30 DIAGNOSIS — Z51.11 ENCOUNTER FOR CHEMOTHERAPY MANAGEMENT: ICD-10-CM

## 2020-10-30 DIAGNOSIS — C50.412 MALIGNANT NEOPLASM OF UPPER-OUTER QUADRANT OF LEFT BREAST IN FEMALE, ESTROGEN RECEPTOR POSITIVE: Primary | ICD-10-CM

## 2020-10-30 DIAGNOSIS — R11.0 CHEMOTHERAPY-INDUCED NAUSEA: ICD-10-CM

## 2020-10-30 DIAGNOSIS — D70.1 CHEMOTHERAPY INDUCED NEUTROPENIA: ICD-10-CM

## 2020-10-30 DIAGNOSIS — T45.1X5A CHEMOTHERAPY-INDUCED NAUSEA: ICD-10-CM

## 2020-10-30 DIAGNOSIS — T45.1X5A CHEMOTHERAPY INDUCED NEUTROPENIA: ICD-10-CM

## 2020-10-30 PROCEDURE — 96377 APPLICATON ON-BODY INJECTOR: CPT | Mod: 59

## 2020-10-30 PROCEDURE — 96375 TX/PRO/DX INJ NEW DRUG ADDON: CPT

## 2020-10-30 PROCEDURE — 25000003 PHARM REV CODE 250: Performed by: INTERNAL MEDICINE

## 2020-10-30 PROCEDURE — 63600175 PHARM REV CODE 636 W HCPCS: Performed by: INTERNAL MEDICINE

## 2020-10-30 PROCEDURE — 96417 CHEMO IV INFUS EACH ADDL SEQ: CPT

## 2020-10-30 PROCEDURE — 96367 TX/PROPH/DG ADDL SEQ IV INF: CPT

## 2020-10-30 PROCEDURE — 96413 CHEMO IV INFUSION 1 HR: CPT

## 2020-10-30 RX ORDER — DIPHENHYDRAMINE HYDROCHLORIDE 50 MG/ML
12.5 INJECTION INTRAMUSCULAR; INTRAVENOUS
Status: COMPLETED | OUTPATIENT
Start: 2020-10-30 | End: 2020-10-30

## 2020-10-30 RX ORDER — HEPARIN 100 UNIT/ML
500 SYRINGE INTRAVENOUS
Status: DISCONTINUED | OUTPATIENT
Start: 2020-10-30 | End: 2020-10-30 | Stop reason: HOSPADM

## 2020-10-30 RX ORDER — SODIUM CHLORIDE 0.9 % (FLUSH) 0.9 %
10 SYRINGE (ML) INJECTION
Status: DISCONTINUED | OUTPATIENT
Start: 2020-10-30 | End: 2020-10-30 | Stop reason: HOSPADM

## 2020-10-30 RX ADMIN — DOCETAXEL ANHYDROUS 90 MG: 10 INJECTION, SOLUTION INTRAVENOUS at 08:10

## 2020-10-30 RX ADMIN — APREPITANT 130 MG: 130 INJECTION, EMULSION INTRAVENOUS at 07:10

## 2020-10-30 RX ADMIN — DEXAMETHASONE SODIUM PHOSPHATE: 4 INJECTION, SOLUTION INTRA-ARTICULAR; INTRALESIONAL; INTRAMUSCULAR; INTRAVENOUS; SOFT TISSUE at 08:10

## 2020-10-30 RX ADMIN — CARBOPLATIN 515 MG: 10 INJECTION INTRAVENOUS at 09:10

## 2020-10-30 RX ADMIN — DIPHENHYDRAMINE HYDROCHLORIDE 12.5 MG: 50 INJECTION INTRAMUSCULAR; INTRAVENOUS at 07:10

## 2020-10-30 RX ADMIN — TRASTUZUMAB 300 MG: 150 INJECTION, POWDER, LYOPHILIZED, FOR SOLUTION INTRAVENOUS at 10:10

## 2020-10-30 RX ADMIN — PEGFILGRASTIM 6 MG: KIT SUBCUTANEOUS at 12:10

## 2020-11-02 ENCOUNTER — OFFICE VISIT (OUTPATIENT)
Dept: SURGERY | Facility: CLINIC | Age: 35
End: 2020-11-02
Payer: COMMERCIAL

## 2020-11-02 VITALS
SYSTOLIC BLOOD PRESSURE: 117 MMHG | HEIGHT: 65 IN | HEART RATE: 106 BPM | WEIGHT: 110 LBS | BODY MASS INDEX: 18.33 KG/M2 | TEMPERATURE: 99 F | DIASTOLIC BLOOD PRESSURE: 60 MMHG

## 2020-11-02 DIAGNOSIS — Z01.818 PREOP TESTING: ICD-10-CM

## 2020-11-02 DIAGNOSIS — C50.912 INFILTRATING DUCTAL CARCINOMA OF LEFT BREAST: Primary | ICD-10-CM

## 2020-11-02 PROCEDURE — 3008F BODY MASS INDEX DOCD: CPT | Mod: CPTII,S$GLB,, | Performed by: SURGERY

## 2020-11-02 PROCEDURE — 99215 OFFICE O/P EST HI 40 MIN: CPT | Mod: S$GLB,,, | Performed by: SURGERY

## 2020-11-02 PROCEDURE — 99999 PR PBB SHADOW E&M-EST. PATIENT-LVL IV: CPT | Mod: PBBFAC,,, | Performed by: SURGERY

## 2020-11-02 PROCEDURE — 99215 PR OFFICE/OUTPT VISIT, EST, LEVL V, 40-54 MIN: ICD-10-PCS | Mod: S$GLB,,, | Performed by: SURGERY

## 2020-11-02 PROCEDURE — 1126F PR PAIN SEVERITY QUANTIFIED, NO PAIN PRESENT: ICD-10-PCS | Mod: S$GLB,,, | Performed by: SURGERY

## 2020-11-02 PROCEDURE — 1126F AMNT PAIN NOTED NONE PRSNT: CPT | Mod: S$GLB,,, | Performed by: SURGERY

## 2020-11-02 PROCEDURE — 99999 PR PBB SHADOW E&M-EST. PATIENT-LVL IV: ICD-10-PCS | Mod: PBBFAC,,, | Performed by: SURGERY

## 2020-11-02 PROCEDURE — 3008F PR BODY MASS INDEX (BMI) DOCUMENTED: ICD-10-PCS | Mod: CPTII,S$GLB,, | Performed by: SURGERY

## 2020-11-02 NOTE — PROGRESS NOTES
[facility]  [facility]  11/2/2020      SUBJECTIVE:   Ms. Michelle Gage is a 34 y.o. female with invasive ductal carcinoma of the LEFT breast, grade 3, ER positive/OH positive/[id number] positive. She is status-post neoadjuvant chemotherapy.     History of Present Illness: Patient states she first noticed this mass at her left breast about 1.5 years ago.  She describes it as tender, itchy, and growing.  She presented to her OBGYN at [facility] who ordered imaging. Left breast U/S 5/2019  showed an 0.6 cm round nodule at the left axillary tail, 9 cm from the nipple, BIRADS 3. In 9/2019 she had a follow up u/s which showed a 1 cm hypoechoic lesion at the 1 o'clock left axillary tail.  Several small cysts were also seen bilaterally, BIRADS 3. She denies any history of breast biopsies.  She denies any nipple discharge or nipple inversion.  She denies palpating any right breast masses and denies any skin changes. She has no personal history of cancer.    At last clinic visit on 5/11/20 with YUMIKO, repeated imaging at [facility], left diagnostic mammogram and ultrasound with subsequent biopsy.  We will also request her previous imaging from [facility]. MMG reveals irregular left breast density at 1 OC posterior depth with indistinct margins and mild left axillary adenopathy. U/S correlates with 1 OC  Mass, 8 cm from nipple, irregular hypoechoic mass, posterior shadowing, with indistinct margins measuring 1.4cm.     Biopsy of 1 OC left breast lesion and left axillary lymph node 5/11/20. Pathology reveals invasive ductal carcinoma, grade 3, ER positive/OH positive/[id number] positive. Ki-67 25%.  The lymph node was benign.     Due to the location of her tumor, and the receptor status, it was decided that neoadjuvant chemotherapy would be beneficial. Patient is now status-post 6 cycles of THC chemotherapy under the care of Dr. Salazar / [facility]. She completed neoadjuvant chemotherapy on 10/27/20.    Findings at that  time were the following:   Tumor size: 1.4 cm   Tumor thgthrthathdtheth:th th4th Estrogen Receptor: Positive (95%)  Progesterone Receptor: Positive (90%)  Her-2 delphine: Positive (3+)  Lymph node status: Negative for carcinoma      Family History: Paternal grandmother had breast cancer in her late 40's and again at age 60.   Genetic testing: NEGATIVE  OB/Gyn history:    Number of pregnancies & age at first gestation:    Age of menarche: 11   Last menstrual period: 3 weeks ago   There is no height or weight on file to calculate BMI.   Contraceptive Use/ HRT: OCP since age 15; denies HRT.   Breastfeeding: N/A   Number of previous biopsies:0    20 Breast MRI:  In the left breast upper outer quadrant far posterior depth, there is an oval heterogeneously enhancing mass measuring 1.2 x 0.9 cm. This corresponds to the biopsy-proven malignancy. This mass is well away from the nipple, and 0.3 cm deep to the skin surface. This mass abuts and effaces the underlying left pectoralis major muscle, but there is no muscle edema or abnormal enhancement.   No abnormal skin or nipple enhancement.  No suspicious finding in the right breast.   No axillary or internal mammary adenopathy. One of the level 1 left axillary nodes is biopsy-proven benign.   Impression:  Left breast upper outer quadrant far posterior depth mass is biopsy-proven malignancy. BI-RADS 6: Known malignancy.     Past Medical History:   Diagnosis Date    Breast cancer     Cancer 2020    Breast    Hx of psychiatric care     Xanax     Past Surgical History:   Procedure Laterality Date    BREAST SURGERY      Breast biopsy    INSERTION OF TUNNELED CENTRAL VENOUS CATHETER (CVC) WITH SUBCUTANEOUS PORT N/A 2020    Procedure: [id number];  Surgeon: Dosc Diagnostic Provider;  Location: [facility] OR [address] [place] [address];  Service: Radiology;  Laterality: N/A;  189 port placement    WISDOM TOOTH EXTRACTION       Social History     Socioeconomic History    Marital status:       Spouse name: Elio    Number of children: Not on file    Years of education: Not on file    Highest education level: Not on file   Occupational History    Occupation:    Social Needs    Financial resource strain: Not on file    Food insecurity     Worry: Not on file     Inability: Not on file    Transportation needs     Medical: Not on file     Non-medical: Not on file   Tobacco Use    Smoking status: Former Smoker    Smokeless tobacco: Never Used    Tobacco comment: occasional past while drinking   Substance and Sexual Activity    Alcohol use: Yes     Comment: socially    Drug use: No    Sexual activity: Yes     Partners: Male     Birth control/protection: None, Other-see comments     Comment: spouse   Lifestyle    Physical activity     Days per week: Not on file     Minutes per session: Not on file    Stress: Not on file   Relationships    Social connections     Talks on phone: Not on file     Gets together: Not on file     Attends Religion service: Not on file     Active member of club or organization: Not on file     Attends meetings of clubs or organizations: Not on file     Relationship status: Not on file   Other Topics Concern    Patient feels they ought to cut down on drinking/drug use Not Asked    Patient annoyed by others criticizing their drinking/drug use Not Asked    Patient has felt bad or guilty about drinking/drug use Not Asked    Patient has had a drink/used drugs as an eye opener in the AM Not Asked   Social History Narrative    , no children, construction , originally from [place]     Review of patient's allergies indicates:  No Known Allergies     Review of Systems: Denies any chest pain or shortness of breath.  Denies any fever or chills.  See HPI/ Interval History for other systems reviewed.    OBJECTIVE:   Vitals:    11/02/20 1416   BP: 117/60   Pulse: 106   Temp: 99 °F (37.2 °C)     Physical Exam:  HEENT: Normocephalic, atraumatic.     General: alert and oriented; no acute distress.  Breast: No masses or nodules palpated bilaterally. No nipple discharge or nipple inversion bilaterally. No skin changes bilaterally.  Lymph: No palpable adjacent axillary lymph nodes.    Extremities: Port at right upper arm.     ASSESSMENT:  Ms. Michelle Gage is a 34 y.o. year old female with a new left breast 1.4cm grade 3 ER +/MS+/Nly3jtx positive invasive ductal carcinoma; She is now S/P neoadjuvant chemo with THC. Here today to discuss surgical treatment options.     Plan:     Patient already saw Dr. Seo in plastic surgery and is interested in a bilateral mastectomy and left axillary sentinel lymph node biopsy with immediate implant reconstruction.    -Will get MRI of the breast prior to surgery to gauge response to chemotherapy.  -Will consult with Dr. Seo to determine surgery date (aiming for November 30th).

## 2020-11-03 ENCOUNTER — DOCUMENTATION ONLY (OUTPATIENT)
Dept: SURGERY | Facility: CLINIC | Age: 35
End: 2020-11-03

## 2020-11-03 RX ORDER — HEPARIN 100 UNIT/ML
500 SYRINGE INTRAVENOUS
Status: CANCELLED | OUTPATIENT
Start: 2020-11-19

## 2020-11-03 RX ORDER — SODIUM CHLORIDE 0.9 % (FLUSH) 0.9 %
10 SYRINGE (ML) INJECTION
Status: CANCELLED | OUTPATIENT
Start: 2020-11-19

## 2020-11-03 NOTE — NURSING
Oncology Navigation   Intake  Date of Diagnosis: 05/11/20  Cancer Type: Breast  Internal / External Referral: Internal  Initial Nurse Navigator Contact: 05/13/20  Date Worked: 05/13/20  First Appointment Available: 05/14/20  Appointment Date: 05/14/20  Schedule to Appointment Timeline (days): 1  First Available Date vs. Scheduled Date (days): 0     Treatment  Current Status: Active    Surgery: Planned  Surgical Oncologist: Dr.Aimee Dumont  Plastic Surgeon: Dr.Jules Seo  Type of Surgery: Bilat mastectomy with SLNB     Chemotherapy: Completed  Chemotherapy Regimen: Taxotere, Carboplatin, Herceptin       Procedures: MRI  Biopsy Schedule Date: 05/11/20  Genetic Testing Date Sent: 05/14/20  MRI Schedule Date: 11/06/20    General Referrals: Fertility specialist    ER: Positive  NM: Positive  Her2: Postive           Acuity  Stage: 1  Systemic Treatment - predicted or initiated: Chemotherapy regimen with multiple drugs (+1)  Treatment Tolerability: Has not started treatment yet/treatment fully completed and side effects resolved  Psychological Factors (+1 each): Emotional during conversation  Navigation Acuity: 3     Follow Up  No follow-ups on file.

## 2020-11-04 ENCOUNTER — INFUSION (OUTPATIENT)
Dept: INFUSION THERAPY | Facility: HOSPITAL | Age: 35
End: 2020-11-04
Attending: NURSE PRACTITIONER
Payer: COMMERCIAL

## 2020-11-04 DIAGNOSIS — E28.39 SUPPRESSION OF OVARIAN SECRETION: Primary | ICD-10-CM

## 2020-11-04 PROCEDURE — 63600175 PHARM REV CODE 636 W HCPCS: Mod: JG | Performed by: INTERNAL MEDICINE

## 2020-11-04 PROCEDURE — 96402 CHEMO HORMON ANTINEOPL SQ/IM: CPT

## 2020-11-04 RX ADMIN — GOSERELIN ACETATE 3.6 MG: 3.6 IMPLANT SUBCUTANEOUS at 12:11

## 2020-11-06 ENCOUNTER — HOSPITAL ENCOUNTER (OUTPATIENT)
Dept: RADIOLOGY | Facility: HOSPITAL | Age: 35
Discharge: HOME | End: 2020-11-06
Attending: SURGERY
Payer: COMMERCIAL

## 2020-11-06 ENCOUNTER — PATIENT MESSAGE (OUTPATIENT)
Dept: HEMATOLOGY/ONCOLOGY | Facility: CLINIC | Age: 35
End: 2020-11-06

## 2020-11-06 DIAGNOSIS — C50.912 INFILTRATING DUCTAL CARCINOMA OF LEFT BREAST: ICD-10-CM

## 2020-11-06 PROCEDURE — 77049 MRI BREAST C-+ W/CAD BI: CPT | Mod: 26,,, | Performed by: RADIOLOGY

## 2020-11-06 PROCEDURE — 25500020 PHARM REV CODE 255: Performed by: SURGERY

## 2020-11-06 PROCEDURE — 77049 MRI BREAST C-+ W/CAD BI: CPT | Mod: TC

## 2020-11-06 PROCEDURE — 77049 MRI BREAST W/WO CONTRAST, W/CAD, BILATERAL: ICD-10-PCS | Mod: 26,,, | Performed by: RADIOLOGY

## 2020-11-06 PROCEDURE — A9577 INJ MULTIHANCE: HCPCS | Performed by: SURGERY

## 2020-11-06 RX ADMIN — GADOBENATE DIMEGLUMINE 11 ML: 529 INJECTION, SOLUTION INTRAVENOUS at 04:11

## 2020-11-16 DIAGNOSIS — C50.912 INFILTRATING DUCTAL CARCINOMA OF LEFT BREAST: Primary | ICD-10-CM

## 2020-11-16 DIAGNOSIS — Z01.818 PREOP TESTING: ICD-10-CM

## 2020-11-19 ENCOUNTER — ANESTHESIA EVENT (OUTPATIENT)
Dept: SURGERY | Facility: OTHER | Age: 35
End: 2020-11-19
Payer: COMMERCIAL

## 2020-11-19 ENCOUNTER — PATIENT MESSAGE (OUTPATIENT)
Dept: HEMATOLOGY/ONCOLOGY | Facility: CLINIC | Age: 35
End: 2020-11-19

## 2020-11-19 ENCOUNTER — HOSPITAL ENCOUNTER (OUTPATIENT)
Dept: PREADMISSION TESTING | Facility: OTHER | Age: 35
Discharge: HOME | End: 2020-11-19
Attending: SURGERY
Payer: COMMERCIAL

## 2020-11-19 VITALS
DIASTOLIC BLOOD PRESSURE: 69 MMHG | BODY MASS INDEX: 17.49 KG/M2 | HEIGHT: 65 IN | SYSTOLIC BLOOD PRESSURE: 114 MMHG | WEIGHT: 105 LBS | TEMPERATURE: 98 F | OXYGEN SATURATION: 97 %

## 2020-11-19 DIAGNOSIS — Z01.818 PRE-OP TESTING: Primary | ICD-10-CM

## 2020-11-19 DIAGNOSIS — C50.412 MALIGNANT NEOPLASM OF UPPER-OUTER QUADRANT OF LEFT BREAST IN FEMALE, ESTROGEN RECEPTOR POSITIVE: ICD-10-CM

## 2020-11-19 DIAGNOSIS — R45.89 ANXIETY ABOUT HEALTH: ICD-10-CM

## 2020-11-19 DIAGNOSIS — Z17.0 MALIGNANT NEOPLASM OF UPPER-OUTER QUADRANT OF LEFT BREAST IN FEMALE, ESTROGEN RECEPTOR POSITIVE: ICD-10-CM

## 2020-11-19 LAB
ANION GAP SERPL CALC-SCNC: 6 MMOL/L (ref 8–16)
BASOPHILS # BLD AUTO: 0.03 K/UL (ref 0–0.2)
BASOPHILS NFR BLD: 0.8 % (ref 0–1.9)
BUN SERPL-MCNC: 10 MG/DL (ref 6–20)
CALCIUM SERPL-MCNC: 9.7 MG/DL (ref 8.7–10.5)
CHLORIDE SERPL-SCNC: 107 MMOL/L (ref 95–110)
CO2 SERPL-SCNC: 29 MMOL/L (ref 23–29)
CREAT SERPL-MCNC: 0.7 MG/DL (ref 0.5–1.4)
DIFFERENTIAL METHOD: ABNORMAL
EOSINOPHIL # BLD AUTO: 0 K/UL (ref 0–0.5)
EOSINOPHIL NFR BLD: 1 % (ref 0–8)
ERYTHROCYTE [DISTWIDTH] IN BLOOD BY AUTOMATED COUNT: 14.7 % (ref 11.5–14.5)
EST. GFR  (AFRICAN AMERICAN): >60 ML/MIN/1.73 M^2
EST. GFR  (NON AFRICAN AMERICAN): >60 ML/MIN/1.73 M^2
GLUCOSE SERPL-MCNC: 97 MG/DL (ref 70–110)
HCT VFR BLD AUTO: 40.6 % (ref 37–48.5)
HGB BLD-MCNC: 13.1 G/DL (ref 12–16)
IMM GRANULOCYTES # BLD AUTO: 0.01 K/UL (ref 0–0.04)
IMM GRANULOCYTES NFR BLD AUTO: 0.3 % (ref 0–0.5)
LYMPHOCYTES # BLD AUTO: 1.9 K/UL (ref 1–4.8)
LYMPHOCYTES NFR BLD: 47.9 % (ref 18–48)
MCH RBC QN AUTO: 31.4 PG (ref 27–31)
MCHC RBC AUTO-ENTMCNC: 32.3 G/DL (ref 32–36)
MCV RBC AUTO: 97 FL (ref 82–98)
MONOCYTES # BLD AUTO: 0.6 K/UL (ref 0.3–1)
MONOCYTES NFR BLD: 16.1 % (ref 4–15)
NEUTROPHILS # BLD AUTO: 1.3 K/UL (ref 1.8–7.7)
NEUTROPHILS NFR BLD: 33.9 % (ref 38–73)
NRBC BLD-RTO: 0 /100 WBC
PLATELET # BLD AUTO: 159 K/UL (ref 150–350)
PMV BLD AUTO: 10.7 FL (ref 9.2–12.9)
POTASSIUM SERPL-SCNC: 4.6 MMOL/L (ref 3.5–5.1)
RBC # BLD AUTO: 4.17 M/UL (ref 4–5.4)
SODIUM SERPL-SCNC: 142 MMOL/L (ref 136–145)
WBC # BLD AUTO: 3.86 K/UL (ref 3.9–12.7)

## 2020-11-19 PROCEDURE — 36415 COLL VENOUS BLD VENIPUNCTURE: CPT

## 2020-11-19 PROCEDURE — 85025 COMPLETE CBC W/AUTO DIFF WBC: CPT

## 2020-11-19 PROCEDURE — 80048 BASIC METABOLIC PNL TOTAL CA: CPT

## 2020-11-19 RX ORDER — LIDOCAINE HYDROCHLORIDE 10 MG/ML
0.5 INJECTION, SOLUTION EPIDURAL; INFILTRATION; INTRACAUDAL; PERINEURAL ONCE
Status: CANCELLED | OUTPATIENT
Start: 2020-11-19 | End: 2020-11-19

## 2020-11-19 RX ORDER — CELECOXIB 200 MG/1
400 CAPSULE ORAL
Status: CANCELLED | OUTPATIENT
Start: 2020-11-19 | End: 2020-11-19

## 2020-11-19 RX ORDER — ACETAMINOPHEN 500 MG
1000 TABLET ORAL
Status: CANCELLED | OUTPATIENT
Start: 2020-11-19 | End: 2020-11-19

## 2020-11-19 RX ORDER — ALPRAZOLAM 0.5 MG/1
0.5 TABLET ORAL 3 TIMES DAILY PRN
Qty: 30 TABLET | Refills: 0 | Status: SHIPPED | OUTPATIENT
Start: 2020-11-19 | End: 2021-02-24 | Stop reason: SDUPTHER

## 2020-11-19 RX ORDER — SODIUM CHLORIDE, SODIUM LACTATE, POTASSIUM CHLORIDE, CALCIUM CHLORIDE 600; 310; 30; 20 MG/100ML; MG/100ML; MG/100ML; MG/100ML
INJECTION, SOLUTION INTRAVENOUS CONTINUOUS
Status: CANCELLED | OUTPATIENT
Start: 2020-11-19

## 2020-11-19 NOTE — DISCHARGE INSTRUCTIONS
Information to Prepare you for your Surgery    PRE-ADMIT TESTING -  [phone]    [address]  [place] [facility]          Your surgery has been scheduled at [facility]. We are pleased to have the opportunity to serve you. For Further Information please call [phone].    On the day of surgery please report to the Information Desk on the 1st floor.    · CONTACT YOUR PHYSICIAN'S OFFICE THE DAY PRIOR TO YOUR SURGERY TO OBTAIN YOUR ARRIVAL TIME.     · The evening before surgery do not eat anything after 9 p.m. ( this includes hard candy, chewing gum and mints).  You may only have GATORADE, POWERADE AND WATER  from 9 p.m. until you leave your home.   DO NOT DRINK ANY LIQUIDS ON THE WAY TO THE HOSPITAL.      SPECIAL MEDICATION INSTRUCTIONS: TAKE medications checked off by the Anesthesiologist on your Medication List.    Angiogram Patients: Take medications as instructed by your physician, including aspirin.     Surgery Patients:    If you take ASPIRIN - Your PHYSICIAN/SURGEON will need to inform you IF/OR when you need to stop taking aspirin prior to your surgery.     Do Not take any medications containing IBUPROFEN.  Do Not Wear any make-up or dark nail polish   (especially eye make-up) to surgery. If you come to surgery with makeup on you will be required to remove the makeup or nail polish.    Do not shave your surgical area at least 5 days prior to your surgery. The surgical prep will be performed at the hospital according to Infection Control regulations.    Leave all valuables at home.   Do Not wear any jewelry or watches, including any metal in body piercings. Jewelry must be removed prior to coming to the hospital.  There is a possibility that rings that are unable to be removed may be cut off if they are on the surgical extremity.    Contact Lens must be removed before surgery. Either do not wear the contact lens or bring a case and solution for  storage.  Please bring a container for eyeglasses or dentures as required.  Bring any paperwork your physician has provided, such as consent forms,  history and physicals, doctor's orders, etc.   Bring comfortable clothes that are loose fitting to wear upon discharge. Take into consideration the type of surgery being performed.  Maintain your diet as advised per your physician the day prior to surgery.      Adequate rest the night before surgery is advised.   Park in the Parking lot behind the hospital or in the [place] [facility] across the street from the parking lot. Parking is complimentary.  If you will be discharged the same day as your procedure, please arrange for a responsible adult to drive you home or to accompany you if traveling by taxi.   YOU WILL NOT BE PERMITTED TO DRIVE OR TO LEAVE THE HOSPITAL ALONE AFTER SURGERY.   If you are being discharged the same day, it is strongly recommended that you arrange for someone to remain with you for the first 24 hrs following your surgery.    The Surgeon will speak to your family/visitor after your surgery regarding the outcome of your surgery and post op care.  The Surgeon may speak to you after your surgery, but there is a possibility you may not remember the details.  Please check with your family members regarding the conversation with the Surgeon.    We strongly recommend whoever is bringing you home be present for discharge instructions.  This will ensure a thorough understanding for your post op home care.    ALL CHILDREN MUST ALWAYS BE ACCOMPANIED BY AN ADULT.    Visitors-Refer to current Visitor policy handouts.    Thank you for your cooperation.  The Staff of [facility].                Bathing Instructions with Hibiclens     Shower the evening before and morning of your procedure with Hibiclens:   Wash your face with water and your regular face wash/soap   Apply Hibiclens directly on your skin or on a wet washcloth and wash  gently. When showering: Move away from the shower stream when applying Hibiclens to avoid rinsing off too soon.   Rinse thoroughly with warm water   Do not dilute Hibiclens         Dry off as usual, do not use any deodorant, powder, body lotions, perfume, after shave or cologne.                                       Information to Prepare you for your Surgery    PRE-ADMIT TESTING -  [phone]    [address] [facility]          Your surgery has been scheduled at [facility]. We are pleased to have the opportunity to serve you. For Further Information please call [phone].    On the day of surgery please report to the Information Desk on the 1st floor.    · CONTACT YOUR PHYSICIAN'S OFFICE THE DAY PRIOR TO YOUR SURGERY TO OBTAIN YOUR ARRIVAL TIME.     · The evening before surgery do not eat anything after 9 p.m. ( this includes hard candy, chewing gum and mints).  You may only have GATORADE, POWERADE AND WATER  from 9 p.m. until you leave your home.   DO NOT DRINK ANY LIQUIDS ON THE WAY TO THE HOSPITAL.      SPECIAL MEDICATION INSTRUCTIONS: TAKE medications checked off by the Anesthesiologist on your Medication List.    Angiogram Patients: Take medications as instructed by your physician, including aspirin.     Surgery Patients:    If you take ASPIRIN - Your PHYSICIAN/SURGEON will need to inform you IF/OR when you need to stop taking aspirin prior to your surgery.     Do Not take any medications containing IBUPROFEN.  Do Not Wear any make-up or dark nail polish   (especially eye make-up) to surgery. If you come to surgery with makeup on you will be required to remove the makeup or nail polish.    Do not shave your surgical area at least 5 days prior to your surgery. The surgical prep will be performed at the hospital according to Infection Control regulations.    Leave all valuables at home.   Do Not wear any jewelry or watches, including any metal in body piercings. Jewelry  must be removed prior to coming to the hospital.  There is a possibility that rings that are unable to be removed may be cut off if they are on the surgical extremity.    Contact Lens must be removed before surgery. Either do not wear the contact lens or bring a case and solution for storage.  Please bring a container for eyeglasses or dentures as required.  Bring any paperwork your physician has provided, such as consent forms,  history and physicals, doctor's orders, etc.   Bring comfortable clothes that are loose fitting to wear upon discharge. Take into consideration the type of surgery being performed.  Maintain your diet as advised per your physician the day prior to surgery.      Adequate rest the night before surgery is advised.   Park in the Parking lot behind the hospital or in the [employer] [facility] across the street from the parking lot. Parking is complimentary.  If you will be discharged the same day as your procedure, please arrange for a responsible adult to drive you home or to accompany you if traveling by taxi.   YOU WILL NOT BE PERMITTED TO DRIVE OR TO LEAVE THE HOSPITAL ALONE AFTER SURGERY.   If you are being discharged the same day, it is strongly recommended that you arrange for someone to remain with you for the first 24 hrs following your surgery.    The Surgeon will speak to your family/visitor after your surgery regarding the outcome of your surgery and post op care.  The Surgeon may speak to you after your surgery, but there is a possibility you may not remember the details.  Please check with your family members regarding the conversation with the Surgeon.    We strongly recommend whoever is bringing you home be present for discharge instructions.  This will ensure a thorough understanding for your post op home care.    ALL CHILDREN MUST ALWAYS BE ACCOMPANIED BY AN ADULT.    Visitors-Refer to current Visitor policy handouts.    Thank you for your cooperation.  The Staff of [facility].                Bathing Instructions with Hibiclens     Shower the evening before and morning of your procedure with Hibiclens:   Wash your face with water and your regular face wash/soap   Apply Hibiclens directly on your skin or on a wet washcloth and wash gently. When showering: Move away from the shower stream when applying Hibiclens to avoid rinsing off too soon.   Rinse thoroughly with warm water   Do not dilute Hibiclens         Dry off as usual, do not use any deodorant, powder, body lotions, perfume, after shave or cologne.

## 2020-11-19 NOTE — ANESTHESIA PREPROCEDURE EVALUATION
11/19/2020  Michelle Gage is a 35 y.o., female.    Anesthesia Evaluation    I have reviewed the Patient Summary Reports.    I have reviewed the Nursing Notes.    I have reviewed the Medications.     Review of Systems  Anesthesia Hx:  No problems with previous Anesthesia  History of prior surgery of interest to airway management or planning: Previous anesthesia: MAC  Port a cath May 2020 [facility] with MAC.  Procedure performed at an [facility]. Denies Family Hx of Anesthesia complications.   Denies Personal Hx of Anesthesia complications.   Social:  Non-Smoker    Hematology/Oncology:  Hematology Normal      Current/Recent Cancer. Breast left chemotherapy and surgery Oncology Comments: Port a cath in rt arm     EENT/Dental:EENT/Dental Normal   Cardiovascular:   Exercise tolerance: good    Pulmonary:  Pulmonary Normal    Renal/:  Renal/ Normal     Hepatic/GI:  Hepatic/GI Normal    Musculoskeletal:  Musculoskeletal Normal    Neurological:  Neurology Normal    Endocrine:  Endocrine Normal    Dermatological:  Skin Normal    Psych:   Psychiatric History          Physical Exam  General:  Well nourished    Airway/Jaw/Neck:  Airway Findings: Mouth Opening: Normal Tongue: Normal  General Airway Assessment: Adult  Mallampati: II      Dental:  Dental Findings: In tact        Mental Status:  Mental Status Findings:  Cooperative, Alert and Oriented, Anxious         Anesthesia Plan  Type of Anesthesia, risks & benefits discussed:  Anesthesia Type:  general  Patient's Preference:   Intra-op Monitoring Plan: standard ASA monitors  Intra-op Monitoring Plan Comments:   Post Op Pain Control Plan: per primary service following discharge from PACU and multimodal analgesia  Post Op Pain Control Plan Comments:   Induction:   IV  Beta Blocker:         Informed Consent: Patient understands risks and agrees with  Anesthesia plan.  Questions answered. Anesthesia consent signed with patient.  ASA Score: 3     Day of Surgery Review of History & Physical:    H&P update referred to the surgeon.     Anesthesia Plan Notes: Labs today,port in rt arm        Ready For Surgery From Anesthesia Perspective.

## 2020-11-20 DIAGNOSIS — C50.912 INFILTRATING DUCTAL CARCINOMA OF LEFT BREAST: Primary | ICD-10-CM

## 2020-11-20 DIAGNOSIS — C50.912 MALIGNANT NEOPLASM OF LEFT BREAST: ICD-10-CM

## 2020-11-20 RX ORDER — SODIUM CHLORIDE 9 MG/ML
INJECTION, SOLUTION INTRAVENOUS CONTINUOUS
Status: CANCELLED | OUTPATIENT
Start: 2020-11-20

## 2020-11-27 ENCOUNTER — LAB VISIT (OUTPATIENT)
Dept: SURGERY | Facility: CLINIC | Age: 35
End: 2020-11-27
Payer: COMMERCIAL

## 2020-11-27 ENCOUNTER — TELEPHONE (OUTPATIENT)
Dept: SURGERY | Facility: CLINIC | Age: 35
End: 2020-11-27

## 2020-11-27 ENCOUNTER — PATIENT MESSAGE (OUTPATIENT)
Dept: SURGERY | Facility: CLINIC | Age: 35
End: 2020-11-27

## 2020-11-27 DIAGNOSIS — Z01.818 PREOP TESTING: ICD-10-CM

## 2020-11-27 PROCEDURE — U0003 INFECTIOUS AGENT DETECTION BY NUCLEIC ACID (DNA OR RNA); SEVERE ACUTE RESPIRATORY SYNDROME CORONAVIRUS 2 (SARS-COV-2) (CORONAVIRUS DISEASE [COVID-19]), AMPLIFIED PROBE TECHNIQUE, MAKING USE OF HIGH THROUGHPUT TECHNOLOGIES AS DESCRIBED BY CMS-2020-01-R: HCPCS

## 2020-11-27 NOTE — TELEPHONE ENCOUNTER
Called patient to inform her of surgery arrival time.  Patient to arrive on Monday 11/30/2020, 7:30 at [facility] building for surgery.  Message left on patient's voicemail stating this information.

## 2020-11-28 LAB — SARS-COV-2 RNA RESP QL NAA+PROBE: NOT DETECTED

## 2020-11-30 ENCOUNTER — HOSPITAL ENCOUNTER (OUTPATIENT)
Dept: RADIOLOGY | Facility: OTHER | Age: 35
Discharge: HOME | End: 2020-11-30
Attending: SURGERY
Payer: COMMERCIAL

## 2020-11-30 ENCOUNTER — HOSPITAL ENCOUNTER (OUTPATIENT)
Facility: OTHER | Age: 35
Discharge: HOME | End: 2020-11-30
Attending: SURGERY | Admitting: SURGERY
Payer: COMMERCIAL

## 2020-11-30 ENCOUNTER — ANESTHESIA (OUTPATIENT)
Dept: SURGERY | Facility: OTHER | Age: 35
End: 2020-11-30
Payer: COMMERCIAL

## 2020-11-30 VITALS
HEIGHT: 65 IN | DIASTOLIC BLOOD PRESSURE: 61 MMHG | TEMPERATURE: 98 F | OXYGEN SATURATION: 100 % | RESPIRATION RATE: 18 BRPM | WEIGHT: 105 LBS | SYSTOLIC BLOOD PRESSURE: 119 MMHG | BODY MASS INDEX: 17.49 KG/M2 | HEART RATE: 78 BPM

## 2020-11-30 DIAGNOSIS — Z85.3 HISTORY OF MALIGNANT NEOPLASM OF LEFT BREAST: Primary | ICD-10-CM

## 2020-11-30 DIAGNOSIS — C50.912 MALIGNANT NEOPLASM OF LEFT BREAST: ICD-10-CM

## 2020-11-30 DIAGNOSIS — C50.912 INFILTRATING DUCTAL CARCINOMA OF LEFT BREAST: ICD-10-CM

## 2020-11-30 PROCEDURE — 19303 PR MASTECTOMY, SIMPLE, COMPLETE: ICD-10-PCS | Mod: 50,,, | Performed by: SURGERY

## 2020-11-30 PROCEDURE — 71000039 HC RECOVERY, EACH ADD'L HOUR: Performed by: SURGERY

## 2020-11-30 PROCEDURE — 27201423 OPTIME MED/SURG SUP & DEVICES STERILE SUPPLY: Performed by: SURGERY

## 2020-11-30 PROCEDURE — 38900 IO MAP OF SENT LYMPH NODE: CPT | Mod: LT,,, | Performed by: SURGERY

## 2020-11-30 PROCEDURE — 63600175 PHARM REV CODE 636 W HCPCS: Performed by: SURGERY

## 2020-11-30 PROCEDURE — C1894 INTRO/SHEATH, NON-LASER: HCPCS | Performed by: SURGERY

## 2020-11-30 PROCEDURE — P9045 ALBUMIN (HUMAN), 5%, 250 ML: HCPCS | Mod: JG | Performed by: NURSE ANESTHETIST, CERTIFIED REGISTERED

## 2020-11-30 PROCEDURE — 25000003 PHARM REV CODE 250: Performed by: ANESTHESIOLOGY

## 2020-11-30 PROCEDURE — 63600175 PHARM REV CODE 636 W HCPCS: Performed by: ANESTHESIOLOGY

## 2020-11-30 PROCEDURE — 38525 BIOPSY/REMOVAL LYMPH NODES: CPT | Mod: 51,LT,, | Performed by: SURGERY

## 2020-11-30 PROCEDURE — 38900 PR INTRAOPERATIVE SENTINEL LYMPH NODE ID W DYE INJECTION: ICD-10-PCS | Mod: LT,,, | Performed by: SURGERY

## 2020-11-30 PROCEDURE — 88307 PR  SURG PATH,LEVEL V: ICD-10-PCS | Mod: 26,,, | Performed by: PATHOLOGY

## 2020-11-30 PROCEDURE — 88341 IMHCHEM/IMCYTCHM EA ADD ANTB: CPT | Mod: 59 | Performed by: PATHOLOGY

## 2020-11-30 PROCEDURE — 19303 MAST SIMPLE COMPLETE: CPT | Mod: 50,,, | Performed by: SURGERY

## 2020-11-30 PROCEDURE — 88331 PATH CONSLTJ SURG 1 BLK 1SPC: CPT | Mod: 26,,, | Performed by: PATHOLOGY

## 2020-11-30 PROCEDURE — C1789 PROSTHESIS, BREAST, IMP: HCPCS | Performed by: SURGERY

## 2020-11-30 PROCEDURE — 71000016 HC POSTOP RECOV ADDL HR: Performed by: SURGERY

## 2020-11-30 PROCEDURE — 88341 PR IHC OR ICC EACH ADD'L SINGLE ANTIBODY  STAINPR: ICD-10-PCS | Mod: 26,,, | Performed by: PATHOLOGY

## 2020-11-30 PROCEDURE — 88332 PATH CONSLTJ SURG EA ADD BLK: CPT | Performed by: PATHOLOGY

## 2020-11-30 PROCEDURE — 37000008 HC ANESTHESIA 1ST 15 MINUTES: Performed by: SURGERY

## 2020-11-30 PROCEDURE — 88307 TISSUE EXAM BY PATHOLOGIST: CPT | Mod: 26,,, | Performed by: PATHOLOGY

## 2020-11-30 PROCEDURE — 88331 PATH CONSLTJ SURG 1 BLK 1SPC: CPT | Mod: 59 | Performed by: PATHOLOGY

## 2020-11-30 PROCEDURE — 37000009 HC ANESTHESIA EA ADD 15 MINS: Performed by: SURGERY

## 2020-11-30 PROCEDURE — 25000003 PHARM REV CODE 250: Performed by: NURSE ANESTHETIST, CERTIFIED REGISTERED

## 2020-11-30 PROCEDURE — 71000033 HC RECOVERY, INTIAL HOUR: Performed by: SURGERY

## 2020-11-30 PROCEDURE — 38525 PR BIOPSY/REM LYMPH NODES, AXILLARY: ICD-10-PCS | Mod: 51,LT,, | Performed by: SURGERY

## 2020-11-30 PROCEDURE — 88342 CHG IMMUNOCYTOCHEMISTRY: ICD-10-PCS | Mod: 26,,, | Performed by: PATHOLOGY

## 2020-11-30 PROCEDURE — 88342 IMHCHEM/IMCYTCHM 1ST ANTB: CPT | Performed by: PATHOLOGY

## 2020-11-30 PROCEDURE — C1729 CATH, DRAINAGE: HCPCS | Performed by: SURGERY

## 2020-11-30 PROCEDURE — 36000706: Performed by: SURGERY

## 2020-11-30 PROCEDURE — 63600175 PHARM REV CODE 636 W HCPCS: Performed by: NURSE ANESTHETIST, CERTIFIED REGISTERED

## 2020-11-30 PROCEDURE — 88341 IMHCHEM/IMCYTCHM EA ADD ANTB: CPT | Mod: 26,,, | Performed by: PATHOLOGY

## 2020-11-30 PROCEDURE — 88307 TISSUE EXAM BY PATHOLOGIST: CPT | Performed by: PATHOLOGY

## 2020-11-30 PROCEDURE — 88332 PATH CONSLTJ SURG EA ADD BLK: CPT | Mod: 26,,, | Performed by: PATHOLOGY

## 2020-11-30 PROCEDURE — 25000003 PHARM REV CODE 250: Performed by: SURGERY

## 2020-11-30 PROCEDURE — 63600175 PHARM REV CODE 636 W HCPCS: Mod: JW,JG | Performed by: SURGERY

## 2020-11-30 PROCEDURE — 71000015 HC POSTOP RECOV 1ST HR: Performed by: SURGERY

## 2020-11-30 PROCEDURE — 88331 PR  PATH CONSULT IN SURG,W FRZ SEC: ICD-10-PCS | Mod: 26,,, | Performed by: PATHOLOGY

## 2020-11-30 PROCEDURE — A9520 TC99 TILMANOCEPT DIAG 0.5MCI: HCPCS

## 2020-11-30 PROCEDURE — 36000707: Performed by: SURGERY

## 2020-11-30 PROCEDURE — 88342 IMHCHEM/IMCYTCHM 1ST ANTB: CPT | Mod: 26,,, | Performed by: PATHOLOGY

## 2020-11-30 PROCEDURE — 88332 PR  PATH CONSULT IN SURG,W ADDN FRZ SEC: ICD-10-PCS | Mod: 26,,, | Performed by: PATHOLOGY

## 2020-11-30 DEVICE — ALLODERM PERF MED 16CMX20CM: Type: IMPLANTABLE DEVICE | Site: BREAST | Status: FUNCTIONAL

## 2020-11-30 DEVICE — IMPLANTABLE DEVICE: Type: IMPLANTABLE DEVICE | Site: BREAST | Status: FUNCTIONAL

## 2020-11-30 DEVICE — MATRIX ALLODERM THK PERF 16X20: Type: IMPLANTABLE DEVICE | Site: BREAST | Status: FUNCTIONAL

## 2020-11-30 RX ORDER — CEFAZOLIN SODIUM 1 G/3ML
2 INJECTION, POWDER, FOR SOLUTION INTRAMUSCULAR; INTRAVENOUS
Status: DISCONTINUED | OUTPATIENT
Start: 2020-11-30 | End: 2020-11-30 | Stop reason: HOSPADM

## 2020-11-30 RX ORDER — INDOCYANINE GREEN AND WATER 25 MG
KIT INJECTION
Status: DISCONTINUED | OUTPATIENT
Start: 2020-11-30 | End: 2020-11-30

## 2020-11-30 RX ORDER — ISOSULFAN BLUE 50 MG/5ML
INJECTION, SOLUTION SUBCUTANEOUS
Status: DISCONTINUED | OUTPATIENT
Start: 2020-11-30 | End: 2020-11-30 | Stop reason: HOSPADM

## 2020-11-30 RX ORDER — CEFAZOLIN SODIUM 1 G/3ML
2 INJECTION, POWDER, FOR SOLUTION INTRAMUSCULAR; INTRAVENOUS
Status: DISCONTINUED | OUTPATIENT
Start: 2020-11-30 | End: 2020-11-30 | Stop reason: SDUPTHER

## 2020-11-30 RX ORDER — OXYCODONE HYDROCHLORIDE 5 MG/1
5 TABLET ORAL
Status: DISCONTINUED | OUTPATIENT
Start: 2020-11-30 | End: 2020-11-30 | Stop reason: HOSPADM

## 2020-11-30 RX ORDER — PHENYLEPHRINE HYDROCHLORIDE 10 MG/ML
INJECTION INTRAVENOUS
Status: DISCONTINUED | OUTPATIENT
Start: 2020-11-30 | End: 2020-11-30

## 2020-11-30 RX ORDER — ROCURONIUM BROMIDE 10 MG/ML
INJECTION, SOLUTION INTRAVENOUS
Status: DISCONTINUED | OUTPATIENT
Start: 2020-11-30 | End: 2020-11-30

## 2020-11-30 RX ORDER — ONDANSETRON 2 MG/ML
INJECTION INTRAMUSCULAR; INTRAVENOUS
Status: DISCONTINUED | OUTPATIENT
Start: 2020-11-30 | End: 2020-11-30

## 2020-11-30 RX ORDER — HYDROMORPHONE HYDROCHLORIDE 2 MG/ML
INJECTION, SOLUTION INTRAMUSCULAR; INTRAVENOUS; SUBCUTANEOUS
Status: DISCONTINUED | OUTPATIENT
Start: 2020-11-30 | End: 2020-11-30

## 2020-11-30 RX ORDER — SODIUM CHLORIDE 9 MG/ML
INJECTION, SOLUTION INTRAVENOUS CONTINUOUS
Status: DISCONTINUED | OUTPATIENT
Start: 2020-11-30 | End: 2020-11-30 | Stop reason: HOSPADM

## 2020-11-30 RX ORDER — ACETAMINOPHEN 500 MG
1000 TABLET ORAL
Status: COMPLETED | OUTPATIENT
Start: 2020-11-30 | End: 2020-11-30

## 2020-11-30 RX ORDER — LIDOCAINE HCL/PF 100 MG/5ML
SYRINGE (ML) INTRAVENOUS
Status: DISCONTINUED | OUTPATIENT
Start: 2020-11-30 | End: 2020-11-30

## 2020-11-30 RX ORDER — NEOSTIGMINE METHYLSULFATE 1 MG/ML
INJECTION, SOLUTION INTRAVENOUS
Status: DISCONTINUED | OUTPATIENT
Start: 2020-11-30 | End: 2020-11-30

## 2020-11-30 RX ORDER — CELECOXIB 200 MG/1
400 CAPSULE ORAL
Status: COMPLETED | OUTPATIENT
Start: 2020-11-30 | End: 2020-11-30

## 2020-11-30 RX ORDER — SUCCINYLCHOLINE CHLORIDE 20 MG/ML
INJECTION INTRAMUSCULAR; INTRAVENOUS
Status: DISCONTINUED | OUTPATIENT
Start: 2020-11-30 | End: 2020-11-30

## 2020-11-30 RX ORDER — EPHEDRINE SULFATE 50 MG/ML
INJECTION, SOLUTION INTRAVENOUS
Status: DISCONTINUED | OUTPATIENT
Start: 2020-11-30 | End: 2020-11-30

## 2020-11-30 RX ORDER — ONDANSETRON 2 MG/ML
4 INJECTION INTRAMUSCULAR; INTRAVENOUS DAILY PRN
Status: DISCONTINUED | OUTPATIENT
Start: 2020-11-30 | End: 2020-11-30 | Stop reason: HOSPADM

## 2020-11-30 RX ORDER — ALBUMIN HUMAN 50 G/1000ML
SOLUTION INTRAVENOUS CONTINUOUS PRN
Status: DISCONTINUED | OUTPATIENT
Start: 2020-11-30 | End: 2020-11-30

## 2020-11-30 RX ORDER — LIDOCAINE HYDROCHLORIDE 10 MG/ML
0.5 INJECTION, SOLUTION EPIDURAL; INFILTRATION; INTRACAUDAL; PERINEURAL ONCE
Status: DISCONTINUED | OUTPATIENT
Start: 2020-11-30 | End: 2020-11-30 | Stop reason: HOSPADM

## 2020-11-30 RX ORDER — MEPERIDINE HYDROCHLORIDE 25 MG/ML
12.5 INJECTION INTRAMUSCULAR; INTRAVENOUS; SUBCUTANEOUS ONCE AS NEEDED
Status: DISCONTINUED | OUTPATIENT
Start: 2020-11-30 | End: 2020-11-30 | Stop reason: HOSPADM

## 2020-11-30 RX ORDER — HYDROMORPHONE HYDROCHLORIDE 2 MG/ML
0.4 INJECTION, SOLUTION INTRAMUSCULAR; INTRAVENOUS; SUBCUTANEOUS EVERY 5 MIN PRN
Status: DISCONTINUED | OUTPATIENT
Start: 2020-11-30 | End: 2020-11-30 | Stop reason: HOSPADM

## 2020-11-30 RX ORDER — FENTANYL CITRATE 50 UG/ML
INJECTION, SOLUTION INTRAMUSCULAR; INTRAVENOUS
Status: DISCONTINUED | OUTPATIENT
Start: 2020-11-30 | End: 2020-11-30

## 2020-11-30 RX ORDER — DEXAMETHASONE SODIUM PHOSPHATE 4 MG/ML
INJECTION, SOLUTION INTRA-ARTICULAR; INTRALESIONAL; INTRAMUSCULAR; INTRAVENOUS; SOFT TISSUE
Status: DISCONTINUED | OUTPATIENT
Start: 2020-11-30 | End: 2020-11-30

## 2020-11-30 RX ORDER — SODIUM CHLORIDE, SODIUM LACTATE, POTASSIUM CHLORIDE, CALCIUM CHLORIDE 600; 310; 30; 20 MG/100ML; MG/100ML; MG/100ML; MG/100ML
INJECTION, SOLUTION INTRAVENOUS CONTINUOUS
Status: DISCONTINUED | OUTPATIENT
Start: 2020-11-30 | End: 2020-11-30 | Stop reason: HOSPADM

## 2020-11-30 RX ORDER — PROPOFOL 10 MG/ML
VIAL (ML) INTRAVENOUS
Status: DISCONTINUED | OUTPATIENT
Start: 2020-11-30 | End: 2020-11-30

## 2020-11-30 RX ORDER — SODIUM CHLORIDE 0.9 % (FLUSH) 0.9 %
3 SYRINGE (ML) INJECTION
Status: DISCONTINUED | OUTPATIENT
Start: 2020-11-30 | End: 2020-11-30 | Stop reason: HOSPADM

## 2020-11-30 RX ORDER — MIDAZOLAM HYDROCHLORIDE 1 MG/ML
INJECTION INTRAMUSCULAR; INTRAVENOUS
Status: DISCONTINUED | OUTPATIENT
Start: 2020-11-30 | End: 2020-11-30

## 2020-11-30 RX ADMIN — ROCURONIUM BROMIDE 30 MG: 10 INJECTION, SOLUTION INTRAVENOUS at 11:11

## 2020-11-30 RX ADMIN — ESMOLOL HYDROCHLORIDE 10 MG: 10 INJECTION INTRAVENOUS at 01:11

## 2020-11-30 RX ADMIN — HYDROMORPHONE HYDROCHLORIDE 0.5 MG: 2 INJECTION, SOLUTION INTRAMUSCULAR; INTRAVENOUS; SUBCUTANEOUS at 02:11

## 2020-11-30 RX ADMIN — PHENYLEPHRINE HYDROCHLORIDE 100 MCG: 10 INJECTION INTRAVENOUS at 10:11

## 2020-11-30 RX ADMIN — HYDROMORPHONE HYDROCHLORIDE 0.5 MG: 2 INJECTION, SOLUTION INTRAMUSCULAR; INTRAVENOUS; SUBCUTANEOUS at 10:11

## 2020-11-30 RX ADMIN — PHENYLEPHRINE HYDROCHLORIDE 200 MCG: 10 INJECTION INTRAVENOUS at 10:11

## 2020-11-30 RX ADMIN — FENTANYL CITRATE 50 MCG: 50 INJECTION, SOLUTION INTRAMUSCULAR; INTRAVENOUS at 10:11

## 2020-11-30 RX ADMIN — CELECOXIB 400 MG: 200 CAPSULE ORAL at 07:11

## 2020-11-30 RX ADMIN — LIDOCAINE HYDROCHLORIDE 50 MG: 20 INJECTION, SOLUTION INTRAVENOUS at 08:11

## 2020-11-30 RX ADMIN — HYDROMORPHONE HYDROCHLORIDE 0.4 MG: 2 INJECTION, SOLUTION INTRAMUSCULAR; INTRAVENOUS; SUBCUTANEOUS at 02:11

## 2020-11-30 RX ADMIN — HYDROMORPHONE HYDROCHLORIDE 0.5 MG: 2 INJECTION, SOLUTION INTRAMUSCULAR; INTRAVENOUS; SUBCUTANEOUS at 12:11

## 2020-11-30 RX ADMIN — MIDAZOLAM HYDROCHLORIDE 2 MG: 1 INJECTION, SOLUTION INTRAMUSCULAR; INTRAVENOUS at 08:11

## 2020-11-30 RX ADMIN — FENTANYL CITRATE 50 MCG: 50 INJECTION, SOLUTION INTRAMUSCULAR; INTRAVENOUS at 12:11

## 2020-11-30 RX ADMIN — FENTANYL CITRATE 50 MCG: 50 INJECTION, SOLUTION INTRAMUSCULAR; INTRAVENOUS at 11:11

## 2020-11-30 RX ADMIN — ALBUMIN (HUMAN): 12.5 SOLUTION INTRAVENOUS at 09:11

## 2020-11-30 RX ADMIN — DEXAMETHASONE SODIUM PHOSPHATE 8 MG: 4 INJECTION, SOLUTION INTRAMUSCULAR; INTRAVENOUS at 08:11

## 2020-11-30 RX ADMIN — PHENYLEPHRINE HYDROCHLORIDE 100 MCG: 10 INJECTION INTRAVENOUS at 09:11

## 2020-11-30 RX ADMIN — EPHEDRINE SULFATE 10 MG: 50 INJECTION INTRAVENOUS at 10:11

## 2020-11-30 RX ADMIN — GLYCOPYRROLATE 0.4 MG: 0.2 INJECTION, SOLUTION INTRAMUSCULAR; INTRAVITREAL at 02:11

## 2020-11-30 RX ADMIN — FENTANYL CITRATE 50 MCG: 50 INJECTION, SOLUTION INTRAMUSCULAR; INTRAVENOUS at 08:11

## 2020-11-30 RX ADMIN — ESMOLOL HYDROCHLORIDE 15 MG: 10 INJECTION INTRAVENOUS at 12:11

## 2020-11-30 RX ADMIN — SUCCINYLCHOLINE CHLORIDE 120 MG: 20 INJECTION, SOLUTION INTRAMUSCULAR; INTRAVENOUS at 08:11

## 2020-11-30 RX ADMIN — PROPOFOL 160 MG: 10 INJECTION, EMULSION INTRAVENOUS at 08:11

## 2020-11-30 RX ADMIN — CEFAZOLIN 2 G: 330 INJECTION, POWDER, FOR SOLUTION INTRAMUSCULAR; INTRAVENOUS at 08:11

## 2020-11-30 RX ADMIN — HYDROMORPHONE HYDROCHLORIDE 0.4 MG: 2 INJECTION, SOLUTION INTRAMUSCULAR; INTRAVENOUS; SUBCUTANEOUS at 03:11

## 2020-11-30 RX ADMIN — OXYCODONE HYDROCHLORIDE 5 MG: 5 TABLET ORAL at 02:11

## 2020-11-30 RX ADMIN — HYDROMORPHONE HYDROCHLORIDE 0.5 MG: 2 INJECTION, SOLUTION INTRAMUSCULAR; INTRAVENOUS; SUBCUTANEOUS at 01:11

## 2020-11-30 RX ADMIN — SODIUM CHLORIDE, SODIUM LACTATE, POTASSIUM CHLORIDE, AND CALCIUM CHLORIDE: 600; 310; 30; 20 INJECTION, SOLUTION INTRAVENOUS at 08:11

## 2020-11-30 RX ADMIN — HYDROMORPHONE HYDROCHLORIDE 0.4 MG: 2 INJECTION, SOLUTION INTRAMUSCULAR; INTRAVENOUS; SUBCUTANEOUS at 04:11

## 2020-11-30 RX ADMIN — ONDANSETRON HYDROCHLORIDE 4 MG: 2 INJECTION INTRAMUSCULAR; INTRAVENOUS at 01:11

## 2020-11-30 RX ADMIN — ACETAMINOPHEN 1000 MG: 500 TABLET, FILM COATED ORAL at 07:11

## 2020-11-30 RX ADMIN — SODIUM CHLORIDE, SODIUM LACTATE, POTASSIUM CHLORIDE, AND CALCIUM CHLORIDE: 600; 310; 30; 20 INJECTION, SOLUTION INTRAVENOUS at 10:11

## 2020-11-30 RX ADMIN — NEOSTIGMINE METHYLSULFATE 4 MG: 1 INJECTION INTRAVENOUS at 02:11

## 2020-11-30 RX ADMIN — ROCURONIUM BROMIDE 10 MG: 10 INJECTION, SOLUTION INTRAVENOUS at 08:11

## 2020-11-30 RX ADMIN — INDOCYANINE GREEN 10 MG: KIT INTRAVENOUS at 01:11

## 2020-11-30 RX ADMIN — FENTANYL CITRATE 100 MCG: 50 INJECTION, SOLUTION INTRAMUSCULAR; INTRAVENOUS at 08:11

## 2020-11-30 NOTE — ANESTHESIA POSTPROCEDURE EVALUATION
Anesthesia Post Evaluation    Patient: Michelle Gage    Procedure(s) Performed: Procedure(s) (LRB):  INSERTION, BREAST TISSUE EXPANDER - BILATERAL BREAST RECONSTRUCTION WITH TISSUE EXPANDERS AND ACELLULAR DERMAL MATRIX (Bilateral)  MASTECTOMY, BILATERAL (Bilateral)  BIOPSY, LYMPH NODE, SENTINEL (Left)  RECONSTRUCTION, BREAST (Bilateral)    Final Anesthesia Type: general    Patient location during evaluation: PACU  Patient participation: Yes- Able to Participate  Level of consciousness: awake and alert  Post-procedure vital signs: reviewed and stable  Pain management: adequate  Airway patency: patent    PONV status at discharge: No PONV  Anesthetic complications: yes  Perioperative Events: peripheral neurologic deficit        Cardiovascular status: blood pressure returned to baseline  Respiratory status: unassisted and spontaneous ventilation  Hydration status: euvolemic  Follow-up not needed.  Comments: Pt c/o tingling and numbness in first 3 fingers of R hand.  No movement deficit.  Good strength.  Likely nerve compression injury.  Will likely resolve spontaneously.  Told patient to contact hospital if symptoms worsen or fail to resolve in 2-3 days.  Requested RN to inform surgical team.          Vitals Value Taken Time   /58 11/30/20 1529   Temp 36.9 °C (98.4 °F) 11/30/20 1436   Pulse 82 11/30/20 1532   Resp 17 11/30/20 1520   SpO2 96 % 11/30/20 1532   Vitals shown include unvalidated device data.      No case tracking events are documented in the log.      Pain/Veronica Score: Pain Rating Prior to Med Admin: 7 (11/30/2020  3:16 PM)  Pain Rating Post Med Admin: 7 (11/30/2020  3:20 PM)  Veronica Score: 9 (11/30/2020  3:20 PM)

## 2020-11-30 NOTE — ANESTHESIA PROCEDURE NOTES
Intubation  Performed by: Сергей Rouse Jr. CRNA  Authorized by: Clay Guerra MD     Intubation:     Induction:  Intravenous    Intubated:  Postinduction    Mask Ventilation:  Easy mask    Attempts:  1    Attempted By:  CRNA    Method of Intubation:  Video laryngoscopy    Laryngeal View Grade: Grade I - full view of chords      Difficult Airway Encountered?: No      Complications:  None    Airway Device:  Oral endotracheal tube    Airway Device Size:  7.5    Style/Cuff Inflation:  Cuffed (inflated to minimal occlusive pressure)    Tube secured:  20    Secured at:  The lips    Placement Verified By:  Capnometry    Complicating Factors:  None    Findings Post-Intubation:  BS equal bilateral and atraumatic/condition of teeth unchanged (small cut to right upper lip after pinching with blade/tube; applied pressure to lip to stop bleeding and area lubricated to keep moist)

## 2020-11-30 NOTE — OP NOTE
[facility]  Operative Note    SUMMARY     Surgery Date: 11/30/2020     Surgeons:     Lamin Seo MD surgeon      Assisting Surgeon: None    Pre-op Diagnosis:  HX: breast cancer [Z85.3]    Post-op Diagnosis:  Post-Op Diagnosis Codes:     * HX: breast cancer [Z85.3]     * Malignant neoplasm of left female breast, unspecified estrogen receptor status, unspecified site of breast [C50.912]    Procedure:    1.) Left prepectoral breast reconstruction with tissue expander and acellular dermal matrix  2.) Right prepectoral breast reconstruction with tissue expander and acellular dermal matrix  3.) Spy visualization of right breast  4.) Spy visualization to left breast    Anesthesia: General    Drains:  2 15 round drains to each breast    Complications:  None    Condition:  Stable    Operative procedure in detail:  After risks and benefits were thoroughly discussed with the patient the patient expressed verbal understanding, informed consent was obtained.  The patient was subsequently taken to the operating room and placed supine on the operating room table.  After appropriate general anesthesia was provided, the patient's breasts were prepped and draped in standard surgical fashion.  Dr. Dumont began the mastectomy portion of the operation which will be dictated separately.  Once Dr. Dumont was finished with the mastectomy portion the breast were re-prepped and draped in a standard surgical fashion.  Attention was directed towards the left breast.  The mastectomy skin flaps were inspected and were shown to be well perfused.  Hemostasis was obtained.  Next the lateral breast border was then reconstructed by securing the lateral skin flap to the lateral chest wall with a 2-0 PDS suture.  Next the inframammary fold was then reconstructed with a 2-0 PDS suture.  Attention was directed towards the opposite side.  Mastectomy skin flaps were inspected and were shown to be well perfused.  Hemostasis was  obtained.  Next the lateral breast border was then reconstructed by securing the lateral skin flap to the lateral chest wall with a 2-0 PDS suture.  Next the inframammary fold was then reconstructed with a 2-0 PDS suture.    Next a style 133S MX-11-T tissue expander was selected bilaterally.  The tissue expanders were then removed of air and 100 cc of air was placed bilaterally into the tissue expanders.  Next the acellular dermal matrix was then wrapped around each tissue expander with a 2-0 PDS suture.  Next the pockets were then irrigated with antibiotic solution as well as Betadine solution.  Two drains were placed to each breast pocket.  Next the skin was then re-prepped with Betadine solution.  Surgical gloves were changed prior to touching the implants.  Next the implants and acellular dermal matrix were then placed into the pockets and the inferior suture tabs were then secured with 2-0 Prolene suture bilaterally.  Next the skin was then tailor tacked with skin staples.  Spy visualization of the breast bilaterally was then performed was shown to have good perfusion to both skin mastectomy skin flaps.  Next the wounds were then closed in layers with 3-0 Monocryl deep dermal suture and 2-0 Quill subcuticular stitch.  Dermabond tape was applied to the incisions followed by op sites and bio patches for the drains.  Nitro paste was applied to the nipple-areolar complex and covered by op site.  The sponge needle and instrument counts correct at the end of the case the patient tolerated the procedure well transferred to recovery room stable condition.    Description of the findings of the procedure:  As above    Estimated Blood Loss: * No values recorded between 11/30/2020  9:09 AM and 11/30/2020  2:38 PM *         Specimens:   Specimen (12h ago, onward)    None

## 2020-11-30 NOTE — DISCHARGE SUMMARY
[facility]  Short Stay  Discharge Summary    Admit Date: 11/30/2020    Discharge Date and Time:  11/30/2020 2:36 PM      Discharge Attending Physician: Lamin Seo MD     Hospital Course the patient underwent bilateral nipple sparing mastectomies via a hidden scar approach with bilateral tissue expander reconstruction.  Patient tolerated well without complications.    Final Diagnoses:    Principal Problem: Malignant neoplasm of left breast   Secondary Diagnoses:  Acquired absence of bilateral breasts    Discharged Condition: good    Disposition: Home or Self Care    Follow up/Patient Instructions:      Patient is to follow up with me next week.  Patient was instructed to keep dressings in place until follow-up appointment.  No heavy lifting.  Do not get the breasts wet..    Medications:  Reconciled Home Medications:      Medication List      CHANGE how you take these medications    methylphenidate HCl 10 MG tablet  Commonly known as: RITALIN  Take 1 tablet (10 mg total) by mouth 2 (two) times daily with meals.  What changed: additional instructions        CONTINUE taking these medications    ALPRAZolam 0.5 MG tablet  Commonly known as: XANAX  Take 1 tablet (0.5 mg total) by mouth 3 (three) times daily as needed for Insomnia or Anxiety.     ATRALIN 0.05 % gel  Generic drug: tretinoin  2 (two) times daily as needed.     HYDROcodone-acetaminophen 5-325 mg per tablet  Commonly known as: NORCO  Take 1 tablet by mouth every 8 (eight) hours as needed for Pain.     lidocaine-prilocaine cream  Commonly known as: EMLA  Apply topically as needed.     naproxen 500 MG tablet  Commonly known as: NAPROSYN  Hold until after surgery     ondansetron 8 MG tablet  Commonly known as: ZOFRAN  Take 1 tablet (8 mg total) by mouth every 8 (eight) hours as needed for Nausea.     spironolactone 50 MG tablet  Commonly known as: ALDACTONE  Take 1 tablet (50 mg total) by mouth every evening.     sulfacetamide  sodium-sulfur 8-4 % Susp     valACYclovir 500 MG tablet  Commonly known as: VALTREX  Take 1 pill twice daily x 3 days as needed for fever blister.     venlafaxine 37.5 MG 24 hr capsule  Commonly known as: EFFEXOR XR  Take 1 capsule (37.5 mg total) by mouth once daily.          Discharge Procedure Orders   Diet Adult Regular     Lifting restrictions     Sponge bath only until clinic visit     Notify your health care provider if you experience any of the following:  temperature >100.4     Notify your health care provider if you experience any of the following:  persistent nausea and vomiting or diarrhea     Notify your health care provider if you experience any of the following:  severe uncontrolled pain     Notify your health care provider if you experience any of the following:  redness, tenderness, or signs of infection (pain, swelling, redness, odor or green/yellow discharge around incision site)     Notify your health care provider if you experience any of the following:  difficulty breathing or increased cough     Notify your health care provider if you experience any of the following:  severe persistent headache     Notify your health care provider if you experience any of the following:  worsening rash     Notify your health care provider if you experience any of the following:  persistent dizziness, light-headedness, or visual disturbances     Notify your health care provider if you experience any of the following:  increased confusion or weakness     Notify your health care provider if you experience any of the following:     Leave dressing on - Keep it clean, dry, and intact until clinic visit     Follow-up Information     Lamin eSo MD In 1 week.    Specialty: Plastic Surgery  Why: For post-op visit  Contact information:  [address] [facility]  [address] [place] [address] [id number] [phone]

## 2020-11-30 NOTE — OR NURSING
Right mastectomy: 204g  Right tissue expander (321L-FJ-73-T) 300cc filled with 100cc AIR    Left mastectomy: 200g  Left tissue expander (177R-PC-71-T) 300cc filled with 100cc AIR

## 2020-11-30 NOTE — DISCHARGE INSTRUCTIONS
Anesthesia: After Your Surgery  Youve just had surgery. During surgery, you received medication called anesthesia to keep you comfortable and pain-free. After surgery, you may experience some pain or nausea. This is common. Here are some tips for feeling better and recovering after surgery.    Going home  Your doctor or nurse will show you how to take care of yourself when you go home. He or she will also answer your questions. Have an adult family member or friend drive you home. For the first 24 hours after your surgery:  · Do not drive or use heavy equipment.  · Do not make important decisions or sign legal documents.  · Avoid alcohol.  · Have someone stay with you, if needed. He or she can watch for problems and help keep you safe.  · Take your time getting up from a seated or lying position. You may experience dizziness for 24 hours  Be sure to keep all follow-up appointments with your doctor. And rest after your procedure for as long as your doctor tells you to.    Coping with pain  If you have pain after surgery, pain medication will help you feel better. Take it as directed, before pain becomes severe. Also, ask your doctor or pharmacist about other ways to control pain, such as with heat, ice, and relaxation. And follow any other instructions your surgeon or nurse gives you.    URINARY RETENTION  Should you experience a decrease in your urine output or are unable to urinate following surgery, this can be due to the medications given during surgery.  We recommend you going to the nearest Emergency Department.    Tips for taking pain medication  To get the best relief possible, remember these points:  · Pain medications can upset your stomach. Taking them with a little food may help.  · Most pain relievers taken by mouth need at least 20 to 30 minutes to take effect.  · Taking medication on a schedule can help you remember to take it. Try to time your medication so that you can take it before beginning an  activity, such as dressing, walking, or sitting down for dinner.  · Constipation is a common side effect of pain medications. Contact your doctor before taking any medications like laxatives or stool softeners to help relieve constipation. Also ask about any dietary restrictions, because drinking lots of fluids and eating foods like fruits and vegetables that are high in fiber can also help. Remember, dont take laxatives unless your surgeon has prescribed them.  · Mixing alcohol and pain medication can cause dizziness and slow your breathing. It can even be fatal. Dont drink alcohol while taking pain medication.  · Pain medication can slow your reflexes. Dont drive or operate machinery while taking pain medication.  If your health care provider tells you to take acetaminophen to help relieve your pain, ask him or her how much you are supposed to take each day. (Acetaminophen is the generic name for Tylenol and other brand-name pain relievers.) Acetaminophen or other pain relievers may interact with your prescription medicines or other over-the-counter (OTC) drugs. Some prescription medications contain acetaminophen along with other active ingredients. Using both prescription and OTC acetaminophen for pain can cause you to overdose. The FDA recommends that you read the labels on your OTC medications carefully. This will help you to clearly understand the list of active ingredients, dosing instructions, and any warnings. It may also help you avoid taking too much acetaminophen. If you have questions or don't understand the information, ask your pharmacist or health care provider to explain it to you before you take the OTC medication.    Managing nausea  Some people have an upset stomach after surgery. This is often due to anesthesia, pain, pain medications, or the stress of surgery. The following tips will help you manage nausea and get good nutrition as you recover. If you were on a special diet before surgery,  "ask your doctor if you should follow it during recovery. These tips may help:  · Dont push yourself to eat. Your body will tell you when to eat and how much.  · Start off with clear liquids and soup. They are easier to digest.  · Progress to semi-solid foods (mashed potatoes, applesauce, and gelatin) as you feel ready.  · Slowly move to solid foods. Dont eat fatty, rich, or spicy foods at first.  · Dont force yourself to have three large meals a day. Instead, eat smaller amounts more often.  · Take pain medications with a small amount of solid food, such as crackers or toast to avoid nausea.      Call your surgeon if    · You feel too sleepy, dizzy, or groggy (medication may be too strong).  · You have side effects like nausea, vomiting, or skin changes (rash, itching, or hives).   © 3297-7770 [employer]. [address] [place] [address], [place] [address]. All rights reserved. This information is not intended as a substitute for professional medical care. Always follow your healthcare professional's instructions.          Caring for a Closed Suction Drainage Tube  A drainage tube removes fluid from around an incision. This helps prevent infection and promotes healing. The collection bulb at the end of the tube is squeezed and plugged to create suction. The bulb should be emptied and reset when half full to maintain adequate suction. You need to empty the bulb and clean the skin around the drain as often as your healthcare provider tells you to. Follow the steps below.     What youll need  Have the following items ready:  · Disposable gloves  · Measuring cup  · Record sheet  · Gauze or paper towel  · Sterile cotton swabs or 4" x 4" gauze pads  · Sterile saline or soap and water       Step 1. Empty the bulb  · Wash your hands and put on a new pair of disposable gloves.  · Point the top of the bulb away from you and remove the stopper.  · Turn the bulb upside down over a measuring cup. Squeeze the fluid into " "the cup. Make sure the bulb is totally empty.  · Put the cup to one side. You can record the volume of liquid in the cup after you clean and reconnect the bulb in step 2.    Step 2. Clean and reconnect the bulb  · Clean the top of the bulb with clean gauze or a paper towel, if needed.  · Squeeze the bulb tight, and put the stopper back on the top.  · Record the amount of fluid in the cup. Then, empty the cup as directed.    Step 3. Clean the site  · Remove your disposable gloves and wash your hands before cleaning the site.  · Put on a new pair of disposable gloves.  · Wet a sterile cotton swab or 4" x 4" gauze pad with sterile saline or soap and water.  · Gently clean the skin around the drain. Always wipe away from the incision.  · Apply an antibacterial ointment if directed.   When to call your healthcare provider  Call your healthcare provider if you notice any of these changes:  · The amount of fluid increases or decreases suddenly  · Large amount of blood or a clot in drainage  · Color, odor, or thickness of the fluid changes  · Tube falls out or the incision opens  · Skin around the drain is red, swollen, painful, or seeping pus  · You have a fever of 100.4°F (38°C) or higher, or as directed by your healthcare provider     If the tube isn't draining  Here are tips to drain the tube:  · Uncurl any kinks in the tube.  · With one hand, firmly hold the base of the tube between your thumb and index finger. Do not touch the incision.  · Put the thumb and index finger of your other hand on the tube, next to the first hand. Pinch your fingers together. Then pull them along the tube toward the bag. This will help push any clogged fluid through the tube. This is called "stripping the tube." You may find it helpful to hold an alcohol swab between your fingers and the tube to lubricate the tubing.  · If the tube still does not drain, call your healthcare provider.   Date Last Reviewed: 12/1/2016  © 7103-8442 [employer]. [address] [place] [address], [place] [address]. All rights reserved. This information is not intended as a substitute for professional medical care. Always follow your healthcare professional's instructions.        FOLLOW ANY OTHER INSTRUCTIONS GIVEN TO YOU BY DR CALL

## 2020-11-30 NOTE — PLAN OF CARE
During phase II assessment, patient c/o numbness to R thumb, first and middle fingers.  Anesthesia called and will come to assess at bedside.

## 2020-11-30 NOTE — PLAN OF CARE
Anesthesia came to patient's bedside to assess her c/o numbness.  States that it may be r/t positioning during surgery and should resolve in a few days.  If condition worsens or does not improve by three days, patient was instructed to call MD.  Mukesh Dumont and Gabbi messaged via secure chat.  Patient and mom verbalized understanding.

## 2020-11-30 NOTE — OP NOTE
Operative Note     11/30/2020    PRE-OP DIAGNOSIS: HX: breast cancer [Z85.3]      POST-OP DIAGNOSIS: Post-Op Diagnosis Codes:     * HX: breast cancer [Z85.3]     * Malignant neoplasm of left female breast, unspecified estrogen receptor status, unspecified site of breast [C50.912]    Procedure(s):  INSERTION, BREAST TISSUE EXPANDER - BILATERAL BREAST RECONSTRUCTION WITH TISSUE EXPANDERS AND ACELLULAR DERMAL MATRIX    MASTECTOMY, BILATERAL nipple sparing  BIOPSY, LYMPH NODE, SENTINEL left axillary deep  ID of SLN  Injection of isosulfan blue dye and radioactive techntium labled radioacolloid for SLN       SURGEON: Surgeon(s) and Role:  Panel 1:     * Lamin Seo MD - Primary  Panel 2:     * Jessica Dumont MD - Primary    ANESTHESIA: General     OPERATIVE FINDINGS: 4 SLN, counts 33, 49, 65, 11 with clip.  All negative on frozen section.    INDICATION FOR PROCEDURE: This patient presents with a history of cancer of the left breast    PROCEDURE IN DETAIL:  Michelle Gage is a 35 y.o. female brought to the operating room for definitive surgery of cancer of the left breast.  The patient has elected to undergo bilateral nipple sparing mastectomy with sentinel lymph node biopsy for aamir assessment. The patient was informed of the possible risks and complications of the procedure, including but not limited to anesthetic risks, bleeding, infection, and need for additional surgery.  The patient concurred with the proposed plan, and has given informed consent.  The site of surgery was properly noted/marked in the preoperative holding area.    Prior to arriving in the operating room, the patient's left breast was injected with technetium to facilitate sentinel lymph node identification. The patient was then brought to the operating room and placed in the supine position with both upper extremities extended.  general anesthesia was administered. Perioperative antibiotics were administered consisting of Ancef and  a time out was performed confirming the patient, site, and procedure.  The bilateral chest and axilla was then prepped and draped in the usual sterile fashion.    We first turned our attention to the right prophylactic breast where an inframammary incision was made, sparing the nipple areolar complex.  The incision was made with a plasmablade and deepened through the subcutaneous tissues with plasmablade.  Skin flaps were raised to the clavicle superiorly, to the lateral border of the sternum medially, to the inframammary fold inferiorly, and to the anterior border of the latissimus dorsi muscle laterally. Lighted retractors was used to assist in the creation of the skin flaps. The tissue beneath the nipple areolar complex was sharply dissected being careful to dissect to the dermis.  We were careful to ensure that the ductal tissue beneath the nipple was removed.  The breast tissue was then sharply excised off the chest wall taking care to incorporate the pectoralis fascia while leaving the serratus fascia behind.  The resulting mastectomy specimen was marked using a short stitch superiorly and a long stitch laterally.  The breast was sent to pathology for permanent evaluation.   The operative field was irrigated with normal saline and all bleeding points were secured with Bovie electrocautery.   The incision will be closed by the plastic surgery service.      We then turned our attention to the left axilla.   Blue dye was injected prior to the incision in the usual subareolar fashion. The gamma probe was used to identify an area of increased radioactivity within the lower axilla.  A small incision was made in a transverse inferior fashion just beneath the hairline in the axilla. The clavipectoral sheath was sharply incised to reveal the level I axillary lymph nodes. The probe was used to identify a single node with increased radioactivity.  This node was brought into the operative field and carefully dissected  free of the surrounding lymphovascular structures.  The highest ex vivo count of the node was 65.  The node was then sent to pathology for frozen section evaluation, labeled as sentinel node #1.  A total of 4 axillary sentinel nodes and 0 axillary non-sentinel nodes were identified, excised and submitted to pathology.  Bed counts were obtained to confirm that the 10% rule had not been violated.   The wound was irrigated with normal saline, and all bleeding points were secured with Bovie electrocautery.  The incision was closed with an interrupted 3-0 vicryl deep dermal stitch followed by a running 4-0 monocryl subcuticular.     We then turned our attention back to the left breast where an inframammary incision was fashioned sparing the nipple areolar complex.  The incision was made with a plasmablade and extended through the subcutaneous tissues with plasmablade.  Skin flaps were raised to the clavicle superiorly.  We then proceeded to raise the remainder of the flaps to the lateral border of the sternum medially, to the inframammary fold inferiorly, and to the anterior border of the latissimus dorsi muscle laterally.  Lighted retractors was used to assist in the creation of the skin flaps. The tissue beneath the nipple areolar complex was sharply dissected being careful to dissect to the dermis.  We were careful to ensure that the ductal tissue beneath the nipple was removed. The breast tissue was sharply excised off the chest wall taking care to incorporate the pectoralis fascia while leaving the serratus fascia behind.  The resulting mastectomy specimen was marked using a short stitch superiorly and long stitch laterally.  The breast was sent to pathology for permanent evaluation.      Frozen section aamir evaluation revealed no evidence of metastatic disease.  Therefore, the operative field was irrigated with normal saline and all bleeding points were secured with Bovie electrocautery.  The incision will be  closed by the plastic surgery service.      At the end of my portion of the operation, all sponge, instrument, and needle counts x 2 were correct.    ESTIMATED BLOOD LOSS: less than 50 mL    COMPLICATIONS: none    DISPOSITION: intraop transfer to the plastic surgery service    ATTESTATION:   I performed the procedure.

## 2020-11-30 NOTE — TRANSFER OF CARE
"Anesthesia Transfer of Care Note    Patient: Michelle Gage    Procedure(s) Performed: Procedure(s) (LRB):  INSERTION, BREAST TISSUE EXPANDER - BILATERAL BREAST RECONSTRUCTION WITH TISSUE EXPANDERS AND ACELLULAR DERMAL MATRIX (Bilateral)  MASTECTOMY, BILATERAL (Bilateral)  BIOPSY, LYMPH NODE, SENTINEL (Left)  RECONSTRUCTION, BREAST (Bilateral)    Patient location: PACU    Anesthesia Type: general    Transport from OR: Transported from OR on 2-3 L/min O2 by NC with adequate spontaneous ventilation    Post pain: adequate analgesia    Post assessment: no apparent anesthetic complications and tolerated procedure well    Post vital signs: stable    Level of consciousness: awake, alert and oriented    Nausea/Vomiting: no nausea/vomiting    Complications: none    Transfer of care protocol was followed      Last vitals:   Visit Vitals  BP (!) 94/50   Pulse 80   Temp 36.3 °C (97.3 °F) (Tympanic)   Resp 18   Ht 5' 5" (1.651 m)   Wt 47.6 kg (105 lb)   SpO2 100%   Breastfeeding No   BMI 17.47 kg/m²     "

## 2020-12-01 ENCOUNTER — PATIENT MESSAGE (OUTPATIENT)
Dept: HEMATOLOGY/ONCOLOGY | Facility: CLINIC | Age: 35
End: 2020-12-01

## 2020-12-01 NOTE — PLAN OF CARE
Michelle Goodth Denisehilda has met all discharge criteria from Phase II. Vital Signs are stable, ambulating  without difficulty. Discharge instructions given, patient verbalized understanding. Discharged from facility via wheelchair in stable condition.

## 2020-12-07 LAB
FINAL PATHOLOGIC DIAGNOSIS: NORMAL
FROZEN SECTION DIAGNOSIS: NORMAL
FROZEN SECTION FOOTNOTE: NORMAL
GROSS: NORMAL
Lab: NORMAL

## 2020-12-14 ENCOUNTER — OFFICE VISIT (OUTPATIENT)
Dept: SURGERY | Facility: CLINIC | Age: 35
End: 2020-12-14
Payer: COMMERCIAL

## 2020-12-14 VITALS
WEIGHT: 110.56 LBS | SYSTOLIC BLOOD PRESSURE: 112 MMHG | BODY MASS INDEX: 17.77 KG/M2 | HEART RATE: 65 BPM | DIASTOLIC BLOOD PRESSURE: 63 MMHG | HEIGHT: 66 IN

## 2020-12-14 DIAGNOSIS — Z85.3 PERSONAL HISTORY OF BREAST CANCER: Primary | ICD-10-CM

## 2020-12-14 DIAGNOSIS — C50.912 INFILTRATING DUCTAL CARCINOMA OF LEFT BREAST: ICD-10-CM

## 2020-12-14 PROCEDURE — 3008F PR BODY MASS INDEX (BMI) DOCUMENTED: ICD-10-PCS | Mod: CPTII,S$GLB,, | Performed by: SURGERY

## 2020-12-14 PROCEDURE — 99024 POSTOP FOLLOW-UP VISIT: CPT | Mod: S$GLB,,, | Performed by: SURGERY

## 2020-12-14 PROCEDURE — 99999 PR PBB SHADOW E&M-EST. PATIENT-LVL III: CPT | Mod: PBBFAC,,, | Performed by: SURGERY

## 2020-12-14 PROCEDURE — 99999 PR PBB SHADOW E&M-EST. PATIENT-LVL III: ICD-10-PCS | Mod: PBBFAC,,, | Performed by: SURGERY

## 2020-12-14 PROCEDURE — 1125F PR PAIN SEVERITY QUANTIFIED, PAIN PRESENT: ICD-10-PCS | Mod: S$GLB,,, | Performed by: SURGERY

## 2020-12-14 PROCEDURE — 1125F AMNT PAIN NOTED PAIN PRSNT: CPT | Mod: S$GLB,,, | Performed by: SURGERY

## 2020-12-14 PROCEDURE — 99024 PR POST-OP FOLLOW-UP VISIT: ICD-10-PCS | Mod: S$GLB,,, | Performed by: SURGERY

## 2020-12-14 PROCEDURE — 3008F BODY MASS INDEX DOCD: CPT | Mod: CPTII,S$GLB,, | Performed by: SURGERY

## 2020-12-15 ENCOUNTER — PATIENT MESSAGE (OUTPATIENT)
Dept: SURGERY | Facility: CLINIC | Age: 35
End: 2020-12-15

## 2020-12-15 ENCOUNTER — TUMOR BOARD CONFERENCE (OUTPATIENT)
Dept: SURGERY | Facility: CLINIC | Age: 35
End: 2020-12-15

## 2020-12-15 ENCOUNTER — TELEPHONE (OUTPATIENT)
Dept: HEMATOLOGY/ONCOLOGY | Facility: CLINIC | Age: 35
End: 2020-12-15

## 2020-12-15 NOTE — TELEPHONE ENCOUNTER
----- Message from Krzysztof Miranda DNP sent at 12/15/2020 10:22 AM CST -----  Cathy,    Please call the pt to schedule a Cancer Genetics visit with me    Please let her know that Dr. Crum agrees with recommendation for this visit to discuss her genetic testing results    Virtual or in-person OK  Referring is Dr. Crum  Reason for visit (please place in appt note when scheduling):  VUS    Thank you,  Krzysztof    ----- Message -----  From: Mone Crum MD  Sent: 12/15/2020   9:54 AM CST  To: Krzysztof Miranda DNP    Yes thank you  ----- Message -----  From: Krzysztof Miranda DNP  Sent: 12/15/2020   8:02 AM CST  To: MD Dr. Skyla Guzman-  OK for Cathy to reach out to pt to schedule post-test genetics appt to discuss her VUS?

## 2020-12-15 NOTE — PROGRESS NOTES
Oncology History   Malignant neoplasm of upper-outer quadrant of left breast in female, estrogen receptor positive   5/11/2020 Imaging Significant Findings    Mammogram and US  Impression:  1. Left breast 01:00 o'clock axis mass corresponds to the area of palpable concern. BI-RADS 4: Suspicious. Recommend ultrasound-guided biopsy.      2. Mild left axillary adenopathy. BI-RADS 4: Suspicious. Recommend ultrasound-guided biopsy for the most abnormal node.      Both biopsies will be performed today.     BI-RADS Category:   Overall: 4 - Suspicious     Recommendation:  1. Ultrasound-guided biopsy for the left breast 01:00 o'clock axis mass.   2. Ultrasound-guided biopsy for the most abnormal left axillary node.      Further management of any symptomatic or palpable concern should be determined clinically.     5/11/2020 Biopsy    1. BREAST, LEFT, 01:00, 8 CM FROM NIPPLE, MASS, ULTRASOUND-GUIDED BIOPSY:  - Invasive ductal carcinoma of breast.  - Size of invasive carcinoma: 8 MM in greatest linear dimension within a single core biopsy fragment.  - [place] Histologic Score: Grade 3 of 3.    2. AXILLARY LYMPH NODE, LEFT, ULTRASOUND-GUIDED BIOPSY:  - Negative for metastatic carcinoma.  - Benign lymphoid tissue with reactive changes     5/11/2020 Breast Tumor Markers    Estrogen Receptor: Positive >90%  Progesterone Receptor: Positive >90%  HER2: Positive  Ki67: 10-30%     5/14/2020 Genetic Testing    MyRisk: Negative, VUS: APC     5/15/2020 Initial Diagnosis    Malignant neoplasm of upper-outer quadrant of left breast in female, estrogen receptor positive     5/15/2020 Cancer Staged    Staging form: Breast, AJCC 8th Edition  - Clinical stage from 5/15/2020: Stage IA (cT1c, cN0(f), cM0, G3, ER+, IL+, HER2+)     5/18/2020 Imaging Significant Findings    Breast MRI  Impression:  Left breast upper outer quadrant far posterior depth mass is biopsy-proven malignancy. BI-RADS 6: Known malignancy.      BI-RADS Category:   Overall:  6 - Known Biopsy-Proven Malignancy     Recommendation:  The patient is already under Breast Surgical and Oncologic care.        6/17/2020 -  Chemotherapy    Treatment Summary   Plan Name: OP BREAST DOCETAXEL CARBOPLATIN TRASTUZUMAB Q3W  Treatment Goal: Curative  Status: Active  Start Date: 6/17/2020  End Date: 7/8/2021 (Planned)  Provider: Mone Crum MD  Chemotherapy: CARBOplatin (PARAPLATIN) 620 mg in sodium chloride 0.9% 250 mL chemo infusion, 620 mg (100 % of original dose 618.6 mg), Intravenous, Clinic/HOD 1 time, 6 of 6 cycles  Dose modification:   (original dose 618.6 mg, Cycle 1),   (original dose 559 mg, Cycle 3),   (original dose 610.2 mg, Cycle 2),   (original dose 498.5 mg, Cycle 4, Comment: neutropenia)  Administration: 620 mg (6/17/2020), 495 mg (8/18/2020), 540 mg (7/15/2020), 500 mg (9/9/2020), 515 mg (10/7/2020), 515 mg (10/30/2020)  DOCEtaxeL (TAXOTERE) 75 mg/m2 = 115 mg in sodium chloride 0.9% 250 mL chemo infusion, 75 mg/m2 = 115 mg, Intravenous, Clinic/HOD 1 time, 6 of 6 cycles  Dose modification: 60 mg/m2 (original dose 75 mg/m2, Cycle 3)  Administration: 115 mg (6/17/2020), 110 mg (7/15/2020), 90 mg (8/18/2020), 90 mg (9/9/2020), 90 mg (10/7/2020), 90 mg (10/30/2020)  trastuzumab 403 mg in sodium chloride 0.9% 250 mL chemo infusion, 8 mg/kg = 403 mg, Intravenous, Clinic/HOD 1 time, 6 of 18 cycles  Administration: 403 mg (6/17/2020), 283 mg (7/15/2020), 286 mg (8/18/2020), 289 mg (9/9/2020), 300 mg (10/7/2020), 300 mg (10/30/2020)     11/6/2020 Imaging Significant Findings    Breast MRI  Impression:     The previously described upper outer quadrant known malignancy in the left breast is no longer visualized consistent with complete imaging response to therapy.      BI-RADS Category:   Overall: 6 - Known Biopsy-Proven Malignancy     Recommendation:  Continued breast surgery and oncology management.            11/30/2020 Breast Surgery    Surgery: Dr Jessica Dumont, [phone] performed  bilateral mastectomy with sentinel lymph node biopsy with pathology showing grade 1 invasive ductal carcinoma,  3 mm with 0 positive lymph nodes.        11/30/2020 Cancer Staged    [id number]     12/15/2020 Tumor Conference      Post mastectomy radiation not recommended but patient will meet with radiation oncology. Systemically,standard of care would be Kadcyla followed by endocrine therapy.

## 2020-12-16 ENCOUNTER — PATIENT MESSAGE (OUTPATIENT)
Dept: SURGERY | Facility: CLINIC | Age: 35
End: 2020-12-16

## 2020-12-17 NOTE — PLAN OF CARE
DISCONTINUE OFF PATHWAY REGIMEN - Breast            Trastuzumab-xxxx       Trastuzumab-xxxx       Docetaxel (Taxotere)       Carboplatin (Paraplatin)           Additional Orders: Premedicate with dexamethasone 8 mg PO bid for three   days beginning 1 day prior to therapy  * All AUC calculations intended to be   used in Manuel formula.    **Always confirm dose/schedule in your pharmacy ordering system**    REASON: Other Reason  PRIOR TREATMENT:   TREATMENT RESPONSE: Partial Response (NC)    START ON PATHWAY REGIMEN - Breast    [id number]        Ado-trastuzumab emtansine (Kadcyla (T-DM1))           Additional Orders: LVEF assessment recommended at baseline and at least   every 3 months during treatment.    **Always confirm dose/schedule in your pharmacy ordering system**    Patient Characteristics:  Post-Neoadjuvant Therapy and Resection, HER2 Positive, ER Positive, Residual   Disease, Adjuvant Targeted Therapy After Neoadjuvant Chemo/Targeted Therapy  Therapeutic Status: Post-Neoadjuvant Therapy and Resection  ER Status: Positive (+)  HER2 Status: Positive (+)  NC Status: Positive (+)  Residual Invasive Disease Post-Neoadjuvant Therapy<= Yes  Intent of Therapy:  Curative Intent, Discussed with Patient

## 2020-12-18 ENCOUNTER — INFUSION (OUTPATIENT)
Dept: INFUSION THERAPY | Facility: HOSPITAL | Age: 35
End: 2020-12-18
Attending: NURSE PRACTITIONER
Payer: COMMERCIAL

## 2020-12-18 ENCOUNTER — OFFICE VISIT (OUTPATIENT)
Dept: HEMATOLOGY/ONCOLOGY | Facility: CLINIC | Age: 35
End: 2020-12-18
Payer: COMMERCIAL

## 2020-12-18 ENCOUNTER — HOSPITAL ENCOUNTER (OUTPATIENT)
Dept: CARDIOLOGY | Facility: HOSPITAL | Age: 35
Discharge: HOME | End: 2020-12-18
Attending: INTERNAL MEDICINE
Payer: COMMERCIAL

## 2020-12-18 VITALS
WEIGHT: 110.25 LBS | HEIGHT: 66 IN | HEART RATE: 79 BPM | DIASTOLIC BLOOD PRESSURE: 75 MMHG | HEART RATE: 72 BPM | SYSTOLIC BLOOD PRESSURE: 127 MMHG | WEIGHT: 105 LBS | RESPIRATION RATE: 16 BRPM | HEIGHT: 65 IN | BODY MASS INDEX: 17.72 KG/M2 | SYSTOLIC BLOOD PRESSURE: 117 MMHG | OXYGEN SATURATION: 97 % | BODY MASS INDEX: 17.49 KG/M2 | DIASTOLIC BLOOD PRESSURE: 64 MMHG | TEMPERATURE: 98 F

## 2020-12-18 DIAGNOSIS — C50.412 MALIGNANT NEOPLASM OF UPPER-OUTER QUADRANT OF LEFT BREAST IN FEMALE, ESTROGEN RECEPTOR POSITIVE: Primary | ICD-10-CM

## 2020-12-18 DIAGNOSIS — Z17.0 MALIGNANT NEOPLASM OF UPPER-OUTER QUADRANT OF LEFT BREAST IN FEMALE, ESTROGEN RECEPTOR POSITIVE: Primary | ICD-10-CM

## 2020-12-18 DIAGNOSIS — D70.1 CHEMOTHERAPY INDUCED NEUTROPENIA: ICD-10-CM

## 2020-12-18 DIAGNOSIS — Z17.0 MALIGNANT NEOPLASM OF UPPER-OUTER QUADRANT OF LEFT BREAST IN FEMALE, ESTROGEN RECEPTOR POSITIVE: ICD-10-CM

## 2020-12-18 DIAGNOSIS — T45.1X5A CHEMOTHERAPY INDUCED NEUTROPENIA: ICD-10-CM

## 2020-12-18 DIAGNOSIS — G89.3 PAIN, NEOPLASM-RELATED: ICD-10-CM

## 2020-12-18 DIAGNOSIS — E28.39 SUPPRESSION OF OVARIAN SECRETION: Primary | ICD-10-CM

## 2020-12-18 DIAGNOSIS — C50.412 MALIGNANT NEOPLASM OF UPPER-OUTER QUADRANT OF LEFT BREAST IN FEMALE, ESTROGEN RECEPTOR POSITIVE: ICD-10-CM

## 2020-12-18 LAB
ASCENDING AORTA: 2.35 CM
AV INDEX (PROSTH): 1.05
AV MEAN GRADIENT: 2 MMHG
AV PEAK GRADIENT: 5 MMHG
AV VALVE AREA: 2.82 CM2
AV VELOCITY RATIO: 0.88
BSA FOR ECHO PROCEDURE: 1.48 M2
CV ECHO LV RWT: 0.24 CM
DOP CALC AO PEAK VEL: 1.11 M/S
DOP CALC AO VTI: 19.09 CM
DOP CALC LVOT AREA: 2.7 CM2
DOP CALC LVOT DIAMETER: 1.85 CM
DOP CALC LVOT PEAK VEL: 0.98 M/S
DOP CALC LVOT STROKE VOLUME: 53.92 CM3
DOP CALCLVOT PEAK VEL VTI: 20.07 CM
E WAVE DECELERATION TIME: 214.95 MSEC
E/A RATIO: 1.05
E/E' RATIO: 3.54 M/S
ECHO LV POSTERIOR WALL: 0.53 CM (ref 0.6–1.1)
FRACTIONAL SHORTENING: 32 % (ref 28–44)
INTERVENTRICULAR SEPTUM: 0.49 CM (ref 0.6–1.1)
IVRT: 89.44 MSEC
LA MAJOR: 4.6 CM
LA MINOR: 4.41 CM
LA WIDTH: 3.33 CM
LEFT ATRIUM SIZE: 2.51 CM
LEFT ATRIUM VOLUME INDEX MOD: 20.6 ML/M2
LEFT ATRIUM VOLUME INDEX: 21.3 ML/M2
LEFT ATRIUM VOLUME MOD: 31.04 CM3
LEFT ATRIUM VOLUME: 31.99 CM3
LEFT INTERNAL DIMENSION IN SYSTOLE: 3.04 CM (ref 2.1–4)
LEFT VENTRICLE DIASTOLIC VOLUME INDEX: 60.11 ML/M2
LEFT VENTRICLE DIASTOLIC VOLUME: 90.42 ML
LEFT VENTRICLE MASS INDEX: 42 G/M2
LEFT VENTRICLE SYSTOLIC VOLUME INDEX: 24.1 ML/M2
LEFT VENTRICLE SYSTOLIC VOLUME: 36.23 ML
LEFT VENTRICULAR INTERNAL DIMENSION IN DIASTOLE: 4.46 CM (ref 3.5–6)
LEFT VENTRICULAR MASS: 63.71 G
LV LATERAL E/E' RATIO: 3.07 M/S
LV SEPTAL E/E' RATIO: 4.18 M/S
MV A" WAVE DURATION": 11.42 MSEC
MV PEAK A VEL: 0.44 M/S
MV PEAK E VEL: 0.46 M/S
PISA TR MAX VEL: 1.74 M/S
PULM VEIN S/D RATIO: 1.33
PV PEAK D VEL: 0.43 M/S
PV PEAK S VEL: 0.57 M/S
RA MAJOR: 3.81 CM
RA PRESSURE: 3 MMHG
RA WIDTH: 3.19 CM
RETIRED EF AND QEF - SEE NOTES: 60 %
RIGHT VENTRICULAR END-DIASTOLIC DIMENSION: 3.01 CM
RV TISSUE DOPPLER FREE WALL SYSTOLIC VELOCITY 1 (APICAL 4 CHAMBER VIEW): 11.17 CM/S
SINUS: 2.53 CM
STJ: 2.33 CM
TDI LATERAL: 0.15 M/S
TDI SEPTAL: 0.11 M/S
TDI: 0.13 M/S
TR MAX PG: 12 MMHG
TRICUSPID ANNULAR PLANE SYSTOLIC EXCURSION: 1.57 CM
TV REST PULMONARY ARTERY PRESSURE: 15 MMHG

## 2020-12-18 PROCEDURE — 93306 ECHO (CUPID ONLY): ICD-10-PCS | Mod: 26,,, | Performed by: INTERNAL MEDICINE

## 2020-12-18 PROCEDURE — 93306 TTE W/DOPPLER COMPLETE: CPT

## 2020-12-18 PROCEDURE — 99999 PR PBB SHADOW E&M-EST. PATIENT-LVL IV: CPT | Mod: PBBFAC,,, | Performed by: NURSE PRACTITIONER

## 2020-12-18 PROCEDURE — 93356 ECHO (CUPID ONLY): ICD-10-PCS | Mod: ,,, | Performed by: INTERNAL MEDICINE

## 2020-12-18 PROCEDURE — 96402 CHEMO HORMON ANTINEOPL SQ/IM: CPT

## 2020-12-18 PROCEDURE — 93356 MYOCRD STRAIN IMG SPCKL TRCK: CPT | Mod: ,,, | Performed by: INTERNAL MEDICINE

## 2020-12-18 PROCEDURE — 1125F AMNT PAIN NOTED PAIN PRSNT: CPT | Mod: S$GLB,,, | Performed by: NURSE PRACTITIONER

## 2020-12-18 PROCEDURE — 93306 TTE W/DOPPLER COMPLETE: CPT | Mod: 26,,, | Performed by: INTERNAL MEDICINE

## 2020-12-18 PROCEDURE — 99215 OFFICE O/P EST HI 40 MIN: CPT | Mod: S$GLB,,, | Performed by: NURSE PRACTITIONER

## 2020-12-18 PROCEDURE — 99999 PR PBB SHADOW E&M-EST. PATIENT-LVL IV: ICD-10-PCS | Mod: PBBFAC,,, | Performed by: NURSE PRACTITIONER

## 2020-12-18 PROCEDURE — 3008F BODY MASS INDEX DOCD: CPT | Mod: CPTII,S$GLB,, | Performed by: NURSE PRACTITIONER

## 2020-12-18 PROCEDURE — 99215 PR OFFICE/OUTPT VISIT, EST, LEVL V, 40-54 MIN: ICD-10-PCS | Mod: S$GLB,,, | Performed by: NURSE PRACTITIONER

## 2020-12-18 PROCEDURE — 63600175 PHARM REV CODE 636 W HCPCS: Mod: JG | Performed by: INTERNAL MEDICINE

## 2020-12-18 PROCEDURE — 3008F PR BODY MASS INDEX (BMI) DOCUMENTED: ICD-10-PCS | Mod: CPTII,S$GLB,, | Performed by: NURSE PRACTITIONER

## 2020-12-18 PROCEDURE — 1125F PR PAIN SEVERITY QUANTIFIED, PAIN PRESENT: ICD-10-PCS | Mod: S$GLB,,, | Performed by: NURSE PRACTITIONER

## 2020-12-18 RX ORDER — GABAPENTIN 100 MG/1
100 CAPSULE ORAL 3 TIMES DAILY
Qty: 90 CAPSULE | Refills: 1 | Status: SHIPPED | OUTPATIENT
Start: 2020-12-18 | End: 2020-12-30 | Stop reason: SDUPTHER

## 2020-12-18 RX ORDER — TAMOXIFEN CITRATE 20 MG/1
20 TABLET ORAL DAILY
Qty: 30 TABLET | Refills: 11 | Status: SHIPPED | OUTPATIENT
Start: 2020-12-18 | End: 2022-01-03 | Stop reason: SDUPTHER

## 2020-12-18 RX ADMIN — GOSERELIN ACETATE 3.6 MG: 3.6 IMPLANT SUBCUTANEOUS at 12:12

## 2020-12-18 NOTE — PROGRESS NOTES
Subjective:       Patient ID: Michelle Gage is a 35 y.o. female.    Chief Complaint: No chief complaint on file.    HPI     Presents for follow up post surgery. Residual disease. Here to discuss Kadcyla and get her Zoladex injection    Pain in breast/chest wall since surgery- very uncomfortable. Feels like a sharp pain and a pulling sensation.  Takes pain medication but no relief  Unable to stay asleep.   Has tried to take the pain medication at night but still wakes up.   Takes xanax to help her sleep but unable to stay asleep.   No other complaints.   Appetite is ok.   ROM limited in arms due to recent surgery.   + hot flashes but no fevers, chills or concerns for infection  Off and on neuropathy    ECHO 12/18/2020:  · The left ventricle is normal in size and normal systolic function. The estimated ejection fraction is 60%.  · The quantitatively derived ejection fraction is 60%.  · The left ventricular global longitudinal strain is -17.74%.  · Normal left ventricular diastolic function.  · Normal right ventricular size with normal right ventricular systolic function.  · The estimated PA systolic pressure is 15 mmHg.  · Normal central venous pressure (3 mmHg).    This is a former patient of Dr. Christy Salazar's but now with Dr. Crum    Diagnosis: Stage I ([id number]) invasive ductal carcinoma of left breast, upper outer quadrant, ER 95%, MD 90%, Her2 3+, Grade 3, Ki67 25%  Premenopausal status.     Oncologic History:  Presentation  - [id number] - presented for palpable left breast mass around 2018 - was being watched on imaging at outside hospital.    - 5/11/2020 - Diagnostic bilateral mammogram and left breast US at [facility] showed left breast 1:00 mass, 1.4 cm, 8 CFN, mild left axillary adenopathy  - 5/11/2020 - Biopsy - Grade 3 IDC, estrogen receptor 95%, progesterone receptor 90%, Her2 delphine 3+, Ki67 25%. LN biopsy negative  Surgery consultation with Dr Dumont on 5/14. Breast cancer is far in  axillary tail and felt to be difficult to obtain clear margins without neoadjuvant therapy.               - 5/14/2020 - Genetics [facility] BRACAnalysis neg; VUS AP             Medical oncology consultation on 5/15/2020 -  This case was discussed with Dr. Dumont.  She does feel like the patient will benefit from neoadjuvant therapy to help improve her surgical margins.  Since the tumor is less than 2 cm and clinically lymph node negative (MRI pending), I recommended treatment with Taxotere, carboplatin and Herceptin without Perjeta.  Discussed with her that there is data in tumors less than 2 cm to consider Taxol/Herceptin however would not typically use this in a neoadjuvant setting for concern for under treatment.                - Eggs Retrieval - has 2 embryos.               * 6/16/2020 started neoadjuvant TCH (no perjeta since < 2 cm and LN negative). Neoadjuvant therapy chosen due to location of tumor.    11/6/2020 Imaging Significant Findings     Breast MRI  Impression:     The previously described upper outer quadrant known malignancy in the left breast is no longer visualized consistent with complete imaging response to therapy.      BI-RADS Category:   Overall: 6 - Known Biopsy-Proven Malignancy     Recommendation:  Continued breast surgery and oncology management.             11/30/2020 Breast Surgery     Surgery: Dr Jessica Dumont, [phone] performed bilateral mastectomy with sentinel lymph node biopsy with pathology showing grade 1 invasive ductal carcinoma,  3 mm with 0 positive lymph nodes.          11/30/2020 Cancer Staged     [id number]      12/15/2020 Tumor Conference       Post mastectomy radiation not recommended but patient will meet with radiation oncology. Systemically,standard of care would be Kadcyla followed by endocrine therapy.            Review of Systems   Constitutional:        See above   All other systems reviewed and are negative.        Objective:      Physical Exam  Vitals signs  and nursing note reviewed.   Constitutional:       General: She is not in acute distress.     Appearance: Normal appearance. She is well-developed and normal weight.   HENT:      Head: Normocephalic and atraumatic.      Mouth/Throat:      Mouth: No oral lesions.   Eyes:      General: No scleral icterus.     Extraocular Movements: Extraocular movements intact.      Conjunctiva/sclera: Conjunctivae normal.      Pupils: Pupils are equal, round, and reactive to light.   Neck:      Musculoskeletal: Normal range of motion and neck supple. No neck rigidity.   Cardiovascular:      Rate and Rhythm: Normal rate and regular rhythm.      Heart sounds: Normal heart sounds. No murmur. No friction rub. No gallop.    Pulmonary:      Effort: Pulmonary effort is normal. No respiratory distress.      Breath sounds: Normal breath sounds. No wheezing, rhonchi or rales.      Comments: S/p bilateral mastectomies with expanders in place. Incisions healing well. No palpable masses or LAD  Chest:      Chest wall: There is no dullness to percussion.   Abdominal:      General: Abdomen is flat. Bowel sounds are normal. There is no distension.      Palpations: Abdomen is soft. There is no mass.      Tenderness: There is no abdominal tenderness. There is no guarding or rebound.      Comments: No organomegaly   Musculoskeletal: Normal range of motion.         General: No swelling, tenderness or deformity.      Right lower leg: No edema.      Left lower leg: No edema.   Skin:     General: Skin is warm and dry.      Coloration: Skin is not jaundiced or pale.      Findings: No bruising, erythema, lesion or rash.   Neurological:      Mental Status: She is alert and oriented to person, place, and time.   Psychiatric:         Mood and Affect: Mood normal.         Behavior: Behavior normal.         Thought Content: Thought content normal.         Judgment: Judgment normal.       Labs- most recent reviewed  Assessment:       1. Malignant neoplasm of  upper-outer quadrant of left breast in female, estrogen receptor positive    2. Pain, neoplasm-related    3. Chemotherapy induced neutropenia        Plan:           -Discussed Kadcyla indicated as improved survival with those who had residual disease.  Adverse effects discussed- see below.   -Discussed endocrine therapy, specifically tamoxifen. Avoid pregnancy. She has eggs harvested and we discussed revisiting the discussion in a year.   -Zoladex today and monthly.    -Rx Tamoxifen   -ECHO due 3/2021.  -See Krzysztof in January as scheduled with VUS.   -See Rad Onc as scheduled to discuss further recs.   -gabapentin for chest wall pain following surgery.     -RTC to see me or Dr. Crum with a cbc, cmp, Vit D and for Kadcyla once approved.     Patient was consented for chemotherapy today 12/18/2020 .   An extensive discussion was had which included a thorough discussion of the risk and benefits of treatment and alternatives.  Risks, including but not limited to, possible hair loss, bone marrow damage (anemia, thrombocytopenia, immune suppression, neutropenia), damage to body organs (brain, heart, liver, kidney, lungs, nervous system, skin, and others), allergic reactions, sterility, nausea/vomiting, constipation/diarrhea, sores in the mouth, secondary cancers, local damage at possible injection sites, and rarely death were all discussed.  The patient agrees with the plan, and all questions have been answered to their satisfaction.  Consent was signed the patient, provider, and a third party witness.      Kadcyla adverse effects:        Tamoxifen: possible adverse effects discussed.     Patient is in agreement with the proposed treatment plan. All questions were answered to the patient's satisfaction. Pt knows to call clinic for any new or worsening symptoms and if anything is needed before the next clinic visit. Patient was seen and examined by Dr. Crum, who agrees with above.         Curtis Benedict,  FNP-C  Hematology & Oncology  [address] [place] [address]  ph. [phone]  Fax. [phone]     I spent 40 minutes (face to face) with the patient, more than 50% was in counseling and coordination of care as detailed above.

## 2020-12-18 NOTE — NURSING
Patient received Zoladex injection SQ to ABD. Tolerated well.   Band-aid applied to site.  C/D/I.

## 2020-12-18 NOTE — PATIENT INSTRUCTIONS
Tamoxifen oral tablet  What is this medicine?  TAMOXIFEN (ta MOX i fen) blocks the effects of estrogen. It is commonly used to treat breast cancer. It is also used to decrease the chance of breast cancer coming back in women who have received treatment for the disease. It may also help prevent breast cancer in women who have a high risk of developing breast cancer.  How should I use this medicine?  Take this medicine by mouth with a glass of water. Follow the directions on the prescription label. You can take it with or without food. Take your medicine at regular intervals. Do not take your medicine more often than directed. Do not stop taking except on your doctor's advice.  A special MedGuide will be given to you by the pharmacist with each prescription and refill. Be sure to read this information carefully each time.  Talk to your pediatrician regarding the use of this medicine in children. While this drug may be prescribed for selected conditions, precautions do apply.  What side effects may I notice from receiving this medicine?  Side effects that you should report to your doctor or health care professional as soon as possible:  · changes in vision (blurred vision)  · changes in your menstrual cycle  · difficulty breathing or shortness of breath  · difficulty walking or talking  · new breast lumps  · numbness  · pelvic pain or pressure  · redness, blistering, peeling or loosening of the skin, including inside the mouth  · skin rash or itching (hives)  · sudden chest pain  · swelling of lips, face, or tongue  · swelling, pain or tenderness in your calf or leg  · unusual bruising or bleeding  · vaginal discharge that is bloody, brown, or rust  · weakness  · yellowing of the whites of the eyes or skin  Side effects that usually do not require medical attention (report to your doctor or health care professional if they continue or are bothersome):  · fatigue  · hair loss, although uncommon and is usually  mild  · headache  · hot flashes  · impotence (in men)  · nausea, vomiting (mild)  · vaginal discharge (white or clear)  What may interact with this medicine?  · aminoglutethimide  · bromocriptine  · chemotherapy drugs  · female hormones, like estrogens and birth control pills  · letrozole  · medroxyprogesterone  · phenobarbital  · rifampin  · warfarin  What if I miss a dose?  If you miss a dose, take it as soon as you can. If it is almost time for your next dose, take only that dose. Do not take double or extra doses.  Where should I keep my medicine?  Keep out of the reach of children.  Store at room temperature between 20 and 25 degrees C (68 and 77 degrees F). Protect from light. Keep container tightly closed. Throw away any unused medicine after the expiration date.  What should I tell my health care provider before I take this medicine?  They need to know if you have any of these conditions:  · blood clots  · blood disease  · cataracts or impaired eyesight  · endometriosis  · high calcium levels  · high cholesterol  · irregular menstrual cycles  · liver disease  · stroke  · uterine fibroids  · an unusual or allergic reaction to tamoxifen, other medicines, foods, dyes, or preservatives  · pregnant or trying to get pregnant  · breast-feeding  What should I watch for while using this medicine?  Visit your doctor or health care professional for regular checks on your progress. You will need regular pelvic exams, breast exams, and mammograms. If you are taking this medicine to reduce your risk of getting breast cancer, you should know that this medicine does not prevent all types of breast cancer. If breast cancer or other problems occur, there is no guarantee that it will be found at an early stage.  Do not become pregnant while taking this medicine or for 2 months after stopping this medicine. Stop taking this medicine if you get pregnant or think you are pregnant and contact your doctor. This medicine may harm  your unborn baby. Women who can possibly become pregnant should use birth control methods that do not use hormones during tamoxifen treatment and for 2 months after therapy has stopped. Talk with your health care provider for birth control advice.  Do not breast feed while taking this medicine.  NOTE:This sheet is a summary. It may not cover all possible information. If you have questions about this medicine, talk to your doctor, pharmacist, or health care provider. Copyright© 2017 [employer]            Ado-Trastuzumab Emtansine for injection  What is this medicine?  ADO-TRASTUZUMAB EMTANSINE (ADD oh tratheo [facility] mab em TAN zine) is a monoclonal antibody combined with chemotherapy. It is used to treat breast cancer.  How should I use this medicine?  This medicine is for infusion into a vein. It is given by a health care professional in a hospital or clinic setting.  Talk to your pediatrician regarding the use of this medicine in children. Special care may be needed.  What side effects may I notice from receiving this medicine?  Side effects that you should report to your doctor or health care professional as soon as possible:  · allergic reactions like skin rash, itching or hives, swelling of the face, lips, or tongue  · breathing problems  · chest pain or palpitations  · fever or chills, sore throat  · general ill feeling or flu-like symptoms  · light-colored stools  · nausea, vomiting  · pain, tingling, numbness in the hands or feet  · signs and symptoms of bleeding such as bloody or black, tarry stools; red or dark-brown urine; spitting up blood or brown material that looks like coffee grounds; red spots on the skin; unusual bruising or bleeding from the eye, gums, or nose  · swelling of the legs or ankles  · yellowing of the eyes or skin  Side effects that usually do not require medical attention (Report these to your doctor or health care professional if they continue or are bothersome.):  · changes in  taste  · constipation  · dizziness  · headache  · joint pain  · muscle pain  · trouble sleeping  · unusually weak or tired  What may interact with this medicine?  This medicine may also interact with the following medications:  · atazanavir  · boceprevir  · clarithromycin  · delavirdine  · indinavir  · dalfopristin; quinupristin  · isoniazid, INH  · itraconazole  · ketoconazole  · nefazodone  · nelfinavir  · ritonavir  · telaprevir  · telithromycin  · tipranavir  · voriconazole  What if I miss a dose?  It is important not to miss your dose. Call your doctor or health care professional if you are unable to keep an appointment.  Where should I keep my medicine?  This drug is given in a hospital or clinic and will not be stored at home.  What should I tell my health care provider before I take this medicine?  They need to know if you have any of these conditions:  · heart disease  · heart failure  · infection (especially a virus infection such as chickenpox, cold sores, or herpes)  · liver disease  · lung or breathing disease, like asthma  · an unusual or allergic reaction to ado-trastuzumab emtansine, other medications, foods, dyes, or preservatives  · pregnant or trying to get pregnant  · breast-feeding  What should I watch for while using this medicine?  Visit your doctor for checks on your progress. This drug may make you feel generally unwell. This is not uncommon, as chemotherapy can affect healthy cells as well as cancer cells. Report any side effects. Continue your course of treatment even though you feel ill unless your doctor tells you to stop.  Call your doctor or health care professional for advice if you get a fever, chills or sore throat, or other symptoms of a cold or flu. Do not treat yourself. This drug decreases your body's ability to fight infections. Try to avoid being around people who are sick.  Be careful brushing and flossing your teeth or using a toothpick because you may get an infection or  bleed more easily. If you have any dental work done, tell your dentist you are receiving this medicine.  Avoid taking products that contain aspirin, acetaminophen, ibuprofen, naproxen, or ketoprofen unless instructed by your doctor. These medicines may hide a fever.  Do not become pregnant while taking this medicine or for 7 months after stopping it, men with female partners should use contraception during treatment and for 4 months after the last dose. Women should inform their doctor if they wish to become pregnant or think they might be pregnant. There is a potential for serious side effects to an unborn child. Men should inform their doctors if they wish to father a child. This medicine may lower sperm counts. Talk to your health care professional or pharmacist for more information. Do not breast-feed an infant while taking this medicine or for 7 months after the last dose.  You may need blood work done while you are taking this medicine.  NOTE:This sheet is a summary. It may not cover all possible information. If you have questions about this medicine, talk to your doctor, pharmacist, or health care provider. Copyright© 2017 [employer]        Kadcyla

## 2020-12-28 ENCOUNTER — PATIENT MESSAGE (OUTPATIENT)
Dept: HEMATOLOGY/ONCOLOGY | Facility: CLINIC | Age: 35
End: 2020-12-28

## 2020-12-29 ENCOUNTER — LAB VISIT (OUTPATIENT)
Dept: LAB | Facility: HOSPITAL | Age: 35
End: 2020-12-29
Attending: NURSE PRACTITIONER
Payer: COMMERCIAL

## 2020-12-29 DIAGNOSIS — C50.412 MALIGNANT NEOPLASM OF UPPER-OUTER QUADRANT OF LEFT BREAST IN FEMALE, ESTROGEN RECEPTOR POSITIVE: ICD-10-CM

## 2020-12-29 DIAGNOSIS — Z17.0 MALIGNANT NEOPLASM OF UPPER-OUTER QUADRANT OF LEFT BREAST IN FEMALE, ESTROGEN RECEPTOR POSITIVE: ICD-10-CM

## 2020-12-29 LAB
25(OH)D3+25(OH)D2 SERPL-MCNC: 23 NG/ML (ref 30–96)
ALBUMIN SERPL BCP-MCNC: 3.8 G/DL (ref 3.5–5.2)
ALP SERPL-CCNC: 77 U/L (ref 55–135)
ALT SERPL W/O P-5'-P-CCNC: 7 U/L (ref 10–44)
ANION GAP SERPL CALC-SCNC: 8 MMOL/L (ref 8–16)
AST SERPL-CCNC: 13 U/L (ref 10–40)
BILIRUB SERPL-MCNC: 0.3 MG/DL (ref 0.1–1)
BUN SERPL-MCNC: 9 MG/DL (ref 6–20)
CALCIUM SERPL-MCNC: 9.8 MG/DL (ref 8.7–10.5)
CHLORIDE SERPL-SCNC: 107 MMOL/L (ref 95–110)
CO2 SERPL-SCNC: 28 MMOL/L (ref 23–29)
CREAT SERPL-MCNC: 0.7 MG/DL (ref 0.5–1.4)
ERYTHROCYTE [DISTWIDTH] IN BLOOD BY AUTOMATED COUNT: 14 % (ref 11.5–14.5)
EST. GFR  (AFRICAN AMERICAN): >60 ML/MIN/1.73 M^2
EST. GFR  (NON AFRICAN AMERICAN): >60 ML/MIN/1.73 M^2
GLUCOSE SERPL-MCNC: 113 MG/DL (ref 70–110)
HCT VFR BLD AUTO: 38.1 % (ref 37–48.5)
HGB BLD-MCNC: 12.3 G/DL (ref 12–16)
IMM GRANULOCYTES # BLD AUTO: 0.01 K/UL (ref 0–0.04)
MCH RBC QN AUTO: 31.4 PG (ref 27–31)
MCHC RBC AUTO-ENTMCNC: 32.3 G/DL (ref 32–36)
MCV RBC AUTO: 97 FL (ref 82–98)
NEUTROPHILS # BLD AUTO: 2.6 K/UL (ref 1.8–7.7)
PLATELET # BLD AUTO: 138 K/UL (ref 150–350)
PMV BLD AUTO: 11.4 FL (ref 9.2–12.9)
POTASSIUM SERPL-SCNC: 4.3 MMOL/L (ref 3.5–5.1)
PROT SERPL-MCNC: 6.7 G/DL (ref 6–8.4)
RBC # BLD AUTO: 3.92 M/UL (ref 4–5.4)
SODIUM SERPL-SCNC: 143 MMOL/L (ref 136–145)
WBC # BLD AUTO: 5.97 K/UL (ref 3.9–12.7)

## 2020-12-29 PROCEDURE — 36415 COLL VENOUS BLD VENIPUNCTURE: CPT

## 2020-12-29 PROCEDURE — 82306 VITAMIN D 25 HYDROXY: CPT

## 2020-12-29 PROCEDURE — 80053 COMPREHEN METABOLIC PANEL: CPT

## 2020-12-29 PROCEDURE — 85027 COMPLETE CBC AUTOMATED: CPT

## 2020-12-30 ENCOUNTER — OFFICE VISIT (OUTPATIENT)
Dept: HEMATOLOGY/ONCOLOGY | Facility: CLINIC | Age: 35
End: 2020-12-30
Payer: COMMERCIAL

## 2020-12-30 ENCOUNTER — INFUSION (OUTPATIENT)
Dept: INFUSION THERAPY | Facility: HOSPITAL | Age: 35
End: 2020-12-30
Attending: NURSE PRACTITIONER
Payer: COMMERCIAL

## 2020-12-30 VITALS
RESPIRATION RATE: 18 BRPM | HEIGHT: 66 IN | WEIGHT: 112.88 LBS | BODY MASS INDEX: 18.14 KG/M2 | HEART RATE: 84 BPM | SYSTOLIC BLOOD PRESSURE: 145 MMHG | OXYGEN SATURATION: 97 % | TEMPERATURE: 99 F | DIASTOLIC BLOOD PRESSURE: 77 MMHG

## 2020-12-30 VITALS
RESPIRATION RATE: 18 BRPM | WEIGHT: 112.88 LBS | SYSTOLIC BLOOD PRESSURE: 125 MMHG | BODY MASS INDEX: 18.14 KG/M2 | DIASTOLIC BLOOD PRESSURE: 70 MMHG | TEMPERATURE: 99 F | HEART RATE: 65 BPM | HEIGHT: 66 IN

## 2020-12-30 DIAGNOSIS — E55.9 VITAMIN D DEFICIENCY: ICD-10-CM

## 2020-12-30 DIAGNOSIS — G89.3 PAIN, NEOPLASM-RELATED: ICD-10-CM

## 2020-12-30 DIAGNOSIS — Z17.0 MALIGNANT NEOPLASM OF UPPER-OUTER QUADRANT OF LEFT BREAST IN FEMALE, ESTROGEN RECEPTOR POSITIVE: Primary | ICD-10-CM

## 2020-12-30 DIAGNOSIS — D69.6 THROMBOCYTOPENIA: ICD-10-CM

## 2020-12-30 DIAGNOSIS — T45.1X5A CHEMOTHERAPY-INDUCED NAUSEA: ICD-10-CM

## 2020-12-30 DIAGNOSIS — T45.1X5A CHEMOTHERAPY INDUCED NEUTROPENIA: ICD-10-CM

## 2020-12-30 DIAGNOSIS — C50.412 MALIGNANT NEOPLASM OF UPPER-OUTER QUADRANT OF LEFT BREAST IN FEMALE, ESTROGEN RECEPTOR POSITIVE: Primary | ICD-10-CM

## 2020-12-30 DIAGNOSIS — R11.0 CHEMOTHERAPY-INDUCED NAUSEA: ICD-10-CM

## 2020-12-30 DIAGNOSIS — D70.1 CHEMOTHERAPY INDUCED NEUTROPENIA: ICD-10-CM

## 2020-12-30 DIAGNOSIS — Z51.11 ENCOUNTER FOR CHEMOTHERAPY MANAGEMENT: ICD-10-CM

## 2020-12-30 LAB — SARS-COV-2 RDRP RESP QL NAA+PROBE: NEGATIVE

## 2020-12-30 PROCEDURE — 96413 CHEMO IV INFUSION 1 HR: CPT

## 2020-12-30 PROCEDURE — 1125F PR PAIN SEVERITY QUANTIFIED, PAIN PRESENT: ICD-10-PCS | Mod: S$GLB,,, | Performed by: NURSE PRACTITIONER

## 2020-12-30 PROCEDURE — 25000003 PHARM REV CODE 250: Performed by: INTERNAL MEDICINE

## 2020-12-30 PROCEDURE — 3008F PR BODY MASS INDEX (BMI) DOCUMENTED: ICD-10-PCS | Mod: CPTII,S$GLB,, | Performed by: NURSE PRACTITIONER

## 2020-12-30 PROCEDURE — 99215 OFFICE O/P EST HI 40 MIN: CPT | Mod: S$GLB,,, | Performed by: NURSE PRACTITIONER

## 2020-12-30 PROCEDURE — 99999 PR PBB SHADOW E&M-EST. PATIENT-LVL IV: CPT | Mod: PBBFAC,,, | Performed by: NURSE PRACTITIONER

## 2020-12-30 PROCEDURE — U0002 COVID-19 LAB TEST NON-CDC: HCPCS

## 2020-12-30 PROCEDURE — 99999 PR PBB SHADOW E&M-EST. PATIENT-LVL IV: ICD-10-PCS | Mod: PBBFAC,,, | Performed by: NURSE PRACTITIONER

## 2020-12-30 PROCEDURE — 1125F AMNT PAIN NOTED PAIN PRSNT: CPT | Mod: S$GLB,,, | Performed by: NURSE PRACTITIONER

## 2020-12-30 PROCEDURE — 63600175 PHARM REV CODE 636 W HCPCS: Performed by: INTERNAL MEDICINE

## 2020-12-30 PROCEDURE — 96375 TX/PRO/DX INJ NEW DRUG ADDON: CPT

## 2020-12-30 PROCEDURE — A4216 STERILE WATER/SALINE, 10 ML: HCPCS | Performed by: INTERNAL MEDICINE

## 2020-12-30 PROCEDURE — 99215 PR OFFICE/OUTPT VISIT, EST, LEVL V, 40-54 MIN: ICD-10-PCS | Mod: S$GLB,,, | Performed by: NURSE PRACTITIONER

## 2020-12-30 PROCEDURE — 3008F BODY MASS INDEX DOCD: CPT | Mod: CPTII,S$GLB,, | Performed by: NURSE PRACTITIONER

## 2020-12-30 RX ORDER — HEPARIN 100 UNIT/ML
500 SYRINGE INTRAVENOUS
Status: CANCELLED | OUTPATIENT
Start: 2020-12-30

## 2020-12-30 RX ORDER — SODIUM CHLORIDE 0.9 % (FLUSH) 0.9 %
10 SYRINGE (ML) INJECTION
Status: CANCELLED | OUTPATIENT
Start: 2020-12-30

## 2020-12-30 RX ORDER — ONDANSETRON 2 MG/ML
8 INJECTION INTRAMUSCULAR; INTRAVENOUS
Status: CANCELLED | OUTPATIENT
Start: 2020-12-30

## 2020-12-30 RX ORDER — SODIUM CHLORIDE 0.9 % (FLUSH) 0.9 %
10 SYRINGE (ML) INJECTION
Status: DISCONTINUED | OUTPATIENT
Start: 2020-12-30 | End: 2020-12-30 | Stop reason: HOSPADM

## 2020-12-30 RX ORDER — ONDANSETRON 2 MG/ML
8 INJECTION INTRAMUSCULAR; INTRAVENOUS
Status: COMPLETED | OUTPATIENT
Start: 2020-12-30 | End: 2020-12-30

## 2020-12-30 RX ORDER — HEPARIN 100 UNIT/ML
500 SYRINGE INTRAVENOUS
Status: DISCONTINUED | OUTPATIENT
Start: 2020-12-30 | End: 2020-12-30 | Stop reason: HOSPADM

## 2020-12-30 RX ORDER — GABAPENTIN 100 MG/1
100 CAPSULE ORAL 3 TIMES DAILY
Qty: 270 CAPSULE | Refills: 0 | Status: SHIPPED | OUTPATIENT
Start: 2020-12-30 | End: 2022-02-04

## 2020-12-30 RX ADMIN — Medication 10 ML: at 06:12

## 2020-12-30 RX ADMIN — HEPARIN 500 UNITS: 100 SYRINGE at 06:12

## 2020-12-30 RX ADMIN — ADO-TRASTUZUMAB EMTANSINE 180 MG: 20 INJECTION, POWDER, LYOPHILIZED, FOR SOLUTION INTRAVENOUS at 04:12

## 2020-12-30 RX ADMIN — ONDANSETRON 8 MG: 2 INJECTION, SOLUTION INTRAMUSCULAR; INTRAVENOUS at 04:12

## 2020-12-30 NOTE — PLAN OF CARE
Patient tolerating infusion so far, aware of post infusion observation time until 7pm.  Handoff report given to Telma TUCKER RN.

## 2020-12-30 NOTE — PLAN OF CARE
1604-Labs , hx, and medications reviewed, patient was seen by NP prior to arrival. Assessment completed. Discussed plan of care with patient. Patient in agreement. Chair reclined and warm blanket and snack offered.

## 2020-12-30 NOTE — PROGRESS NOTES
Subjective:       Patient ID: Michelle Gage is a 35 y.o. female.    Chief Complaint: Malignant neoplasm of upper-outer quadrant of left breast in    HPI     Presents for follow up post surgery. Residual disease. Here to start Kadcyla.   Alos taking tamoxifen and zoladex monthly.     Tolerating tamoxifen well. +hot flashes. No joint pain but feels stiffness in fingers.   Gabapentin helping with chest wall pain  but would like to increase nighttime dose. Tried to take during day but too sedative.   Gabapentin helping her sleep.   No other complaints.   Appetite is good.    ROM limited in arms due to recent surgery but improving.        ECHO 12/18/2020:  · The left ventricle is normal in size and normal systolic function. The estimated ejection fraction is 60%.  · The quantitatively derived ejection fraction is 60%.  · The left ventricular global longitudinal strain is -17.74%.  · Normal left ventricular diastolic function.  · Normal right ventricular size with normal right ventricular systolic function.  · The estimated PA systolic pressure is 15 mmHg.  · Normal central venous pressure (3 mmHg).    This is a former patient of Dr. Christy Salazar's but now with Dr. Crum    Diagnosis: Stage I ([id number]) invasive ductal carcinoma of left breast, upper outer quadrant, ER 95%, OK 90%, Her2 3+, Grade 3, Ki67 25%  Premenopausal status.     Oncologic History:  Presentation  - [id number] - presented for palpable left breast mass around 2018 - was being watched on imaging at outside hospital.    - 5/11/2020 - Diagnostic bilateral mammogram and left breast US at [facility] showed left breast 1:00 mass, 1.4 cm, 8 CFN, mild left axillary adenopathy  - 5/11/2020 - Biopsy - Grade 3 IDC, estrogen receptor 95%, progesterone receptor 90%, Her2 deplhine 3+, Ki67 25%. LN biopsy negative  Surgery consultation with Dr Dumont on 5/14. Breast cancer is far in axillary tail and felt to be difficult to obtain clear margins  without neoadjuvant therapy.               - 5/14/2020 - Genetics Myriad MyRisk BRACAnalysis neg; [facility] AP             Medical oncology consultation on 5/15/2020 -  This case was discussed with Dr. Dumont.  She does feel like the patient will benefit from neoadjuvant therapy to help improve her surgical margins.  Since the tumor is less than 2 cm and clinically lymph node negative (MRI pending), I recommended treatment with Taxotere, carboplatin and Herceptin without Perjeta.  Discussed with her that there is data in tumors less than 2 cm to consider Taxol/Herceptin however would not typically use this in a neoadjuvant setting for concern for under treatment.                - Eggs Retrieval - has 2 embryos.               * 6/16/2020 started neoadjuvant TCH (no perjeta since < 2 cm and LN negative). Neoadjuvant therapy chosen due to location of tumor.    11/6/2020 Imaging Significant Findings     Breast MRI  Impression:     The previously described upper outer quadrant known malignancy in the left breast is no longer visualized consistent with complete imaging response to therapy.      BI-RADS Category:   Overall: 6 - Known Biopsy-Proven Malignancy     Recommendation:  Continued breast surgery and oncology management.             11/30/2020 Breast Surgery     Surgery: Dr Jessica Dumont, [phone] performed bilateral mastectomy with sentinel lymph node biopsy with pathology showing grade 1 invasive ductal carcinoma,  3 mm with 0 positive lymph nodes.          11/30/2020 Cancer Staged     [id number]      12/15/2020 Tumor Conference       Post mastectomy radiation not recommended but patient will meet with radiation oncology. Systemically,standard of care would be Kadcyla followed by endocrine therapy.            Review of Systems   Constitutional:        See above   All other systems reviewed and are negative.        Objective:      Physical Exam  Vitals signs and nursing note reviewed.   Constitutional:       General: She  is not in acute distress.     Appearance: Normal appearance. She is well-developed and normal weight.   HENT:      Head: Normocephalic and atraumatic.      Mouth/Throat:      Mouth: No oral lesions.   Eyes:      General: No scleral icterus.     Extraocular Movements: Extraocular movements intact.      Conjunctiva/sclera: Conjunctivae normal.      Pupils: Pupils are equal, round, and reactive to light.   Neck:      Musculoskeletal: Normal range of motion and neck supple. No neck rigidity.   Cardiovascular:      Rate and Rhythm: Normal rate and regular rhythm.      Heart sounds: Normal heart sounds. No murmur. No friction rub. No gallop.    Pulmonary:      Effort: Pulmonary effort is normal. No respiratory distress.      Breath sounds: Normal breath sounds. No wheezing, rhonchi or rales.      Comments: S/p bilateral mastectomies with expanders in place. Incisions healing well. No palpable masses or LAD  Chest:      Chest wall: There is no dullness to percussion.   Abdominal:      General: Abdomen is flat. Bowel sounds are normal. There is no distension.      Palpations: Abdomen is soft. There is no mass.      Tenderness: There is no abdominal tenderness. There is no guarding or rebound.      Comments: No organomegaly   Musculoskeletal: Normal range of motion.         General: No swelling, tenderness or deformity.      Right lower leg: No edema.      Left lower leg: No edema.   Skin:     General: Skin is warm and dry.      Coloration: Skin is not jaundiced or pale.      Findings: No bruising, erythema, lesion or rash.   Neurological:      Mental Status: She is alert and oriented to person, place, and time.   Psychiatric:         Mood and Affect: Mood normal.         Behavior: Behavior normal.         Thought Content: Thought content normal.         Judgment: Judgment normal.         Results for orders placed or performed in visit on 12/30/20   COVID-19 Rapid Screening   Result Value Ref Range    SARS-CoV-2 RNA,  Amplification, Qual Negative Negative       Assessment:       1. Malignant neoplasm of upper-outer quadrant of left breast in female, estrogen receptor positive    2. Pain, neoplasm-related    3. Vitamin D deficiency    4. Thrombocytopenia        Plan:         -Labs reviewed and adequate for treatment.   -Again discussed that Kadcyla is indicated as improved survival with those who had residual disease. Adverse effects discussed- see below. Previously consented.   Ok to proceed with C1 Kadcyla today  -Continue tamoxifen- understands need to Avoid pregnancy. She has eggs harvested and we discussed revisiting the discussion in a year.   -Zoladex monthly- will give in 3 weeks when she returns.    -ECHO due 3/2021.  -See Krzysztof in January as scheduled with [facility].  -monitor plts- bleeding precautions.   -Start vit D 1000u daily   -See Rad Onc as scheduled to discuss further recs- appt in 1/2021.   -Ok to increase gabapentin to 200mg or 300mg qhs  for chest wall pain following surgery.     -RTC in 3 weeks to see Dr. Crum with a cbc, cmp and for Kadcyla and zoladex.   -RTC in 6 weeks to see me with a cbc, cmp and for Kadcyla         Kadcyla possible adverse effects:        Tamoxifen: possible adverse effects discussed.       Patient is in agreement with the proposed treatment plan. All questions were answered to the patient's satisfaction. Pt knows to call clinic for any new or worsening symptoms and if anything is needed before the next clinic visit.      Curtis Benedict, FNP-C  Hematology & Oncology  [facility] [place] [address]  ph. [phone]  Fax. [phone]     I spent 30 minutes (face to face) with the patient, more than 50% was in counseling and coordination of care as detailed above.

## 2020-12-30 NOTE — Clinical Note
-RTC in 3 weeks to see Dr. Crum with a cbc, cmp and for Kadcyla and zoladex.   -RTC in 6 weeks to see me with a cbc, cmp and for Kadcyla

## 2020-12-31 NOTE — NURSING
Report received from DWAYNE Garzon RN. Patient completed 1 hour post observation (ok per Dr. Nova). NAD noted upon discharge. Port + blood return present, flushed, hep locked and deaccessed. Verbalized understanding to call MD for any questions/concerns. Declined avs, Discharged home, ambulated independently.

## 2021-01-05 ENCOUNTER — INITIAL CONSULT (OUTPATIENT)
Dept: RADIATION ONCOLOGY | Facility: CLINIC | Age: 36
End: 2021-01-05
Payer: COMMERCIAL

## 2021-01-05 VITALS
SYSTOLIC BLOOD PRESSURE: 129 MMHG | DIASTOLIC BLOOD PRESSURE: 68 MMHG | WEIGHT: 113.31 LBS | HEART RATE: 78 BPM | OXYGEN SATURATION: 99 % | TEMPERATURE: 98 F | RESPIRATION RATE: 18 BRPM | HEIGHT: 65 IN | BODY MASS INDEX: 18.88 KG/M2

## 2021-01-05 DIAGNOSIS — Z17.0 MALIGNANT NEOPLASM OF AXILLARY TAIL OF LEFT BREAST IN FEMALE, ESTROGEN RECEPTOR POSITIVE: Primary | ICD-10-CM

## 2021-01-05 DIAGNOSIS — C50.612 MALIGNANT NEOPLASM OF AXILLARY TAIL OF LEFT BREAST IN FEMALE, ESTROGEN RECEPTOR POSITIVE: Primary | ICD-10-CM

## 2021-01-05 PROCEDURE — 99205 OFFICE O/P NEW HI 60 MIN: CPT | Mod: S$GLB,,, | Performed by: RADIOLOGY

## 2021-01-05 PROCEDURE — 99205 PR OFFICE/OUTPT VISIT, NEW, LEVL V, 60-74 MIN: ICD-10-PCS | Mod: S$GLB,,, | Performed by: RADIOLOGY

## 2021-01-05 PROCEDURE — 99999 PR PBB SHADOW E&M-EST. PATIENT-LVL IV: ICD-10-PCS | Mod: PBBFAC,,, | Performed by: RADIOLOGY

## 2021-01-05 PROCEDURE — 99999 PR PBB SHADOW E&M-EST. PATIENT-LVL IV: CPT | Mod: PBBFAC,,, | Performed by: RADIOLOGY

## 2021-01-07 ENCOUNTER — PATIENT MESSAGE (OUTPATIENT)
Dept: HEMATOLOGY/ONCOLOGY | Facility: CLINIC | Age: 36
End: 2021-01-07

## 2021-01-14 ENCOUNTER — OFFICE VISIT (OUTPATIENT)
Dept: HEMATOLOGY/ONCOLOGY | Facility: CLINIC | Age: 36
End: 2021-01-14
Payer: COMMERCIAL

## 2021-01-14 ENCOUNTER — PATIENT MESSAGE (OUTPATIENT)
Dept: HEMATOLOGY/ONCOLOGY | Facility: CLINIC | Age: 36
End: 2021-01-14

## 2021-01-14 VITALS — HEART RATE: 73 BPM | OXYGEN SATURATION: 98 % | SYSTOLIC BLOOD PRESSURE: 134 MMHG | DIASTOLIC BLOOD PRESSURE: 64 MMHG

## 2021-01-14 DIAGNOSIS — Z71.83 ENCOUNTER FOR NONPROCREATIVE GENETIC COUNSELING: Primary | ICD-10-CM

## 2021-01-14 PROCEDURE — 99999 PR PBB SHADOW E&M-EST. PATIENT-LVL II: CPT | Mod: PBBFAC,,, | Performed by: NURSE PRACTITIONER

## 2021-01-14 PROCEDURE — 1126F PR PAIN SEVERITY QUANTIFIED, NO PAIN PRESENT: ICD-10-PCS | Mod: S$GLB,,, | Performed by: NURSE PRACTITIONER

## 2021-01-14 PROCEDURE — 99214 OFFICE O/P EST MOD 30 MIN: CPT | Mod: S$GLB,,, | Performed by: NURSE PRACTITIONER

## 2021-01-14 PROCEDURE — 99214 PR OFFICE/OUTPT VISIT, EST, LEVL IV, 30-39 MIN: ICD-10-PCS | Mod: S$GLB,,, | Performed by: NURSE PRACTITIONER

## 2021-01-14 PROCEDURE — 99999 PR PBB SHADOW E&M-EST. PATIENT-LVL II: ICD-10-PCS | Mod: PBBFAC,,, | Performed by: NURSE PRACTITIONER

## 2021-01-14 PROCEDURE — 1126F AMNT PAIN NOTED NONE PRSNT: CPT | Mod: S$GLB,,, | Performed by: NURSE PRACTITIONER

## 2021-01-19 ENCOUNTER — OFFICE VISIT (OUTPATIENT)
Dept: HEMATOLOGY/ONCOLOGY | Facility: CLINIC | Age: 36
End: 2021-01-19
Payer: COMMERCIAL

## 2021-01-19 ENCOUNTER — INFUSION (OUTPATIENT)
Dept: INFUSION THERAPY | Facility: HOSPITAL | Age: 36
End: 2021-01-19
Attending: NURSE PRACTITIONER
Payer: COMMERCIAL

## 2021-01-19 ENCOUNTER — LAB VISIT (OUTPATIENT)
Dept: LAB | Facility: HOSPITAL | Age: 36
End: 2021-01-19
Payer: COMMERCIAL

## 2021-01-19 VITALS — RESPIRATION RATE: 18 BRPM | DIASTOLIC BLOOD PRESSURE: 76 MMHG | HEART RATE: 64 BPM | SYSTOLIC BLOOD PRESSURE: 127 MMHG

## 2021-01-19 VITALS
HEIGHT: 65 IN | SYSTOLIC BLOOD PRESSURE: 142 MMHG | TEMPERATURE: 99 F | HEART RATE: 77 BPM | WEIGHT: 106.25 LBS | OXYGEN SATURATION: 97 % | DIASTOLIC BLOOD PRESSURE: 87 MMHG | RESPIRATION RATE: 18 BRPM | BODY MASS INDEX: 17.7 KG/M2

## 2021-01-19 DIAGNOSIS — G47.00 INSOMNIA, UNSPECIFIED TYPE: ICD-10-CM

## 2021-01-19 DIAGNOSIS — T45.1X5A CHEMOTHERAPY-INDUCED THROMBOCYTOPENIA: ICD-10-CM

## 2021-01-19 DIAGNOSIS — Z17.0 MALIGNANT NEOPLASM OF UPPER-OUTER QUADRANT OF LEFT BREAST IN FEMALE, ESTROGEN RECEPTOR POSITIVE: Primary | ICD-10-CM

## 2021-01-19 DIAGNOSIS — E28.39 SUPPRESSION OF OVARIAN SECRETION: ICD-10-CM

## 2021-01-19 DIAGNOSIS — T45.1X5A CHEMOTHERAPY-INDUCED NAUSEA: ICD-10-CM

## 2021-01-19 DIAGNOSIS — Z17.0 MALIGNANT NEOPLASM OF UPPER-OUTER QUADRANT OF LEFT BREAST IN FEMALE, ESTROGEN RECEPTOR POSITIVE: ICD-10-CM

## 2021-01-19 DIAGNOSIS — C50.412 MALIGNANT NEOPLASM OF UPPER-OUTER QUADRANT OF LEFT BREAST IN FEMALE, ESTROGEN RECEPTOR POSITIVE: Primary | ICD-10-CM

## 2021-01-19 DIAGNOSIS — D69.59 CHEMOTHERAPY-INDUCED THROMBOCYTOPENIA: ICD-10-CM

## 2021-01-19 DIAGNOSIS — D70.1 CHEMOTHERAPY INDUCED NEUTROPENIA: ICD-10-CM

## 2021-01-19 DIAGNOSIS — Z51.11 ENCOUNTER FOR CHEMOTHERAPY MANAGEMENT: ICD-10-CM

## 2021-01-19 DIAGNOSIS — C50.412 MALIGNANT NEOPLASM OF UPPER-OUTER QUADRANT OF LEFT BREAST IN FEMALE, ESTROGEN RECEPTOR POSITIVE: ICD-10-CM

## 2021-01-19 DIAGNOSIS — T45.1X5A CHEMOTHERAPY INDUCED NEUTROPENIA: ICD-10-CM

## 2021-01-19 DIAGNOSIS — R11.0 CHEMOTHERAPY-INDUCED NAUSEA: ICD-10-CM

## 2021-01-19 LAB
ALBUMIN SERPL BCP-MCNC: 4.4 G/DL (ref 3.5–5.2)
ALP SERPL-CCNC: 73 U/L (ref 55–135)
ALT SERPL W/O P-5'-P-CCNC: 14 U/L (ref 10–44)
ANION GAP SERPL CALC-SCNC: 11 MMOL/L (ref 8–16)
AST SERPL-CCNC: 20 U/L (ref 10–40)
BILIRUB SERPL-MCNC: 0.4 MG/DL (ref 0.1–1)
BUN SERPL-MCNC: 14 MG/DL (ref 6–20)
CALCIUM SERPL-MCNC: 10.1 MG/DL (ref 8.7–10.5)
CHLORIDE SERPL-SCNC: 105 MMOL/L (ref 95–110)
CO2 SERPL-SCNC: 26 MMOL/L (ref 23–29)
CREAT SERPL-MCNC: 1 MG/DL (ref 0.5–1.4)
ERYTHROCYTE [DISTWIDTH] IN BLOOD BY AUTOMATED COUNT: 13.2 % (ref 11.5–14.5)
EST. GFR  (AFRICAN AMERICAN): >60 ML/MIN/1.73 M^2
EST. GFR  (NON AFRICAN AMERICAN): >60 ML/MIN/1.73 M^2
GLUCOSE SERPL-MCNC: 140 MG/DL (ref 70–110)
HCT VFR BLD AUTO: 44.5 % (ref 37–48.5)
HGB BLD-MCNC: 14.3 G/DL (ref 12–16)
IMM GRANULOCYTES # BLD AUTO: 0.02 K/UL (ref 0–0.04)
MCH RBC QN AUTO: 30.7 PG (ref 27–31)
MCHC RBC AUTO-ENTMCNC: 32.1 G/DL (ref 32–36)
MCV RBC AUTO: 96 FL (ref 82–98)
NEUTROPHILS # BLD AUTO: 1.5 K/UL (ref 1.8–7.7)
PLATELET # BLD AUTO: 126 K/UL (ref 150–350)
PMV BLD AUTO: 12 FL (ref 9.2–12.9)
POTASSIUM SERPL-SCNC: 4.2 MMOL/L (ref 3.5–5.1)
PROT SERPL-MCNC: 7.9 G/DL (ref 6–8.4)
RBC # BLD AUTO: 4.66 M/UL (ref 4–5.4)
SODIUM SERPL-SCNC: 142 MMOL/L (ref 136–145)
WBC # BLD AUTO: 4.31 K/UL (ref 3.9–12.7)

## 2021-01-19 PROCEDURE — 1125F PR PAIN SEVERITY QUANTIFIED, PAIN PRESENT: ICD-10-PCS | Mod: S$GLB,,, | Performed by: INTERNAL MEDICINE

## 2021-01-19 PROCEDURE — 96413 CHEMO IV INFUSION 1 HR: CPT

## 2021-01-19 PROCEDURE — 80053 COMPREHEN METABOLIC PANEL: CPT

## 2021-01-19 PROCEDURE — 1125F AMNT PAIN NOTED PAIN PRSNT: CPT | Mod: S$GLB,,, | Performed by: INTERNAL MEDICINE

## 2021-01-19 PROCEDURE — 3008F BODY MASS INDEX DOCD: CPT | Mod: CPTII,S$GLB,, | Performed by: INTERNAL MEDICINE

## 2021-01-19 PROCEDURE — 99214 OFFICE O/P EST MOD 30 MIN: CPT | Mod: S$GLB,,, | Performed by: INTERNAL MEDICINE

## 2021-01-19 PROCEDURE — 99214 PR OFFICE/OUTPT VISIT, EST, LEVL IV, 30-39 MIN: ICD-10-PCS | Mod: S$GLB,,, | Performed by: INTERNAL MEDICINE

## 2021-01-19 PROCEDURE — 85027 COMPLETE CBC AUTOMATED: CPT

## 2021-01-19 PROCEDURE — 3008F PR BODY MASS INDEX (BMI) DOCUMENTED: ICD-10-PCS | Mod: CPTII,S$GLB,, | Performed by: INTERNAL MEDICINE

## 2021-01-19 PROCEDURE — A4216 STERILE WATER/SALINE, 10 ML: HCPCS | Performed by: INTERNAL MEDICINE

## 2021-01-19 PROCEDURE — 99999 PR PBB SHADOW E&M-EST. PATIENT-LVL IV: ICD-10-PCS | Mod: PBBFAC,,, | Performed by: INTERNAL MEDICINE

## 2021-01-19 PROCEDURE — 63600175 PHARM REV CODE 636 W HCPCS: Performed by: INTERNAL MEDICINE

## 2021-01-19 PROCEDURE — 36415 COLL VENOUS BLD VENIPUNCTURE: CPT

## 2021-01-19 PROCEDURE — 99999 PR PBB SHADOW E&M-EST. PATIENT-LVL IV: CPT | Mod: PBBFAC,,, | Performed by: INTERNAL MEDICINE

## 2021-01-19 PROCEDURE — 25000003 PHARM REV CODE 250: Performed by: INTERNAL MEDICINE

## 2021-01-19 PROCEDURE — 96402 CHEMO HORMON ANTINEOPL SQ/IM: CPT

## 2021-01-19 RX ORDER — HEPARIN 100 UNIT/ML
500 SYRINGE INTRAVENOUS
Status: DISCONTINUED | OUTPATIENT
Start: 2021-01-19 | End: 2021-01-19 | Stop reason: HOSPADM

## 2021-01-19 RX ORDER — SODIUM CHLORIDE 0.9 % (FLUSH) 0.9 %
10 SYRINGE (ML) INJECTION
Status: DISCONTINUED | OUTPATIENT
Start: 2021-01-19 | End: 2021-01-19 | Stop reason: HOSPADM

## 2021-01-19 RX ORDER — ZALEPLON 5 MG/1
5 CAPSULE ORAL NIGHTLY PRN
Qty: 30 CAPSULE | Refills: 1 | Status: SHIPPED | OUTPATIENT
Start: 2021-01-19 | End: 2021-02-18

## 2021-01-19 RX ORDER — ONDANSETRON 2 MG/ML
8 INJECTION INTRAMUSCULAR; INTRAVENOUS
Status: COMPLETED | OUTPATIENT
Start: 2021-01-19 | End: 2021-01-19

## 2021-01-19 RX ORDER — ONDANSETRON 2 MG/ML
8 INJECTION INTRAMUSCULAR; INTRAVENOUS
Status: CANCELLED | OUTPATIENT
Start: 2021-01-19

## 2021-01-19 RX ORDER — SODIUM CHLORIDE 0.9 % (FLUSH) 0.9 %
10 SYRINGE (ML) INJECTION
Status: CANCELLED | OUTPATIENT
Start: 2021-01-19

## 2021-01-19 RX ORDER — HEPARIN 100 UNIT/ML
500 SYRINGE INTRAVENOUS
Status: CANCELLED | OUTPATIENT
Start: 2021-01-19

## 2021-01-19 RX ADMIN — GOSERELIN ACETATE 3.6 MG: 3.6 IMPLANT SUBCUTANEOUS at 03:01

## 2021-01-19 RX ADMIN — HEPARIN 500 UNITS: 100 SYRINGE at 04:01

## 2021-01-19 RX ADMIN — Medication 10 ML: at 04:01

## 2021-01-19 RX ADMIN — ADO-TRASTUZUMAB EMTANSINE 180 MG: 20 INJECTION, POWDER, LYOPHILIZED, FOR SOLUTION INTRAVENOUS at 03:01

## 2021-01-19 RX ADMIN — ONDANSETRON 8 MG: 2 INJECTION, SOLUTION INTRAMUSCULAR; INTRAVENOUS at 02:01

## 2021-02-08 ENCOUNTER — LAB VISIT (OUTPATIENT)
Dept: LAB | Facility: HOSPITAL | Age: 36
End: 2021-02-08
Payer: COMMERCIAL

## 2021-02-08 ENCOUNTER — PATIENT MESSAGE (OUTPATIENT)
Dept: HEMATOLOGY/ONCOLOGY | Facility: CLINIC | Age: 36
End: 2021-02-08

## 2021-02-08 DIAGNOSIS — Z17.0 MALIGNANT NEOPLASM OF UPPER-OUTER QUADRANT OF LEFT BREAST IN FEMALE, ESTROGEN RECEPTOR POSITIVE: ICD-10-CM

## 2021-02-08 DIAGNOSIS — C50.412 MALIGNANT NEOPLASM OF UPPER-OUTER QUADRANT OF LEFT BREAST IN FEMALE, ESTROGEN RECEPTOR POSITIVE: ICD-10-CM

## 2021-02-08 LAB
ALBUMIN SERPL BCP-MCNC: 3.9 G/DL (ref 3.5–5.2)
ALP SERPL-CCNC: 69 U/L (ref 55–135)
ALT SERPL W/O P-5'-P-CCNC: 19 U/L (ref 10–44)
ANION GAP SERPL CALC-SCNC: 8 MMOL/L (ref 8–16)
AST SERPL-CCNC: 26 U/L (ref 10–40)
BILIRUB SERPL-MCNC: 0.3 MG/DL (ref 0.1–1)
BUN SERPL-MCNC: 12 MG/DL (ref 6–20)
CALCIUM SERPL-MCNC: 9.6 MG/DL (ref 8.7–10.5)
CHLORIDE SERPL-SCNC: 109 MMOL/L (ref 95–110)
CO2 SERPL-SCNC: 26 MMOL/L (ref 23–29)
CREAT SERPL-MCNC: 0.8 MG/DL (ref 0.5–1.4)
ERYTHROCYTE [DISTWIDTH] IN BLOOD BY AUTOMATED COUNT: 13.6 % (ref 11.5–14.5)
EST. GFR  (AFRICAN AMERICAN): >60 ML/MIN/1.73 M^2
EST. GFR  (NON AFRICAN AMERICAN): >60 ML/MIN/1.73 M^2
GLUCOSE SERPL-MCNC: 83 MG/DL (ref 70–110)
HCT VFR BLD AUTO: 39.1 % (ref 37–48.5)
HGB BLD-MCNC: 12.8 G/DL (ref 12–16)
IMM GRANULOCYTES # BLD AUTO: 0.01 K/UL (ref 0–0.04)
MCH RBC QN AUTO: 30.3 PG (ref 27–31)
MCHC RBC AUTO-ENTMCNC: 32.7 G/DL (ref 32–36)
MCV RBC AUTO: 92 FL (ref 82–98)
NEUTROPHILS # BLD AUTO: 2.1 K/UL (ref 1.8–7.7)
PLATELET # BLD AUTO: 128 K/UL (ref 150–350)
PMV BLD AUTO: 12.2 FL (ref 9.2–12.9)
POTASSIUM SERPL-SCNC: 4.1 MMOL/L (ref 3.5–5.1)
PROT SERPL-MCNC: 7 G/DL (ref 6–8.4)
RBC # BLD AUTO: 4.23 M/UL (ref 4–5.4)
SODIUM SERPL-SCNC: 143 MMOL/L (ref 136–145)
WBC # BLD AUTO: 6.02 K/UL (ref 3.9–12.7)

## 2021-02-08 PROCEDURE — 85027 COMPLETE CBC AUTOMATED: CPT

## 2021-02-08 PROCEDURE — 36415 COLL VENOUS BLD VENIPUNCTURE: CPT

## 2021-02-08 PROCEDURE — 80053 COMPREHEN METABOLIC PANEL: CPT

## 2021-02-09 ENCOUNTER — INFUSION (OUTPATIENT)
Dept: INFUSION THERAPY | Facility: HOSPITAL | Age: 36
End: 2021-02-09
Attending: NURSE PRACTITIONER
Payer: COMMERCIAL

## 2021-02-09 ENCOUNTER — OFFICE VISIT (OUTPATIENT)
Dept: HEMATOLOGY/ONCOLOGY | Facility: CLINIC | Age: 36
End: 2021-02-09
Payer: COMMERCIAL

## 2021-02-09 VITALS
SYSTOLIC BLOOD PRESSURE: 138 MMHG | DIASTOLIC BLOOD PRESSURE: 68 MMHG | RESPIRATION RATE: 16 BRPM | HEART RATE: 66 BPM | TEMPERATURE: 99 F

## 2021-02-09 VITALS
HEIGHT: 65 IN | BODY MASS INDEX: 17.92 KG/M2 | HEART RATE: 78 BPM | TEMPERATURE: 99 F | SYSTOLIC BLOOD PRESSURE: 122 MMHG | RESPIRATION RATE: 16 BRPM | OXYGEN SATURATION: 97 % | DIASTOLIC BLOOD PRESSURE: 58 MMHG | WEIGHT: 107.56 LBS

## 2021-02-09 DIAGNOSIS — T45.1X5A CHEMOTHERAPY-INDUCED THROMBOCYTOPENIA: ICD-10-CM

## 2021-02-09 DIAGNOSIS — C50.412 MALIGNANT NEOPLASM OF UPPER-OUTER QUADRANT OF LEFT BREAST IN FEMALE, ESTROGEN RECEPTOR POSITIVE: Primary | ICD-10-CM

## 2021-02-09 DIAGNOSIS — D70.1 CHEMOTHERAPY INDUCED NEUTROPENIA: ICD-10-CM

## 2021-02-09 DIAGNOSIS — Z17.0 MALIGNANT NEOPLASM OF UPPER-OUTER QUADRANT OF LEFT BREAST IN FEMALE, ESTROGEN RECEPTOR POSITIVE: Primary | ICD-10-CM

## 2021-02-09 DIAGNOSIS — E28.39 SUPPRESSION OF OVARIAN SECRETION: ICD-10-CM

## 2021-02-09 DIAGNOSIS — R11.0 CHEMOTHERAPY-INDUCED NAUSEA: ICD-10-CM

## 2021-02-09 DIAGNOSIS — T45.1X5A CHEMOTHERAPY INDUCED NEUTROPENIA: ICD-10-CM

## 2021-02-09 DIAGNOSIS — D69.59 CHEMOTHERAPY-INDUCED THROMBOCYTOPENIA: ICD-10-CM

## 2021-02-09 DIAGNOSIS — E55.9 VITAMIN D DEFICIENCY: ICD-10-CM

## 2021-02-09 DIAGNOSIS — Z51.11 ENCOUNTER FOR CHEMOTHERAPY MANAGEMENT: ICD-10-CM

## 2021-02-09 DIAGNOSIS — Z79.810 SERM USE (SELECTIVE ESTROGEN RECEPTOR MODULATOR): ICD-10-CM

## 2021-02-09 DIAGNOSIS — T45.1X5A CHEMOTHERAPY-INDUCED NAUSEA: ICD-10-CM

## 2021-02-09 PROCEDURE — 3008F PR BODY MASS INDEX (BMI) DOCUMENTED: ICD-10-PCS | Mod: CPTII,S$GLB,, | Performed by: NURSE PRACTITIONER

## 2021-02-09 PROCEDURE — 99215 OFFICE O/P EST HI 40 MIN: CPT | Mod: S$GLB,,, | Performed by: NURSE PRACTITIONER

## 2021-02-09 PROCEDURE — 3008F BODY MASS INDEX DOCD: CPT | Mod: CPTII,S$GLB,, | Performed by: NURSE PRACTITIONER

## 2021-02-09 PROCEDURE — 96375 TX/PRO/DX INJ NEW DRUG ADDON: CPT

## 2021-02-09 PROCEDURE — 99999 PR PBB SHADOW E&M-EST. PATIENT-LVL IV: ICD-10-PCS | Mod: PBBFAC,,, | Performed by: NURSE PRACTITIONER

## 2021-02-09 PROCEDURE — 96413 CHEMO IV INFUSION 1 HR: CPT

## 2021-02-09 PROCEDURE — 1126F AMNT PAIN NOTED NONE PRSNT: CPT | Mod: S$GLB,,, | Performed by: NURSE PRACTITIONER

## 2021-02-09 PROCEDURE — 99999 PR PBB SHADOW E&M-EST. PATIENT-LVL IV: CPT | Mod: PBBFAC,,, | Performed by: NURSE PRACTITIONER

## 2021-02-09 PROCEDURE — 99215 PR OFFICE/OUTPT VISIT, EST, LEVL V, 40-54 MIN: ICD-10-PCS | Mod: S$GLB,,, | Performed by: NURSE PRACTITIONER

## 2021-02-09 PROCEDURE — 25000003 PHARM REV CODE 250: Performed by: INTERNAL MEDICINE

## 2021-02-09 PROCEDURE — 1126F PR PAIN SEVERITY QUANTIFIED, NO PAIN PRESENT: ICD-10-PCS | Mod: S$GLB,,, | Performed by: NURSE PRACTITIONER

## 2021-02-09 PROCEDURE — A4216 STERILE WATER/SALINE, 10 ML: HCPCS | Performed by: INTERNAL MEDICINE

## 2021-02-09 PROCEDURE — 63600175 PHARM REV CODE 636 W HCPCS: Performed by: INTERNAL MEDICINE

## 2021-02-09 RX ORDER — SODIUM CHLORIDE 0.9 % (FLUSH) 0.9 %
10 SYRINGE (ML) INJECTION
Status: DISCONTINUED | OUTPATIENT
Start: 2021-02-09 | End: 2021-02-09 | Stop reason: HOSPADM

## 2021-02-09 RX ORDER — ONDANSETRON 2 MG/ML
8 INJECTION INTRAMUSCULAR; INTRAVENOUS
Status: COMPLETED | OUTPATIENT
Start: 2021-02-09 | End: 2021-02-09

## 2021-02-09 RX ORDER — SODIUM CHLORIDE 0.9 % (FLUSH) 0.9 %
10 SYRINGE (ML) INJECTION
Status: CANCELLED | OUTPATIENT
Start: 2021-02-09

## 2021-02-09 RX ORDER — HEPARIN 100 UNIT/ML
500 SYRINGE INTRAVENOUS
Status: DISCONTINUED | OUTPATIENT
Start: 2021-02-09 | End: 2021-02-09 | Stop reason: HOSPADM

## 2021-02-09 RX ORDER — ONDANSETRON 2 MG/ML
8 INJECTION INTRAMUSCULAR; INTRAVENOUS
Status: CANCELLED | OUTPATIENT
Start: 2021-02-09

## 2021-02-09 RX ORDER — HEPARIN 100 UNIT/ML
500 SYRINGE INTRAVENOUS
Status: CANCELLED | OUTPATIENT
Start: 2021-02-09

## 2021-02-09 RX ADMIN — SODIUM CHLORIDE: 9 INJECTION, SOLUTION INTRAVENOUS at 02:02

## 2021-02-09 RX ADMIN — ONDANSETRON 8 MG: 2 INJECTION, SOLUTION INTRAMUSCULAR; INTRAVENOUS at 02:02

## 2021-02-09 RX ADMIN — ADO-TRASTUZUMAB EMTANSINE 180 MG: 20 INJECTION, POWDER, LYOPHILIZED, FOR SOLUTION INTRAVENOUS at 03:02

## 2021-02-09 RX ADMIN — HEPARIN 500 UNITS: 100 SYRINGE at 04:02

## 2021-02-09 RX ADMIN — Medication 10 ML: at 04:02

## 2021-02-10 ENCOUNTER — PATIENT MESSAGE (OUTPATIENT)
Dept: HEMATOLOGY/ONCOLOGY | Facility: CLINIC | Age: 36
End: 2021-02-10

## 2021-02-19 ENCOUNTER — PATIENT MESSAGE (OUTPATIENT)
Dept: HEMATOLOGY/ONCOLOGY | Facility: CLINIC | Age: 36
End: 2021-02-19

## 2021-02-19 ENCOUNTER — INFUSION (OUTPATIENT)
Dept: INFUSION THERAPY | Facility: HOSPITAL | Age: 36
End: 2021-02-19
Attending: INTERNAL MEDICINE
Payer: COMMERCIAL

## 2021-02-19 DIAGNOSIS — E28.39 SUPPRESSION OF OVARIAN SECRETION: Primary | ICD-10-CM

## 2021-02-19 PROCEDURE — 96402 CHEMO HORMON ANTINEOPL SQ/IM: CPT

## 2021-02-19 PROCEDURE — 63600175 PHARM REV CODE 636 W HCPCS: Mod: JG | Performed by: INTERNAL MEDICINE

## 2021-02-19 RX ADMIN — GOSERELIN ACETATE 3.6 MG: 3.6 IMPLANT SUBCUTANEOUS at 10:02

## 2021-02-24 ENCOUNTER — PATIENT MESSAGE (OUTPATIENT)
Dept: HEMATOLOGY/ONCOLOGY | Facility: CLINIC | Age: 36
End: 2021-02-24

## 2021-02-24 DIAGNOSIS — Z17.0 MALIGNANT NEOPLASM OF UPPER-OUTER QUADRANT OF LEFT BREAST IN FEMALE, ESTROGEN RECEPTOR POSITIVE: ICD-10-CM

## 2021-02-24 DIAGNOSIS — C50.412 MALIGNANT NEOPLASM OF UPPER-OUTER QUADRANT OF LEFT BREAST IN FEMALE, ESTROGEN RECEPTOR POSITIVE: ICD-10-CM

## 2021-02-24 DIAGNOSIS — R45.89 ANXIETY ABOUT HEALTH: ICD-10-CM

## 2021-02-25 ENCOUNTER — PATIENT MESSAGE (OUTPATIENT)
Dept: HEMATOLOGY/ONCOLOGY | Facility: CLINIC | Age: 36
End: 2021-02-25

## 2021-02-25 RX ORDER — ALPRAZOLAM 0.5 MG/1
0.5 TABLET ORAL 3 TIMES DAILY PRN
Qty: 30 TABLET | Refills: 0 | Status: SHIPPED | OUTPATIENT
Start: 2021-02-25 | End: 2021-05-24 | Stop reason: SDUPTHER

## 2021-03-01 ENCOUNTER — LAB VISIT (OUTPATIENT)
Dept: LAB | Facility: HOSPITAL | Age: 36
End: 2021-03-01
Attending: NURSE PRACTITIONER
Payer: COMMERCIAL

## 2021-03-01 ENCOUNTER — CLINICAL SUPPORT (OUTPATIENT)
Dept: HEMATOLOGY/ONCOLOGY | Facility: CLINIC | Age: 36
End: 2021-03-01
Payer: COMMERCIAL

## 2021-03-01 DIAGNOSIS — E55.9 VITAMIN D DEFICIENCY: ICD-10-CM

## 2021-03-01 DIAGNOSIS — C50.412 MALIGNANT NEOPLASM OF UPPER-OUTER QUADRANT OF LEFT BREAST IN FEMALE, ESTROGEN RECEPTOR POSITIVE: ICD-10-CM

## 2021-03-01 DIAGNOSIS — R53.0 NEOPLASTIC MALIGNANT RELATED FATIGUE: ICD-10-CM

## 2021-03-01 DIAGNOSIS — Z17.0 MALIGNANT NEOPLASM OF UPPER-OUTER QUADRANT OF LEFT BREAST IN FEMALE, ESTROGEN RECEPTOR POSITIVE: ICD-10-CM

## 2021-03-01 DIAGNOSIS — T45.1X5A CHEMOTHERAPY-INDUCED NAUSEA: Primary | ICD-10-CM

## 2021-03-01 DIAGNOSIS — R11.0 CHEMOTHERAPY-INDUCED NAUSEA: Primary | ICD-10-CM

## 2021-03-01 DIAGNOSIS — G89.3 CANCER ASSOCIATED PAIN: ICD-10-CM

## 2021-03-01 LAB
25(OH)D3+25(OH)D2 SERPL-MCNC: 43 NG/ML (ref 30–96)
ALBUMIN SERPL BCP-MCNC: 4.3 G/DL (ref 3.5–5.2)
ALP SERPL-CCNC: 70 U/L (ref 55–135)
ALT SERPL W/O P-5'-P-CCNC: 27 U/L (ref 10–44)
ANION GAP SERPL CALC-SCNC: 10 MMOL/L (ref 8–16)
AST SERPL-CCNC: 35 U/L (ref 10–40)
BILIRUB SERPL-MCNC: 0.2 MG/DL (ref 0.1–1)
BUN SERPL-MCNC: 11 MG/DL (ref 6–20)
CALCIUM SERPL-MCNC: 10.2 MG/DL (ref 8.7–10.5)
CHLORIDE SERPL-SCNC: 105 MMOL/L (ref 95–110)
CO2 SERPL-SCNC: 27 MMOL/L (ref 23–29)
CREAT SERPL-MCNC: 0.8 MG/DL (ref 0.5–1.4)
ERYTHROCYTE [DISTWIDTH] IN BLOOD BY AUTOMATED COUNT: 14.1 % (ref 11.5–14.5)
EST. GFR  (AFRICAN AMERICAN): >60 ML/MIN/1.73 M^2
EST. GFR  (NON AFRICAN AMERICAN): >60 ML/MIN/1.73 M^2
GLUCOSE SERPL-MCNC: 84 MG/DL (ref 70–110)
HCT VFR BLD AUTO: 40.5 % (ref 37–48.5)
HGB BLD-MCNC: 13.3 G/DL (ref 12–16)
IMM GRANULOCYTES # BLD AUTO: 0.01 K/UL (ref 0–0.04)
MCH RBC QN AUTO: 30.2 PG (ref 27–31)
MCHC RBC AUTO-ENTMCNC: 32.8 G/DL (ref 32–36)
MCV RBC AUTO: 92 FL (ref 82–98)
NEUTROPHILS # BLD AUTO: 1.5 K/UL (ref 1.8–7.7)
PLATELET # BLD AUTO: 137 K/UL (ref 150–350)
PMV BLD AUTO: 11.8 FL (ref 9.2–12.9)
POTASSIUM SERPL-SCNC: 4.5 MMOL/L (ref 3.5–5.1)
PROT SERPL-MCNC: 7.8 G/DL (ref 6–8.4)
RBC # BLD AUTO: 4.41 M/UL (ref 4–5.4)
SODIUM SERPL-SCNC: 142 MMOL/L (ref 136–145)
WBC # BLD AUTO: 4.57 K/UL (ref 3.9–12.7)

## 2021-03-01 PROCEDURE — 97810 ACUP 1/> WO ESTIM 1ST 15 MIN: CPT | Mod: S$GLB,,, | Performed by: ACUPUNCTURIST

## 2021-03-01 PROCEDURE — 99999 PR PBB SHADOW E&M-EST. PATIENT-LVL I: CPT | Mod: PBBFAC,,, | Performed by: ACUPUNCTURIST

## 2021-03-01 PROCEDURE — 97810 PR ACUPUNCT W/O ELEC STIMUL 15 MIN: ICD-10-PCS | Mod: S$GLB,,, | Performed by: ACUPUNCTURIST

## 2021-03-01 PROCEDURE — 97811 ACUP 1/> W/O ESTIM EA ADD 15: CPT | Mod: S$GLB,,, | Performed by: ACUPUNCTURIST

## 2021-03-01 PROCEDURE — 82306 VITAMIN D 25 HYDROXY: CPT

## 2021-03-01 PROCEDURE — 36415 COLL VENOUS BLD VENIPUNCTURE: CPT

## 2021-03-01 PROCEDURE — 85027 COMPLETE CBC AUTOMATED: CPT

## 2021-03-01 PROCEDURE — 80053 COMPREHEN METABOLIC PANEL: CPT

## 2021-03-01 PROCEDURE — 99999 PR PBB SHADOW E&M-EST. PATIENT-LVL I: ICD-10-PCS | Mod: PBBFAC,,, | Performed by: ACUPUNCTURIST

## 2021-03-01 PROCEDURE — 97811 PR ACUPUNCT W/O ELEC STIMUL ADDL 15M: ICD-10-PCS | Mod: S$GLB,,, | Performed by: ACUPUNCTURIST

## 2021-03-02 ENCOUNTER — OFFICE VISIT (OUTPATIENT)
Dept: HEMATOLOGY/ONCOLOGY | Facility: CLINIC | Age: 36
End: 2021-03-02
Payer: COMMERCIAL

## 2021-03-02 ENCOUNTER — INFUSION (OUTPATIENT)
Dept: INFUSION THERAPY | Facility: HOSPITAL | Age: 36
End: 2021-03-02
Payer: COMMERCIAL

## 2021-03-02 VITALS
SYSTOLIC BLOOD PRESSURE: 130 MMHG | TEMPERATURE: 99 F | HEART RATE: 88 BPM | RESPIRATION RATE: 18 BRPM | DIASTOLIC BLOOD PRESSURE: 58 MMHG

## 2021-03-02 VITALS
WEIGHT: 107.81 LBS | DIASTOLIC BLOOD PRESSURE: 78 MMHG | SYSTOLIC BLOOD PRESSURE: 164 MMHG | BODY MASS INDEX: 17.96 KG/M2 | TEMPERATURE: 99 F | HEIGHT: 65 IN | HEART RATE: 81 BPM

## 2021-03-02 DIAGNOSIS — C80.1 ANXIETY ASSOCIATED WITH CANCER DIAGNOSIS: ICD-10-CM

## 2021-03-02 DIAGNOSIS — T45.1X5A CHEMOTHERAPY-INDUCED NAUSEA: ICD-10-CM

## 2021-03-02 DIAGNOSIS — Z79.810 SERM USE (SELECTIVE ESTROGEN RECEPTOR MODULATOR): ICD-10-CM

## 2021-03-02 DIAGNOSIS — T45.1X5A CHEMOTHERAPY INDUCED NEUTROPENIA: ICD-10-CM

## 2021-03-02 DIAGNOSIS — R11.0 CHEMOTHERAPY-INDUCED NAUSEA: ICD-10-CM

## 2021-03-02 DIAGNOSIS — D70.1 CHEMOTHERAPY INDUCED NEUTROPENIA: ICD-10-CM

## 2021-03-02 DIAGNOSIS — C50.412 MALIGNANT NEOPLASM OF UPPER-OUTER QUADRANT OF LEFT BREAST IN FEMALE, ESTROGEN RECEPTOR POSITIVE: Primary | ICD-10-CM

## 2021-03-02 DIAGNOSIS — Z51.11 ENCOUNTER FOR CHEMOTHERAPY MANAGEMENT: ICD-10-CM

## 2021-03-02 DIAGNOSIS — E55.9 VITAMIN D DEFICIENCY: ICD-10-CM

## 2021-03-02 DIAGNOSIS — Z17.0 MALIGNANT NEOPLASM OF UPPER-OUTER QUADRANT OF LEFT BREAST IN FEMALE, ESTROGEN RECEPTOR POSITIVE: Primary | ICD-10-CM

## 2021-03-02 DIAGNOSIS — F41.1 ANXIETY ASSOCIATED WITH CANCER DIAGNOSIS: ICD-10-CM

## 2021-03-02 DIAGNOSIS — G47.00 INSOMNIA, UNSPECIFIED TYPE: ICD-10-CM

## 2021-03-02 PROCEDURE — 1125F PR PAIN SEVERITY QUANTIFIED, PAIN PRESENT: ICD-10-PCS | Mod: S$GLB,,, | Performed by: INTERNAL MEDICINE

## 2021-03-02 PROCEDURE — 96413 CHEMO IV INFUSION 1 HR: CPT

## 2021-03-02 PROCEDURE — 63600175 PHARM REV CODE 636 W HCPCS: Mod: JG | Performed by: INTERNAL MEDICINE

## 2021-03-02 PROCEDURE — 99214 PR OFFICE/OUTPT VISIT, EST, LEVL IV, 30-39 MIN: ICD-10-PCS | Mod: S$GLB,,, | Performed by: INTERNAL MEDICINE

## 2021-03-02 PROCEDURE — 3008F BODY MASS INDEX DOCD: CPT | Mod: CPTII,S$GLB,, | Performed by: INTERNAL MEDICINE

## 2021-03-02 PROCEDURE — 99999 PR PBB SHADOW E&M-EST. PATIENT-LVL IV: ICD-10-PCS | Mod: PBBFAC,,, | Performed by: INTERNAL MEDICINE

## 2021-03-02 PROCEDURE — 99999 PR PBB SHADOW E&M-EST. PATIENT-LVL IV: CPT | Mod: PBBFAC,,, | Performed by: INTERNAL MEDICINE

## 2021-03-02 PROCEDURE — 96375 TX/PRO/DX INJ NEW DRUG ADDON: CPT

## 2021-03-02 PROCEDURE — 25000003 PHARM REV CODE 250: Performed by: INTERNAL MEDICINE

## 2021-03-02 PROCEDURE — 1125F AMNT PAIN NOTED PAIN PRSNT: CPT | Mod: S$GLB,,, | Performed by: INTERNAL MEDICINE

## 2021-03-02 PROCEDURE — 3008F PR BODY MASS INDEX (BMI) DOCUMENTED: ICD-10-PCS | Mod: CPTII,S$GLB,, | Performed by: INTERNAL MEDICINE

## 2021-03-02 PROCEDURE — 99214 OFFICE O/P EST MOD 30 MIN: CPT | Mod: S$GLB,,, | Performed by: INTERNAL MEDICINE

## 2021-03-02 RX ORDER — HEPARIN 100 UNIT/ML
500 SYRINGE INTRAVENOUS
Status: DISCONTINUED | OUTPATIENT
Start: 2021-03-02 | End: 2021-03-02 | Stop reason: HOSPADM

## 2021-03-02 RX ORDER — SODIUM CHLORIDE 0.9 % (FLUSH) 0.9 %
10 SYRINGE (ML) INJECTION
Status: DISCONTINUED | OUTPATIENT
Start: 2021-03-02 | End: 2021-03-02 | Stop reason: HOSPADM

## 2021-03-02 RX ORDER — SODIUM CHLORIDE 0.9 % (FLUSH) 0.9 %
10 SYRINGE (ML) INJECTION
Status: CANCELLED | OUTPATIENT
Start: 2021-03-02

## 2021-03-02 RX ORDER — HEPARIN 100 UNIT/ML
500 SYRINGE INTRAVENOUS
Status: CANCELLED | OUTPATIENT
Start: 2021-03-02

## 2021-03-02 RX ORDER — PROCHLORPERAZINE EDISYLATE 5 MG/ML
2.5 INJECTION INTRAMUSCULAR; INTRAVENOUS ONCE
Status: CANCELLED
Start: 2021-03-02 | End: 2021-03-02

## 2021-03-02 RX ORDER — PROCHLORPERAZINE EDISYLATE 5 MG/ML
2.5 INJECTION INTRAMUSCULAR; INTRAVENOUS ONCE
Status: COMPLETED | OUTPATIENT
Start: 2021-03-02 | End: 2021-03-02

## 2021-03-02 RX ADMIN — SODIUM CHLORIDE: 0.9 INJECTION, SOLUTION INTRAVENOUS at 03:03

## 2021-03-02 RX ADMIN — ADO-TRASTUZUMAB EMTANSINE 180 MG: 20 INJECTION, POWDER, LYOPHILIZED, FOR SOLUTION INTRAVENOUS at 03:03

## 2021-03-02 RX ADMIN — HEPARIN 500 UNITS: 100 SYRINGE at 04:03

## 2021-03-02 RX ADMIN — PROCHLORPERAZINE EDISYLATE 2.5 MG: 5 INJECTION INTRAMUSCULAR; INTRAVENOUS at 03:03

## 2021-03-09 ENCOUNTER — CLINICAL SUPPORT (OUTPATIENT)
Dept: HEMATOLOGY/ONCOLOGY | Facility: CLINIC | Age: 36
End: 2021-03-09
Payer: COMMERCIAL

## 2021-03-09 DIAGNOSIS — M54.50 ACUTE BILATERAL LOW BACK PAIN WITHOUT SCIATICA: Primary | ICD-10-CM

## 2021-03-09 PROCEDURE — 97813 PR ACUPUNCT W/ ELEC STIMUL 15 MIN: ICD-10-PCS | Mod: S$GLB,,, | Performed by: ACUPUNCTURIST

## 2021-03-09 PROCEDURE — 97813 ACUP 1/> W/ESTIM 1ST 15 MIN: CPT | Mod: S$GLB,,, | Performed by: ACUPUNCTURIST

## 2021-03-09 PROCEDURE — 97814 PR ACUPUNCT W/ ELEC STIMUL ADDL 15M: ICD-10-PCS | Mod: S$GLB,,, | Performed by: ACUPUNCTURIST

## 2021-03-09 PROCEDURE — 97814 ACUP 1/> W/ESTIM EA ADDL 15: CPT | Mod: S$GLB,,, | Performed by: ACUPUNCTURIST

## 2021-03-16 ENCOUNTER — PATIENT MESSAGE (OUTPATIENT)
Dept: HEMATOLOGY/ONCOLOGY | Facility: CLINIC | Age: 36
End: 2021-03-16

## 2021-03-16 DIAGNOSIS — Z01.818 PREOP TESTING: Primary | ICD-10-CM

## 2021-03-17 ENCOUNTER — INFUSION (OUTPATIENT)
Dept: INFUSION THERAPY | Facility: HOSPITAL | Age: 36
End: 2021-03-17
Attending: INTERNAL MEDICINE
Payer: COMMERCIAL

## 2021-03-17 DIAGNOSIS — E28.39 SUPPRESSION OF OVARIAN SECRETION: Primary | ICD-10-CM

## 2021-03-17 PROCEDURE — 96402 CHEMO HORMON ANTINEOPL SQ/IM: CPT

## 2021-03-17 PROCEDURE — 63600175 PHARM REV CODE 636 W HCPCS: Mod: JG | Performed by: INTERNAL MEDICINE

## 2021-03-17 RX ADMIN — GOSERELIN ACETATE 3.6 MG: 3.6 IMPLANT SUBCUTANEOUS at 02:03

## 2021-03-22 ENCOUNTER — HOSPITAL ENCOUNTER (OUTPATIENT)
Dept: CARDIOLOGY | Facility: HOSPITAL | Age: 36
Discharge: HOME | End: 2021-03-22
Attending: INTERNAL MEDICINE
Payer: COMMERCIAL

## 2021-03-22 ENCOUNTER — PATIENT MESSAGE (OUTPATIENT)
Dept: HEMATOLOGY/ONCOLOGY | Facility: CLINIC | Age: 36
End: 2021-03-22

## 2021-03-22 VITALS
BODY MASS INDEX: 17.83 KG/M2 | HEIGHT: 65 IN | DIASTOLIC BLOOD PRESSURE: 50 MMHG | WEIGHT: 107 LBS | HEART RATE: 70 BPM | SYSTOLIC BLOOD PRESSURE: 112 MMHG

## 2021-03-22 DIAGNOSIS — Z17.0 MALIGNANT NEOPLASM OF UPPER-OUTER QUADRANT OF LEFT BREAST IN FEMALE, ESTROGEN RECEPTOR POSITIVE: ICD-10-CM

## 2021-03-22 DIAGNOSIS — Z79.810 SERM USE (SELECTIVE ESTROGEN RECEPTOR MODULATOR): ICD-10-CM

## 2021-03-22 DIAGNOSIS — C50.412 MALIGNANT NEOPLASM OF UPPER-OUTER QUADRANT OF LEFT BREAST IN FEMALE, ESTROGEN RECEPTOR POSITIVE: ICD-10-CM

## 2021-03-22 LAB
ASCENDING AORTA: 2.42 CM
AV INDEX (PROSTH): 0.81
AV MEAN GRADIENT: 4 MMHG
AV PEAK GRADIENT: 7 MMHG
AV VALVE AREA: 2.48 CM2
AV VELOCITY RATIO: 0.74
BSA FOR ECHO PROCEDURE: 1.49 M2
CV ECHO LV RWT: 0.26 CM
DOP CALC AO PEAK VEL: 1.31 M/S
DOP CALC AO VTI: 25.61 CM
DOP CALC LVOT AREA: 3 CM2
DOP CALC LVOT DIAMETER: 1.97 CM
DOP CALC LVOT PEAK VEL: 0.97 M/S
DOP CALC LVOT STROKE VOLUME: 63.55 CM3
DOP CALCLVOT PEAK VEL VTI: 20.86 CM
E WAVE DECELERATION TIME: 177.62 MSEC
E/A RATIO: 1.57
E/E' RATIO: 5.16 M/S
ECHO LV POSTERIOR WALL: 0.59 CM (ref 0.6–1.1)
FRACTIONAL SHORTENING: 36 % (ref 28–44)
INTERVENTRICULAR SEPTUM: 0.64 CM (ref 0.6–1.1)
LA MAJOR: 3.25 CM
LA MINOR: 3.77 CM
LA WIDTH: 3.11 CM
LEFT ATRIUM SIZE: 2.56 CM
LEFT ATRIUM VOLUME INDEX MOD: 17.8 ML/M2
LEFT ATRIUM VOLUME INDEX: 15.5 ML/M2
LEFT ATRIUM VOLUME MOD: 27.04 CM3
LEFT ATRIUM VOLUME: 23.62 CM3
LEFT INTERNAL DIMENSION IN SYSTOLE: 2.92 CM (ref 2.1–4)
LEFT VENTRICLE DIASTOLIC VOLUME INDEX: 62.04 ML/M2
LEFT VENTRICLE DIASTOLIC VOLUME: 94.3 ML
LEFT VENTRICLE MASS INDEX: 54 G/M2
LEFT VENTRICLE SYSTOLIC VOLUME INDEX: 21.5 ML/M2
LEFT VENTRICLE SYSTOLIC VOLUME: 32.7 ML
LEFT VENTRICULAR INTERNAL DIMENSION IN DIASTOLE: 4.54 CM (ref 3.5–6)
LEFT VENTRICULAR MASS: 82.57 G
LV LATERAL E/E' RATIO: 4.21 M/S
LV SEPTAL E/E' RATIO: 6.67 M/S
MV A" WAVE DURATION": 9.71 MSEC
MV PEAK A VEL: 0.51 M/S
MV PEAK E VEL: 0.8 M/S
MV STENOSIS PRESSURE HALF TIME: 51.51 MS
MV VALVE AREA P 1/2 METHOD: 4.27 CM2
PISA TR MAX VEL: 2.37 M/S
PULM VEIN S/D RATIO: 1.29
PV PEAK D VEL: 0.35 M/S
PV PEAK S VEL: 0.45 M/S
RA MAJOR: 3.33 CM
RA PRESSURE: 3 MMHG
RA WIDTH: 2.6 CM
RETIRED EF AND QEF - SEE NOTES: 69 %
RIGHT VENTRICULAR END-DIASTOLIC DIMENSION: 2.74 CM
RV TISSUE DOPPLER FREE WALL SYSTOLIC VELOCITY 1 (APICAL 4 CHAMBER VIEW): 12.2 CM/S
SINUS: 2.55 CM
STJ: 2.43 CM
TDI LATERAL: 0.19 M/S
TDI SEPTAL: 0.12 M/S
TDI: 0.16 M/S
TR MAX PG: 22 MMHG
TRICUSPID ANNULAR PLANE SYSTOLIC EXCURSION: 2.08 CM
TV REST PULMONARY ARTERY PRESSURE: 25 MMHG

## 2021-03-22 PROCEDURE — 93306 TTE W/DOPPLER COMPLETE: CPT | Mod: 26,,, | Performed by: INTERNAL MEDICINE

## 2021-03-22 PROCEDURE — 93306 ECHO (CUPID ONLY): ICD-10-PCS | Mod: 26,,, | Performed by: INTERNAL MEDICINE

## 2021-03-22 PROCEDURE — 93356 MYOCRD STRAIN IMG SPCKL TRCK: CPT | Mod: ,,, | Performed by: INTERNAL MEDICINE

## 2021-03-22 PROCEDURE — 93356 ECHO (CUPID ONLY): ICD-10-PCS | Mod: ,,, | Performed by: INTERNAL MEDICINE

## 2021-03-22 PROCEDURE — 93306 TTE W/DOPPLER COMPLETE: CPT

## 2021-03-23 ENCOUNTER — CLINICAL SUPPORT (OUTPATIENT)
Dept: HEMATOLOGY/ONCOLOGY | Facility: CLINIC | Age: 36
End: 2021-03-23
Payer: COMMERCIAL

## 2021-03-23 ENCOUNTER — INFUSION (OUTPATIENT)
Dept: INFUSION THERAPY | Facility: HOSPITAL | Age: 36
End: 2021-03-23
Attending: NURSE PRACTITIONER
Payer: COMMERCIAL

## 2021-03-23 ENCOUNTER — OFFICE VISIT (OUTPATIENT)
Dept: HEMATOLOGY/ONCOLOGY | Facility: CLINIC | Age: 36
End: 2021-03-23
Payer: COMMERCIAL

## 2021-03-23 VITALS
WEIGHT: 108.94 LBS | RESPIRATION RATE: 18 BRPM | DIASTOLIC BLOOD PRESSURE: 75 MMHG | SYSTOLIC BLOOD PRESSURE: 139 MMHG | BODY MASS INDEX: 18.15 KG/M2 | HEART RATE: 82 BPM | TEMPERATURE: 98 F | HEIGHT: 65 IN | OXYGEN SATURATION: 99 %

## 2021-03-23 VITALS
SYSTOLIC BLOOD PRESSURE: 144 MMHG | RESPIRATION RATE: 18 BRPM | TEMPERATURE: 98 F | HEIGHT: 65 IN | BODY MASS INDEX: 18.15 KG/M2 | HEART RATE: 74 BPM | WEIGHT: 108.94 LBS | DIASTOLIC BLOOD PRESSURE: 75 MMHG

## 2021-03-23 DIAGNOSIS — Z17.0 MALIGNANT NEOPLASM OF UPPER-OUTER QUADRANT OF LEFT BREAST IN FEMALE, ESTROGEN RECEPTOR POSITIVE: Primary | ICD-10-CM

## 2021-03-23 DIAGNOSIS — M54.40 BILATERAL LOW BACK PAIN WITH SCIATICA, SCIATICA LATERALITY UNSPECIFIED, UNSPECIFIED CHRONICITY: ICD-10-CM

## 2021-03-23 DIAGNOSIS — T45.1X5A CHEMOTHERAPY-INDUCED NAUSEA: ICD-10-CM

## 2021-03-23 DIAGNOSIS — C50.412 MALIGNANT NEOPLASM OF UPPER-OUTER QUADRANT OF LEFT BREAST IN FEMALE, ESTROGEN RECEPTOR POSITIVE: Primary | ICD-10-CM

## 2021-03-23 DIAGNOSIS — E55.9 VITAMIN D DEFICIENCY: ICD-10-CM

## 2021-03-23 DIAGNOSIS — M54.42 MIDLINE LOW BACK PAIN WITH BILATERAL SCIATICA, UNSPECIFIED CHRONICITY: ICD-10-CM

## 2021-03-23 DIAGNOSIS — M54.41 MIDLINE LOW BACK PAIN WITH BILATERAL SCIATICA, UNSPECIFIED CHRONICITY: ICD-10-CM

## 2021-03-23 DIAGNOSIS — T45.1X5A CHEMOTHERAPY-INDUCED THROMBOCYTOPENIA: ICD-10-CM

## 2021-03-23 DIAGNOSIS — D70.1 CHEMOTHERAPY INDUCED NEUTROPENIA: ICD-10-CM

## 2021-03-23 DIAGNOSIS — D69.59 CHEMOTHERAPY-INDUCED THROMBOCYTOPENIA: ICD-10-CM

## 2021-03-23 DIAGNOSIS — T45.1X5A CHEMOTHERAPY INDUCED NEUTROPENIA: ICD-10-CM

## 2021-03-23 DIAGNOSIS — Z51.11 CHEMOTHERAPY MANAGEMENT, ENCOUNTER FOR: ICD-10-CM

## 2021-03-23 DIAGNOSIS — Z79.810 SERM USE (SELECTIVE ESTROGEN RECEPTOR MODULATOR): ICD-10-CM

## 2021-03-23 DIAGNOSIS — G89.3 CANCER ASSOCIATED PAIN: ICD-10-CM

## 2021-03-23 DIAGNOSIS — M54.50 ACUTE BILATERAL LOW BACK PAIN WITHOUT SCIATICA: Primary | ICD-10-CM

## 2021-03-23 DIAGNOSIS — M54.2 NECK PAIN: ICD-10-CM

## 2021-03-23 DIAGNOSIS — Z51.11 ENCOUNTER FOR CHEMOTHERAPY MANAGEMENT: ICD-10-CM

## 2021-03-23 DIAGNOSIS — R11.0 CHEMOTHERAPY-INDUCED NAUSEA: ICD-10-CM

## 2021-03-23 PROCEDURE — 1125F PR PAIN SEVERITY QUANTIFIED, PAIN PRESENT: ICD-10-PCS | Mod: S$GLB,,, | Performed by: NURSE PRACTITIONER

## 2021-03-23 PROCEDURE — 3008F BODY MASS INDEX DOCD: CPT | Mod: CPTII,S$GLB,, | Performed by: NURSE PRACTITIONER

## 2021-03-23 PROCEDURE — 3008F PR BODY MASS INDEX (BMI) DOCUMENTED: ICD-10-PCS | Mod: CPTII,S$GLB,, | Performed by: NURSE PRACTITIONER

## 2021-03-23 PROCEDURE — 97814 PR ACUPUNCT W/ ELEC STIMUL ADDL 15M: ICD-10-PCS | Mod: S$GLB,,, | Performed by: ACUPUNCTURIST

## 2021-03-23 PROCEDURE — A4216 STERILE WATER/SALINE, 10 ML: HCPCS | Performed by: INTERNAL MEDICINE

## 2021-03-23 PROCEDURE — 97813 ACUP 1/> W/ESTIM 1ST 15 MIN: CPT | Mod: S$GLB,,, | Performed by: ACUPUNCTURIST

## 2021-03-23 PROCEDURE — 63600175 PHARM REV CODE 636 W HCPCS: Mod: JG | Performed by: INTERNAL MEDICINE

## 2021-03-23 PROCEDURE — 1125F AMNT PAIN NOTED PAIN PRSNT: CPT | Mod: S$GLB,,, | Performed by: NURSE PRACTITIONER

## 2021-03-23 PROCEDURE — 25000003 PHARM REV CODE 250: Performed by: INTERNAL MEDICINE

## 2021-03-23 PROCEDURE — 99215 OFFICE O/P EST HI 40 MIN: CPT | Mod: S$GLB,,, | Performed by: NURSE PRACTITIONER

## 2021-03-23 PROCEDURE — 96375 TX/PRO/DX INJ NEW DRUG ADDON: CPT

## 2021-03-23 PROCEDURE — 96413 CHEMO IV INFUSION 1 HR: CPT

## 2021-03-23 PROCEDURE — 99999 PR PBB SHADOW E&M-EST. PATIENT-LVL IV: ICD-10-PCS | Mod: PBBFAC,,, | Performed by: NURSE PRACTITIONER

## 2021-03-23 PROCEDURE — 97814 ACUP 1/> W/ESTIM EA ADDL 15: CPT | Mod: S$GLB,,, | Performed by: ACUPUNCTURIST

## 2021-03-23 PROCEDURE — 99215 PR OFFICE/OUTPT VISIT, EST, LEVL V, 40-54 MIN: ICD-10-PCS | Mod: S$GLB,,, | Performed by: NURSE PRACTITIONER

## 2021-03-23 PROCEDURE — 97813 PR ACUPUNCT W/ ELEC STIMUL 15 MIN: ICD-10-PCS | Mod: S$GLB,,, | Performed by: ACUPUNCTURIST

## 2021-03-23 PROCEDURE — 99999 PR PBB SHADOW E&M-EST. PATIENT-LVL IV: CPT | Mod: PBBFAC,,, | Performed by: NURSE PRACTITIONER

## 2021-03-23 RX ORDER — HEPARIN 100 UNIT/ML
500 SYRINGE INTRAVENOUS
Status: DISCONTINUED | OUTPATIENT
Start: 2021-03-23 | End: 2021-03-23 | Stop reason: HOSPADM

## 2021-03-23 RX ORDER — SODIUM CHLORIDE 0.9 % (FLUSH) 0.9 %
10 SYRINGE (ML) INJECTION
Status: CANCELLED | OUTPATIENT
Start: 2021-03-23

## 2021-03-23 RX ORDER — PROCHLORPERAZINE EDISYLATE 5 MG/ML
2.5 INJECTION INTRAMUSCULAR; INTRAVENOUS ONCE
Status: COMPLETED | OUTPATIENT
Start: 2021-03-23 | End: 2021-03-23

## 2021-03-23 RX ORDER — LIDOCAINE AND PRILOCAINE 25; 25 MG/G; MG/G
CREAM TOPICAL
Qty: 30 G | Refills: 0 | Status: SHIPPED | OUTPATIENT
Start: 2021-03-23 | End: 2021-03-25 | Stop reason: SDUPTHER

## 2021-03-23 RX ORDER — SODIUM CHLORIDE 0.9 % (FLUSH) 0.9 %
10 SYRINGE (ML) INJECTION
Status: DISCONTINUED | OUTPATIENT
Start: 2021-03-23 | End: 2021-03-23 | Stop reason: HOSPADM

## 2021-03-23 RX ORDER — PROCHLORPERAZINE EDISYLATE 5 MG/ML
2.5 INJECTION INTRAMUSCULAR; INTRAVENOUS ONCE
Status: CANCELLED
Start: 2021-03-23 | End: 2021-03-23

## 2021-03-23 RX ORDER — HYDROCODONE BITARTRATE AND ACETAMINOPHEN 5; 325 MG/1; MG/1
1 TABLET ORAL EVERY 8 HOURS PRN
Qty: 30 TABLET | Refills: 0 | Status: SHIPPED | OUTPATIENT
Start: 2021-03-23 | End: 2021-03-25 | Stop reason: SDUPTHER

## 2021-03-23 RX ORDER — HEPARIN 100 UNIT/ML
500 SYRINGE INTRAVENOUS
Status: CANCELLED | OUTPATIENT
Start: 2021-03-23

## 2021-03-23 RX ADMIN — SODIUM CHLORIDE: 9 INJECTION, SOLUTION INTRAVENOUS at 02:03

## 2021-03-23 RX ADMIN — ADO-TRASTUZUMAB EMTANSINE 180 MG: 20 INJECTION, POWDER, LYOPHILIZED, FOR SOLUTION INTRAVENOUS at 02:03

## 2021-03-23 RX ADMIN — PROCHLORPERAZINE EDISYLATE 2.5 MG: 5 INJECTION INTRAMUSCULAR; INTRAVENOUS at 02:03

## 2021-03-23 RX ADMIN — HEPARIN 500 UNITS: 100 SYRINGE at 03:03

## 2021-03-23 RX ADMIN — Medication 10 ML: at 03:03

## 2021-03-25 ENCOUNTER — PATIENT MESSAGE (OUTPATIENT)
Dept: HEMATOLOGY/ONCOLOGY | Facility: CLINIC | Age: 36
End: 2021-03-25

## 2021-03-25 ENCOUNTER — TELEPHONE (OUTPATIENT)
Dept: HEMATOLOGY/ONCOLOGY | Facility: CLINIC | Age: 36
End: 2021-03-25

## 2021-03-25 ENCOUNTER — TELEPHONE (OUTPATIENT)
Dept: PHARMACY | Facility: CLINIC | Age: 36
End: 2021-03-25

## 2021-03-25 DIAGNOSIS — Z51.11 CHEMOTHERAPY MANAGEMENT, ENCOUNTER FOR: ICD-10-CM

## 2021-03-25 DIAGNOSIS — C50.412 MALIGNANT NEOPLASM OF UPPER-OUTER QUADRANT OF LEFT BREAST IN FEMALE, ESTROGEN RECEPTOR POSITIVE: ICD-10-CM

## 2021-03-25 DIAGNOSIS — Z17.0 MALIGNANT NEOPLASM OF UPPER-OUTER QUADRANT OF LEFT BREAST IN FEMALE, ESTROGEN RECEPTOR POSITIVE: ICD-10-CM

## 2021-03-25 DIAGNOSIS — G89.3 CANCER ASSOCIATED PAIN: ICD-10-CM

## 2021-03-25 RX ORDER — LIDOCAINE AND PRILOCAINE 25; 25 MG/G; MG/G
CREAM TOPICAL
Qty: 30 G | Refills: 0 | Status: SHIPPED | OUTPATIENT
Start: 2021-03-25 | End: 2021-06-22 | Stop reason: SDUPTHER

## 2021-03-25 RX ORDER — HYDROCODONE BITARTRATE AND ACETAMINOPHEN 5; 325 MG/1; MG/1
1 TABLET ORAL EVERY 8 HOURS PRN
Qty: 30 TABLET | Refills: 0 | Status: SHIPPED | OUTPATIENT
Start: 2021-03-25 | End: 2021-09-17 | Stop reason: SDUPTHER

## 2021-03-29 ENCOUNTER — PATIENT MESSAGE (OUTPATIENT)
Dept: HEMATOLOGY/ONCOLOGY | Facility: CLINIC | Age: 36
End: 2021-03-29

## 2021-03-30 ENCOUNTER — PATIENT MESSAGE (OUTPATIENT)
Dept: HEMATOLOGY/ONCOLOGY | Facility: CLINIC | Age: 36
End: 2021-03-30

## 2021-03-30 ENCOUNTER — CLINICAL SUPPORT (OUTPATIENT)
Dept: HEMATOLOGY/ONCOLOGY | Facility: CLINIC | Age: 36
End: 2021-03-30
Payer: COMMERCIAL

## 2021-03-30 DIAGNOSIS — G89.3 CANCER ASSOCIATED PAIN: ICD-10-CM

## 2021-03-30 DIAGNOSIS — M54.50 ACUTE BILATERAL LOW BACK PAIN WITHOUT SCIATICA: Primary | ICD-10-CM

## 2021-03-30 DIAGNOSIS — T45.1X5A CHEMOTHERAPY-INDUCED NAUSEA: ICD-10-CM

## 2021-03-30 DIAGNOSIS — R11.0 CHEMOTHERAPY-INDUCED NAUSEA: ICD-10-CM

## 2021-03-30 PROCEDURE — 97813 ACUP 1/> W/ESTIM 1ST 15 MIN: CPT | Mod: S$GLB,,, | Performed by: ACUPUNCTURIST

## 2021-03-30 PROCEDURE — 97814 ACUP 1/> W/ESTIM EA ADDL 15: CPT | Mod: S$GLB,,, | Performed by: ACUPUNCTURIST

## 2021-03-30 PROCEDURE — 97813 PR ACUPUNCT W/ ELEC STIMUL 15 MIN: ICD-10-PCS | Mod: S$GLB,,, | Performed by: ACUPUNCTURIST

## 2021-03-30 PROCEDURE — 97814 PR ACUPUNCT W/ ELEC STIMUL ADDL 15M: ICD-10-PCS | Mod: S$GLB,,, | Performed by: ACUPUNCTURIST

## 2021-03-31 ENCOUNTER — PATIENT MESSAGE (OUTPATIENT)
Dept: HEMATOLOGY/ONCOLOGY | Facility: CLINIC | Age: 36
End: 2021-03-31

## 2021-04-01 ENCOUNTER — TELEPHONE (OUTPATIENT)
Dept: PHARMACY | Facility: CLINIC | Age: 36
End: 2021-04-01

## 2021-04-06 ENCOUNTER — PATIENT MESSAGE (OUTPATIENT)
Dept: HEMATOLOGY/ONCOLOGY | Facility: CLINIC | Age: 36
End: 2021-04-06

## 2021-04-09 ENCOUNTER — HOSPITAL ENCOUNTER (OUTPATIENT)
Dept: RADIOLOGY | Facility: OTHER | Age: 36
Discharge: HOME | End: 2021-04-09
Attending: NURSE PRACTITIONER
Payer: COMMERCIAL

## 2021-04-09 DIAGNOSIS — M54.2 NECK PAIN: ICD-10-CM

## 2021-04-09 DIAGNOSIS — M54.42 MIDLINE LOW BACK PAIN WITH BILATERAL SCIATICA, UNSPECIFIED CHRONICITY: ICD-10-CM

## 2021-04-09 DIAGNOSIS — Z17.0 MALIGNANT NEOPLASM OF UPPER-OUTER QUADRANT OF LEFT BREAST IN FEMALE, ESTROGEN RECEPTOR POSITIVE: ICD-10-CM

## 2021-04-09 DIAGNOSIS — M54.41 MIDLINE LOW BACK PAIN WITH BILATERAL SCIATICA, UNSPECIFIED CHRONICITY: ICD-10-CM

## 2021-04-09 DIAGNOSIS — C50.412 MALIGNANT NEOPLASM OF UPPER-OUTER QUADRANT OF LEFT BREAST IN FEMALE, ESTROGEN RECEPTOR POSITIVE: ICD-10-CM

## 2021-04-09 DIAGNOSIS — M54.40 BILATERAL LOW BACK PAIN WITH SCIATICA, SCIATICA LATERALITY UNSPECIFIED, UNSPECIFIED CHRONICITY: ICD-10-CM

## 2021-04-13 ENCOUNTER — PATIENT MESSAGE (OUTPATIENT)
Dept: HEMATOLOGY/ONCOLOGY | Facility: CLINIC | Age: 36
End: 2021-04-13

## 2021-04-16 ENCOUNTER — PATIENT MESSAGE (OUTPATIENT)
Dept: RESEARCH | Facility: HOSPITAL | Age: 36
End: 2021-04-16

## 2021-04-19 ENCOUNTER — INFUSION (OUTPATIENT)
Dept: INFUSION THERAPY | Facility: HOSPITAL | Age: 36
End: 2021-04-19
Payer: COMMERCIAL

## 2021-04-19 DIAGNOSIS — E28.39 SUPPRESSION OF OVARIAN SECRETION: Primary | ICD-10-CM

## 2021-04-19 PROCEDURE — 63600175 PHARM REV CODE 636 W HCPCS: Mod: JG | Performed by: INTERNAL MEDICINE

## 2021-04-19 PROCEDURE — 96402 CHEMO HORMON ANTINEOPL SQ/IM: CPT

## 2021-04-19 RX ADMIN — GOSERELIN ACETATE 3.6 MG: 3.6 IMPLANT SUBCUTANEOUS at 11:04

## 2021-05-04 ENCOUNTER — INFUSION (OUTPATIENT)
Dept: INFUSION THERAPY | Facility: HOSPITAL | Age: 36
End: 2021-05-04
Payer: COMMERCIAL

## 2021-05-04 ENCOUNTER — LAB VISIT (OUTPATIENT)
Dept: LAB | Facility: HOSPITAL | Age: 36
End: 2021-05-04
Payer: COMMERCIAL

## 2021-05-04 ENCOUNTER — OFFICE VISIT (OUTPATIENT)
Dept: HEMATOLOGY/ONCOLOGY | Facility: CLINIC | Age: 36
End: 2021-05-04
Payer: COMMERCIAL

## 2021-05-04 VITALS
HEART RATE: 65 BPM | WEIGHT: 108.44 LBS | OXYGEN SATURATION: 99 % | TEMPERATURE: 98 F | DIASTOLIC BLOOD PRESSURE: 55 MMHG | BODY MASS INDEX: 18.07 KG/M2 | SYSTOLIC BLOOD PRESSURE: 112 MMHG | RESPIRATION RATE: 16 BRPM | HEIGHT: 65 IN

## 2021-05-04 VITALS
TEMPERATURE: 98 F | OXYGEN SATURATION: 95 % | RESPIRATION RATE: 16 BRPM | SYSTOLIC BLOOD PRESSURE: 133 MMHG | HEIGHT: 65 IN | HEART RATE: 76 BPM | BODY MASS INDEX: 18.07 KG/M2 | DIASTOLIC BLOOD PRESSURE: 59 MMHG | WEIGHT: 108.44 LBS

## 2021-05-04 DIAGNOSIS — Z79.810 SERM USE (SELECTIVE ESTROGEN RECEPTOR MODULATOR): ICD-10-CM

## 2021-05-04 DIAGNOSIS — D70.1 CHEMOTHERAPY INDUCED NEUTROPENIA: ICD-10-CM

## 2021-05-04 DIAGNOSIS — Z17.0 MALIGNANT NEOPLASM OF UPPER-OUTER QUADRANT OF LEFT BREAST IN FEMALE, ESTROGEN RECEPTOR POSITIVE: Primary | ICD-10-CM

## 2021-05-04 DIAGNOSIS — C50.412 MALIGNANT NEOPLASM OF UPPER-OUTER QUADRANT OF LEFT BREAST IN FEMALE, ESTROGEN RECEPTOR POSITIVE: ICD-10-CM

## 2021-05-04 DIAGNOSIS — Z17.0 MALIGNANT NEOPLASM OF UPPER-OUTER QUADRANT OF LEFT BREAST IN FEMALE, ESTROGEN RECEPTOR POSITIVE: ICD-10-CM

## 2021-05-04 DIAGNOSIS — T45.1X5A CHEMOTHERAPY INDUCED NEUTROPENIA: ICD-10-CM

## 2021-05-04 DIAGNOSIS — E28.39 SUPPRESSION OF OVARIAN SECRETION: ICD-10-CM

## 2021-05-04 DIAGNOSIS — C50.412 MALIGNANT NEOPLASM OF UPPER-OUTER QUADRANT OF LEFT BREAST IN FEMALE, ESTROGEN RECEPTOR POSITIVE: Primary | ICD-10-CM

## 2021-05-04 DIAGNOSIS — R11.0 CHEMOTHERAPY-INDUCED NAUSEA: ICD-10-CM

## 2021-05-04 DIAGNOSIS — E55.9 VITAMIN D DEFICIENCY: ICD-10-CM

## 2021-05-04 DIAGNOSIS — T45.1X5A CHEMOTHERAPY-INDUCED NAUSEA: ICD-10-CM

## 2021-05-04 DIAGNOSIS — Z51.11 ENCOUNTER FOR CHEMOTHERAPY MANAGEMENT: ICD-10-CM

## 2021-05-04 LAB
ALBUMIN SERPL BCP-MCNC: 3.8 G/DL (ref 3.5–5.2)
ALP SERPL-CCNC: 67 U/L (ref 55–135)
ALT SERPL W/O P-5'-P-CCNC: 28 U/L (ref 10–44)
ANION GAP SERPL CALC-SCNC: 7 MMOL/L (ref 8–16)
AST SERPL-CCNC: 33 U/L (ref 10–40)
BASOPHILS # BLD AUTO: 0.06 K/UL (ref 0–0.2)
BASOPHILS NFR BLD: 0.9 % (ref 0–1.9)
BILIRUB SERPL-MCNC: 0.3 MG/DL (ref 0.1–1)
BUN SERPL-MCNC: 9 MG/DL (ref 6–20)
CALCIUM SERPL-MCNC: 9.4 MG/DL (ref 8.7–10.5)
CHLORIDE SERPL-SCNC: 107 MMOL/L (ref 95–110)
CO2 SERPL-SCNC: 26 MMOL/L (ref 23–29)
CREAT SERPL-MCNC: 0.8 MG/DL (ref 0.5–1.4)
DIFFERENTIAL METHOD: ABNORMAL
EOSINOPHIL # BLD AUTO: 0.1 K/UL (ref 0–0.5)
EOSINOPHIL NFR BLD: 1.6 % (ref 0–8)
ERYTHROCYTE [DISTWIDTH] IN BLOOD BY AUTOMATED COUNT: 15.1 % (ref 11.5–14.5)
EST. GFR  (AFRICAN AMERICAN): >60 ML/MIN/1.73 M^2
EST. GFR  (NON AFRICAN AMERICAN): >60 ML/MIN/1.73 M^2
GLUCOSE SERPL-MCNC: 93 MG/DL (ref 70–110)
HCT VFR BLD AUTO: 37.3 % (ref 37–48.5)
HGB BLD-MCNC: 12.6 G/DL (ref 12–16)
IMM GRANULOCYTES # BLD AUTO: 0.01 K/UL (ref 0–0.04)
IMM GRANULOCYTES NFR BLD AUTO: 0.1 % (ref 0–0.5)
LYMPHOCYTES # BLD AUTO: 4.1 K/UL (ref 1–4.8)
LYMPHOCYTES NFR BLD: 59.8 % (ref 18–48)
MCH RBC QN AUTO: 29.9 PG (ref 27–31)
MCHC RBC AUTO-ENTMCNC: 33.8 G/DL (ref 32–36)
MCV RBC AUTO: 89 FL (ref 82–98)
MONOCYTES # BLD AUTO: 0.5 K/UL (ref 0.3–1)
MONOCYTES NFR BLD: 6.6 % (ref 4–15)
NEUTROPHILS # BLD AUTO: 2.1 K/UL (ref 1.8–7.7)
NEUTROPHILS NFR BLD: 31 % (ref 38–73)
NRBC BLD-RTO: 0 /100 WBC
PLATELET # BLD AUTO: 166 K/UL (ref 150–450)
PMV BLD AUTO: 10.9 FL (ref 9.2–12.9)
POTASSIUM SERPL-SCNC: 4.3 MMOL/L (ref 3.5–5.1)
PROT SERPL-MCNC: 7.2 G/DL (ref 6–8.4)
RBC # BLD AUTO: 4.21 M/UL (ref 4–5.4)
SODIUM SERPL-SCNC: 140 MMOL/L (ref 136–145)
WBC # BLD AUTO: 6.82 K/UL (ref 3.9–12.7)

## 2021-05-04 PROCEDURE — 36415 COLL VENOUS BLD VENIPUNCTURE: CPT | Performed by: INTERNAL MEDICINE

## 2021-05-04 PROCEDURE — 85025 COMPLETE CBC W/AUTO DIFF WBC: CPT | Performed by: INTERNAL MEDICINE

## 2021-05-04 PROCEDURE — 63600175 PHARM REV CODE 636 W HCPCS: Performed by: INTERNAL MEDICINE

## 2021-05-04 PROCEDURE — 3008F BODY MASS INDEX DOCD: CPT | Mod: CPTII,S$GLB,, | Performed by: INTERNAL MEDICINE

## 2021-05-04 PROCEDURE — 99214 PR OFFICE/OUTPT VISIT, EST, LEVL IV, 30-39 MIN: ICD-10-PCS | Mod: S$GLB,,, | Performed by: INTERNAL MEDICINE

## 2021-05-04 PROCEDURE — 99999 PR PBB SHADOW E&M-EST. PATIENT-LVL IV: CPT | Mod: PBBFAC,,, | Performed by: INTERNAL MEDICINE

## 2021-05-04 PROCEDURE — 1126F AMNT PAIN NOTED NONE PRSNT: CPT | Mod: S$GLB,,, | Performed by: INTERNAL MEDICINE

## 2021-05-04 PROCEDURE — 1126F PR PAIN SEVERITY QUANTIFIED, NO PAIN PRESENT: ICD-10-PCS | Mod: S$GLB,,, | Performed by: INTERNAL MEDICINE

## 2021-05-04 PROCEDURE — A4216 STERILE WATER/SALINE, 10 ML: HCPCS | Performed by: INTERNAL MEDICINE

## 2021-05-04 PROCEDURE — 3008F PR BODY MASS INDEX (BMI) DOCUMENTED: ICD-10-PCS | Mod: CPTII,S$GLB,, | Performed by: INTERNAL MEDICINE

## 2021-05-04 PROCEDURE — 25000003 PHARM REV CODE 250: Performed by: INTERNAL MEDICINE

## 2021-05-04 PROCEDURE — 96413 CHEMO IV INFUSION 1 HR: CPT

## 2021-05-04 PROCEDURE — 99999 PR PBB SHADOW E&M-EST. PATIENT-LVL IV: ICD-10-PCS | Mod: PBBFAC,,, | Performed by: INTERNAL MEDICINE

## 2021-05-04 PROCEDURE — 99214 OFFICE O/P EST MOD 30 MIN: CPT | Mod: S$GLB,,, | Performed by: INTERNAL MEDICINE

## 2021-05-04 PROCEDURE — 80053 COMPREHEN METABOLIC PANEL: CPT | Performed by: INTERNAL MEDICINE

## 2021-05-04 PROCEDURE — 96375 TX/PRO/DX INJ NEW DRUG ADDON: CPT

## 2021-05-04 RX ORDER — HEPARIN 100 UNIT/ML
500 SYRINGE INTRAVENOUS
Status: DISCONTINUED | OUTPATIENT
Start: 2021-05-04 | End: 2021-05-04 | Stop reason: HOSPADM

## 2021-05-04 RX ORDER — SODIUM CHLORIDE 0.9 % (FLUSH) 0.9 %
10 SYRINGE (ML) INJECTION
Status: CANCELLED | OUTPATIENT
Start: 2021-05-04

## 2021-05-04 RX ORDER — SODIUM CHLORIDE 0.9 % (FLUSH) 0.9 %
10 SYRINGE (ML) INJECTION
Status: DISCONTINUED | OUTPATIENT
Start: 2021-05-04 | End: 2021-05-04 | Stop reason: HOSPADM

## 2021-05-04 RX ORDER — PROCHLORPERAZINE EDISYLATE 5 MG/ML
2.5 INJECTION INTRAMUSCULAR; INTRAVENOUS ONCE
Status: CANCELLED
Start: 2021-05-04 | End: 2021-05-04

## 2021-05-04 RX ORDER — PROCHLORPERAZINE EDISYLATE 5 MG/ML
2.5 INJECTION INTRAMUSCULAR; INTRAVENOUS ONCE
Status: COMPLETED | OUTPATIENT
Start: 2021-05-04 | End: 2021-05-04

## 2021-05-04 RX ORDER — HEPARIN 100 UNIT/ML
500 SYRINGE INTRAVENOUS
Status: CANCELLED | OUTPATIENT
Start: 2021-05-04

## 2021-05-04 RX ORDER — ADO-TRASTUZUMAB EMTANSINE 20 MG/ML
INJECTION, POWDER, LYOPHILIZED, FOR SOLUTION INTRAVENOUS
COMMUNITY
Start: 2021-04-06 | End: 2022-06-22

## 2021-05-04 RX ADMIN — HEPARIN 500 UNITS: 100 SYRINGE at 04:05

## 2021-05-04 RX ADMIN — SODIUM CHLORIDE: 0.9 INJECTION, SOLUTION INTRAVENOUS at 04:05

## 2021-05-04 RX ADMIN — PROCHLORPERAZINE EDISYLATE 2.5 MG: 5 INJECTION INTRAMUSCULAR; INTRAVENOUS at 04:05

## 2021-05-04 RX ADMIN — Medication 10 ML: at 04:05

## 2021-05-04 RX ADMIN — ADO-TRASTUZUMAB EMTANSINE 180 MG: 20 INJECTION, POWDER, LYOPHILIZED, FOR SOLUTION INTRAVENOUS at 04:05

## 2021-05-05 ENCOUNTER — TELEPHONE (OUTPATIENT)
Dept: HEMATOLOGY/ONCOLOGY | Facility: CLINIC | Age: 36
End: 2021-05-05

## 2021-05-11 RX ORDER — SODIUM CHLORIDE 0.9 % (FLUSH) 0.9 %
10 SYRINGE (ML) INJECTION
Status: CANCELLED | OUTPATIENT
Start: 2021-05-11

## 2021-05-11 RX ORDER — HEPARIN 100 UNIT/ML
500 SYRINGE INTRAVENOUS
Status: CANCELLED | OUTPATIENT
Start: 2021-05-11

## 2021-05-11 RX ORDER — PROCHLORPERAZINE EDISYLATE 5 MG/ML
2.5 INJECTION INTRAMUSCULAR; INTRAVENOUS ONCE
Status: CANCELLED
Start: 2021-05-11 | End: 2021-05-11

## 2021-05-18 ENCOUNTER — INFUSION (OUTPATIENT)
Dept: INFUSION THERAPY | Facility: HOSPITAL | Age: 36
End: 2021-05-18
Payer: COMMERCIAL

## 2021-05-18 DIAGNOSIS — E28.39 SUPPRESSION OF OVARIAN SECRETION: Primary | ICD-10-CM

## 2021-05-18 PROCEDURE — 63600175 PHARM REV CODE 636 W HCPCS: Mod: JG | Performed by: INTERNAL MEDICINE

## 2021-05-18 PROCEDURE — 96402 CHEMO HORMON ANTINEOPL SQ/IM: CPT

## 2021-05-18 RX ADMIN — GOSERELIN ACETATE 3.6 MG: 3.6 IMPLANT SUBCUTANEOUS at 03:05

## 2021-05-19 ENCOUNTER — PATIENT MESSAGE (OUTPATIENT)
Dept: HEMATOLOGY/ONCOLOGY | Facility: CLINIC | Age: 36
End: 2021-05-19

## 2021-05-20 DIAGNOSIS — Z17.0 MALIGNANT NEOPLASM OF UPPER-OUTER QUADRANT OF LEFT BREAST IN FEMALE, ESTROGEN RECEPTOR POSITIVE: Primary | ICD-10-CM

## 2021-05-20 DIAGNOSIS — C50.412 MALIGNANT NEOPLASM OF UPPER-OUTER QUADRANT OF LEFT BREAST IN FEMALE, ESTROGEN RECEPTOR POSITIVE: Primary | ICD-10-CM

## 2021-05-21 ENCOUNTER — TELEPHONE (OUTPATIENT)
Dept: HEMATOLOGY/ONCOLOGY | Facility: CLINIC | Age: 36
End: 2021-05-21

## 2021-05-21 DIAGNOSIS — R11.2 CINV (CHEMOTHERAPY-INDUCED NAUSEA AND VOMITING): ICD-10-CM

## 2021-05-21 DIAGNOSIS — G62.0 CHEMOTHERAPY-INDUCED NEUROPATHY: ICD-10-CM

## 2021-05-21 DIAGNOSIS — G62.9 NEUROPATHY: ICD-10-CM

## 2021-05-21 DIAGNOSIS — G89.3 CANCER ASSOCIATED PAIN: Primary | ICD-10-CM

## 2021-05-21 DIAGNOSIS — M54.50 ACUTE BILATERAL LOW BACK PAIN WITHOUT SCIATICA: ICD-10-CM

## 2021-05-21 DIAGNOSIS — T45.1X5A CINV (CHEMOTHERAPY-INDUCED NAUSEA AND VOMITING): ICD-10-CM

## 2021-05-21 DIAGNOSIS — T45.1X5A CHEMOTHERAPY-INDUCED NEUROPATHY: ICD-10-CM

## 2021-05-24 ENCOUNTER — PATIENT MESSAGE (OUTPATIENT)
Dept: HEMATOLOGY/ONCOLOGY | Facility: CLINIC | Age: 36
End: 2021-05-24

## 2021-05-24 DIAGNOSIS — Z17.0 MALIGNANT NEOPLASM OF UPPER-OUTER QUADRANT OF LEFT BREAST IN FEMALE, ESTROGEN RECEPTOR POSITIVE: ICD-10-CM

## 2021-05-24 DIAGNOSIS — C50.412 MALIGNANT NEOPLASM OF UPPER-OUTER QUADRANT OF LEFT BREAST IN FEMALE, ESTROGEN RECEPTOR POSITIVE: ICD-10-CM

## 2021-05-24 DIAGNOSIS — R45.89 ANXIETY ABOUT HEALTH: ICD-10-CM

## 2021-05-24 RX ORDER — ALPRAZOLAM 0.5 MG/1
0.5 TABLET ORAL 3 TIMES DAILY PRN
Qty: 30 TABLET | Refills: 0 | Status: SHIPPED | OUTPATIENT
Start: 2021-05-24 | End: 2021-09-15

## 2021-06-01 ENCOUNTER — LAB VISIT (OUTPATIENT)
Dept: LAB | Facility: HOSPITAL | Age: 36
End: 2021-06-01
Attending: INTERNAL MEDICINE
Payer: COMMERCIAL

## 2021-06-01 ENCOUNTER — TELEPHONE (OUTPATIENT)
Dept: HEMATOLOGY/ONCOLOGY | Facility: CLINIC | Age: 36
End: 2021-06-01

## 2021-06-01 DIAGNOSIS — Z17.0 MALIGNANT NEOPLASM OF UPPER-OUTER QUADRANT OF LEFT BREAST IN FEMALE, ESTROGEN RECEPTOR POSITIVE: ICD-10-CM

## 2021-06-01 DIAGNOSIS — C50.412 MALIGNANT NEOPLASM OF UPPER-OUTER QUADRANT OF LEFT BREAST IN FEMALE, ESTROGEN RECEPTOR POSITIVE: ICD-10-CM

## 2021-06-01 LAB
ALBUMIN SERPL BCP-MCNC: 3.8 G/DL (ref 3.5–5.2)
ALP SERPL-CCNC: 84 U/L (ref 55–135)
ALT SERPL W/O P-5'-P-CCNC: 40 U/L (ref 10–44)
ANION GAP SERPL CALC-SCNC: 10 MMOL/L (ref 8–16)
AST SERPL-CCNC: 44 U/L (ref 10–40)
BASOPHILS # BLD AUTO: 0.04 K/UL (ref 0–0.2)
BASOPHILS NFR BLD: 0.8 % (ref 0–1.9)
BILIRUB SERPL-MCNC: 0.4 MG/DL (ref 0.1–1)
BUN SERPL-MCNC: 12 MG/DL (ref 6–20)
CALCIUM SERPL-MCNC: 9.9 MG/DL (ref 8.7–10.5)
CHLORIDE SERPL-SCNC: 108 MMOL/L (ref 95–110)
CO2 SERPL-SCNC: 25 MMOL/L (ref 23–29)
CREAT SERPL-MCNC: 0.7 MG/DL (ref 0.5–1.4)
DIFFERENTIAL METHOD: ABNORMAL
EOSINOPHIL # BLD AUTO: 0.2 K/UL (ref 0–0.5)
EOSINOPHIL NFR BLD: 4.4 % (ref 0–8)
ERYTHROCYTE [DISTWIDTH] IN BLOOD BY AUTOMATED COUNT: 14.8 % (ref 11.5–14.5)
EST. GFR  (AFRICAN AMERICAN): >60 ML/MIN/1.73 M^2
EST. GFR  (NON AFRICAN AMERICAN): >60 ML/MIN/1.73 M^2
GLUCOSE SERPL-MCNC: 92 MG/DL (ref 70–110)
HCT VFR BLD AUTO: 40.4 % (ref 37–48.5)
HGB BLD-MCNC: 13.3 G/DL (ref 12–16)
IMM GRANULOCYTES # BLD AUTO: 0 K/UL (ref 0–0.04)
IMM GRANULOCYTES NFR BLD AUTO: 0 % (ref 0–0.5)
LYMPHOCYTES # BLD AUTO: 2.4 K/UL (ref 1–4.8)
LYMPHOCYTES NFR BLD: 45.8 % (ref 18–48)
MCH RBC QN AUTO: 30.7 PG (ref 27–31)
MCHC RBC AUTO-ENTMCNC: 32.9 G/DL (ref 32–36)
MCV RBC AUTO: 93 FL (ref 82–98)
MONOCYTES # BLD AUTO: 0.5 K/UL (ref 0.3–1)
MONOCYTES NFR BLD: 8.6 % (ref 4–15)
NEUTROPHILS # BLD AUTO: 2.1 K/UL (ref 1.8–7.7)
NEUTROPHILS NFR BLD: 40.4 % (ref 38–73)
NRBC BLD-RTO: 0 /100 WBC
PLATELET # BLD AUTO: 123 K/UL (ref 150–450)
PMV BLD AUTO: 12.1 FL (ref 9.2–12.9)
POTASSIUM SERPL-SCNC: 4.2 MMOL/L (ref 3.5–5.1)
PROT SERPL-MCNC: 7.1 G/DL (ref 6–8.4)
RBC # BLD AUTO: 4.33 M/UL (ref 4–5.4)
SODIUM SERPL-SCNC: 143 MMOL/L (ref 136–145)
WBC # BLD AUTO: 5.22 K/UL (ref 3.9–12.7)

## 2021-06-01 PROCEDURE — 36415 COLL VENOUS BLD VENIPUNCTURE: CPT | Performed by: INTERNAL MEDICINE

## 2021-06-01 PROCEDURE — 85025 COMPLETE CBC W/AUTO DIFF WBC: CPT | Performed by: INTERNAL MEDICINE

## 2021-06-01 PROCEDURE — 80053 COMPREHEN METABOLIC PANEL: CPT | Performed by: INTERNAL MEDICINE

## 2021-06-02 ENCOUNTER — PATIENT MESSAGE (OUTPATIENT)
Dept: HEMATOLOGY/ONCOLOGY | Facility: CLINIC | Age: 36
End: 2021-06-02

## 2021-06-02 ENCOUNTER — INFUSION (OUTPATIENT)
Dept: INFUSION THERAPY | Facility: HOSPITAL | Age: 36
End: 2021-06-02
Payer: COMMERCIAL

## 2021-06-02 VITALS
SYSTOLIC BLOOD PRESSURE: 126 MMHG | OXYGEN SATURATION: 99 % | RESPIRATION RATE: 16 BRPM | TEMPERATURE: 99 F | HEIGHT: 65 IN | DIASTOLIC BLOOD PRESSURE: 62 MMHG | BODY MASS INDEX: 18.07 KG/M2 | HEART RATE: 63 BPM | WEIGHT: 108.44 LBS

## 2021-06-02 DIAGNOSIS — D70.1 CHEMOTHERAPY INDUCED NEUTROPENIA: ICD-10-CM

## 2021-06-02 DIAGNOSIS — Z51.11 ENCOUNTER FOR CHEMOTHERAPY MANAGEMENT: ICD-10-CM

## 2021-06-02 DIAGNOSIS — Z17.0 MALIGNANT NEOPLASM OF UPPER-OUTER QUADRANT OF LEFT BREAST IN FEMALE, ESTROGEN RECEPTOR POSITIVE: Primary | ICD-10-CM

## 2021-06-02 DIAGNOSIS — B00.1 FEVER BLISTER: ICD-10-CM

## 2021-06-02 DIAGNOSIS — C50.412 MALIGNANT NEOPLASM OF UPPER-OUTER QUADRANT OF LEFT BREAST IN FEMALE, ESTROGEN RECEPTOR POSITIVE: Primary | ICD-10-CM

## 2021-06-02 DIAGNOSIS — R11.0 CHEMOTHERAPY-INDUCED NAUSEA: ICD-10-CM

## 2021-06-02 DIAGNOSIS — T45.1X5A CHEMOTHERAPY-INDUCED NAUSEA: ICD-10-CM

## 2021-06-02 DIAGNOSIS — T45.1X5A CHEMOTHERAPY INDUCED NEUTROPENIA: ICD-10-CM

## 2021-06-02 PROCEDURE — 25000003 PHARM REV CODE 250: Performed by: INTERNAL MEDICINE

## 2021-06-02 PROCEDURE — A4216 STERILE WATER/SALINE, 10 ML: HCPCS | Performed by: INTERNAL MEDICINE

## 2021-06-02 PROCEDURE — 96413 CHEMO IV INFUSION 1 HR: CPT

## 2021-06-02 PROCEDURE — 63600175 PHARM REV CODE 636 W HCPCS: Performed by: INTERNAL MEDICINE

## 2021-06-02 PROCEDURE — 96375 TX/PRO/DX INJ NEW DRUG ADDON: CPT

## 2021-06-02 RX ORDER — PROCHLORPERAZINE EDISYLATE 5 MG/ML
2.5 INJECTION INTRAMUSCULAR; INTRAVENOUS ONCE
Status: COMPLETED | OUTPATIENT
Start: 2021-06-02 | End: 2021-06-02

## 2021-06-02 RX ORDER — HEPARIN 100 UNIT/ML
500 SYRINGE INTRAVENOUS
Status: DISCONTINUED | OUTPATIENT
Start: 2021-06-02 | End: 2021-06-02 | Stop reason: HOSPADM

## 2021-06-02 RX ORDER — SODIUM CHLORIDE 0.9 % (FLUSH) 0.9 %
10 SYRINGE (ML) INJECTION
Status: DISCONTINUED | OUTPATIENT
Start: 2021-06-02 | End: 2021-06-02 | Stop reason: HOSPADM

## 2021-06-02 RX ADMIN — ADO-TRASTUZUMAB EMTANSINE 180 MG: 20 INJECTION, POWDER, LYOPHILIZED, FOR SOLUTION INTRAVENOUS at 04:06

## 2021-06-02 RX ADMIN — SODIUM CHLORIDE: 0.9 INJECTION, SOLUTION INTRAVENOUS at 04:06

## 2021-06-02 RX ADMIN — HEPARIN 500 UNITS: 100 SYRINGE at 05:06

## 2021-06-02 RX ADMIN — Medication 10 ML: at 05:06

## 2021-06-02 RX ADMIN — PROCHLORPERAZINE EDISYLATE 2.5 MG: 5 INJECTION INTRAMUSCULAR; INTRAVENOUS at 04:06

## 2021-06-04 RX ORDER — VALACYCLOVIR HYDROCHLORIDE 500 MG/1
TABLET, FILM COATED ORAL
Qty: 18 TABLET | Refills: 0 | Status: SHIPPED | OUTPATIENT
Start: 2021-06-04 | End: 2021-06-22 | Stop reason: SDUPTHER

## 2021-06-08 RX ORDER — SODIUM CHLORIDE 0.9 % (FLUSH) 0.9 %
10 SYRINGE (ML) INJECTION
Status: CANCELLED | OUTPATIENT
Start: 2021-06-29

## 2021-06-08 RX ORDER — HEPARIN 100 UNIT/ML
500 SYRINGE INTRAVENOUS
Status: CANCELLED | OUTPATIENT
Start: 2021-06-29

## 2021-06-08 RX ORDER — PROCHLORPERAZINE EDISYLATE 5 MG/ML
2.5 INJECTION INTRAMUSCULAR; INTRAVENOUS ONCE
Status: CANCELLED
Start: 2021-06-29 | End: 2021-06-08

## 2021-06-14 ENCOUNTER — INFUSION (OUTPATIENT)
Dept: INFUSION THERAPY | Facility: HOSPITAL | Age: 36
End: 2021-06-14
Payer: COMMERCIAL

## 2021-06-14 DIAGNOSIS — E28.39 SUPPRESSION OF OVARIAN SECRETION: Primary | ICD-10-CM

## 2021-06-14 PROCEDURE — 96402 CHEMO HORMON ANTINEOPL SQ/IM: CPT

## 2021-06-14 PROCEDURE — 63600175 PHARM REV CODE 636 W HCPCS: Mod: JG | Performed by: INTERNAL MEDICINE

## 2021-06-14 RX ADMIN — GOSERELIN ACETATE 3.6 MG: 3.6 IMPLANT SUBCUTANEOUS at 02:06

## 2021-06-15 ENCOUNTER — PATIENT MESSAGE (OUTPATIENT)
Dept: HEMATOLOGY/ONCOLOGY | Facility: CLINIC | Age: 36
End: 2021-06-15

## 2021-06-15 DIAGNOSIS — C80.1 ANXIETY ASSOCIATED WITH CANCER DIAGNOSIS: ICD-10-CM

## 2021-06-15 DIAGNOSIS — F41.1 ANXIETY ASSOCIATED WITH CANCER DIAGNOSIS: ICD-10-CM

## 2021-06-16 RX ORDER — VENLAFAXINE HYDROCHLORIDE 37.5 MG/1
37.5 CAPSULE, EXTENDED RELEASE ORAL DAILY
Qty: 90 CAPSULE | Refills: 3 | Status: SHIPPED | OUTPATIENT
Start: 2021-06-16 | End: 2021-10-14

## 2021-06-17 ENCOUNTER — OFFICE VISIT (OUTPATIENT)
Dept: SURGERY | Facility: CLINIC | Age: 36
End: 2021-06-17
Payer: COMMERCIAL

## 2021-06-17 VITALS
DIASTOLIC BLOOD PRESSURE: 76 MMHG | SYSTOLIC BLOOD PRESSURE: 135 MMHG | HEIGHT: 65 IN | BODY MASS INDEX: 17.83 KG/M2 | HEART RATE: 67 BPM | WEIGHT: 107 LBS

## 2021-06-17 DIAGNOSIS — Z85.3 PERSONAL HISTORY OF BREAST CANCER: Primary | ICD-10-CM

## 2021-06-17 PROCEDURE — 3008F PR BODY MASS INDEX (BMI) DOCUMENTED: ICD-10-PCS | Mod: CPTII,S$GLB,, | Performed by: SURGERY

## 2021-06-17 PROCEDURE — 99999 PR PBB SHADOW E&M-EST. PATIENT-LVL III: ICD-10-PCS | Mod: PBBFAC,,, | Performed by: SURGERY

## 2021-06-17 PROCEDURE — 1126F PR PAIN SEVERITY QUANTIFIED, NO PAIN PRESENT: ICD-10-PCS | Mod: S$GLB,,, | Performed by: SURGERY

## 2021-06-17 PROCEDURE — 99999 PR PBB SHADOW E&M-EST. PATIENT-LVL III: CPT | Mod: PBBFAC,,, | Performed by: SURGERY

## 2021-06-17 PROCEDURE — 99213 OFFICE O/P EST LOW 20 MIN: CPT | Mod: S$GLB,,, | Performed by: SURGERY

## 2021-06-17 PROCEDURE — 3008F BODY MASS INDEX DOCD: CPT | Mod: CPTII,S$GLB,, | Performed by: SURGERY

## 2021-06-17 PROCEDURE — 1126F AMNT PAIN NOTED NONE PRSNT: CPT | Mod: S$GLB,,, | Performed by: SURGERY

## 2021-06-17 PROCEDURE — 99213 PR OFFICE/OUTPT VISIT, EST, LEVL III, 20-29 MIN: ICD-10-PCS | Mod: S$GLB,,, | Performed by: SURGERY

## 2021-06-21 ENCOUNTER — HOSPITAL ENCOUNTER (OUTPATIENT)
Dept: CARDIOLOGY | Facility: HOSPITAL | Age: 36
Discharge: HOME | End: 2021-06-21
Attending: INTERNAL MEDICINE
Payer: COMMERCIAL

## 2021-06-21 VITALS
WEIGHT: 108 LBS | DIASTOLIC BLOOD PRESSURE: 62 MMHG | HEIGHT: 65 IN | SYSTOLIC BLOOD PRESSURE: 118 MMHG | HEART RATE: 56 BPM | BODY MASS INDEX: 17.99 KG/M2

## 2021-06-21 DIAGNOSIS — C50.412 MALIGNANT NEOPLASM OF UPPER-OUTER QUADRANT OF LEFT BREAST IN FEMALE, ESTROGEN RECEPTOR POSITIVE: ICD-10-CM

## 2021-06-21 DIAGNOSIS — Z17.0 MALIGNANT NEOPLASM OF UPPER-OUTER QUADRANT OF LEFT BREAST IN FEMALE, ESTROGEN RECEPTOR POSITIVE: ICD-10-CM

## 2021-06-21 LAB
ASCENDING AORTA: 2.34 CM
AV INDEX (PROSTH): 0.78
AV MEAN GRADIENT: 2 MMHG
AV PEAK GRADIENT: 4 MMHG
AV VALVE AREA: 2.55 CM2
AV VELOCITY RATIO: 0.81
BSA FOR ECHO PROCEDURE: 1.5 M2
CV ECHO LV RWT: 0.29 CM
DOP CALC AO PEAK VEL: 1.03 M/S
DOP CALC AO VTI: 23.28 CM
DOP CALC LVOT AREA: 3.3 CM2
DOP CALC LVOT DIAMETER: 2.04 CM
DOP CALC LVOT PEAK VEL: 0.83 M/S
DOP CALC LVOT STROKE VOLUME: 59.46 CM3
DOP CALCLVOT PEAK VEL VTI: 18.2 CM
E WAVE DECELERATION TIME: 198.73 MSEC
E/A RATIO: 2.03
E/E' RATIO: 5.26 M/S
ECHO LV POSTERIOR WALL: 0.66 CM (ref 0.6–1.1)
EJECTION FRACTION: 55 %
FRACTIONAL SHORTENING: 33 % (ref 28–44)
INTERVENTRICULAR SEPTUM: 0.59 CM (ref 0.6–1.1)
IVRT: 94.2 MSEC
LA MAJOR: 5.09 CM
LA MINOR: 5 CM
LA WIDTH: 3.49 CM
LEFT ATRIUM SIZE: 3.1 CM
LEFT ATRIUM VOLUME INDEX MOD: 22.1 ML/M2
LEFT ATRIUM VOLUME INDEX: 30.5 ML/M2
LEFT ATRIUM VOLUME MOD: 33.57 CM3
LEFT ATRIUM VOLUME: 46.39 CM3
LEFT INTERNAL DIMENSION IN SYSTOLE: 3.02 CM (ref 2.1–4)
LEFT VENTRICLE DIASTOLIC VOLUME INDEX: 62 ML/M2
LEFT VENTRICLE DIASTOLIC VOLUME: 94.24 ML
LEFT VENTRICLE MASS INDEX: 55 G/M2
LEFT VENTRICLE SYSTOLIC VOLUME INDEX: 23.4 ML/M2
LEFT VENTRICLE SYSTOLIC VOLUME: 35.64 ML
LEFT VENTRICULAR INTERNAL DIMENSION IN DIASTOLE: 4.54 CM (ref 3.5–6)
LEFT VENTRICULAR MASS: 84.24 G
LV LATERAL E/E' RATIO: 4.44 M/S
LV SEPTAL E/E' RATIO: 6.45 M/S
MV A" WAVE DURATION": 9.9 MSEC
MV PEAK A VEL: 0.35 M/S
MV PEAK E VEL: 0.71 M/S
MV STENOSIS PRESSURE HALF TIME: 57.63 MS
MV VALVE AREA P 1/2 METHOD: 3.82 CM2
PISA TR MAX VEL: 2.02 M/S
PULM VEIN S/D RATIO: 0.87
PV PEAK D VEL: 0.45 M/S
PV PEAK S VEL: 0.39 M/S
RA MAJOR: 4.28 CM
RA PRESSURE: 3 MMHG
RA WIDTH: 3.2 CM
RIGHT VENTRICULAR END-DIASTOLIC DIMENSION: 2.86 CM
RV TISSUE DOPPLER FREE WALL SYSTOLIC VELOCITY 1 (APICAL 4 CHAMBER VIEW): 10.51 CM/S
SINUS: 2.4 CM
STJ: 2.08 CM
TDI LATERAL: 0.16 M/S
TDI SEPTAL: 0.11 M/S
TDI: 0.14 M/S
TR MAX PG: 16 MMHG
TRICUSPID ANNULAR PLANE SYSTOLIC EXCURSION: 1.92 CM
TV REST PULMONARY ARTERY PRESSURE: 19 MMHG

## 2021-06-21 PROCEDURE — 93356 ECHO (CUPID ONLY): ICD-10-PCS | Mod: ,,, | Performed by: INTERNAL MEDICINE

## 2021-06-21 PROCEDURE — 93306 ECHO (CUPID ONLY): ICD-10-PCS | Mod: 26,,, | Performed by: INTERNAL MEDICINE

## 2021-06-21 PROCEDURE — 93356 MYOCRD STRAIN IMG SPCKL TRCK: CPT | Mod: ,,, | Performed by: INTERNAL MEDICINE

## 2021-06-21 PROCEDURE — 93306 TTE W/DOPPLER COMPLETE: CPT | Mod: 26,,, | Performed by: INTERNAL MEDICINE

## 2021-06-21 PROCEDURE — 93306 TTE W/DOPPLER COMPLETE: CPT

## 2021-06-22 ENCOUNTER — OFFICE VISIT (OUTPATIENT)
Dept: HEMATOLOGY/ONCOLOGY | Facility: CLINIC | Age: 36
End: 2021-06-22
Payer: COMMERCIAL

## 2021-06-22 ENCOUNTER — TELEPHONE (OUTPATIENT)
Dept: HEMATOLOGY/ONCOLOGY | Facility: CLINIC | Age: 36
End: 2021-06-22

## 2021-06-22 VITALS
HEART RATE: 68 BPM | WEIGHT: 111.56 LBS | RESPIRATION RATE: 18 BRPM | TEMPERATURE: 98 F | HEIGHT: 65 IN | SYSTOLIC BLOOD PRESSURE: 119 MMHG | DIASTOLIC BLOOD PRESSURE: 62 MMHG | OXYGEN SATURATION: 99 % | BODY MASS INDEX: 18.59 KG/M2

## 2021-06-22 DIAGNOSIS — Z17.0 MALIGNANT NEOPLASM OF UPPER-OUTER QUADRANT OF LEFT BREAST IN FEMALE, ESTROGEN RECEPTOR POSITIVE: Primary | ICD-10-CM

## 2021-06-22 DIAGNOSIS — D69.59 CHEMOTHERAPY-INDUCED THROMBOCYTOPENIA: ICD-10-CM

## 2021-06-22 DIAGNOSIS — R74.01 ELEVATED TRANSAMINASE LEVEL: ICD-10-CM

## 2021-06-22 DIAGNOSIS — T45.1X5A CHEMOTHERAPY-INDUCED THROMBOCYTOPENIA: ICD-10-CM

## 2021-06-22 DIAGNOSIS — T45.1X5A CHEMOTHERAPY INDUCED NEUTROPENIA: ICD-10-CM

## 2021-06-22 DIAGNOSIS — C50.412 MALIGNANT NEOPLASM OF UPPER-OUTER QUADRANT OF LEFT BREAST IN FEMALE, ESTROGEN RECEPTOR POSITIVE: Primary | ICD-10-CM

## 2021-06-22 DIAGNOSIS — D70.1 CHEMOTHERAPY INDUCED NEUTROPENIA: ICD-10-CM

## 2021-06-22 DIAGNOSIS — B00.1 FEVER BLISTER: ICD-10-CM

## 2021-06-22 DIAGNOSIS — Z51.11 CHEMOTHERAPY MANAGEMENT, ENCOUNTER FOR: ICD-10-CM

## 2021-06-22 PROCEDURE — 3008F BODY MASS INDEX DOCD: CPT | Mod: CPTII,S$GLB,, | Performed by: NURSE PRACTITIONER

## 2021-06-22 PROCEDURE — 1126F PR PAIN SEVERITY QUANTIFIED, NO PAIN PRESENT: ICD-10-PCS | Mod: S$GLB,,, | Performed by: NURSE PRACTITIONER

## 2021-06-22 PROCEDURE — 3008F PR BODY MASS INDEX (BMI) DOCUMENTED: ICD-10-PCS | Mod: CPTII,S$GLB,, | Performed by: NURSE PRACTITIONER

## 2021-06-22 PROCEDURE — 99215 OFFICE O/P EST HI 40 MIN: CPT | Mod: S$GLB,,, | Performed by: NURSE PRACTITIONER

## 2021-06-22 PROCEDURE — 1126F AMNT PAIN NOTED NONE PRSNT: CPT | Mod: S$GLB,,, | Performed by: NURSE PRACTITIONER

## 2021-06-22 PROCEDURE — 99999 PR PBB SHADOW E&M-EST. PATIENT-LVL IV: ICD-10-PCS | Mod: PBBFAC,,, | Performed by: NURSE PRACTITIONER

## 2021-06-22 PROCEDURE — 99999 PR PBB SHADOW E&M-EST. PATIENT-LVL IV: CPT | Mod: PBBFAC,,, | Performed by: NURSE PRACTITIONER

## 2021-06-22 PROCEDURE — 99215 PR OFFICE/OUTPT VISIT, EST, LEVL V, 40-54 MIN: ICD-10-PCS | Mod: S$GLB,,, | Performed by: NURSE PRACTITIONER

## 2021-06-22 RX ORDER — VALACYCLOVIR HYDROCHLORIDE 500 MG/1
TABLET, FILM COATED ORAL
Qty: 18 TABLET | Refills: 0 | Status: SHIPPED | OUTPATIENT
Start: 2021-06-22 | End: 2022-01-26

## 2021-06-22 RX ORDER — LIDOCAINE AND PRILOCAINE 25; 25 MG/G; MG/G
CREAM TOPICAL
Qty: 30 G | Refills: 0 | Status: SHIPPED | OUTPATIENT
Start: 2021-06-22 | End: 2022-04-29 | Stop reason: SDUPTHER

## 2021-06-23 ENCOUNTER — TELEPHONE (OUTPATIENT)
Dept: HEMATOLOGY/ONCOLOGY | Facility: CLINIC | Age: 36
End: 2021-06-23

## 2021-06-24 ENCOUNTER — PATIENT MESSAGE (OUTPATIENT)
Dept: HEMATOLOGY/ONCOLOGY | Facility: CLINIC | Age: 36
End: 2021-06-24

## 2021-06-25 ENCOUNTER — PATIENT MESSAGE (OUTPATIENT)
Dept: HEMATOLOGY/ONCOLOGY | Facility: CLINIC | Age: 36
End: 2021-06-25

## 2021-06-25 ENCOUNTER — OFFICE VISIT (OUTPATIENT)
Dept: HEMATOLOGY/ONCOLOGY | Facility: CLINIC | Age: 36
End: 2021-06-25
Payer: COMMERCIAL

## 2021-06-25 VITALS
DIASTOLIC BLOOD PRESSURE: 75 MMHG | WEIGHT: 111.31 LBS | SYSTOLIC BLOOD PRESSURE: 131 MMHG | HEIGHT: 65 IN | HEART RATE: 64 BPM | BODY MASS INDEX: 18.55 KG/M2

## 2021-06-25 DIAGNOSIS — Z01.419 ENCOUNTER FOR WELL WOMAN EXAM WITH ROUTINE GYNECOLOGICAL EXAM: Primary | ICD-10-CM

## 2021-06-25 DIAGNOSIS — C50.919 MALIGNANT NEOPLASM OF BREAST IN FEMALE, ESTROGEN RECEPTOR POSITIVE, UNSPECIFIED LATERALITY, UNSPECIFIED SITE OF BREAST: ICD-10-CM

## 2021-06-25 DIAGNOSIS — Z79.810 USE OF TAMOXIFEN (NOLVADEX): ICD-10-CM

## 2021-06-25 DIAGNOSIS — Z17.0 MALIGNANT NEOPLASM OF BREAST IN FEMALE, ESTROGEN RECEPTOR POSITIVE, UNSPECIFIED LATERALITY, UNSPECIFIED SITE OF BREAST: ICD-10-CM

## 2021-06-25 PROCEDURE — 1126F AMNT PAIN NOTED NONE PRSNT: CPT | Mod: S$GLB,,, | Performed by: OBSTETRICS & GYNECOLOGY

## 2021-06-25 PROCEDURE — 99999 PR PBB SHADOW E&M-EST. PATIENT-LVL III: CPT | Mod: PBBFAC,,, | Performed by: OBSTETRICS & GYNECOLOGY

## 2021-06-25 PROCEDURE — 3008F BODY MASS INDEX DOCD: CPT | Mod: CPTII,S$GLB,, | Performed by: OBSTETRICS & GYNECOLOGY

## 2021-06-25 PROCEDURE — 1126F PR PAIN SEVERITY QUANTIFIED, NO PAIN PRESENT: ICD-10-PCS | Mod: S$GLB,,, | Performed by: OBSTETRICS & GYNECOLOGY

## 2021-06-25 PROCEDURE — 3008F PR BODY MASS INDEX (BMI) DOCUMENTED: ICD-10-PCS | Mod: CPTII,S$GLB,, | Performed by: OBSTETRICS & GYNECOLOGY

## 2021-06-25 PROCEDURE — 99395 PR PREVENTIVE VISIT,EST,18-39: ICD-10-PCS | Mod: S$GLB,,, | Performed by: OBSTETRICS & GYNECOLOGY

## 2021-06-25 PROCEDURE — 88175 CYTOPATH C/V AUTO FLUID REDO: CPT | Performed by: OBSTETRICS & GYNECOLOGY

## 2021-06-25 PROCEDURE — 99999 PR PBB SHADOW E&M-EST. PATIENT-LVL III: ICD-10-PCS | Mod: PBBFAC,,, | Performed by: OBSTETRICS & GYNECOLOGY

## 2021-06-25 PROCEDURE — 87624 HPV HI-RISK TYP POOLED RSLT: CPT | Performed by: OBSTETRICS & GYNECOLOGY

## 2021-06-25 PROCEDURE — 99395 PREV VISIT EST AGE 18-39: CPT | Mod: S$GLB,,, | Performed by: OBSTETRICS & GYNECOLOGY

## 2021-06-28 ENCOUNTER — PATIENT MESSAGE (OUTPATIENT)
Dept: HEMATOLOGY/ONCOLOGY | Facility: CLINIC | Age: 36
End: 2021-06-28

## 2021-06-29 ENCOUNTER — LAB VISIT (OUTPATIENT)
Dept: LAB | Facility: HOSPITAL | Age: 36
End: 2021-06-29
Attending: INTERNAL MEDICINE
Payer: COMMERCIAL

## 2021-06-29 ENCOUNTER — INFUSION (OUTPATIENT)
Dept: INFUSION THERAPY | Facility: HOSPITAL | Age: 36
End: 2021-06-29
Payer: COMMERCIAL

## 2021-06-29 ENCOUNTER — OFFICE VISIT (OUTPATIENT)
Dept: HEMATOLOGY/ONCOLOGY | Facility: CLINIC | Age: 36
End: 2021-06-29
Payer: COMMERCIAL

## 2021-06-29 ENCOUNTER — CLINICAL SUPPORT (OUTPATIENT)
Dept: HEMATOLOGY/ONCOLOGY | Facility: CLINIC | Age: 36
End: 2021-06-29
Payer: COMMERCIAL

## 2021-06-29 VITALS
DIASTOLIC BLOOD PRESSURE: 60 MMHG | SYSTOLIC BLOOD PRESSURE: 118 MMHG | RESPIRATION RATE: 18 BRPM | HEART RATE: 58 BPM | OXYGEN SATURATION: 99 % | BODY MASS INDEX: 18.52 KG/M2 | HEIGHT: 65 IN | TEMPERATURE: 99 F | WEIGHT: 111.13 LBS

## 2021-06-29 VITALS — RESPIRATION RATE: 18 BRPM | SYSTOLIC BLOOD PRESSURE: 124 MMHG | HEART RATE: 58 BPM | DIASTOLIC BLOOD PRESSURE: 73 MMHG

## 2021-06-29 DIAGNOSIS — D70.1 CHEMOTHERAPY INDUCED NEUTROPENIA: ICD-10-CM

## 2021-06-29 DIAGNOSIS — R30.0 DYSURIA: ICD-10-CM

## 2021-06-29 DIAGNOSIS — G62.0 CHEMOTHERAPY-INDUCED NEUROPATHY: ICD-10-CM

## 2021-06-29 DIAGNOSIS — G89.3 CANCER ASSOCIATED PAIN: ICD-10-CM

## 2021-06-29 DIAGNOSIS — Z51.11 ENCOUNTER FOR CHEMOTHERAPY MANAGEMENT: ICD-10-CM

## 2021-06-29 DIAGNOSIS — L73.9 FOLLICULITIS: ICD-10-CM

## 2021-06-29 DIAGNOSIS — C50.412 MALIGNANT NEOPLASM OF UPPER-OUTER QUADRANT OF LEFT BREAST IN FEMALE, ESTROGEN RECEPTOR POSITIVE: Primary | ICD-10-CM

## 2021-06-29 DIAGNOSIS — T45.1X5A CHEMOTHERAPY-INDUCED THROMBOCYTOPENIA: ICD-10-CM

## 2021-06-29 DIAGNOSIS — C50.412 MALIGNANT NEOPLASM OF UPPER-OUTER QUADRANT OF LEFT BREAST IN FEMALE, ESTROGEN RECEPTOR POSITIVE: ICD-10-CM

## 2021-06-29 DIAGNOSIS — T45.1X5A CINV (CHEMOTHERAPY-INDUCED NAUSEA AND VOMITING): ICD-10-CM

## 2021-06-29 DIAGNOSIS — T45.1X5A CHEMOTHERAPY-INDUCED NAUSEA: ICD-10-CM

## 2021-06-29 DIAGNOSIS — R11.0 CHEMOTHERAPY-INDUCED NAUSEA: ICD-10-CM

## 2021-06-29 DIAGNOSIS — T45.1X5A CHEMOTHERAPY-INDUCED NEUROPATHY: ICD-10-CM

## 2021-06-29 DIAGNOSIS — K21.9 GASTROESOPHAGEAL REFLUX DISEASE WITHOUT ESOPHAGITIS: ICD-10-CM

## 2021-06-29 DIAGNOSIS — M54.41 MIDLINE LOW BACK PAIN WITH BILATERAL SCIATICA, UNSPECIFIED CHRONICITY: ICD-10-CM

## 2021-06-29 DIAGNOSIS — Z17.0 MALIGNANT NEOPLASM OF UPPER-OUTER QUADRANT OF LEFT BREAST IN FEMALE, ESTROGEN RECEPTOR POSITIVE: ICD-10-CM

## 2021-06-29 DIAGNOSIS — Z17.0 MALIGNANT NEOPLASM OF UPPER-OUTER QUADRANT OF LEFT BREAST IN FEMALE, ESTROGEN RECEPTOR POSITIVE: Primary | ICD-10-CM

## 2021-06-29 DIAGNOSIS — M54.42 MIDLINE LOW BACK PAIN WITH BILATERAL SCIATICA, UNSPECIFIED CHRONICITY: ICD-10-CM

## 2021-06-29 DIAGNOSIS — R74.01 ELEVATED TRANSAMINASE LEVEL: ICD-10-CM

## 2021-06-29 DIAGNOSIS — D69.59 CHEMOTHERAPY-INDUCED THROMBOCYTOPENIA: ICD-10-CM

## 2021-06-29 DIAGNOSIS — T45.1X5A CHEMOTHERAPY INDUCED NEUTROPENIA: ICD-10-CM

## 2021-06-29 DIAGNOSIS — M54.50 ACUTE BILATERAL LOW BACK PAIN WITHOUT SCIATICA: Primary | ICD-10-CM

## 2021-06-29 DIAGNOSIS — R11.2 CINV (CHEMOTHERAPY-INDUCED NAUSEA AND VOMITING): ICD-10-CM

## 2021-06-29 DIAGNOSIS — R10.10 PAIN OF UPPER ABDOMEN: ICD-10-CM

## 2021-06-29 LAB
ALBUMIN SERPL BCP-MCNC: 4.1 G/DL (ref 3.5–5.2)
ALP SERPL-CCNC: 92 U/L (ref 55–135)
ALT SERPL W/O P-5'-P-CCNC: 57 U/L (ref 10–44)
ANION GAP SERPL CALC-SCNC: 8 MMOL/L (ref 8–16)
AST SERPL-CCNC: 58 U/L (ref 10–40)
BILIRUB SERPL-MCNC: 0.2 MG/DL (ref 0.1–1)
BILIRUB UR QL STRIP: NEGATIVE
BUN SERPL-MCNC: 10 MG/DL (ref 6–20)
CALCIUM SERPL-MCNC: 10.1 MG/DL (ref 8.7–10.5)
CHLORIDE SERPL-SCNC: 106 MMOL/L (ref 95–110)
CLARITY UR REFRACT.AUTO: CLEAR
CO2 SERPL-SCNC: 26 MMOL/L (ref 23–29)
COLOR UR AUTO: YELLOW
CREAT SERPL-MCNC: 0.8 MG/DL (ref 0.5–1.4)
ERYTHROCYTE [DISTWIDTH] IN BLOOD BY AUTOMATED COUNT: 14.8 % (ref 11.5–14.5)
EST. GFR  (AFRICAN AMERICAN): >60 ML/MIN/1.73 M^2
EST. GFR  (NON AFRICAN AMERICAN): >60 ML/MIN/1.73 M^2
GLUCOSE SERPL-MCNC: 85 MG/DL (ref 70–110)
GLUCOSE UR QL STRIP: NEGATIVE
HCT VFR BLD AUTO: 42.8 % (ref 37–48.5)
HGB BLD-MCNC: 14.2 G/DL (ref 12–16)
HGB UR QL STRIP: NEGATIVE
IMM GRANULOCYTES # BLD AUTO: 0.01 K/UL (ref 0–0.04)
KETONES UR QL STRIP: NEGATIVE
LEUKOCYTE ESTERASE UR QL STRIP: NEGATIVE
MCH RBC QN AUTO: 30.1 PG (ref 27–31)
MCHC RBC AUTO-ENTMCNC: 33.2 G/DL (ref 32–36)
MCV RBC AUTO: 91 FL (ref 82–98)
NEUTROPHILS # BLD AUTO: 1.2 K/UL (ref 1.8–7.7)
NITRITE UR QL STRIP: NEGATIVE
PH UR STRIP: 7 [PH] (ref 5–8)
PLATELET # BLD AUTO: 104 K/UL (ref 150–450)
PMV BLD AUTO: 12.3 FL (ref 9.2–12.9)
POTASSIUM SERPL-SCNC: 4.5 MMOL/L (ref 3.5–5.1)
PROT SERPL-MCNC: 7.5 G/DL (ref 6–8.4)
PROT UR QL STRIP: NEGATIVE
RBC # BLD AUTO: 4.72 M/UL (ref 4–5.4)
SODIUM SERPL-SCNC: 140 MMOL/L (ref 136–145)
SP GR UR STRIP: 1.01 (ref 1–1.03)
URN SPEC COLLECT METH UR: NORMAL
WBC # BLD AUTO: 4.97 K/UL (ref 3.9–12.7)

## 2021-06-29 PROCEDURE — 3008F PR BODY MASS INDEX (BMI) DOCUMENTED: ICD-10-PCS | Mod: CPTII,S$GLB,, | Performed by: NURSE PRACTITIONER

## 2021-06-29 PROCEDURE — 99999 PR PBB SHADOW E&M-EST. PATIENT-LVL I: ICD-10-PCS | Mod: PBBFAC,,, | Performed by: ACUPUNCTURIST

## 2021-06-29 PROCEDURE — 99215 OFFICE O/P EST HI 40 MIN: CPT | Mod: S$GLB,,, | Performed by: NURSE PRACTITIONER

## 2021-06-29 PROCEDURE — 96375 TX/PRO/DX INJ NEW DRUG ADDON: CPT

## 2021-06-29 PROCEDURE — 1125F AMNT PAIN NOTED PAIN PRSNT: CPT | Mod: S$GLB,,, | Performed by: NURSE PRACTITIONER

## 2021-06-29 PROCEDURE — 99999 PR PBB SHADOW E&M-EST. PATIENT-LVL IV: CPT | Mod: PBBFAC,,, | Performed by: NURSE PRACTITIONER

## 2021-06-29 PROCEDURE — A4216 STERILE WATER/SALINE, 10 ML: HCPCS | Performed by: INTERNAL MEDICINE

## 2021-06-29 PROCEDURE — 81003 URINALYSIS AUTO W/O SCOPE: CPT | Performed by: NURSE PRACTITIONER

## 2021-06-29 PROCEDURE — 97813 ACUP 1/> W/ESTIM 1ST 15 MIN: CPT | Mod: S$GLB,,, | Performed by: ACUPUNCTURIST

## 2021-06-29 PROCEDURE — 99999 PR PBB SHADOW E&M-EST. PATIENT-LVL IV: ICD-10-PCS | Mod: PBBFAC,,, | Performed by: NURSE PRACTITIONER

## 2021-06-29 PROCEDURE — 63600175 PHARM REV CODE 636 W HCPCS: Performed by: INTERNAL MEDICINE

## 2021-06-29 PROCEDURE — 96413 CHEMO IV INFUSION 1 HR: CPT

## 2021-06-29 PROCEDURE — 80053 COMPREHEN METABOLIC PANEL: CPT | Performed by: NURSE PRACTITIONER

## 2021-06-29 PROCEDURE — 25000003 PHARM REV CODE 250: Performed by: INTERNAL MEDICINE

## 2021-06-29 PROCEDURE — 97814 ACUP 1/> W/ESTIM EA ADDL 15: CPT | Mod: S$GLB,,, | Performed by: ACUPUNCTURIST

## 2021-06-29 PROCEDURE — 97813 PR ACUPUNCT W/ ELEC STIMUL 15 MIN: ICD-10-PCS | Mod: S$GLB,,, | Performed by: ACUPUNCTURIST

## 2021-06-29 PROCEDURE — 3008F BODY MASS INDEX DOCD: CPT | Mod: CPTII,S$GLB,, | Performed by: NURSE PRACTITIONER

## 2021-06-29 PROCEDURE — 97814 PR ACUPUNCT W/ ELEC STIMUL ADDL 15M: ICD-10-PCS | Mod: S$GLB,,, | Performed by: ACUPUNCTURIST

## 2021-06-29 PROCEDURE — 85027 COMPLETE CBC AUTOMATED: CPT | Performed by: NURSE PRACTITIONER

## 2021-06-29 PROCEDURE — 36415 COLL VENOUS BLD VENIPUNCTURE: CPT | Performed by: NURSE PRACTITIONER

## 2021-06-29 PROCEDURE — 99999 PR PBB SHADOW E&M-EST. PATIENT-LVL I: CPT | Mod: PBBFAC,,, | Performed by: ACUPUNCTURIST

## 2021-06-29 PROCEDURE — 1125F PR PAIN SEVERITY QUANTIFIED, PAIN PRESENT: ICD-10-PCS | Mod: S$GLB,,, | Performed by: NURSE PRACTITIONER

## 2021-06-29 PROCEDURE — 99215 PR OFFICE/OUTPT VISIT, EST, LEVL V, 40-54 MIN: ICD-10-PCS | Mod: S$GLB,,, | Performed by: NURSE PRACTITIONER

## 2021-06-29 RX ORDER — SODIUM CHLORIDE 0.9 % (FLUSH) 0.9 %
10 SYRINGE (ML) INJECTION
Status: DISCONTINUED | OUTPATIENT
Start: 2021-06-29 | End: 2021-06-29 | Stop reason: HOSPADM

## 2021-06-29 RX ORDER — DOXYCYCLINE 100 MG/1
CAPSULE ORAL
Qty: 8 CAPSULE | Refills: 0 | Status: SHIPPED | OUTPATIENT
Start: 2021-06-29 | End: 2021-07-06

## 2021-06-29 RX ORDER — PROCHLORPERAZINE EDISYLATE 5 MG/ML
2.5 INJECTION INTRAMUSCULAR; INTRAVENOUS ONCE
Status: COMPLETED | OUTPATIENT
Start: 2021-06-29 | End: 2021-06-29

## 2021-06-29 RX ORDER — HEPARIN 100 UNIT/ML
500 SYRINGE INTRAVENOUS
Status: DISCONTINUED | OUTPATIENT
Start: 2021-06-29 | End: 2021-06-29 | Stop reason: HOSPADM

## 2021-06-29 RX ADMIN — SODIUM CHLORIDE: 0.9 INJECTION, SOLUTION INTRAVENOUS at 09:06

## 2021-06-29 RX ADMIN — Medication 10 ML: at 10:06

## 2021-06-29 RX ADMIN — HEPARIN 500 UNITS: 100 SYRINGE at 10:06

## 2021-06-29 RX ADMIN — PROCHLORPERAZINE EDISYLATE 2.5 MG: 5 INJECTION INTRAMUSCULAR; INTRAVENOUS at 09:06

## 2021-06-29 RX ADMIN — ADO-TRASTUZUMAB EMTANSINE 180 MG: 20 INJECTION, POWDER, LYOPHILIZED, FOR SOLUTION INTRAVENOUS at 10:06

## 2021-06-30 LAB
HPV HR 12 DNA SPEC QL NAA+PROBE: POSITIVE
HPV16 AG SPEC QL: NEGATIVE
HPV18 DNA SPEC QL NAA+PROBE: NEGATIVE

## 2021-07-01 LAB
FINAL PATHOLOGIC DIAGNOSIS: NORMAL
Lab: NORMAL

## 2021-07-06 ENCOUNTER — HOSPITAL ENCOUNTER (OUTPATIENT)
Dept: RADIOLOGY | Facility: OTHER | Age: 36
Discharge: HOME | End: 2021-07-06
Attending: NURSE PRACTITIONER
Payer: COMMERCIAL

## 2021-07-06 ENCOUNTER — PATIENT MESSAGE (OUTPATIENT)
Dept: HEMATOLOGY/ONCOLOGY | Facility: CLINIC | Age: 36
End: 2021-07-06

## 2021-07-06 DIAGNOSIS — C50.412 MALIGNANT NEOPLASM OF UPPER-OUTER QUADRANT OF LEFT BREAST IN FEMALE, ESTROGEN RECEPTOR POSITIVE: ICD-10-CM

## 2021-07-06 DIAGNOSIS — R74.01 ELEVATED TRANSAMINASE LEVEL: ICD-10-CM

## 2021-07-06 DIAGNOSIS — Z17.0 MALIGNANT NEOPLASM OF UPPER-OUTER QUADRANT OF LEFT BREAST IN FEMALE, ESTROGEN RECEPTOR POSITIVE: ICD-10-CM

## 2021-07-06 PROCEDURE — 76705 US ABDOMEN LIMITED_LIVER: ICD-10-PCS | Mod: 26,,, | Performed by: RADIOLOGY

## 2021-07-06 PROCEDURE — 76705 ECHO EXAM OF ABDOMEN: CPT | Mod: TC

## 2021-07-06 PROCEDURE — 76705 ECHO EXAM OF ABDOMEN: CPT | Mod: 26,,, | Performed by: RADIOLOGY

## 2021-07-12 ENCOUNTER — INFUSION (OUTPATIENT)
Dept: INFUSION THERAPY | Facility: HOSPITAL | Age: 36
End: 2021-07-12
Payer: COMMERCIAL

## 2021-07-12 ENCOUNTER — PATIENT MESSAGE (OUTPATIENT)
Dept: HEMATOLOGY/ONCOLOGY | Facility: CLINIC | Age: 36
End: 2021-07-12

## 2021-07-12 DIAGNOSIS — E28.39 SUPPRESSION OF OVARIAN SECRETION: Primary | ICD-10-CM

## 2021-07-12 PROCEDURE — 96402 CHEMO HORMON ANTINEOPL SQ/IM: CPT

## 2021-07-12 PROCEDURE — 63600175 PHARM REV CODE 636 W HCPCS: Mod: JG | Performed by: INTERNAL MEDICINE

## 2021-07-12 RX ADMIN — GOSERELIN ACETATE 3.6 MG: 3.6 IMPLANT SUBCUTANEOUS at 11:07

## 2021-07-15 ENCOUNTER — HOSPITAL ENCOUNTER (OUTPATIENT)
Dept: RADIOLOGY | Facility: HOSPITAL | Age: 36
Discharge: HOME | End: 2021-07-15
Payer: COMMERCIAL

## 2021-07-15 DIAGNOSIS — M54.41 MIDLINE LOW BACK PAIN WITH BILATERAL SCIATICA, UNSPECIFIED CHRONICITY: ICD-10-CM

## 2021-07-15 DIAGNOSIS — M54.40 ACUTE BILATERAL LOW BACK PAIN WITH SCIATICA, SCIATICA LATERALITY UNSPECIFIED: Primary | ICD-10-CM

## 2021-07-15 DIAGNOSIS — M54.42 MIDLINE LOW BACK PAIN WITH BILATERAL SCIATICA, UNSPECIFIED CHRONICITY: ICD-10-CM

## 2021-07-15 PROCEDURE — 72158 MRI LUMBAR SPINE W/O & W/DYE: CPT | Mod: 26,,, | Performed by: RADIOLOGY

## 2021-07-15 PROCEDURE — A9585 GADOBUTROL INJECTION: HCPCS | Performed by: NURSE PRACTITIONER

## 2021-07-15 PROCEDURE — 72158 MRI LUMBAR SPINE W WO CONTRAST: ICD-10-PCS | Mod: 26,,, | Performed by: RADIOLOGY

## 2021-07-15 PROCEDURE — 25500020 PHARM REV CODE 255: Performed by: NURSE PRACTITIONER

## 2021-07-15 PROCEDURE — 72158 MRI LUMBAR SPINE W/O & W/DYE: CPT | Mod: TC

## 2021-07-15 RX ORDER — GADOBUTROL 604.72 MG/ML
5 INJECTION INTRAVENOUS
Status: COMPLETED | OUTPATIENT
Start: 2021-07-15 | End: 2021-07-15

## 2021-07-15 RX ADMIN — GADOBUTROL 5 ML: 604.72 INJECTION INTRAVENOUS at 09:07

## 2021-07-19 ENCOUNTER — OFFICE VISIT (OUTPATIENT)
Dept: HEMATOLOGY/ONCOLOGY | Facility: CLINIC | Age: 36
End: 2021-07-19
Payer: COMMERCIAL

## 2021-07-19 ENCOUNTER — INFUSION (OUTPATIENT)
Dept: INFUSION THERAPY | Facility: HOSPITAL | Age: 36
End: 2021-07-19
Payer: COMMERCIAL

## 2021-07-19 ENCOUNTER — LAB VISIT (OUTPATIENT)
Dept: LAB | Facility: HOSPITAL | Age: 36
End: 2021-07-19
Payer: COMMERCIAL

## 2021-07-19 VITALS
SYSTOLIC BLOOD PRESSURE: 124 MMHG | HEART RATE: 83 BPM | HEIGHT: 65 IN | RESPIRATION RATE: 16 BRPM | TEMPERATURE: 98 F | BODY MASS INDEX: 18.75 KG/M2 | OXYGEN SATURATION: 98 % | WEIGHT: 112.56 LBS | DIASTOLIC BLOOD PRESSURE: 64 MMHG

## 2021-07-19 VITALS
SYSTOLIC BLOOD PRESSURE: 107 MMHG | DIASTOLIC BLOOD PRESSURE: 56 MMHG | RESPIRATION RATE: 18 BRPM | HEART RATE: 75 BPM | TEMPERATURE: 98 F

## 2021-07-19 DIAGNOSIS — T45.1X5A CHEMOTHERAPY INDUCED NEUTROPENIA: ICD-10-CM

## 2021-07-19 DIAGNOSIS — C50.412 MALIGNANT NEOPLASM OF UPPER-OUTER QUADRANT OF LEFT BREAST IN FEMALE, ESTROGEN RECEPTOR POSITIVE: Primary | ICD-10-CM

## 2021-07-19 DIAGNOSIS — C50.412 MALIGNANT NEOPLASM OF UPPER-OUTER QUADRANT OF LEFT BREAST IN FEMALE, ESTROGEN RECEPTOR POSITIVE: ICD-10-CM

## 2021-07-19 DIAGNOSIS — D70.1 CHEMOTHERAPY INDUCED NEUTROPENIA: ICD-10-CM

## 2021-07-19 DIAGNOSIS — Z17.0 MALIGNANT NEOPLASM OF UPPER-OUTER QUADRANT OF LEFT BREAST IN FEMALE, ESTROGEN RECEPTOR POSITIVE: Primary | ICD-10-CM

## 2021-07-19 DIAGNOSIS — D69.59 CHEMOTHERAPY-INDUCED THROMBOCYTOPENIA: ICD-10-CM

## 2021-07-19 DIAGNOSIS — T45.1X5A CHEMOTHERAPY-INDUCED NAUSEA: ICD-10-CM

## 2021-07-19 DIAGNOSIS — Z17.0 MALIGNANT NEOPLASM OF UPPER-OUTER QUADRANT OF LEFT BREAST IN FEMALE, ESTROGEN RECEPTOR POSITIVE: ICD-10-CM

## 2021-07-19 DIAGNOSIS — R11.0 CHEMOTHERAPY-INDUCED NAUSEA: ICD-10-CM

## 2021-07-19 DIAGNOSIS — T45.1X5A CHEMOTHERAPY-INDUCED THROMBOCYTOPENIA: ICD-10-CM

## 2021-07-19 DIAGNOSIS — Z51.11 ENCOUNTER FOR CHEMOTHERAPY MANAGEMENT: ICD-10-CM

## 2021-07-19 DIAGNOSIS — R74.8 ELEVATED LIVER ENZYMES: ICD-10-CM

## 2021-07-19 LAB
ALBUMIN SERPL BCP-MCNC: 3.8 G/DL (ref 3.5–5.2)
ALP SERPL-CCNC: 86 U/L (ref 55–135)
ALT SERPL W/O P-5'-P-CCNC: 40 U/L (ref 10–44)
ANION GAP SERPL CALC-SCNC: 9 MMOL/L (ref 8–16)
AST SERPL-CCNC: 50 U/L (ref 10–40)
BILIRUB SERPL-MCNC: 0.4 MG/DL (ref 0.1–1)
BUN SERPL-MCNC: 10 MG/DL (ref 6–20)
CALCIUM SERPL-MCNC: 9.4 MG/DL (ref 8.7–10.5)
CHLORIDE SERPL-SCNC: 107 MMOL/L (ref 95–110)
CO2 SERPL-SCNC: 26 MMOL/L (ref 23–29)
CREAT SERPL-MCNC: 0.8 MG/DL (ref 0.5–1.4)
ERYTHROCYTE [DISTWIDTH] IN BLOOD BY AUTOMATED COUNT: 14.6 % (ref 11.5–14.5)
EST. GFR  (AFRICAN AMERICAN): >60 ML/MIN/1.73 M^2
EST. GFR  (NON AFRICAN AMERICAN): >60 ML/MIN/1.73 M^2
GLUCOSE SERPL-MCNC: 85 MG/DL (ref 70–110)
HCT VFR BLD AUTO: 35.4 % (ref 37–48.5)
HGB BLD-MCNC: 12.2 G/DL (ref 12–16)
IMM GRANULOCYTES # BLD AUTO: 0 K/UL (ref 0–0.04)
MCH RBC QN AUTO: 30.6 PG (ref 27–31)
MCHC RBC AUTO-ENTMCNC: 34.5 G/DL (ref 32–36)
MCV RBC AUTO: 89 FL (ref 82–98)
NEUTROPHILS # BLD AUTO: 1.3 K/UL (ref 1.8–7.7)
PLATELET # BLD AUTO: 98 K/UL (ref 150–450)
PMV BLD AUTO: 12.1 FL (ref 9.2–12.9)
POTASSIUM SERPL-SCNC: 3.9 MMOL/L (ref 3.5–5.1)
PROT SERPL-MCNC: 6.9 G/DL (ref 6–8.4)
RBC # BLD AUTO: 3.99 M/UL (ref 4–5.4)
SODIUM SERPL-SCNC: 142 MMOL/L (ref 136–145)
WBC # BLD AUTO: 4.81 K/UL (ref 3.9–12.7)

## 2021-07-19 PROCEDURE — 3008F BODY MASS INDEX DOCD: CPT | Mod: CPTII,S$GLB,, | Performed by: NURSE PRACTITIONER

## 2021-07-19 PROCEDURE — 99999 PR PBB SHADOW E&M-EST. PATIENT-LVL IV: CPT | Mod: PBBFAC,,, | Performed by: NURSE PRACTITIONER

## 2021-07-19 PROCEDURE — A4216 STERILE WATER/SALINE, 10 ML: HCPCS | Performed by: INTERNAL MEDICINE

## 2021-07-19 PROCEDURE — 99215 PR OFFICE/OUTPT VISIT, EST, LEVL V, 40-54 MIN: ICD-10-PCS | Mod: S$GLB,,, | Performed by: NURSE PRACTITIONER

## 2021-07-19 PROCEDURE — 3008F PR BODY MASS INDEX (BMI) DOCUMENTED: ICD-10-PCS | Mod: CPTII,S$GLB,, | Performed by: NURSE PRACTITIONER

## 2021-07-19 PROCEDURE — 96413 CHEMO IV INFUSION 1 HR: CPT

## 2021-07-19 PROCEDURE — 63600175 PHARM REV CODE 636 W HCPCS: Mod: JG | Performed by: INTERNAL MEDICINE

## 2021-07-19 PROCEDURE — 80053 COMPREHEN METABOLIC PANEL: CPT | Performed by: NURSE PRACTITIONER

## 2021-07-19 PROCEDURE — 99999 PR PBB SHADOW E&M-EST. PATIENT-LVL IV: ICD-10-PCS | Mod: PBBFAC,,, | Performed by: NURSE PRACTITIONER

## 2021-07-19 PROCEDURE — 25000003 PHARM REV CODE 250: Performed by: INTERNAL MEDICINE

## 2021-07-19 PROCEDURE — 99215 OFFICE O/P EST HI 40 MIN: CPT | Mod: S$GLB,,, | Performed by: NURSE PRACTITIONER

## 2021-07-19 PROCEDURE — 85027 COMPLETE CBC AUTOMATED: CPT | Performed by: NURSE PRACTITIONER

## 2021-07-19 PROCEDURE — 36415 COLL VENOUS BLD VENIPUNCTURE: CPT | Performed by: NURSE PRACTITIONER

## 2021-07-19 PROCEDURE — 1126F PR PAIN SEVERITY QUANTIFIED, NO PAIN PRESENT: ICD-10-PCS | Mod: CPTII,S$GLB,, | Performed by: NURSE PRACTITIONER

## 2021-07-19 PROCEDURE — 96376 TX/PRO/DX INJ SAME DRUG ADON: CPT

## 2021-07-19 PROCEDURE — 1126F AMNT PAIN NOTED NONE PRSNT: CPT | Mod: CPTII,S$GLB,, | Performed by: NURSE PRACTITIONER

## 2021-07-19 RX ORDER — SODIUM CHLORIDE 0.9 % (FLUSH) 0.9 %
10 SYRINGE (ML) INJECTION
Status: CANCELLED | OUTPATIENT
Start: 2021-07-19

## 2021-07-19 RX ORDER — HEPARIN 100 UNIT/ML
500 SYRINGE INTRAVENOUS
Status: CANCELLED | OUTPATIENT
Start: 2021-07-19

## 2021-07-19 RX ORDER — HEPARIN 100 UNIT/ML
500 SYRINGE INTRAVENOUS
Status: DISCONTINUED | OUTPATIENT
Start: 2021-07-19 | End: 2021-07-19 | Stop reason: HOSPADM

## 2021-07-19 RX ORDER — SODIUM CHLORIDE 0.9 % (FLUSH) 0.9 %
10 SYRINGE (ML) INJECTION
Status: DISCONTINUED | OUTPATIENT
Start: 2021-07-19 | End: 2021-07-19 | Stop reason: HOSPADM

## 2021-07-19 RX ORDER — PROCHLORPERAZINE EDISYLATE 5 MG/ML
2.5 INJECTION INTRAMUSCULAR; INTRAVENOUS ONCE
Status: COMPLETED | OUTPATIENT
Start: 2021-07-19 | End: 2021-07-19

## 2021-07-19 RX ORDER — PROCHLORPERAZINE EDISYLATE 5 MG/ML
2.5 INJECTION INTRAMUSCULAR; INTRAVENOUS ONCE
Status: CANCELLED
Start: 2021-07-19 | End: 2021-07-19

## 2021-07-19 RX ADMIN — PROCHLORPERAZINE EDISYLATE 2.5 MG: 5 INJECTION INTRAMUSCULAR; INTRAVENOUS at 04:07

## 2021-07-19 RX ADMIN — ADO-TRASTUZUMAB EMTANSINE 180 MG: 20 INJECTION, POWDER, LYOPHILIZED, FOR SOLUTION INTRAVENOUS at 04:07

## 2021-07-19 RX ADMIN — Medication 10 ML: at 05:07

## 2021-07-19 RX ADMIN — SODIUM CHLORIDE: 0.9 INJECTION, SOLUTION INTRAVENOUS at 04:07

## 2021-07-19 RX ADMIN — HEPARIN 500 UNITS: 100 SYRINGE at 05:07

## 2021-07-22 ENCOUNTER — TELEPHONE (OUTPATIENT)
Dept: HEMATOLOGY/ONCOLOGY | Facility: CLINIC | Age: 36
End: 2021-07-22

## 2021-07-31 ENCOUNTER — PATIENT MESSAGE (OUTPATIENT)
Dept: HEMATOLOGY/ONCOLOGY | Facility: CLINIC | Age: 36
End: 2021-07-31

## 2021-08-09 ENCOUNTER — PATIENT MESSAGE (OUTPATIENT)
Dept: HEMATOLOGY/ONCOLOGY | Facility: CLINIC | Age: 36
End: 2021-08-09

## 2021-08-09 ENCOUNTER — LAB VISIT (OUTPATIENT)
Dept: LAB | Facility: HOSPITAL | Age: 36
End: 2021-08-09
Payer: COMMERCIAL

## 2021-08-09 ENCOUNTER — INFUSION (OUTPATIENT)
Dept: INFUSION THERAPY | Facility: HOSPITAL | Age: 36
End: 2021-08-09
Payer: COMMERCIAL

## 2021-08-09 VITALS
WEIGHT: 111.75 LBS | TEMPERATURE: 99 F | RESPIRATION RATE: 18 BRPM | HEART RATE: 97 BPM | BODY MASS INDEX: 18.62 KG/M2 | SYSTOLIC BLOOD PRESSURE: 129 MMHG | HEIGHT: 65 IN | DIASTOLIC BLOOD PRESSURE: 76 MMHG

## 2021-08-09 DIAGNOSIS — Z17.0 MALIGNANT NEOPLASM OF UPPER-OUTER QUADRANT OF LEFT BREAST IN FEMALE, ESTROGEN RECEPTOR POSITIVE: ICD-10-CM

## 2021-08-09 DIAGNOSIS — E28.39 SUPPRESSION OF OVARIAN SECRETION: ICD-10-CM

## 2021-08-09 DIAGNOSIS — Z51.11 ENCOUNTER FOR CHEMOTHERAPY MANAGEMENT: ICD-10-CM

## 2021-08-09 DIAGNOSIS — C50.412 MALIGNANT NEOPLASM OF UPPER-OUTER QUADRANT OF LEFT BREAST IN FEMALE, ESTROGEN RECEPTOR POSITIVE: ICD-10-CM

## 2021-08-09 DIAGNOSIS — R11.0 CHEMOTHERAPY-INDUCED NAUSEA: ICD-10-CM

## 2021-08-09 DIAGNOSIS — D70.1 CHEMOTHERAPY INDUCED NEUTROPENIA: ICD-10-CM

## 2021-08-09 DIAGNOSIS — Z17.0 MALIGNANT NEOPLASM OF UPPER-OUTER QUADRANT OF LEFT BREAST IN FEMALE, ESTROGEN RECEPTOR POSITIVE: Primary | ICD-10-CM

## 2021-08-09 DIAGNOSIS — T45.1X5A CHEMOTHERAPY-INDUCED NAUSEA: ICD-10-CM

## 2021-08-09 DIAGNOSIS — T45.1X5A CHEMOTHERAPY INDUCED NEUTROPENIA: ICD-10-CM

## 2021-08-09 DIAGNOSIS — C50.412 MALIGNANT NEOPLASM OF UPPER-OUTER QUADRANT OF LEFT BREAST IN FEMALE, ESTROGEN RECEPTOR POSITIVE: Primary | ICD-10-CM

## 2021-08-09 LAB
ALBUMIN SERPL BCP-MCNC: 4 G/DL (ref 3.5–5.2)
ALP SERPL-CCNC: 80 U/L (ref 55–135)
ALT SERPL W/O P-5'-P-CCNC: 42 U/L (ref 10–44)
ANION GAP SERPL CALC-SCNC: 11 MMOL/L (ref 8–16)
AST SERPL-CCNC: 55 U/L (ref 10–40)
BILIRUB SERPL-MCNC: 0.5 MG/DL (ref 0.1–1)
BUN SERPL-MCNC: 9 MG/DL (ref 6–20)
CALCIUM SERPL-MCNC: 9.9 MG/DL (ref 8.7–10.5)
CHLORIDE SERPL-SCNC: 106 MMOL/L (ref 95–110)
CO2 SERPL-SCNC: 26 MMOL/L (ref 23–29)
CREAT SERPL-MCNC: 0.7 MG/DL (ref 0.5–1.4)
ERYTHROCYTE [DISTWIDTH] IN BLOOD BY AUTOMATED COUNT: 14.5 % (ref 11.5–14.5)
EST. GFR  (AFRICAN AMERICAN): >60 ML/MIN/1.73 M^2
EST. GFR  (NON AFRICAN AMERICAN): >60 ML/MIN/1.73 M^2
GLUCOSE SERPL-MCNC: 89 MG/DL (ref 70–110)
HCT VFR BLD AUTO: 39 % (ref 37–48.5)
HGB BLD-MCNC: 13.1 G/DL (ref 12–16)
IMM GRANULOCYTES # BLD AUTO: 0 K/UL (ref 0–0.04)
MCH RBC QN AUTO: 30.1 PG (ref 27–31)
MCHC RBC AUTO-ENTMCNC: 33.6 G/DL (ref 32–36)
MCV RBC AUTO: 90 FL (ref 82–98)
NEUTROPHILS # BLD AUTO: 1.3 K/UL (ref 1.8–7.7)
PLATELET # BLD AUTO: 79 K/UL (ref 150–450)
PMV BLD AUTO: 12.9 FL (ref 9.2–12.9)
POTASSIUM SERPL-SCNC: 4.1 MMOL/L (ref 3.5–5.1)
PROT SERPL-MCNC: 7.4 G/DL (ref 6–8.4)
RBC # BLD AUTO: 4.35 M/UL (ref 4–5.4)
SODIUM SERPL-SCNC: 143 MMOL/L (ref 136–145)
WBC # BLD AUTO: 4.3 K/UL (ref 3.9–12.7)

## 2021-08-09 PROCEDURE — 85027 COMPLETE CBC AUTOMATED: CPT | Performed by: NURSE PRACTITIONER

## 2021-08-09 PROCEDURE — 96413 CHEMO IV INFUSION 1 HR: CPT

## 2021-08-09 PROCEDURE — 80053 COMPREHEN METABOLIC PANEL: CPT | Performed by: NURSE PRACTITIONER

## 2021-08-09 PROCEDURE — 36415 COLL VENOUS BLD VENIPUNCTURE: CPT | Performed by: NURSE PRACTITIONER

## 2021-08-09 PROCEDURE — 63600175 PHARM REV CODE 636 W HCPCS: Performed by: INTERNAL MEDICINE

## 2021-08-09 PROCEDURE — 96402 CHEMO HORMON ANTINEOPL SQ/IM: CPT

## 2021-08-09 PROCEDURE — A4216 STERILE WATER/SALINE, 10 ML: HCPCS | Performed by: INTERNAL MEDICINE

## 2021-08-09 PROCEDURE — 25000003 PHARM REV CODE 250: Performed by: INTERNAL MEDICINE

## 2021-08-09 RX ORDER — PROCHLORPERAZINE EDISYLATE 5 MG/ML
2.5 INJECTION INTRAMUSCULAR; INTRAVENOUS ONCE
Status: CANCELLED
Start: 2021-08-09 | End: 2021-08-09

## 2021-08-09 RX ORDER — PROCHLORPERAZINE EDISYLATE 5 MG/ML
2.5 INJECTION INTRAMUSCULAR; INTRAVENOUS ONCE
Status: COMPLETED | OUTPATIENT
Start: 2021-08-09 | End: 2021-08-09

## 2021-08-09 RX ORDER — HEPARIN 100 UNIT/ML
500 SYRINGE INTRAVENOUS
Status: CANCELLED | OUTPATIENT
Start: 2021-08-09

## 2021-08-09 RX ORDER — SODIUM CHLORIDE 0.9 % (FLUSH) 0.9 %
10 SYRINGE (ML) INJECTION
Status: DISCONTINUED | OUTPATIENT
Start: 2021-08-09 | End: 2021-08-09 | Stop reason: HOSPADM

## 2021-08-09 RX ORDER — HEPARIN 100 UNIT/ML
500 SYRINGE INTRAVENOUS
Status: DISCONTINUED | OUTPATIENT
Start: 2021-08-09 | End: 2021-08-09 | Stop reason: HOSPADM

## 2021-08-09 RX ORDER — SODIUM CHLORIDE 0.9 % (FLUSH) 0.9 %
10 SYRINGE (ML) INJECTION
Status: CANCELLED | OUTPATIENT
Start: 2021-08-09

## 2021-08-09 RX ADMIN — GOSERELIN ACETATE 3.6 MG: 3.6 IMPLANT SUBCUTANEOUS at 02:08

## 2021-08-09 RX ADMIN — HEPARIN 500 UNITS: 100 SYRINGE at 02:08

## 2021-08-09 RX ADMIN — ADO-TRASTUZUMAB EMTANSINE 180 MG: 20 INJECTION, POWDER, LYOPHILIZED, FOR SOLUTION INTRAVENOUS at 02:08

## 2021-08-09 RX ADMIN — PROCHLORPERAZINE EDISYLATE 2.5 MG: 5 INJECTION INTRAMUSCULAR; INTRAVENOUS at 01:08

## 2021-08-09 RX ADMIN — SODIUM CHLORIDE, PRESERVATIVE FREE 10 ML: 5 INJECTION INTRAVENOUS at 02:08

## 2021-08-09 RX ADMIN — SODIUM CHLORIDE: 0.9 INJECTION, SOLUTION INTRAVENOUS at 01:08

## 2021-08-10 ENCOUNTER — PATIENT MESSAGE (OUTPATIENT)
Dept: HEMATOLOGY/ONCOLOGY | Facility: CLINIC | Age: 36
End: 2021-08-10

## 2021-08-27 ENCOUNTER — PATIENT MESSAGE (OUTPATIENT)
Dept: HEMATOLOGY/ONCOLOGY | Facility: CLINIC | Age: 36
End: 2021-08-27

## 2021-09-03 ENCOUNTER — TELEPHONE (OUTPATIENT)
Dept: HEMATOLOGY/ONCOLOGY | Facility: CLINIC | Age: 36
End: 2021-09-03

## 2021-09-09 ENCOUNTER — OFFICE VISIT (OUTPATIENT)
Dept: HEMATOLOGY/ONCOLOGY | Facility: CLINIC | Age: 36
End: 2021-09-09
Payer: COMMERCIAL

## 2021-09-09 DIAGNOSIS — R53.83 FATIGUE, UNSPECIFIED TYPE: ICD-10-CM

## 2021-09-09 DIAGNOSIS — E55.9 VITAMIN D DEFICIENCY: ICD-10-CM

## 2021-09-09 DIAGNOSIS — Z17.0 MALIGNANT NEOPLASM OF UPPER-OUTER QUADRANT OF LEFT BREAST IN FEMALE, ESTROGEN RECEPTOR POSITIVE: ICD-10-CM

## 2021-09-09 DIAGNOSIS — C50.412 MALIGNANT NEOPLASM OF UPPER-OUTER QUADRANT OF LEFT BREAST IN FEMALE, ESTROGEN RECEPTOR POSITIVE: ICD-10-CM

## 2021-09-09 DIAGNOSIS — M25.50 ARTHRALGIA, UNSPECIFIED JOINT: ICD-10-CM

## 2021-09-09 DIAGNOSIS — G43.001 MIGRAINE WITHOUT AURA AND WITH STATUS MIGRAINOSUS, NOT INTRACTABLE: Primary | ICD-10-CM

## 2021-09-09 PROCEDURE — 1159F MED LIST DOCD IN RCRD: CPT | Mod: CPTII,,, | Performed by: INTERNAL MEDICINE

## 2021-09-09 PROCEDURE — 99213 OFFICE O/P EST LOW 20 MIN: CPT | Mod: 95,,, | Performed by: INTERNAL MEDICINE

## 2021-09-09 PROCEDURE — 99213 PR OFFICE/OUTPT VISIT, EST, LEVL III, 20-29 MIN: ICD-10-PCS | Mod: 95,,, | Performed by: INTERNAL MEDICINE

## 2021-09-09 PROCEDURE — 1160F RVW MEDS BY RX/DR IN RCRD: CPT | Mod: CPTII,,, | Performed by: INTERNAL MEDICINE

## 2021-09-09 PROCEDURE — 1159F PR MEDICATION LIST DOCUMENTED IN MEDICAL RECORD: ICD-10-PCS | Mod: CPTII,,, | Performed by: INTERNAL MEDICINE

## 2021-09-09 PROCEDURE — 1160F PR REVIEW ALL MEDS BY PRESCRIBER/CLIN PHARMACIST DOCUMENTED: ICD-10-PCS | Mod: CPTII,,, | Performed by: INTERNAL MEDICINE

## 2021-09-09 RX ORDER — BUTALBITAL, ACETAMINOPHEN AND CAFFEINE 50; 325; 40 MG/1; MG/1; MG/1
1 TABLET ORAL EVERY 4 HOURS PRN
Qty: 180 EACH | Refills: 0 | Status: SHIPPED | OUTPATIENT
Start: 2021-09-09 | End: 2021-10-09

## 2021-09-10 ENCOUNTER — INFUSION (OUTPATIENT)
Dept: INFUSION THERAPY | Facility: HOSPITAL | Age: 36
End: 2021-09-10
Payer: COMMERCIAL

## 2021-09-10 ENCOUNTER — LAB VISIT (OUTPATIENT)
Dept: LAB | Facility: HOSPITAL | Age: 36
End: 2021-09-10
Payer: COMMERCIAL

## 2021-09-10 VITALS
WEIGHT: 111.75 LBS | SYSTOLIC BLOOD PRESSURE: 157 MMHG | HEIGHT: 65 IN | HEART RATE: 83 BPM | RESPIRATION RATE: 18 BRPM | BODY MASS INDEX: 18.62 KG/M2 | DIASTOLIC BLOOD PRESSURE: 85 MMHG | TEMPERATURE: 98 F

## 2021-09-10 DIAGNOSIS — T45.1X5A CHEMOTHERAPY-INDUCED NAUSEA: ICD-10-CM

## 2021-09-10 DIAGNOSIS — T45.1X5A CHEMOTHERAPY INDUCED NEUTROPENIA: ICD-10-CM

## 2021-09-10 DIAGNOSIS — Z17.0 MALIGNANT NEOPLASM OF UPPER-OUTER QUADRANT OF LEFT BREAST IN FEMALE, ESTROGEN RECEPTOR POSITIVE: ICD-10-CM

## 2021-09-10 DIAGNOSIS — C50.412 MALIGNANT NEOPLASM OF UPPER-OUTER QUADRANT OF LEFT BREAST IN FEMALE, ESTROGEN RECEPTOR POSITIVE: ICD-10-CM

## 2021-09-10 DIAGNOSIS — D70.1 CHEMOTHERAPY INDUCED NEUTROPENIA: ICD-10-CM

## 2021-09-10 DIAGNOSIS — E28.39 SUPPRESSION OF OVARIAN SECRETION: ICD-10-CM

## 2021-09-10 DIAGNOSIS — Z51.11 ENCOUNTER FOR CHEMOTHERAPY MANAGEMENT: ICD-10-CM

## 2021-09-10 DIAGNOSIS — C50.412 MALIGNANT NEOPLASM OF UPPER-OUTER QUADRANT OF LEFT BREAST IN FEMALE, ESTROGEN RECEPTOR POSITIVE: Primary | ICD-10-CM

## 2021-09-10 DIAGNOSIS — Z17.0 MALIGNANT NEOPLASM OF UPPER-OUTER QUADRANT OF LEFT BREAST IN FEMALE, ESTROGEN RECEPTOR POSITIVE: Primary | ICD-10-CM

## 2021-09-10 DIAGNOSIS — R53.83 FATIGUE, UNSPECIFIED TYPE: ICD-10-CM

## 2021-09-10 DIAGNOSIS — R11.0 CHEMOTHERAPY-INDUCED NAUSEA: ICD-10-CM

## 2021-09-10 DIAGNOSIS — M25.50 ARTHRALGIA, UNSPECIFIED JOINT: ICD-10-CM

## 2021-09-10 LAB
ALBUMIN SERPL BCP-MCNC: 3.8 G/DL (ref 3.5–5.2)
ALP SERPL-CCNC: 79 U/L (ref 55–135)
ALT SERPL W/O P-5'-P-CCNC: 42 U/L (ref 10–44)
ANION GAP SERPL CALC-SCNC: 9 MMOL/L (ref 8–16)
AST SERPL-CCNC: 48 U/L (ref 10–40)
BILIRUB SERPL-MCNC: 0.6 MG/DL (ref 0.1–1)
BUN SERPL-MCNC: 10 MG/DL (ref 6–20)
CALCIUM SERPL-MCNC: 9.7 MG/DL (ref 8.7–10.5)
CHLORIDE SERPL-SCNC: 110 MMOL/L (ref 95–110)
CO2 SERPL-SCNC: 25 MMOL/L (ref 23–29)
CREAT SERPL-MCNC: 0.8 MG/DL (ref 0.5–1.4)
CRP SERPL-MCNC: 0.8 MG/L (ref 0–8.2)
ERYTHROCYTE [DISTWIDTH] IN BLOOD BY AUTOMATED COUNT: 15.8 % (ref 11.5–14.5)
ERYTHROCYTE [SEDIMENTATION RATE] IN BLOOD BY WESTERGREN METHOD: <2 MM/HR (ref 0–36)
EST. GFR  (AFRICAN AMERICAN): >60 ML/MIN/1.73 M^2
EST. GFR  (NON AFRICAN AMERICAN): >60 ML/MIN/1.73 M^2
GLUCOSE SERPL-MCNC: 102 MG/DL (ref 70–110)
HCT VFR BLD AUTO: 39 % (ref 37–48.5)
HGB BLD-MCNC: 12.8 G/DL (ref 12–16)
IMM GRANULOCYTES # BLD AUTO: 0.01 K/UL (ref 0–0.04)
MCH RBC QN AUTO: 30 PG (ref 27–31)
MCHC RBC AUTO-ENTMCNC: 32.8 G/DL (ref 32–36)
MCV RBC AUTO: 92 FL (ref 82–98)
NEUTROPHILS # BLD AUTO: 1 K/UL (ref 1.8–7.7)
PLATELET # BLD AUTO: 88 K/UL (ref 150–450)
PMV BLD AUTO: 12.6 FL (ref 9.2–12.9)
POTASSIUM SERPL-SCNC: 4.4 MMOL/L (ref 3.5–5.1)
PROT SERPL-MCNC: 6.9 G/DL (ref 6–8.4)
RBC # BLD AUTO: 4.26 M/UL (ref 4–5.4)
SODIUM SERPL-SCNC: 144 MMOL/L (ref 136–145)
T4 FREE SERPL-MCNC: 0.75 NG/DL (ref 0.71–1.51)
TSH SERPL DL<=0.005 MIU/L-ACNC: 1.36 UIU/ML (ref 0.4–4)
WBC # BLD AUTO: 4.8 K/UL (ref 3.9–12.7)

## 2021-09-10 PROCEDURE — 85027 COMPLETE CBC AUTOMATED: CPT | Performed by: NURSE PRACTITIONER

## 2021-09-10 PROCEDURE — 63600175 PHARM REV CODE 636 W HCPCS: Mod: JG | Performed by: INTERNAL MEDICINE

## 2021-09-10 PROCEDURE — 84443 ASSAY THYROID STIM HORMONE: CPT | Performed by: INTERNAL MEDICINE

## 2021-09-10 PROCEDURE — 85652 RBC SED RATE AUTOMATED: CPT | Performed by: INTERNAL MEDICINE

## 2021-09-10 PROCEDURE — 25000003 PHARM REV CODE 250: Performed by: INTERNAL MEDICINE

## 2021-09-10 PROCEDURE — 80053 COMPREHEN METABOLIC PANEL: CPT | Performed by: NURSE PRACTITIONER

## 2021-09-10 PROCEDURE — 86140 C-REACTIVE PROTEIN: CPT | Performed by: INTERNAL MEDICINE

## 2021-09-10 PROCEDURE — 36415 COLL VENOUS BLD VENIPUNCTURE: CPT | Performed by: NURSE PRACTITIONER

## 2021-09-10 PROCEDURE — 96372 THER/PROPH/DIAG INJ SC/IM: CPT

## 2021-09-10 PROCEDURE — A4216 STERILE WATER/SALINE, 10 ML: HCPCS | Performed by: INTERNAL MEDICINE

## 2021-09-10 PROCEDURE — 84439 ASSAY OF FREE THYROXINE: CPT | Performed by: INTERNAL MEDICINE

## 2021-09-10 RX ORDER — SODIUM CHLORIDE 0.9 % (FLUSH) 0.9 %
10 SYRINGE (ML) INJECTION
Status: CANCELLED | OUTPATIENT
Start: 2021-09-10

## 2021-09-10 RX ORDER — PROCHLORPERAZINE EDISYLATE 5 MG/ML
2.5 INJECTION INTRAMUSCULAR; INTRAVENOUS ONCE
Status: CANCELLED
Start: 2021-09-10 | End: 2021-09-10

## 2021-09-10 RX ORDER — HEPARIN 100 UNIT/ML
500 SYRINGE INTRAVENOUS
Status: DISCONTINUED | OUTPATIENT
Start: 2021-09-10 | End: 2021-09-10 | Stop reason: HOSPADM

## 2021-09-10 RX ORDER — PROCHLORPERAZINE EDISYLATE 5 MG/ML
2.5 INJECTION INTRAMUSCULAR; INTRAVENOUS ONCE
Status: DISCONTINUED | OUTPATIENT
Start: 2021-09-10 | End: 2021-09-10 | Stop reason: HOSPADM

## 2021-09-10 RX ORDER — SODIUM CHLORIDE 0.9 % (FLUSH) 0.9 %
10 SYRINGE (ML) INJECTION
Status: DISCONTINUED | OUTPATIENT
Start: 2021-09-10 | End: 2021-09-10 | Stop reason: HOSPADM

## 2021-09-10 RX ORDER — HEPARIN 100 UNIT/ML
500 SYRINGE INTRAVENOUS
Status: CANCELLED | OUTPATIENT
Start: 2021-09-10

## 2021-09-10 RX ADMIN — HEPARIN 500 UNITS: 100 SYRINGE at 10:09

## 2021-09-10 RX ADMIN — Medication 10 ML: at 10:09

## 2021-09-10 RX ADMIN — GOSERELIN ACETATE 3.6 MG: 3.6 IMPLANT SUBCUTANEOUS at 10:09

## 2021-09-10 RX ADMIN — SODIUM CHLORIDE: 0.9 INJECTION, SOLUTION INTRAVENOUS at 09:09

## 2021-09-15 ENCOUNTER — LAB VISIT (OUTPATIENT)
Dept: LAB | Facility: HOSPITAL | Age: 36
End: 2021-09-15
Payer: COMMERCIAL

## 2021-09-15 DIAGNOSIS — Z17.0 MALIGNANT NEOPLASM OF UPPER-OUTER QUADRANT OF LEFT BREAST IN FEMALE, ESTROGEN RECEPTOR POSITIVE: ICD-10-CM

## 2021-09-15 DIAGNOSIS — Z17.0 MALIGNANT NEOPLASM OF UPPER-OUTER QUADRANT OF LEFT BREAST IN FEMALE, ESTROGEN RECEPTOR POSITIVE: Primary | ICD-10-CM

## 2021-09-15 DIAGNOSIS — C50.412 MALIGNANT NEOPLASM OF UPPER-OUTER QUADRANT OF LEFT BREAST IN FEMALE, ESTROGEN RECEPTOR POSITIVE: Primary | ICD-10-CM

## 2021-09-15 DIAGNOSIS — C50.412 MALIGNANT NEOPLASM OF UPPER-OUTER QUADRANT OF LEFT BREAST IN FEMALE, ESTROGEN RECEPTOR POSITIVE: ICD-10-CM

## 2021-09-15 LAB
ERYTHROCYTE [DISTWIDTH] IN BLOOD BY AUTOMATED COUNT: 15.8 % (ref 11.5–14.5)
HCT VFR BLD AUTO: 37.9 % (ref 37–48.5)
HGB BLD-MCNC: 12.8 G/DL (ref 12–16)
IMM GRANULOCYTES # BLD AUTO: 0.01 K/UL (ref 0–0.04)
MCH RBC QN AUTO: 30 PG (ref 27–31)
MCHC RBC AUTO-ENTMCNC: 33.8 G/DL (ref 32–36)
MCV RBC AUTO: 89 FL (ref 82–98)
NEUTROPHILS # BLD AUTO: 1.7 K/UL (ref 1.8–7.7)
PLATELET # BLD AUTO: 102 K/UL (ref 150–450)
PMV BLD AUTO: 12.6 FL (ref 9.2–12.9)
RBC # BLD AUTO: 4.27 M/UL (ref 4–5.4)
WBC # BLD AUTO: 5.94 K/UL (ref 3.9–12.7)

## 2021-09-15 PROCEDURE — 85027 COMPLETE CBC AUTOMATED: CPT | Performed by: NURSE PRACTITIONER

## 2021-09-15 PROCEDURE — 36415 COLL VENOUS BLD VENIPUNCTURE: CPT | Performed by: NURSE PRACTITIONER

## 2021-09-16 ENCOUNTER — OFFICE VISIT (OUTPATIENT)
Dept: HEMATOLOGY/ONCOLOGY | Facility: CLINIC | Age: 36
End: 2021-09-16
Payer: COMMERCIAL

## 2021-09-16 DIAGNOSIS — Z17.0 MALIGNANT NEOPLASM OF UPPER-OUTER QUADRANT OF LEFT BREAST IN FEMALE, ESTROGEN RECEPTOR POSITIVE: Primary | ICD-10-CM

## 2021-09-16 DIAGNOSIS — C50.412 MALIGNANT NEOPLASM OF UPPER-OUTER QUADRANT OF LEFT BREAST IN FEMALE, ESTROGEN RECEPTOR POSITIVE: Primary | ICD-10-CM

## 2021-09-16 PROCEDURE — 1160F PR REVIEW ALL MEDS BY PRESCRIBER/CLIN PHARMACIST DOCUMENTED: ICD-10-PCS | Mod: CPTII,95,, | Performed by: INTERNAL MEDICINE

## 2021-09-16 PROCEDURE — 1159F PR MEDICATION LIST DOCUMENTED IN MEDICAL RECORD: ICD-10-PCS | Mod: CPTII,95,, | Performed by: INTERNAL MEDICINE

## 2021-09-16 PROCEDURE — 1160F RVW MEDS BY RX/DR IN RCRD: CPT | Mod: CPTII,95,, | Performed by: INTERNAL MEDICINE

## 2021-09-16 PROCEDURE — 99212 OFFICE O/P EST SF 10 MIN: CPT | Mod: 95,,, | Performed by: INTERNAL MEDICINE

## 2021-09-16 PROCEDURE — 1159F MED LIST DOCD IN RCRD: CPT | Mod: CPTII,95,, | Performed by: INTERNAL MEDICINE

## 2021-09-16 PROCEDURE — 99212 PR OFFICE/OUTPT VISIT, EST, LEVL II, 10-19 MIN: ICD-10-PCS | Mod: 95,,, | Performed by: INTERNAL MEDICINE

## 2021-09-17 ENCOUNTER — PATIENT MESSAGE (OUTPATIENT)
Dept: HEMATOLOGY/ONCOLOGY | Facility: CLINIC | Age: 36
End: 2021-09-17

## 2021-09-17 DIAGNOSIS — G89.3 CANCER ASSOCIATED PAIN: ICD-10-CM

## 2021-09-17 DIAGNOSIS — Z17.0 MALIGNANT NEOPLASM OF UPPER-OUTER QUADRANT OF LEFT BREAST IN FEMALE, ESTROGEN RECEPTOR POSITIVE: ICD-10-CM

## 2021-09-17 DIAGNOSIS — C50.412 MALIGNANT NEOPLASM OF UPPER-OUTER QUADRANT OF LEFT BREAST IN FEMALE, ESTROGEN RECEPTOR POSITIVE: ICD-10-CM

## 2021-09-17 RX ORDER — HYDROCODONE BITARTRATE AND ACETAMINOPHEN 5; 325 MG/1; MG/1
1 TABLET ORAL EVERY 8 HOURS PRN
Qty: 30 TABLET | Refills: 0 | Status: SHIPPED | OUTPATIENT
Start: 2021-09-17 | End: 2021-09-17 | Stop reason: SDUPTHER

## 2021-09-17 RX ORDER — HYDROCODONE BITARTRATE AND ACETAMINOPHEN 5; 325 MG/1; MG/1
1 TABLET ORAL EVERY 8 HOURS PRN
Qty: 30 TABLET | Refills: 0 | Status: SHIPPED | OUTPATIENT
Start: 2021-09-17 | End: 2022-01-03 | Stop reason: SDUPTHER

## 2021-09-27 ENCOUNTER — HOSPITAL ENCOUNTER (OUTPATIENT)
Dept: CARDIOLOGY | Facility: HOSPITAL | Age: 36
Discharge: HOME | End: 2021-09-27
Attending: INTERNAL MEDICINE
Payer: COMMERCIAL

## 2021-09-27 VITALS
HEART RATE: 75 BPM | SYSTOLIC BLOOD PRESSURE: 157 MMHG | WEIGHT: 120 LBS | HEIGHT: 60 IN | RESPIRATION RATE: 5 BRPM | DIASTOLIC BLOOD PRESSURE: 85 MMHG | BODY MASS INDEX: 23.56 KG/M2

## 2021-09-27 DIAGNOSIS — Z17.0 MALIGNANT NEOPLASM OF UPPER-OUTER QUADRANT OF LEFT BREAST IN FEMALE, ESTROGEN RECEPTOR POSITIVE: ICD-10-CM

## 2021-09-27 DIAGNOSIS — C50.412 MALIGNANT NEOPLASM OF UPPER-OUTER QUADRANT OF LEFT BREAST IN FEMALE, ESTROGEN RECEPTOR POSITIVE: ICD-10-CM

## 2021-09-27 LAB
ASCENDING AORTA: 2.63 CM
AV INDEX (PROSTH): 1.09
AV MEAN GRADIENT: 2 MMHG
AV PEAK GRADIENT: 3 MMHG
AV VALVE AREA: 3.42 CM2
AV VELOCITY RATIO: 1.11
BSA FOR ECHO PROCEDURE: 1.52 M2
CV ECHO LV RWT: 0.24 CM
DOP CALC AO PEAK VEL: 0.93 M/S
DOP CALC AO VTI: 21.64 CM
DOP CALC LVOT AREA: 3.1 CM2
DOP CALC LVOT DIAMETER: 2 CM
DOP CALC LVOT PEAK VEL: 1.03 M/S
DOP CALC LVOT STROKE VOLUME: 74.04 CM3
DOP CALCLVOT PEAK VEL VTI: 23.58 CM
E WAVE DECELERATION TIME: 170.93 MSEC
E/A RATIO: 1.33
E/E' RATIO: 5.33 M/S
ECHO LV POSTERIOR WALL: 0.61 CM (ref 0.6–1.1)
EJECTION FRACTION: 60 %
FRACTIONAL SHORTENING: 41 % (ref 28–44)
INTERVENTRICULAR SEPTUM: 0.56 CM (ref 0.6–1.1)
LA MAJOR: 4.27 CM
LA MINOR: 4.16 CM
LA WIDTH: 2.9 CM
LEFT ATRIUM SIZE: 2.98 CM
LEFT ATRIUM VOLUME INDEX MOD: 20.6 ML/M2
LEFT ATRIUM VOLUME INDEX: 20.6 ML/M2
LEFT ATRIUM VOLUME MOD: 30.88 CM3
LEFT ATRIUM VOLUME: 30.96 CM3
LEFT INTERNAL DIMENSION IN SYSTOLE: 2.98 CM (ref 2.1–4)
LEFT VENTRICLE DIASTOLIC VOLUME INDEX: 80.35 ML/M2
LEFT VENTRICLE DIASTOLIC VOLUME: 120.52 ML
LEFT VENTRICLE MASS INDEX: 62 G/M2
LEFT VENTRICLE SYSTOLIC VOLUME INDEX: 23 ML/M2
LEFT VENTRICLE SYSTOLIC VOLUME: 34.51 ML
LEFT VENTRICULAR INTERNAL DIMENSION IN DIASTOLE: 5.04 CM (ref 3.5–6)
LEFT VENTRICULAR MASS: 93.33 G
LV LATERAL E/E' RATIO: 4.63 M/S
LV SEPTAL E/E' RATIO: 6.29 M/S
MV PEAK A VEL: 0.66 M/S
MV PEAK E VEL: 0.88 M/S
MV STENOSIS PRESSURE HALF TIME: 49.57 MS
MV VALVE AREA P 1/2 METHOD: 4.44 CM2
PISA TR MAX VEL: 2.26 M/S
QEF: 63 %
RA MAJOR: 3.5 CM
RA PRESSURE: 3 MMHG
RA WIDTH: 2.56 CM
RIGHT VENTRICULAR END-DIASTOLIC DIMENSION: 2.87 CM
RV TISSUE DOPPLER FREE WALL SYSTOLIC VELOCITY 1 (APICAL 4 CHAMBER VIEW): 14.1 CM/S
SINUS: 2.91 CM
STJ: 2.47 CM
TDI LATERAL: 0.19 M/S
TDI SEPTAL: 0.14 M/S
TDI: 0.17 M/S
TR MAX PG: 20 MMHG
TRICUSPID ANNULAR PLANE SYSTOLIC EXCURSION: 2.24 CM
TV REST PULMONARY ARTERY PRESSURE: 23 MMHG

## 2021-09-27 PROCEDURE — 93306 TTE W/DOPPLER COMPLETE: CPT

## 2021-09-27 PROCEDURE — 93306 ECHO (CUPID ONLY): ICD-10-PCS | Mod: 26,,, | Performed by: INTERNAL MEDICINE

## 2021-09-27 PROCEDURE — 93306 TTE W/DOPPLER COMPLETE: CPT | Mod: 26,,, | Performed by: INTERNAL MEDICINE

## 2021-09-30 ENCOUNTER — PATIENT MESSAGE (OUTPATIENT)
Dept: HEMATOLOGY/ONCOLOGY | Facility: CLINIC | Age: 36
End: 2021-09-30

## 2021-10-01 ENCOUNTER — TELEPHONE (OUTPATIENT)
Dept: HEMATOLOGY/ONCOLOGY | Facility: CLINIC | Age: 36
End: 2021-10-01

## 2021-10-06 RX ORDER — SODIUM CHLORIDE 0.9 % (FLUSH) 0.9 %
10 SYRINGE (ML) INJECTION
Status: CANCELLED | OUTPATIENT
Start: 2021-10-06

## 2021-10-06 RX ORDER — HEPARIN 100 UNIT/ML
500 SYRINGE INTRAVENOUS
Status: CANCELLED | OUTPATIENT
Start: 2021-10-06

## 2021-10-06 RX ORDER — PROCHLORPERAZINE EDISYLATE 5 MG/ML
2.5 INJECTION INTRAMUSCULAR; INTRAVENOUS ONCE
Status: CANCELLED
Start: 2021-10-06 | End: 2021-10-06

## 2021-10-08 ENCOUNTER — PATIENT MESSAGE (OUTPATIENT)
Dept: HEMATOLOGY/ONCOLOGY | Facility: CLINIC | Age: 36
End: 2021-10-08

## 2021-10-08 ENCOUNTER — INFUSION (OUTPATIENT)
Dept: INFUSION THERAPY | Facility: HOSPITAL | Age: 36
End: 2021-10-08
Payer: COMMERCIAL

## 2021-10-08 DIAGNOSIS — E28.39 SUPPRESSION OF OVARIAN SECRETION: Primary | ICD-10-CM

## 2021-10-08 PROCEDURE — 63600175 PHARM REV CODE 636 W HCPCS: Mod: JG | Performed by: INTERNAL MEDICINE

## 2021-10-08 PROCEDURE — 96402 CHEMO HORMON ANTINEOPL SQ/IM: CPT

## 2021-10-08 RX ADMIN — GOSERELIN ACETATE 3.6 MG: 3.6 IMPLANT SUBCUTANEOUS at 11:10

## 2021-10-14 ENCOUNTER — LAB VISIT (OUTPATIENT)
Dept: LAB | Facility: HOSPITAL | Age: 36
End: 2021-10-14
Attending: INTERNAL MEDICINE
Payer: COMMERCIAL

## 2021-10-14 ENCOUNTER — OFFICE VISIT (OUTPATIENT)
Dept: HEMATOLOGY/ONCOLOGY | Facility: CLINIC | Age: 36
End: 2021-10-14
Payer: COMMERCIAL

## 2021-10-14 ENCOUNTER — TELEPHONE (OUTPATIENT)
Dept: INFUSION THERAPY | Facility: HOSPITAL | Age: 36
End: 2021-10-14

## 2021-10-14 VITALS
HEART RATE: 81 BPM | TEMPERATURE: 98 F | DIASTOLIC BLOOD PRESSURE: 82 MMHG | BODY MASS INDEX: 18.11 KG/M2 | WEIGHT: 108.69 LBS | HEIGHT: 65 IN | OXYGEN SATURATION: 96 % | SYSTOLIC BLOOD PRESSURE: 178 MMHG

## 2021-10-14 DIAGNOSIS — F41.8 DEPRESSION WITH ANXIETY: ICD-10-CM

## 2021-10-14 DIAGNOSIS — T45.1X5A CHEMOTHERAPY-INDUCED THROMBOCYTOPENIA: ICD-10-CM

## 2021-10-14 DIAGNOSIS — M54.40 BILATERAL LOW BACK PAIN WITH SCIATICA, SCIATICA LATERALITY UNSPECIFIED, UNSPECIFIED CHRONICITY: ICD-10-CM

## 2021-10-14 DIAGNOSIS — C50.412 MALIGNANT NEOPLASM OF UPPER-OUTER QUADRANT OF LEFT BREAST IN FEMALE, ESTROGEN RECEPTOR POSITIVE: ICD-10-CM

## 2021-10-14 DIAGNOSIS — Z17.0 MALIGNANT NEOPLASM OF UPPER-OUTER QUADRANT OF LEFT BREAST IN FEMALE, ESTROGEN RECEPTOR POSITIVE: ICD-10-CM

## 2021-10-14 DIAGNOSIS — Z17.0 MALIGNANT NEOPLASM OF UPPER-OUTER QUADRANT OF LEFT BREAST IN FEMALE, ESTROGEN RECEPTOR POSITIVE: Primary | ICD-10-CM

## 2021-10-14 DIAGNOSIS — C50.412 MALIGNANT NEOPLASM OF UPPER-OUTER QUADRANT OF LEFT BREAST IN FEMALE, ESTROGEN RECEPTOR POSITIVE: Primary | ICD-10-CM

## 2021-10-14 DIAGNOSIS — Z79.810 SERM USE (SELECTIVE ESTROGEN RECEPTOR MODULATOR): ICD-10-CM

## 2021-10-14 DIAGNOSIS — D69.59 CHEMOTHERAPY-INDUCED THROMBOCYTOPENIA: ICD-10-CM

## 2021-10-14 DIAGNOSIS — R53.83 FATIGUE, UNSPECIFIED TYPE: ICD-10-CM

## 2021-10-14 LAB
ALBUMIN SERPL BCP-MCNC: 4.3 G/DL (ref 3.5–5.2)
ALP SERPL-CCNC: 71 U/L (ref 55–135)
ALT SERPL W/O P-5'-P-CCNC: 32 U/L (ref 10–44)
ANION GAP SERPL CALC-SCNC: 13 MMOL/L (ref 8–16)
AST SERPL-CCNC: 40 U/L (ref 10–40)
BASOPHILS # BLD AUTO: 0.05 K/UL (ref 0–0.2)
BASOPHILS NFR BLD: 0.8 % (ref 0–1.9)
BILIRUB SERPL-MCNC: 0.5 MG/DL (ref 0.1–1)
BUN SERPL-MCNC: 9 MG/DL (ref 6–20)
CALCIUM SERPL-MCNC: 10.4 MG/DL (ref 8.7–10.5)
CHLORIDE SERPL-SCNC: 104 MMOL/L (ref 95–110)
CO2 SERPL-SCNC: 23 MMOL/L (ref 23–29)
CREAT SERPL-MCNC: 0.8 MG/DL (ref 0.5–1.4)
DIFFERENTIAL METHOD: ABNORMAL
EOSINOPHIL # BLD AUTO: 0.1 K/UL (ref 0–0.5)
EOSINOPHIL NFR BLD: 1.9 % (ref 0–8)
ERYTHROCYTE [DISTWIDTH] IN BLOOD BY AUTOMATED COUNT: 15.3 % (ref 11.5–14.5)
EST. GFR  (AFRICAN AMERICAN): >60 ML/MIN/1.73 M^2
EST. GFR  (NON AFRICAN AMERICAN): >60 ML/MIN/1.73 M^2
GLUCOSE SERPL-MCNC: 93 MG/DL (ref 70–110)
HCT VFR BLD AUTO: 42.7 % (ref 37–48.5)
HGB BLD-MCNC: 14.1 G/DL (ref 12–16)
IMM GRANULOCYTES # BLD AUTO: 0.01 K/UL (ref 0–0.04)
IMM GRANULOCYTES NFR BLD AUTO: 0.2 % (ref 0–0.5)
LYMPHOCYTES # BLD AUTO: 2.8 K/UL (ref 1–4.8)
LYMPHOCYTES NFR BLD: 44.5 % (ref 18–48)
MCH RBC QN AUTO: 30.5 PG (ref 27–31)
MCHC RBC AUTO-ENTMCNC: 33 G/DL (ref 32–36)
MCV RBC AUTO: 92 FL (ref 82–98)
MONOCYTES # BLD AUTO: 0.6 K/UL (ref 0.3–1)
MONOCYTES NFR BLD: 10 % (ref 4–15)
NEUTROPHILS # BLD AUTO: 2.7 K/UL (ref 1.8–7.7)
NEUTROPHILS NFR BLD: 42.6 % (ref 38–73)
NRBC BLD-RTO: 0 /100 WBC
PLATELET # BLD AUTO: 107 K/UL (ref 150–450)
PMV BLD AUTO: 12.3 FL (ref 9.2–12.9)
POTASSIUM SERPL-SCNC: 4.7 MMOL/L (ref 3.5–5.1)
PROT SERPL-MCNC: 7.8 G/DL (ref 6–8.4)
RBC # BLD AUTO: 4.62 M/UL (ref 4–5.4)
SODIUM SERPL-SCNC: 140 MMOL/L (ref 136–145)
WBC # BLD AUTO: 6.29 K/UL (ref 3.9–12.7)

## 2021-10-14 PROCEDURE — 3008F PR BODY MASS INDEX (BMI) DOCUMENTED: ICD-10-PCS | Mod: CPTII,S$GLB,, | Performed by: NURSE PRACTITIONER

## 2021-10-14 PROCEDURE — 3079F PR MOST RECENT DIASTOLIC BLOOD PRESSURE 80-89 MM HG: ICD-10-PCS | Mod: CPTII,S$GLB,, | Performed by: NURSE PRACTITIONER

## 2021-10-14 PROCEDURE — 36415 COLL VENOUS BLD VENIPUNCTURE: CPT | Performed by: INTERNAL MEDICINE

## 2021-10-14 PROCEDURE — 99999 PR PBB SHADOW E&M-EST. PATIENT-LVL V: ICD-10-PCS | Mod: PBBFAC,,, | Performed by: NURSE PRACTITIONER

## 2021-10-14 PROCEDURE — 80053 COMPREHEN METABOLIC PANEL: CPT | Performed by: INTERNAL MEDICINE

## 2021-10-14 PROCEDURE — 99215 PR OFFICE/OUTPT VISIT, EST, LEVL V, 40-54 MIN: ICD-10-PCS | Mod: S$GLB,,, | Performed by: NURSE PRACTITIONER

## 2021-10-14 PROCEDURE — 85025 COMPLETE CBC W/AUTO DIFF WBC: CPT | Performed by: INTERNAL MEDICINE

## 2021-10-14 PROCEDURE — 3077F PR MOST RECENT SYSTOLIC BLOOD PRESSURE >= 140 MM HG: ICD-10-PCS | Mod: CPTII,S$GLB,, | Performed by: NURSE PRACTITIONER

## 2021-10-14 PROCEDURE — 3008F BODY MASS INDEX DOCD: CPT | Mod: CPTII,S$GLB,, | Performed by: NURSE PRACTITIONER

## 2021-10-14 PROCEDURE — 1159F MED LIST DOCD IN RCRD: CPT | Mod: CPTII,S$GLB,, | Performed by: NURSE PRACTITIONER

## 2021-10-14 PROCEDURE — 3077F SYST BP >= 140 MM HG: CPT | Mod: CPTII,S$GLB,, | Performed by: NURSE PRACTITIONER

## 2021-10-14 PROCEDURE — 99215 OFFICE O/P EST HI 40 MIN: CPT | Mod: S$GLB,,, | Performed by: NURSE PRACTITIONER

## 2021-10-14 PROCEDURE — 99999 PR PBB SHADOW E&M-EST. PATIENT-LVL V: CPT | Mod: PBBFAC,,, | Performed by: NURSE PRACTITIONER

## 2021-10-14 PROCEDURE — 1159F PR MEDICATION LIST DOCUMENTED IN MEDICAL RECORD: ICD-10-PCS | Mod: CPTII,S$GLB,, | Performed by: NURSE PRACTITIONER

## 2021-10-14 PROCEDURE — 3079F DIAST BP 80-89 MM HG: CPT | Mod: CPTII,S$GLB,, | Performed by: NURSE PRACTITIONER

## 2021-10-14 RX ORDER — VENLAFAXINE HYDROCHLORIDE 75 MG/1
75 CAPSULE, EXTENDED RELEASE ORAL DAILY
Qty: 30 CAPSULE | Refills: 2 | Status: SHIPPED | OUTPATIENT
Start: 2021-10-14 | End: 2022-02-22

## 2021-10-15 ENCOUNTER — TELEPHONE (OUTPATIENT)
Dept: PAIN MEDICINE | Facility: CLINIC | Age: 36
End: 2021-10-15

## 2021-10-15 ENCOUNTER — TELEPHONE (OUTPATIENT)
Dept: OBSTETRICS AND GYNECOLOGY | Facility: CLINIC | Age: 36
End: 2021-10-15

## 2021-10-18 ENCOUNTER — PATIENT MESSAGE (OUTPATIENT)
Dept: OBSTETRICS AND GYNECOLOGY | Facility: CLINIC | Age: 36
End: 2021-10-18
Payer: COMMERCIAL

## 2021-10-18 ENCOUNTER — TELEPHONE (OUTPATIENT)
Dept: OBSTETRICS AND GYNECOLOGY | Facility: CLINIC | Age: 36
End: 2021-10-18

## 2021-10-18 ENCOUNTER — TELEPHONE (OUTPATIENT)
Dept: INTERVENTIONAL RADIOLOGY/VASCULAR | Facility: OTHER | Age: 36
End: 2021-10-18

## 2021-10-18 ENCOUNTER — TELEPHONE (OUTPATIENT)
Dept: INFUSION THERAPY | Facility: HOSPITAL | Age: 36
End: 2021-10-18

## 2021-10-18 DIAGNOSIS — C50.412 MALIGNANT NEOPLASM OF UPPER-OUTER QUADRANT OF LEFT BREAST IN FEMALE, ESTROGEN RECEPTOR POSITIVE: Primary | ICD-10-CM

## 2021-10-18 DIAGNOSIS — Z17.0 MALIGNANT NEOPLASM OF UPPER-OUTER QUADRANT OF LEFT BREAST IN FEMALE, ESTROGEN RECEPTOR POSITIVE: Primary | ICD-10-CM

## 2021-10-19 ENCOUNTER — TELEPHONE (OUTPATIENT)
Dept: INTERVENTIONAL RADIOLOGY/VASCULAR | Facility: OTHER | Age: 36
End: 2021-10-19

## 2021-10-26 ENCOUNTER — PATIENT MESSAGE (OUTPATIENT)
Dept: HEMATOLOGY/ONCOLOGY | Facility: CLINIC | Age: 36
End: 2021-10-26
Payer: COMMERCIAL

## 2021-11-01 ENCOUNTER — HOSPITAL ENCOUNTER (OUTPATIENT)
Dept: RADIOLOGY | Facility: OTHER | Age: 36
Discharge: HOME | End: 2021-11-01
Attending: NURSE PRACTITIONER
Payer: COMMERCIAL

## 2021-11-01 ENCOUNTER — INFUSION (OUTPATIENT)
Dept: INFUSION THERAPY | Facility: HOSPITAL | Age: 36
End: 2021-11-01
Payer: COMMERCIAL

## 2021-11-01 DIAGNOSIS — M54.40 BILATERAL LOW BACK PAIN WITH SCIATICA, SCIATICA LATERALITY UNSPECIFIED, UNSPECIFIED CHRONICITY: ICD-10-CM

## 2021-11-01 DIAGNOSIS — Z79.810 SERM USE (SELECTIVE ESTROGEN RECEPTOR MODULATOR): ICD-10-CM

## 2021-11-01 DIAGNOSIS — E28.39 SUPPRESSION OF OVARIAN SECRETION: Primary | ICD-10-CM

## 2021-11-01 DIAGNOSIS — C50.412 MALIGNANT NEOPLASM OF UPPER-OUTER QUADRANT OF LEFT BREAST IN FEMALE, ESTROGEN RECEPTOR POSITIVE: ICD-10-CM

## 2021-11-01 DIAGNOSIS — Z17.0 MALIGNANT NEOPLASM OF UPPER-OUTER QUADRANT OF LEFT BREAST IN FEMALE, ESTROGEN RECEPTOR POSITIVE: ICD-10-CM

## 2021-11-01 PROCEDURE — 77080 DEXA BONE DENSITY SPINE HIP: ICD-10-PCS | Mod: 26,,, | Performed by: RADIOLOGY

## 2021-11-01 PROCEDURE — 96402 CHEMO HORMON ANTINEOPL SQ/IM: CPT

## 2021-11-01 PROCEDURE — 63600175 PHARM REV CODE 636 W HCPCS: Mod: JG | Performed by: INTERNAL MEDICINE

## 2021-11-01 PROCEDURE — 77080 DXA BONE DENSITY AXIAL: CPT | Mod: 26,,, | Performed by: RADIOLOGY

## 2021-11-01 PROCEDURE — 77080 DXA BONE DENSITY AXIAL: CPT | Mod: TC

## 2021-11-01 RX ADMIN — GOSERELIN ACETATE 3.6 MG: 3.6 IMPLANT SUBCUTANEOUS at 12:11

## 2021-11-04 ENCOUNTER — OFFICE VISIT (OUTPATIENT)
Dept: SPINE | Facility: CLINIC | Age: 36
End: 2021-11-04
Payer: COMMERCIAL

## 2021-11-04 VITALS
RESPIRATION RATE: 20 BRPM | BODY MASS INDEX: 17.92 KG/M2 | SYSTOLIC BLOOD PRESSURE: 174 MMHG | DIASTOLIC BLOOD PRESSURE: 74 MMHG | HEART RATE: 79 BPM | OXYGEN SATURATION: 100 % | TEMPERATURE: 97 F | HEIGHT: 65 IN | WEIGHT: 107.56 LBS

## 2021-11-04 DIAGNOSIS — G89.29 CHRONIC BILATERAL LOW BACK PAIN WITHOUT SCIATICA: ICD-10-CM

## 2021-11-04 DIAGNOSIS — M51.36 ANNULAR TEAR OF LUMBAR DISC: ICD-10-CM

## 2021-11-04 DIAGNOSIS — M47.816 SPONDYLOSIS OF LUMBAR REGION WITHOUT MYELOPATHY OR RADICULOPATHY: Primary | ICD-10-CM

## 2021-11-04 DIAGNOSIS — M54.50 CHRONIC BILATERAL LOW BACK PAIN WITHOUT SCIATICA: ICD-10-CM

## 2021-11-04 PROCEDURE — 3077F PR MOST RECENT SYSTOLIC BLOOD PRESSURE >= 140 MM HG: ICD-10-PCS | Mod: CPTII,S$GLB,, | Performed by: NURSE PRACTITIONER

## 2021-11-04 PROCEDURE — 3078F DIAST BP <80 MM HG: CPT | Mod: CPTII,S$GLB,, | Performed by: NURSE PRACTITIONER

## 2021-11-04 PROCEDURE — 1160F RVW MEDS BY RX/DR IN RCRD: CPT | Mod: CPTII,S$GLB,, | Performed by: NURSE PRACTITIONER

## 2021-11-04 PROCEDURE — 3008F BODY MASS INDEX DOCD: CPT | Mod: CPTII,S$GLB,, | Performed by: NURSE PRACTITIONER

## 2021-11-04 PROCEDURE — 3077F SYST BP >= 140 MM HG: CPT | Mod: CPTII,S$GLB,, | Performed by: NURSE PRACTITIONER

## 2021-11-04 PROCEDURE — 99204 PR OFFICE/OUTPT VISIT, NEW, LEVL IV, 45-59 MIN: ICD-10-PCS | Mod: S$GLB,,, | Performed by: NURSE PRACTITIONER

## 2021-11-04 PROCEDURE — 1159F PR MEDICATION LIST DOCUMENTED IN MEDICAL RECORD: ICD-10-PCS | Mod: CPTII,S$GLB,, | Performed by: NURSE PRACTITIONER

## 2021-11-04 PROCEDURE — 3078F PR MOST RECENT DIASTOLIC BLOOD PRESSURE < 80 MM HG: ICD-10-PCS | Mod: CPTII,S$GLB,, | Performed by: NURSE PRACTITIONER

## 2021-11-04 PROCEDURE — 99999 PR PBB SHADOW E&M-EST. PATIENT-LVL V: CPT | Mod: PBBFAC,,, | Performed by: NURSE PRACTITIONER

## 2021-11-04 PROCEDURE — 1160F PR REVIEW ALL MEDS BY PRESCRIBER/CLIN PHARMACIST DOCUMENTED: ICD-10-PCS | Mod: CPTII,S$GLB,, | Performed by: NURSE PRACTITIONER

## 2021-11-04 PROCEDURE — 99999 PR PBB SHADOW E&M-EST. PATIENT-LVL V: ICD-10-PCS | Mod: PBBFAC,,, | Performed by: NURSE PRACTITIONER

## 2021-11-04 PROCEDURE — 99204 OFFICE O/P NEW MOD 45 MIN: CPT | Mod: S$GLB,,, | Performed by: NURSE PRACTITIONER

## 2021-11-04 PROCEDURE — 1159F MED LIST DOCD IN RCRD: CPT | Mod: CPTII,S$GLB,, | Performed by: NURSE PRACTITIONER

## 2021-11-04 PROCEDURE — 3008F PR BODY MASS INDEX (BMI) DOCUMENTED: ICD-10-PCS | Mod: CPTII,S$GLB,, | Performed by: NURSE PRACTITIONER

## 2021-11-09 ENCOUNTER — HOSPITAL ENCOUNTER (OUTPATIENT)
Facility: OTHER | Age: 36
Discharge: HOME | End: 2021-11-09
Attending: RADIOLOGY | Admitting: RADIOLOGY
Payer: COMMERCIAL

## 2021-11-09 VITALS
DIASTOLIC BLOOD PRESSURE: 57 MMHG | RESPIRATION RATE: 18 BRPM | SYSTOLIC BLOOD PRESSURE: 119 MMHG | HEART RATE: 69 BPM | OXYGEN SATURATION: 100 % | HEIGHT: 65 IN | TEMPERATURE: 98 F | WEIGHT: 107 LBS | BODY MASS INDEX: 17.83 KG/M2

## 2021-11-09 DIAGNOSIS — C50.412 MALIGNANT NEOPLASM OF UPPER-OUTER QUADRANT OF LEFT BREAST IN FEMALE, ESTROGEN RECEPTOR POSITIVE: ICD-10-CM

## 2021-11-09 DIAGNOSIS — Z17.0 MALIGNANT NEOPLASM OF UPPER-OUTER QUADRANT OF LEFT BREAST IN FEMALE, ESTROGEN RECEPTOR POSITIVE: ICD-10-CM

## 2021-11-09 DIAGNOSIS — C50.919 BREAST CANCER: ICD-10-CM

## 2021-11-09 DIAGNOSIS — E55.9 VITAMIN D DEFICIENCY: Primary | ICD-10-CM

## 2021-11-09 DIAGNOSIS — Z13.820 SCREENING FOR OSTEOPOROSIS: ICD-10-CM

## 2021-11-09 PROCEDURE — 99152 MOD SED SAME PHYS/QHP 5/>YRS: CPT | Performed by: RADIOLOGY

## 2021-11-09 PROCEDURE — 63600175 PHARM REV CODE 636 W HCPCS: Performed by: RADIOLOGY

## 2021-11-09 PROCEDURE — 25000003 PHARM REV CODE 250: Performed by: RADIOLOGY

## 2021-11-09 RX ORDER — FENTANYL CITRATE 50 UG/ML
INJECTION, SOLUTION INTRAMUSCULAR; INTRAVENOUS
Status: DISCONTINUED | OUTPATIENT
Start: 2021-11-09 | End: 2021-11-09 | Stop reason: HOSPADM

## 2021-11-09 RX ORDER — LIDOCAINE HYDROCHLORIDE 10 MG/ML
INJECTION, SOLUTION EPIDURAL; INFILTRATION; INTRACAUDAL; PERINEURAL
Status: DISCONTINUED | OUTPATIENT
Start: 2021-11-09 | End: 2021-11-09 | Stop reason: HOSPADM

## 2021-11-09 RX ORDER — MIDAZOLAM HYDROCHLORIDE 1 MG/ML
INJECTION, SOLUTION INTRAMUSCULAR; INTRAVENOUS
Status: DISCONTINUED | OUTPATIENT
Start: 2021-11-09 | End: 2021-11-09 | Stop reason: HOSPADM

## 2021-11-09 RX ORDER — HYDROCODONE BITARTRATE AND ACETAMINOPHEN 5; 325 MG/1; MG/1
1 TABLET ORAL EVERY 4 HOURS PRN
Status: DISCONTINUED | OUTPATIENT
Start: 2021-11-09 | End: 2021-11-09 | Stop reason: HOSPADM

## 2021-11-16 ENCOUNTER — OFFICE VISIT (OUTPATIENT)
Dept: PSYCHIATRY | Facility: CLINIC | Age: 36
End: 2021-11-16
Payer: COMMERCIAL

## 2021-11-16 DIAGNOSIS — Z17.0 MALIGNANT NEOPLASM OF UPPER-OUTER QUADRANT OF LEFT BREAST IN FEMALE, ESTROGEN RECEPTOR POSITIVE: ICD-10-CM

## 2021-11-16 DIAGNOSIS — F43.23 ADJUSTMENT DISORDER WITH MIXED ANXIETY AND DEPRESSED MOOD: Primary | ICD-10-CM

## 2021-11-16 DIAGNOSIS — F41.8 DEPRESSION WITH ANXIETY: ICD-10-CM

## 2021-11-16 DIAGNOSIS — C50.412 MALIGNANT NEOPLASM OF UPPER-OUTER QUADRANT OF LEFT BREAST IN FEMALE, ESTROGEN RECEPTOR POSITIVE: ICD-10-CM

## 2021-11-16 PROCEDURE — 99999 PR PBB SHADOW E&M-EST. PATIENT-LVL II: CPT | Mod: PBBFAC,,, | Performed by: PSYCHOLOGIST

## 2021-11-16 PROCEDURE — 90791 PR PSYCHIATRIC DIAGNOSTIC EVALUATION: ICD-10-PCS | Mod: S$GLB,,, | Performed by: PSYCHOLOGIST

## 2021-11-16 PROCEDURE — 99999 PR PBB SHADOW E&M-EST. PATIENT-LVL II: ICD-10-PCS | Mod: PBBFAC,,, | Performed by: PSYCHOLOGIST

## 2021-11-16 PROCEDURE — 90791 PSYCH DIAGNOSTIC EVALUATION: CPT | Mod: S$GLB,,, | Performed by: PSYCHOLOGIST

## 2021-11-17 ENCOUNTER — PATIENT MESSAGE (OUTPATIENT)
Dept: HEMATOLOGY/ONCOLOGY | Facility: CLINIC | Age: 36
End: 2021-11-17
Payer: COMMERCIAL

## 2021-11-18 ENCOUNTER — TELEPHONE (OUTPATIENT)
Dept: HEMATOLOGY/ONCOLOGY | Facility: CLINIC | Age: 36
End: 2021-11-18
Payer: COMMERCIAL

## 2021-11-23 PROBLEM — F43.23 ADJUSTMENT DISORDER WITH MIXED ANXIETY AND DEPRESSED MOOD: Status: ACTIVE | Noted: 2020-06-05

## 2021-11-24 ENCOUNTER — OFFICE VISIT (OUTPATIENT)
Dept: HEMATOLOGY/ONCOLOGY | Facility: CLINIC | Age: 36
End: 2021-11-24
Payer: COMMERCIAL

## 2021-11-24 DIAGNOSIS — C50.412 MALIGNANT NEOPLASM OF UPPER-OUTER QUADRANT OF LEFT BREAST IN FEMALE, ESTROGEN RECEPTOR POSITIVE: Primary | ICD-10-CM

## 2021-11-24 DIAGNOSIS — Z79.810 SERM USE (SELECTIVE ESTROGEN RECEPTOR MODULATOR): ICD-10-CM

## 2021-11-24 DIAGNOSIS — M54.40 BILATERAL LOW BACK PAIN WITH SCIATICA, SCIATICA LATERALITY UNSPECIFIED, UNSPECIFIED CHRONICITY: ICD-10-CM

## 2021-11-24 DIAGNOSIS — Z17.0 MALIGNANT NEOPLASM OF UPPER-OUTER QUADRANT OF LEFT BREAST IN FEMALE, ESTROGEN RECEPTOR POSITIVE: Primary | ICD-10-CM

## 2021-11-24 DIAGNOSIS — F41.8 DEPRESSION WITH ANXIETY: ICD-10-CM

## 2021-11-24 PROCEDURE — 99214 PR OFFICE/OUTPT VISIT, EST, LEVL IV, 30-39 MIN: ICD-10-PCS | Mod: 95,,, | Performed by: NURSE PRACTITIONER

## 2021-11-24 PROCEDURE — 99214 OFFICE O/P EST MOD 30 MIN: CPT | Mod: 95,,, | Performed by: NURSE PRACTITIONER

## 2021-11-30 ENCOUNTER — CLINICAL SUPPORT (OUTPATIENT)
Dept: REHABILITATION | Facility: HOSPITAL | Age: 36
End: 2021-11-30
Payer: COMMERCIAL

## 2021-11-30 DIAGNOSIS — M51.36 ANNULAR TEAR OF LUMBAR DISC: ICD-10-CM

## 2021-11-30 DIAGNOSIS — G89.29 CHRONIC BILATERAL LOW BACK PAIN WITHOUT SCIATICA: Primary | ICD-10-CM

## 2021-11-30 DIAGNOSIS — M54.50 CHRONIC BILATERAL LOW BACK PAIN WITHOUT SCIATICA: Primary | ICD-10-CM

## 2021-11-30 DIAGNOSIS — M47.816 SPONDYLOSIS OF LUMBAR REGION WITHOUT MYELOPATHY OR RADICULOPATHY: ICD-10-CM

## 2021-11-30 PROCEDURE — 97110 THERAPEUTIC EXERCISES: CPT | Mod: PO

## 2021-11-30 PROCEDURE — 97162 PT EVAL MOD COMPLEX 30 MIN: CPT | Mod: PO

## 2021-12-02 ENCOUNTER — CLINICAL SUPPORT (OUTPATIENT)
Dept: REHABILITATION | Facility: HOSPITAL | Age: 36
End: 2021-12-02
Payer: COMMERCIAL

## 2021-12-02 DIAGNOSIS — M54.50 CHRONIC BILATERAL LOW BACK PAIN WITHOUT SCIATICA: Primary | ICD-10-CM

## 2021-12-02 DIAGNOSIS — M51.36 ANNULAR TEAR OF LUMBAR DISC: ICD-10-CM

## 2021-12-02 DIAGNOSIS — M47.816 SPONDYLOSIS OF LUMBAR REGION WITHOUT MYELOPATHY OR RADICULOPATHY: ICD-10-CM

## 2021-12-02 DIAGNOSIS — G89.29 CHRONIC BILATERAL LOW BACK PAIN WITHOUT SCIATICA: Primary | ICD-10-CM

## 2021-12-02 PROCEDURE — 97110 THERAPEUTIC EXERCISES: CPT | Mod: PO

## 2021-12-03 ENCOUNTER — OFFICE VISIT (OUTPATIENT)
Dept: HEMATOLOGY/ONCOLOGY | Facility: CLINIC | Age: 36
End: 2021-12-03
Payer: COMMERCIAL

## 2021-12-03 ENCOUNTER — LAB VISIT (OUTPATIENT)
Dept: LAB | Facility: HOSPITAL | Age: 36
End: 2021-12-03
Attending: OBSTETRICS & GYNECOLOGY
Payer: COMMERCIAL

## 2021-12-03 VITALS
HEIGHT: 65 IN | DIASTOLIC BLOOD PRESSURE: 53 MMHG | SYSTOLIC BLOOD PRESSURE: 91 MMHG | WEIGHT: 108 LBS | BODY MASS INDEX: 17.99 KG/M2 | HEART RATE: 65 BPM

## 2021-12-03 DIAGNOSIS — F41.9 ANXIETY: ICD-10-CM

## 2021-12-03 DIAGNOSIS — M54.50 CHRONIC MIDLINE LOW BACK PAIN, UNSPECIFIED WHETHER SCIATICA PRESENT: ICD-10-CM

## 2021-12-03 DIAGNOSIS — G89.29 CHRONIC MIDLINE LOW BACK PAIN, UNSPECIFIED WHETHER SCIATICA PRESENT: ICD-10-CM

## 2021-12-03 DIAGNOSIS — Z17.0 MALIGNANT NEOPLASM OF BREAST IN FEMALE, ESTROGEN RECEPTOR POSITIVE, UNSPECIFIED LATERALITY, UNSPECIFIED SITE OF BREAST: ICD-10-CM

## 2021-12-03 DIAGNOSIS — N95.1 MENOPAUSAL SYMPTOM: ICD-10-CM

## 2021-12-03 DIAGNOSIS — C50.919 MALIGNANT NEOPLASM OF BREAST IN FEMALE, ESTROGEN RECEPTOR POSITIVE, UNSPECIFIED LATERALITY, UNSPECIFIED SITE OF BREAST: ICD-10-CM

## 2021-12-03 DIAGNOSIS — R53.83 FATIGUE, UNSPECIFIED TYPE: ICD-10-CM

## 2021-12-03 DIAGNOSIS — N95.1 MENOPAUSAL SYMPTOM: Primary | ICD-10-CM

## 2021-12-03 LAB — ESTRADIOL SERPL-MCNC: 11 PG/ML

## 2021-12-03 PROCEDURE — 82670 ASSAY OF TOTAL ESTRADIOL: CPT | Performed by: OBSTETRICS & GYNECOLOGY

## 2021-12-03 PROCEDURE — 99214 PR OFFICE/OUTPT VISIT, EST, LEVL IV, 30-39 MIN: ICD-10-PCS | Mod: S$GLB,,, | Performed by: OBSTETRICS & GYNECOLOGY

## 2021-12-03 PROCEDURE — 99999 PR PBB SHADOW E&M-EST. PATIENT-LVL III: CPT | Mod: PBBFAC,,, | Performed by: OBSTETRICS & GYNECOLOGY

## 2021-12-03 PROCEDURE — 99999 PR PBB SHADOW E&M-EST. PATIENT-LVL III: ICD-10-PCS | Mod: PBBFAC,,, | Performed by: OBSTETRICS & GYNECOLOGY

## 2021-12-03 PROCEDURE — 84402 ASSAY OF FREE TESTOSTERONE: CPT | Performed by: OBSTETRICS & GYNECOLOGY

## 2021-12-03 PROCEDURE — 99214 OFFICE O/P EST MOD 30 MIN: CPT | Mod: S$GLB,,, | Performed by: OBSTETRICS & GYNECOLOGY

## 2021-12-03 RX ORDER — TESTOSTERONE CYPIONATE 200 MG/ML
50 INJECTION, SOLUTION INTRAMUSCULAR
Status: SHIPPED | OUTPATIENT
Start: 2021-12-03 | End: 2022-06-01

## 2021-12-06 ENCOUNTER — INFUSION (OUTPATIENT)
Dept: INFUSION THERAPY | Facility: HOSPITAL | Age: 36
End: 2021-12-06
Payer: COMMERCIAL

## 2021-12-06 DIAGNOSIS — E28.39 SUPPRESSION OF OVARIAN SECRETION: Primary | ICD-10-CM

## 2021-12-06 LAB — TESTOST FREE SERPL-MCNC: 0.5 PG/ML

## 2021-12-06 PROCEDURE — 96402 CHEMO HORMON ANTINEOPL SQ/IM: CPT

## 2021-12-06 PROCEDURE — 63600175 PHARM REV CODE 636 W HCPCS: Mod: JG | Performed by: INTERNAL MEDICINE

## 2021-12-06 RX ADMIN — GOSERELIN ACETATE 3.6 MG: 3.6 IMPLANT SUBCUTANEOUS at 12:12

## 2021-12-07 ENCOUNTER — OFFICE VISIT (OUTPATIENT)
Dept: PSYCHIATRY | Facility: CLINIC | Age: 36
End: 2021-12-07
Payer: COMMERCIAL

## 2021-12-07 DIAGNOSIS — Z17.0 MALIGNANT NEOPLASM OF UPPER-OUTER QUADRANT OF LEFT BREAST IN FEMALE, ESTROGEN RECEPTOR POSITIVE: ICD-10-CM

## 2021-12-07 DIAGNOSIS — C50.412 MALIGNANT NEOPLASM OF UPPER-OUTER QUADRANT OF LEFT BREAST IN FEMALE, ESTROGEN RECEPTOR POSITIVE: ICD-10-CM

## 2021-12-07 DIAGNOSIS — F43.23 ADJUSTMENT DISORDER WITH MIXED ANXIETY AND DEPRESSED MOOD: Primary | ICD-10-CM

## 2021-12-07 PROCEDURE — 99999 PR PBB SHADOW E&M-EST. PATIENT-LVL I: ICD-10-PCS | Mod: PBBFAC,,, | Performed by: PSYCHOLOGIST

## 2021-12-07 PROCEDURE — 90837 PSYTX W PT 60 MINUTES: CPT | Mod: S$GLB,,, | Performed by: PSYCHOLOGIST

## 2021-12-07 PROCEDURE — 99999 PR PBB SHADOW E&M-EST. PATIENT-LVL I: CPT | Mod: PBBFAC,,, | Performed by: PSYCHOLOGIST

## 2021-12-07 PROCEDURE — 90837 PR PSYCHOTHERAPY W/PATIENT, 60 MIN: ICD-10-PCS | Mod: S$GLB,,, | Performed by: PSYCHOLOGIST

## 2021-12-10 ENCOUNTER — CLINICAL SUPPORT (OUTPATIENT)
Dept: REHABILITATION | Facility: HOSPITAL | Age: 36
End: 2021-12-10
Payer: COMMERCIAL

## 2021-12-10 DIAGNOSIS — M54.50 CHRONIC BILATERAL LOW BACK PAIN WITHOUT SCIATICA: ICD-10-CM

## 2021-12-10 DIAGNOSIS — M51.36 ANNULAR TEAR OF LUMBAR DISC: Primary | ICD-10-CM

## 2021-12-10 DIAGNOSIS — M47.816 SPONDYLOSIS OF LUMBAR REGION WITHOUT MYELOPATHY OR RADICULOPATHY: ICD-10-CM

## 2021-12-10 DIAGNOSIS — G89.29 CHRONIC BILATERAL LOW BACK PAIN WITHOUT SCIATICA: ICD-10-CM

## 2021-12-10 PROCEDURE — 97112 NEUROMUSCULAR REEDUCATION: CPT | Mod: PO

## 2021-12-10 PROCEDURE — 97110 THERAPEUTIC EXERCISES: CPT | Mod: PO

## 2021-12-13 ENCOUNTER — TELEPHONE (OUTPATIENT)
Dept: OBSTETRICS AND GYNECOLOGY | Facility: CLINIC | Age: 36
End: 2021-12-13
Payer: COMMERCIAL

## 2021-12-16 ENCOUNTER — CLINICAL SUPPORT (OUTPATIENT)
Dept: REHABILITATION | Facility: HOSPITAL | Age: 36
End: 2021-12-16
Payer: COMMERCIAL

## 2021-12-16 DIAGNOSIS — M47.816 SPONDYLOSIS OF LUMBAR REGION WITHOUT MYELOPATHY OR RADICULOPATHY: ICD-10-CM

## 2021-12-16 DIAGNOSIS — G89.29 CHRONIC BILATERAL LOW BACK PAIN WITHOUT SCIATICA: ICD-10-CM

## 2021-12-16 DIAGNOSIS — M54.50 CHRONIC BILATERAL LOW BACK PAIN WITHOUT SCIATICA: ICD-10-CM

## 2021-12-16 DIAGNOSIS — M51.36 ANNULAR TEAR OF LUMBAR DISC: Primary | ICD-10-CM

## 2021-12-16 PROCEDURE — 97110 THERAPEUTIC EXERCISES: CPT | Mod: PO,CQ

## 2021-12-28 ENCOUNTER — CLINICAL SUPPORT (OUTPATIENT)
Dept: REHABILITATION | Facility: HOSPITAL | Age: 36
End: 2021-12-28
Payer: COMMERCIAL

## 2021-12-28 DIAGNOSIS — M54.50 CHRONIC BILATERAL LOW BACK PAIN WITHOUT SCIATICA: ICD-10-CM

## 2021-12-28 DIAGNOSIS — M51.36 ANNULAR TEAR OF LUMBAR DISC: Primary | ICD-10-CM

## 2021-12-28 DIAGNOSIS — G89.29 CHRONIC BILATERAL LOW BACK PAIN WITHOUT SCIATICA: ICD-10-CM

## 2021-12-28 DIAGNOSIS — M47.816 SPONDYLOSIS OF LUMBAR REGION WITHOUT MYELOPATHY OR RADICULOPATHY: ICD-10-CM

## 2021-12-28 PROCEDURE — 97112 NEUROMUSCULAR REEDUCATION: CPT | Mod: PO

## 2021-12-28 PROCEDURE — 97110 THERAPEUTIC EXERCISES: CPT | Mod: PO

## 2022-01-02 ENCOUNTER — PATIENT MESSAGE (OUTPATIENT)
Dept: HEMATOLOGY/ONCOLOGY | Facility: CLINIC | Age: 37
End: 2022-01-02
Payer: COMMERCIAL

## 2022-01-03 DIAGNOSIS — G89.3 CANCER ASSOCIATED PAIN: ICD-10-CM

## 2022-01-03 DIAGNOSIS — C50.412 MALIGNANT NEOPLASM OF UPPER-OUTER QUADRANT OF LEFT BREAST IN FEMALE, ESTROGEN RECEPTOR POSITIVE: ICD-10-CM

## 2022-01-03 DIAGNOSIS — Z17.0 MALIGNANT NEOPLASM OF UPPER-OUTER QUADRANT OF LEFT BREAST IN FEMALE, ESTROGEN RECEPTOR POSITIVE: ICD-10-CM

## 2022-01-03 RX ORDER — HYDROCODONE BITARTRATE AND ACETAMINOPHEN 5; 325 MG/1; MG/1
1 TABLET ORAL EVERY 8 HOURS PRN
Qty: 30 TABLET | Refills: 0 | Status: SHIPPED | OUTPATIENT
Start: 2022-01-03 | End: 2023-06-30

## 2022-01-03 RX ORDER — TAMOXIFEN CITRATE 20 MG/1
20 TABLET ORAL DAILY
Qty: 90 TABLET | Refills: 3 | Status: SHIPPED | OUTPATIENT
Start: 2022-01-03 | End: 2022-01-03

## 2022-01-04 ENCOUNTER — PATIENT MESSAGE (OUTPATIENT)
Dept: PSYCHIATRY | Facility: CLINIC | Age: 37
End: 2022-01-04
Payer: COMMERCIAL

## 2022-01-04 ENCOUNTER — CLINICAL SUPPORT (OUTPATIENT)
Dept: REHABILITATION | Facility: HOSPITAL | Age: 37
End: 2022-01-04
Payer: COMMERCIAL

## 2022-01-04 ENCOUNTER — OFFICE VISIT (OUTPATIENT)
Dept: PSYCHIATRY | Facility: CLINIC | Age: 37
End: 2022-01-04
Payer: COMMERCIAL

## 2022-01-04 DIAGNOSIS — F43.23 ADJUSTMENT DISORDER WITH MIXED ANXIETY AND DEPRESSED MOOD: Primary | ICD-10-CM

## 2022-01-04 DIAGNOSIS — G89.29 CHRONIC BILATERAL LOW BACK PAIN WITHOUT SCIATICA: Primary | ICD-10-CM

## 2022-01-04 DIAGNOSIS — C50.412 MALIGNANT NEOPLASM OF UPPER-OUTER QUADRANT OF LEFT BREAST IN FEMALE, ESTROGEN RECEPTOR POSITIVE: ICD-10-CM

## 2022-01-04 DIAGNOSIS — Z17.0 MALIGNANT NEOPLASM OF UPPER-OUTER QUADRANT OF LEFT BREAST IN FEMALE, ESTROGEN RECEPTOR POSITIVE: ICD-10-CM

## 2022-01-04 DIAGNOSIS — M54.50 CHRONIC BILATERAL LOW BACK PAIN WITHOUT SCIATICA: Primary | ICD-10-CM

## 2022-01-04 PROCEDURE — 99999 PR PBB SHADOW E&M-EST. PATIENT-LVL I: CPT | Mod: PBBFAC,,, | Performed by: PSYCHOLOGIST

## 2022-01-04 PROCEDURE — 90834 PSYTX W PT 45 MINUTES: CPT | Mod: S$GLB,,, | Performed by: PSYCHOLOGIST

## 2022-01-04 PROCEDURE — 1159F PR MEDICATION LIST DOCUMENTED IN MEDICAL RECORD: ICD-10-PCS | Mod: CPTII,S$GLB,, | Performed by: PSYCHOLOGIST

## 2022-01-04 PROCEDURE — 97112 NEUROMUSCULAR REEDUCATION: CPT | Mod: PO

## 2022-01-04 PROCEDURE — 99999 PR PBB SHADOW E&M-EST. PATIENT-LVL I: ICD-10-PCS | Mod: PBBFAC,,, | Performed by: PSYCHOLOGIST

## 2022-01-04 PROCEDURE — 1159F MED LIST DOCD IN RCRD: CPT | Mod: CPTII,S$GLB,, | Performed by: PSYCHOLOGIST

## 2022-01-04 PROCEDURE — 90834 PR PSYCHOTHERAPY W/PATIENT, 45 MIN: ICD-10-PCS | Mod: S$GLB,,, | Performed by: PSYCHOLOGIST

## 2022-01-04 PROCEDURE — 97110 THERAPEUTIC EXERCISES: CPT | Mod: PO

## 2022-01-04 NOTE — PROGRESS NOTES
INFORMED CONSENT: Michelle Gage   is known to this provider and identity was confirmed via NAME and .  The patient has been informed of the risks and benefits associated with engaging in psychotherapy, the handling of protected health information, the rights of privacy and the limits of confidentiality. The patient has also been informed of the importance of reporting any suicidal or homicidal ideation to this or any provider to ensure safety of all parties, and the Michelle Gage expressed understanding. The patient was agreeable to these terms and freely participates in individual psychotherapy.    PSYCHO-ONCOLOGY NOTE/ Individual Psychotherapy     Date: 2022   Site:  [facility]        Therapeutic Intervention: Met with patient.  Outpatient - Behavior modifying psychotherapy 45 min - CPT code 58515      Patient was last seen by me on 2021    Problem list  Patient Active Problem List   Diagnosis    Upper abdominal pain    Malignant neoplasm of upper-outer quadrant of left breast in female, estrogen receptor positive    Chemotherapy-induced nausea    Chemotherapy induced neutropenia    Encounter for chemotherapy management    Suppression of ovarian secretion    Adjustment disorder with mixed anxiety and depressed mood    Anxiety associated with cancer diagnosis    Malignant neoplasm of left breast    Insomnia    Chemotherapy-induced thrombocytopenia    Vitamin D deficiency    SERM use (selective estrogen receptor modulator)    Elevated liver enzymes       Chief complaint/reason for encounter: depression and anxiety   Met with patient to evaluate psychosocial adaptation to diagnosis/treatment/survivorship of BC    Current Medications  Current Outpatient Medications   Medication    ALPRAZolam (XANAX) 0.5 MG tablet    ATRALIN 0.05 % gel    gabapentin (NEURONTIN) 100 MG capsule    HYDROcodone-acetaminophen (NORCO) 5-325 mg per tablet    KADCYLA 100 mg     LIDOcaine-prilocaine (EMLA) cream    methylphenidate HCl (RITALIN) 10 MG tablet    naproxen (NAPROSYN) 500 MG tablet    ondansetron (ZOFRAN) 8 MG tablet    spironolactone (ALDACTONE) 50 MG tablet    sulfacetamide sodium-sulfur 8-4 % Susp    tamoxifen (NOLVADEX) 20 MG Tab    valACYclovir (VALTREX) 500 MG tablet    venlafaxine (EFFEXOR XR) 75 MG 24 hr capsule     Current Facility-Administered Medications   Medication Frequency    copper intrauterine device 380 square mm  mm     testosterone cypionate injection 50 mg 1 time in Clinic/HOD     Facility-Administered Medications Ordered in Other Visits   Medication Frequency    ceFAZolin 1 g, gentamicin 80 mg in sodium chloride 0.9% 500 mL irrigation On Call Procedure    ceFAZolin 1 g, gentamicin 80 mg in sodium chloride 0.9% 500 mL irrigation On Call Procedure    ceFAZolin 1 g, gentamicin 80 mg in sodium chloride 0.9% 500 mL irrigation On Call Procedure    ceFAZolin 1 g, gentamicin 80 mg in sodium chloride 0.9% 500 mL irrigation On Call Procedure       Objective:  Michelle Gage arrived late for the session.  Ms. Gage was independently ambulatory at the time of session. The patient was fully cooperative throughout the session.  Appearance: age appropriate, appropriately  dressed, adequately  groomed  Behavior/Cooperation: friendly and cooperative  Speech: normal in rate, volume, and tone and appropriate quality, quantity and organization of sentences  Mood: anxious  Affect: mood congruent  Thought Process: goal-directed, logical  Thought Content: normal,  No delusions or paranoia; did not appear to be responding to internal stimuli during the session  Orientation: grossly intact  Memory: Grossly intact  Attention Span/Concentration: Attends to session without distraction; reports no difficulty  Fund of Knowledge: average  Estimate of Intelligence: average from verbal skills and history  Cognition: grossly intact  Insight: patient has  awareness of illness; good insight into own behavior and behavior of others  Judgment: the patient's behavior is adequate to circumstances    Interval history and content of current session: Processed patient's return to work and adjustment to new routine.  Discussed diagnosis, treatment, prognosis, current adaptation to disease and treatment status and family's adaptation to disease and treatment status. Reports to be coping with great difficulty. Evaluated cognitive response, paying particular attention to negative intrusive thoughts of a persistent and detrimental nature. Thoughts of this type are in evidence with mild distress. Provided cognitive behavioral therapy to address negative cognitions. Identified and evaluated psychosocial and environmental stressors secondary to diagnosis and treatment.  Examined proactive behaviors that may be implemented to minimize or ameliorate psychosocial stressors secondary to diagnosis and treatment.     Risk parameters:   Patient reports no suicidal ideation  Patient reports no homicidal ideation  Patient reports no self-injurious behavior  Patient reports no violent behavior   Safety needs:  None at this time      Verbal deficits: None     Patient's response to intervention:The patient's response to intervention is accepting.     Progress toward goals and other mental status changes:  The patient's progress toward goals is good.      Progress to date:Progress as Expected      Goals from last visit: Met     Patient reported outcomes:    Distress Thermometer:   Distress Score    Distress Score: 2        Practical Problems Physical Problems   : No Appearance: No   Housing: No Bathing / Dressing: No   Insurance / Financial: No Breathing: No    Transportation: No  Changes in Urination: No    Work / School: Yes  Constipation: No   Treatment Decisions: Yes  Diarrhea: No     Eating: No    Family Problems Fatigue: Yes    Dealing with Children: No Feeling Swollen: No     Dealing with Partner: No Fevers: No    Ability to Have Children: No  Getting Around: No    Family Health Issues: Yes  Indigestion: No     Memory / Concentration: Yes   Emotional Problems Mouth Sores: No    Depression: Yes  Nausea: No    Fears: No  Nose Dry / Congested: No    Nervousness: Yes  Pain: No    Sadness: Yes Sexual: No    Worry: Yes Skin Dry / Itchy: No    Loss of Interest in Usual Activities: Yes Sleep: Yes     Substance Abuse: No    Spiritual/Religions Concerns Tingling in Hands / Feet: No   Spritual / Voodoo Concerns: No         Other Problems             PHQ-9= 11   JENIFER-7=7     Client Strengths: verbal, intelligent, successful, good social support, good insight, commitment to wellness, strong kiah, strong cultural traditions     Diagnosis:     ICD-10-CM ICD-9-CM   1. Adjustment disorder with mixed anxiety and depressed mood  F43.23 309.28   2. Malignant neoplasm of upper-outer quadrant of left breast in female, estrogen receptor positive  C50.412 174.4    Z17.0 V86.0       Treatment Plan:individual psychotherapy and medication management by physician  · Target symptoms: adjustment  · Why chosen therapy is appropriate versus another modality: relevant to diagnosis, patient responds to this modality, evidence based practice  · Outcome monitoring methods: self-report, observation, checklist/rating scale  · Therapeutic intervention type: behavior modifying psychotherapy  · Prognosis: Good      Behavioral goals:    Exercise:   Stress management: continue with thought balancing and BA.   Social engagement:   Nutrition:   Smoking Cessation:   Therapy:    Return to clinic: 3 weeks     Length of Service (minutes direct face-to-face contact): 45    Breanne Powell, PhD  Clinical Psychologist  [place] License #[id number]  [place] License #[id number]

## 2022-01-04 NOTE — PROGRESS NOTES
"  Physical Therapy Daily Treatment Note     Name: Michelle Gage  Clinic Number: [id number]    Therapy Diagnosis:   Encounter Diagnosis   Name Primary?    Chronic bilateral low back pain without sciatica Yes     Physician: Telma Wheatley NP    Visit Date: 2022    Physician Orders: PT Eval and Treat  Medical Diagnosis from Referral:   M54.50,G89.29 (ICD-10-CM) - Chronic bilateral low back pain without sciatica   M47.816 (ICD-10-CM) - Spondylosis of lumbar region without myelopathy or radiculopathy   M51.36 (ICD-10-CM) - Annular tear of lumbar disc     Evaluation Date: 2021  Authorization Period Expiration: 2022  Plan of Care Expiration: 3/1/2022  Progress Note Due: 2021  Visit # / Visits authorized:    FOTO: 1/ 3     Time In: 7:45 pm  Time Out: 8:30 pm  Total Billable Time: 45 minutes  Precautions: Standard and Cancer (Breast); Neutropenia   Subjective     Pt reports: Pt states she is feeling better. Pt states that she's tries to do her HEP at least once a day. Pt states that the pillow she placed at her office chair has helped out a lot.  She was compliant with home exercise program.  Response to previous treatment: no adverse effects   Functional change: Ongoing    Pain: 2/10  Location: SI joint    Objective   Upon arrival, Michelle was noted to be in a sacral sitting position in lobby chair.    Michelle received therapeutic exercises to develop strength, endurance, ROM, flexibility, posture and core stabilization for 30 minutes includin minutes treadmill 1.5mph  Repeated Extensions, 15x, standing, (extremity pain abolished)  Shuttle Squats, bilateral, blue band above knees, 2x10, 2 ropes (50#)  Paloff Presses, green band (doubled bands) x20 each side  Quadruped TrA Activation + Bird-Dogs, legs only, 3x5 each side (Home exercise program)  Bridges 2x10  Dead bugs LE only 2x8    Qudruped TA with alt arm lifts x 15   Glute Sets, 20x5" hold  SKFO OTB x 15   TA plus LTR " "with red weighted ball OH x 15   PPT x 15   Paloff press OTB x 15   MET Shotgun, 7x5" hold  Clamshells, 10x5" hold, no resistance (not pursued 2/2 increased discomfort)    Michelle participated in neuromuscular re-education activities to improve: Balance, Coordination, Kinesthetic, Sense, Proprioception and Posture for 15 minutes. The following activities were included:  Rhythmic Stabilization, supine    Hip-Hinging with Dowel Savage, Sit<>Stand --> progressed to no savage (3x10)  Squats with emphasis on hip hinge  Hip-Hinging with Dowel Savage, seated, 10x  Hip-Hinging with Dowel Savage, standing, 10x-->progressed to no savage (10x)  Rhythmic Stabilization, supine, UE/LE core activation, unilateral 10x each side --> f/b alternating 10x      Home Exercises Provided and Patient Education Provided     Education provided:   - HEP, TA activation, posture education while seated   -Lumbar Roll (Chacho roll) for improving sitting posture during driving and working    Written Home Exercises Provided: yes.  Exercises were reviewed and Michelle was able to demonstrate them prior to the end of the session.  Michelle demonstrated good  understanding of the education provided.     See EMR under Patient Instructions for exercises provided 12/2/2021.    Assessment   Pt's subjective report of finding pain relief with pillow placement at her workspace and in the car indicates good response to positioning changes in sitting. She demonstrates good lumbopelvic motion during hip hinge-based exercises with consistent thoracic extension and erector activation. LE fatigue persists during sit to stands and bridges, indicating good training effect at the hamstrings and glute max. She continues to fatigue and remain limited in core endurance and strength at this time, albeit good quality motion noted with repetitions she was able to perform.     Michelle is progressing well towards her goals.   Pt prognosis is Good.     Pt will continue to benefit from " skilled outpatient physical therapy to address the deficits listed in the problem list box on initial evaluation, provide pt/family education and to maximize pt's level of independence in the home and community environment.     Pt's spiritual, cultural and educational needs considered and pt agreeable to plan of care and goals.     Anticipated barriers to physical therapy: None    Goals:   SHORT TERM GOALS:  4 weeks 11/30/2021   1. Recent signs and systems trend is improving in order to progress towards LTG's.     2. Patient will be independent with HEP in order to further progress and return to maximal function.     3. Pain rating at Worst: 5/10 in order to progress towards increased independence with activity.     4. Patient will be able to correct postural deviations in sitting and standing, to decrease pain and promote postural awareness for injury prevention.         LONG TERM GOALS: 10 weeks 11/30/2021   1. Patient will return to normal ADL, recreational, and work related activities with less pain and limitation.      2. Patient will improve AROM to stated goals in order to return to maximal functional potential.      3. Patient will improve Strength to stated goals of appropriate musculature in order to improve functional independence.      4. Pain Rating at Best: 1/10 to improve Quality of Life.      5. Patient will meet predicted functional outcome (FOTO) score: 20% to increase self-worth & perceived functional ability.     6. Patient will have met/partially met personal goal of: Being able to work a full day without activity modification for pain.        Plan     Continue plan of care for lumbopelvic stabilization, TrA, multifidi and abdominal training and re-training motor coordination impairments as tolerated.    Jonathan Abrams, PT, DPT

## 2022-01-06 ENCOUNTER — INFUSION (OUTPATIENT)
Dept: INFUSION THERAPY | Facility: HOSPITAL | Age: 37
End: 2022-01-06
Payer: COMMERCIAL

## 2022-01-06 DIAGNOSIS — E28.39 SUPPRESSION OF OVARIAN SECRETION: Primary | ICD-10-CM

## 2022-01-06 PROCEDURE — 63600175 PHARM REV CODE 636 W HCPCS: Mod: JG | Performed by: INTERNAL MEDICINE

## 2022-01-06 PROCEDURE — 96402 CHEMO HORMON ANTINEOPL SQ/IM: CPT

## 2022-01-06 RX ADMIN — GOSERELIN ACETATE 3.6 MG: 3.6 IMPLANT SUBCUTANEOUS at 11:01

## 2022-01-06 NOTE — NURSING
Pt here for Zoladex injection.  Assessment complete and labs reviewed.  Administered injection in abdomen.  No questions or concerns.  Pt ambulated out of unit unassisted.

## 2022-01-11 ENCOUNTER — CLINICAL SUPPORT (OUTPATIENT)
Dept: REHABILITATION | Facility: HOSPITAL | Age: 37
End: 2022-01-11
Payer: COMMERCIAL

## 2022-01-11 DIAGNOSIS — M54.50 CHRONIC BILATERAL LOW BACK PAIN WITHOUT SCIATICA: Primary | ICD-10-CM

## 2022-01-11 DIAGNOSIS — G89.29 CHRONIC BILATERAL LOW BACK PAIN WITHOUT SCIATICA: Primary | ICD-10-CM

## 2022-01-11 PROCEDURE — 97112 NEUROMUSCULAR REEDUCATION: CPT | Mod: PO

## 2022-01-11 PROCEDURE — 97110 THERAPEUTIC EXERCISES: CPT | Mod: PO

## 2022-01-11 NOTE — PROGRESS NOTES
"  Physical Therapy Daily Treatment Note/Progress Note     Name: Michelle Gage  Clinic Number: [id number]    Therapy Diagnosis:   Encounter Diagnosis   Name Primary?    Chronic bilateral low back pain without sciatica Yes     Physician: Telma Wheatley NP    Visit Date: 2022    Physician Orders: PT Eval and Treat  Medical Diagnosis from Referral:   M54.50,G89.29 (ICD-10-CM) - Chronic bilateral low back pain without sciatica   M47.816 (ICD-10-CM) - Spondylosis of lumbar region without myelopathy or radiculopathy   M51.36 (ICD-10-CM) - Annular tear of lumbar disc     Evaluation Date: 2021  Authorization Period Expiration: 2022  Plan of Care Expiration: 3/1/2022  Progress Note Due: 2022  Visit # / Visits authorized:    FOTO: 1/ 3     Time In: 7:45 pm  Time Out: 8:30 pm  Total Billable Time: 45 minutes  Precautions: Standard and Cancer (Breast); Neutropenia   Subjective     Pt reports: Pt states no major changes over the weekend. Pt is doing better overall generally. Pt has difficulty finding a comfortable resting position in sitting during work.  She was compliant with home exercise program.  Response to previous treatment: no adverse effects   Functional change: Ongoing    Pain: 2/10  Location: SI joint    Objective   Upon arrival, Michelle was noted to be in a sacral sitting position in lobby chair.    Michelle received therapeutic exercises to develop strength, endurance, ROM, flexibility, posture and core stabilization for 30 minutes includin minutes treadmill 1.5mph  Repeated Extensions in prone, 15x, standing, (extremity pain abolished)  Shuttle Squats, bilateral, blue band above knees, 2x10, 2 ropes (50#)  Paloff Presses, green band (doubled bands) x20 each side  Quadruped TrA Activation + Bird-Dogs 3x5 each side (Home exercise program)  Dead bugs LE only x10  Dead bugs both extremities x10  Bridges 3x10 25# with blue foam for comfort      MET Shotgun, 7x5" " "hold    Qudruped TA with alt arm lifts x 15   Glute Sets, 20x5" hold  SKFO OTB x 15   TA plus LTR with red weighted ball OH x 15   PPT x 15   Paloff press OTB x 15     Clamshells, 10x5" hold, no resistance (not pursued 2/2 increased discomfort)    Michelle participated in neuromuscular re-education activities to improve: Balance, Coordination, Kinesthetic, Sense, Proprioception and Posture for 15 minutes. The following activities were included:  Rhythmic Stabilization, supine    Hip-Hinging with Dowel Savage, Sit<>Stand --> progressed to no savage (3x10)  Squats with emphasis on hip hinge  Hip-Hinging with Dowel Savage, seated, 10x  Hip-Hinging with Dowel Savage, standing, 10x-->progressed to no savage (10x)  Rhythmic Stabilization, supine, UE/LE core activation, unilateral 10x each side --> f/b alternating 10x      Home Exercises Provided and Patient Education Provided     Education provided:   - HEP, TA activation, posture education while seated   -Lumbar Roll (Chacho roll) for improving sitting posture during driving and working    Written Home Exercises Provided: yes.  Exercises were reviewed and Michelle was able to demonstrate them prior to the end of the session.  Michelle demonstrated good  understanding of the education provided.     See EMR under Patient Instructions for exercises provided 12/2/2021.    Assessment   Progression to exercises tolerated well by pt. Core exercises with combined with upper extremity movement provided pt with good muscle fatigue. Training effect noted with glute bridge progression. Overall, pt has seen gains in both strength and symptom presentation. Continue to progress with core stabilization and hip strengthening exercise.    Michelle is progressing well towards her goals.   Pt prognosis is Good.     Pt will continue to benefit from skilled outpatient physical therapy to address the deficits listed in the problem list box on initial evaluation, provide pt/family education and to maximize pt's " level of independence in the home and community environment.     Pt's spiritual, cultural and educational needs considered and pt agreeable to plan of care and goals.     Anticipated barriers to physical therapy: None    Goals:   SHORT TERM GOALS:  4 weeks 11/30/2021   1. Recent signs and systems trend is improving in order to progress towards LTG's.     2. Patient will be independent with HEP in order to further progress and return to maximal function.     3. Pain rating at Worst: 5/10 in order to progress towards increased independence with activity.     4. Patient will be able to correct postural deviations in sitting and standing, to decrease pain and promote postural awareness for injury prevention.         LONG TERM GOALS: 10 weeks 11/30/2021   1. Patient will return to normal ADL, recreational, and work related activities with less pain and limitation.      2. Patient will improve AROM to stated goals in order to return to maximal functional potential.      3. Patient will improve Strength to stated goals of appropriate musculature in order to improve functional independence.      4. Pain Rating at Best: 1/10 to improve Quality of Life.      5. Patient will meet predicted functional outcome (FOTO) score: 20% to increase self-worth & perceived functional ability.     6. Patient will have met/partially met personal goal of: Being able to work a full day without activity modification for pain.        Plan     Continue plan of care for lumbopelvic stabilization, TrA, multifidi and abdominal training and re-training motor coordination impairments as tolerated.    Jonathan Abrams, PT, DPT

## 2022-01-12 ENCOUNTER — OFFICE VISIT (OUTPATIENT)
Dept: SPINE | Facility: CLINIC | Age: 37
End: 2022-01-12
Payer: COMMERCIAL

## 2022-01-12 VITALS
BODY MASS INDEX: 17.99 KG/M2 | WEIGHT: 108 LBS | HEIGHT: 65 IN | HEART RATE: 70 BPM | SYSTOLIC BLOOD PRESSURE: 96 MMHG | DIASTOLIC BLOOD PRESSURE: 65 MMHG

## 2022-01-12 DIAGNOSIS — M54.50 CHRONIC BILATERAL LOW BACK PAIN WITHOUT SCIATICA: Primary | ICD-10-CM

## 2022-01-12 DIAGNOSIS — G89.29 CHRONIC BILATERAL LOW BACK PAIN WITHOUT SCIATICA: Primary | ICD-10-CM

## 2022-01-12 DIAGNOSIS — M51.36 ANNULAR TEAR OF LUMBAR DISC: ICD-10-CM

## 2022-01-12 DIAGNOSIS — M47.816 SPONDYLOSIS OF LUMBAR REGION WITHOUT MYELOPATHY OR RADICULOPATHY: ICD-10-CM

## 2022-01-12 PROCEDURE — 99999 PR PBB SHADOW E&M-EST. PATIENT-LVL IV: CPT | Mod: PBBFAC,,, | Performed by: NURSE PRACTITIONER

## 2022-01-12 PROCEDURE — 3078F PR MOST RECENT DIASTOLIC BLOOD PRESSURE < 80 MM HG: ICD-10-PCS | Mod: CPTII,S$GLB,, | Performed by: NURSE PRACTITIONER

## 2022-01-12 PROCEDURE — 3074F SYST BP LT 130 MM HG: CPT | Mod: CPTII,S$GLB,, | Performed by: NURSE PRACTITIONER

## 2022-01-12 PROCEDURE — 1159F MED LIST DOCD IN RCRD: CPT | Mod: CPTII,S$GLB,, | Performed by: NURSE PRACTITIONER

## 2022-01-12 PROCEDURE — 3074F PR MOST RECENT SYSTOLIC BLOOD PRESSURE < 130 MM HG: ICD-10-PCS | Mod: CPTII,S$GLB,, | Performed by: NURSE PRACTITIONER

## 2022-01-12 PROCEDURE — 99214 OFFICE O/P EST MOD 30 MIN: CPT | Mod: S$GLB,,, | Performed by: NURSE PRACTITIONER

## 2022-01-12 PROCEDURE — 99214 PR OFFICE/OUTPT VISIT, EST, LEVL IV, 30-39 MIN: ICD-10-PCS | Mod: S$GLB,,, | Performed by: NURSE PRACTITIONER

## 2022-01-12 PROCEDURE — 1159F PR MEDICATION LIST DOCUMENTED IN MEDICAL RECORD: ICD-10-PCS | Mod: CPTII,S$GLB,, | Performed by: NURSE PRACTITIONER

## 2022-01-12 PROCEDURE — 1160F RVW MEDS BY RX/DR IN RCRD: CPT | Mod: CPTII,S$GLB,, | Performed by: NURSE PRACTITIONER

## 2022-01-12 PROCEDURE — 3008F PR BODY MASS INDEX (BMI) DOCUMENTED: ICD-10-PCS | Mod: CPTII,S$GLB,, | Performed by: NURSE PRACTITIONER

## 2022-01-12 PROCEDURE — 99999 PR PBB SHADOW E&M-EST. PATIENT-LVL IV: ICD-10-PCS | Mod: PBBFAC,,, | Performed by: NURSE PRACTITIONER

## 2022-01-12 PROCEDURE — 1160F PR REVIEW ALL MEDS BY PRESCRIBER/CLIN PHARMACIST DOCUMENTED: ICD-10-PCS | Mod: CPTII,S$GLB,, | Performed by: NURSE PRACTITIONER

## 2022-01-12 PROCEDURE — 3008F BODY MASS INDEX DOCD: CPT | Mod: CPTII,S$GLB,, | Performed by: NURSE PRACTITIONER

## 2022-01-12 PROCEDURE — 3078F DIAST BP <80 MM HG: CPT | Mod: CPTII,S$GLB,, | Performed by: NURSE PRACTITIONER

## 2022-01-12 NOTE — PROGRESS NOTES
Subjective:      Patient ID: Michelle Gage is a 36 y.o. female.    Chief Complaint: Follow-up    Interval History 1/12/2022:   Ms. Gage presents today for followup. She states her back pain is much better since last OV. She has been doing PT as well as the HEP consistently with good improvement. She reports periodic flare-ups which have been manageable. Overall, she is feeling much better and is pleased. Pain today 2/10.     HPI: Ms. Gage is a 35 year old female with a Hx of Breast CA in 2020 here for evaluation of low back pain. She had a double mastectomy and reconstruction and chemotherapy treatment. She is currently in remission. She was referred by Curtis Benedict NP for consultation. She states the back pain started last year around May 2020 without inciting event. She feels like since her reconstruction surgery, her chest is heavier and she does not always use good posture. She states the pain is in the low back/tailbone and does not radiate. She denies leg pain or numbness and tingling. She describes the pain as constant and sharp and deep. It worsens with bending and improves with yoga and stretching. She has not done PT for the back. The pain can become debilitating to the point that she cannot move without assistance. At times, the pain will be severe and affects her functional activities. She will take hydrocodone at night with some relief and advil during the day which gives mild benefit. She denies loss of bladder/bowel function or saddle anesthesia. Pain now 4/10 while sitting.     Lumbar MRI 2021    FINDINGS:  Lumbar spine vertebral body heights and alignment appear maintained.  No infiltrative marrow process.  Spinal cord terminus lies at approximately L1-L2.  Visualized prevertebral and paraspinal soft tissues demonstrate no acute abnormality.  No abnormal paraspinal enhancing lesion or abnormal nerve root enhancement.     T12-L1: No significant posterior disc herniation, central  canal stenosis, or neural foraminal impingement.     L1-L2: No significant posterior disc herniation, central canal stenosis, or neural foraminal impingement.     L2-L3: No significant posterior disc herniation, central canal stenosis, or neural foraminal impingement.     L3-L4: Mild circumferential bulge without significant central canal stenosis or neural foraminal impingement.     L4-L5: Mild circumferential bulge with posterior annular fissure.  No significant central canal stenosis or neural foraminal impingement.     L5-S1: Circumferential bulge with posterior annular fissure and slight indentation of the ventral thecal sac.  Mild right without significant left neural foraminal narrowing.     Impression:     Mild lower lumbar spondylosis without significant central canal stenosis.     Posterior annular fissure at L4-L5 and L5-S1.     No evidence for infiltrative marrow process.    Past Medical History:   Diagnosis Date    Anxiety     Depression     Genetic testing 05/2020    BRCA+myRisk NEGATIVE with APC VUS c.3875C>T (p.[id number]), aka [id number] (3875C>T)    Hx of psychiatric care     Xanax    Malignant neoplasm of upper-outer quadrant of left breast in female, estrogen receptor positive 5/15/2020       Past Surgical History:   Procedure Laterality Date    BILATERAL MASTECTOMY Bilateral 11/30/2020    Procedure: MASTECTOMY, BILATERAL;  Surgeon: Jessica Dumont MD;  Location: [facility] OR;  Service: General;  Laterality: Bilateral;    BREAST RECONSTRUCTION Bilateral 11/30/2020    Procedure: RECONSTRUCTION, BREAST;  Surgeon: Lamin Seo MD;  Location: [facility] OR;  Service: General;  Laterality: Bilateral;    BREAST SURGERY      Breast biopsy    INSERTION OF BREAST IMPLANT Bilateral 11/30/2020    Procedure: INSERTION, BREAST TISSUE EXPANDER - BILATERAL BREAST RECONSTRUCTION WITH TISSUE EXPANDERS AND ACELLULAR DERMAL MATRIX;  Surgeon: Lamin Seo MD;  Location: [facility] OR;  Service: General;  Laterality:  Bilateral;    INSERTION OF TUNNELED CENTRAL VENOUS CATHETER (CVC) WITH SUBCUTANEOUS PORT N/A 5/28/2020    Procedure: [id number];  Surgeon: Ralph Diagnostic Provider;  Location: [facility] OR [address] [place] [address];  Service: Radiology;  Laterality: N/A;  189 port placement    MEDIPORT REMOVAL Right 11/9/2021    Procedure: REMOVAL, CATHETER, CENTRAL VENOUS, TUNNELED, WITH PORT;  Surgeon: Сергей Rowan MD;  Location: [facility] CATH LAB;  Service: Radiology;  Laterality: Right;    SENTINEL LYMPH NODE BIOPSY Left 11/30/2020    Procedure: BIOPSY, LYMPH NODE, SENTINEL;  Surgeon: Jessica Dumont MD;  Location: [facility] OR;  Service: General;  Laterality: Left;    WISDOM TOOTH EXTRACTION         Family History   Problem Relation Age of Onset    Osteoporosis Mother     No Known Problems Father     Alzheimer's disease Maternal Grandmother     Bipolar disorder Maternal Grandmother     Alcohol abuse Maternal Grandmother     Diabetes Maternal Grandfather     Breast cancer Paternal Grandmother         bilat noncurrent, @ 50 & 62    Diabetes Paternal Grandfather     Breast cancer Other     Breast cancer Other     Colon cancer Neg Hx     Ovarian cancer Neg Hx        Social History     Socioeconomic History    Marital status:      Spouse name: Elio   Occupational History    Occupation:    Tobacco Use    Smoking status: Former Smoker    Smokeless tobacco: Never Used    Tobacco comment: occasional past while drinking   Substance and Sexual Activity    Alcohol use: Yes     Comment: socially    Drug use: Never    Sexual activity: Yes     Partners: Male     Birth control/protection: None, Other-see comments     Comment: spouse   Social History Narrative    , no children, construction , originally from [place]       Current Outpatient Medications   Medication Sig Dispense Refill    ALPRAZolam (XANAX) 0.5 MG tablet TAKE 1 TABLET(0.5 MG) BY MOUTH THREE TIMES DAILY AS NEEDED FOR INSOMNIA OR  ANXIETY 30 tablet 4    ATRALIN 0.05 % gel 2 (two) times daily as needed.       HYDROcodone-acetaminophen (NORCO) 5-325 mg per tablet Take 1 tablet by mouth every 8 (eight) hours as needed for Pain. 30 tablet 0    KADCYLA 100 mg       LIDOcaine-prilocaine (EMLA) cream Apply topically as needed. 30 g 0    naproxen (NAPROSYN) 500 MG tablet Hold until after surgery      ondansetron (ZOFRAN) 8 MG tablet Take 1 tablet (8 mg total) by mouth every 8 (eight) hours as needed for Nausea. 30 tablet 2    spironolactone (ALDACTONE) 50 MG tablet TAKE 1 TABLET(50 MG) BY MOUTH EVERY EVENING 30 tablet 11    sulfacetamide sodium-sulfur 8-4 % Susp       tamoxifen (NOLVADEX) 20 MG Tab TAKE 1 TABLET(20 MG) BY MOUTH EVERY DAY 90 tablet 3    valACYclovir (VALTREX) 500 MG tablet Take 1 pill twice daily x 3 days as needed for fever blister. 18 tablet 0    venlafaxine (EFFEXOR XR) 75 MG 24 hr capsule Take 1 capsule (75 mg total) by mouth once daily. 30 capsule 2    gabapentin (NEURONTIN) 100 MG capsule Take 1 capsule (100 mg total) by mouth 3 (three) times daily. For pain 270 capsule 0    methylphenidate HCl (RITALIN) 10 MG tablet Take 1 tablet (10 mg total) by mouth 2 (two) times daily with meals. 60 tablet 0     Current Facility-Administered Medications   Medication Dose Route Frequency Provider Last Rate Last Admin    copper intrauterine device 380 square mm  mm  380 mm Intrauterine  Lyndsay Warren MD   380 mm at 06/25/20 1030    testosterone cypionate injection 50 mg  50 mg Intramuscular 1 time in Clinic/HOD Lyndsay Warren MD         Facility-Administered Medications Ordered in Other Visits   Medication Dose Route Frequency Provider Last Rate Last Admin    ceFAZolin 1 g, gentamicin 80 mg in sodium chloride 0.9% 500 mL irrigation   Irrigation On Call Procedure Lamin Seo MD        ceFAZolin 1 g, gentamicin 80 mg in sodium chloride 0.9% 500 mL irrigation   Irrigation On Call Procedure Lamin QUINN  MD Gabbi        ceFAZolin 1 g, gentamicin 80 mg in sodium chloride 0.9% 500 mL irrigation   Irrigation On Call Procedure Lamin Seo MD        ceFAZolin 1 g, gentamicin 80 mg in sodium chloride 0.9% 500 mL irrigation   Irrigation On Call Procedure Lamin Seo MD           Review of patient's allergies indicates:  No Known Allergies      Review of Systems   Constitutional: Negative for fever, weight gain and weight loss.   Cardiovascular: Negative for chest pain.   Respiratory: Negative for shortness of breath.    Musculoskeletal: Positive for back pain. Negative for falls.   Gastrointestinal: Negative for abdominal pain, bowel incontinence, nausea and vomiting.   Genitourinary: Negative for bladder incontinence.   Neurological: Negative for focal weakness, numbness, paresthesias, sensory change and weakness.         Objective:        General: Michelle is well-developed, well-nourished, appears stated age, in no acute distress, alert and oriented to time, place and person.     General    Vitals reviewed.  Constitutional: She is oriented to person, place, and time. She appears well-developed and well-nourished.   HENT:   Head: Atraumatic.   Nose: Nose normal.   Eyes: Conjunctivae are normal.   Cardiovascular: Normal rate.    Pulmonary/Chest: Effort normal.   Abdominal: She exhibits no distension.   Neurological: She is alert and oriented to person, place, and time.   Psychiatric: She has a normal mood and affect. Her behavior is normal. Judgment and thought content normal.     General Musculoskeletal Exam   Gait: normal     Back (L-Spine & T-Spine) / Neck (C-Spine) Exam     Tenderness Left paramedian tenderness of the Lower L-Spine.     Back (L-Spine & T-Spine) Range of Motion   Extension: 20   Flexion: 70   Lateral bend right: 30   Lateral bend left: 30     Spinal Sensation   Right Side Sensation  L-Spine Level: normal  S-Spine Level: normal  Left Side Sensation  L-Spine Level: normal  S-Spine Level:  normal    Other She has no scoliosis .  Spinal Kyphosis:  Absent    Comments:  Mild pain with facet loading on left        Muscle Strength   Right Upper Extremity   Biceps: 5/5   Deltoid:  5/5  Triceps:  5/5  Left Upper Extremity  Biceps: 5/5   Deltoid:  5/5  Triceps:  5/5  Right Lower Extremity   Hip Flexion: 5/5   Quadriceps:  5/5   Ankle Dorsiflexion:  5/5   Anterior tibial:  5/5   EHL:  5/5  Left Lower Extremity   Hip Flexion: 5/5   Quadriceps:  5/5   Ankle Dorsiflexion:  5/5   Anterior tibial:  5/5   EHL:  5/5    Reflexes     Left Side  Biceps:  2+  Brachioradialis:  2+  Achilles:  2+  Left Tafoya's Sign:  Absent  Babinski Sign:  absent  Quadriceps:  2+    Right Side   Biceps:  2+  Brachioradialis:  2+  Achilles:  2+  Right Tafoya's Sign:  absent  Babinski Sign:  absent  Quadriceps:  2+    Vascular Exam     Right Pulses        Carotid:                  2+    Left Pulses        Carotid:                  2+              Assessment:       1. Chronic bilateral low back pain without sciatica    2. Spondylosis of lumbar region without myelopathy or radiculopathy    3. Annular tear of lumbar disc           Plan:          1. Prior imaging and records were reviewed today.  Lumbar MRI showed mild spondylosis and posterior annular fissure at L4-5 and L5-S1.  2. She states her back pain is much better since last OV.   3. She has been doing PT as well as the HEP consistently with good improvement. She reports periodic flare-ups which have been manageable. Overall, she is feeling much better and is pleased.   4. She was encouraged to continue PT.   5. She will continue Advil for pain control during the day and hydrocodone at night as needed for severe pain from other providers.  6. She will return for follow-up as needed.       Follow-up: Follow up if symptoms worsen or fail to improve. If there are any questions prior to this, the patient was instructed to contact the office.

## 2022-01-13 ENCOUNTER — CLINICAL SUPPORT (OUTPATIENT)
Dept: REHABILITATION | Facility: HOSPITAL | Age: 37
End: 2022-01-13
Payer: COMMERCIAL

## 2022-01-13 DIAGNOSIS — M54.50 CHRONIC BILATERAL LOW BACK PAIN WITHOUT SCIATICA: ICD-10-CM

## 2022-01-13 DIAGNOSIS — G89.29 CHRONIC BILATERAL LOW BACK PAIN WITHOUT SCIATICA: ICD-10-CM

## 2022-01-13 PROCEDURE — 97530 THERAPEUTIC ACTIVITIES: CPT | Mod: PO

## 2022-01-13 PROCEDURE — 97110 THERAPEUTIC EXERCISES: CPT | Mod: PO

## 2022-01-13 NOTE — PROGRESS NOTES
"  Physical Therapy Daily Treatment Note     Name: Michelle Gage  Clinic Number: [id number]    Therapy Diagnosis:   Encounter Diagnosis   Name Primary?    Chronic bilateral low back pain without sciatica    Physician: Telma Wheatley NP    Visit Date: 1/13/2022    Physician Orders: PT Eval and Treat  Medical Diagnosis from Referral:   M54.50,G89.29 (ICD-10-CM) - Chronic bilateral low back pain without sciatica   M47.816 (ICD-10-CM) - Spondylosis of lumbar region without myelopathy or radiculopathy   M51.36 (ICD-10-CM) - Annular tear of lumbar disc     Evaluation Date: 11/30/2021  Authorization Period Expiration: 11/04/2022  Plan of Care Expiration: 3/1/2022  Progress Note Due: 2/11/2022  Visit # / Visits authorized: 7/ 20  FOTO: 1/ 3     Time In: 5:00 pm  Time Out: 5:45 pm  Total Billable Time: 45 minutes  Precautions: Standard and Cancer (Breast); Neutropenia   Subjective     Pt reports: she feels that she is about 70% of where she would like to be in her goals, 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 She notes that she is still not bending over to lift anything, but reports marked improvement in ability to navigate stairs and not having to accommodate her pain as much as before.  She was compliant with home exercise program.  Response to previous treatment: no adverse effects   Functional change: Ongoing  Pain: 2/10  Location: SI joint  Objective     Michelle received therapeutic exercises to develop strength, endurance, ROM, flexibility, posture and core stabilization for 10 minutes including:  Planks on Knees and Elbows, 10x5" hold  Open Books, 15x5" hold, bilaterally    Not today:  5 minutes treadmill 1.5mph  Repeated Extensions in prone, 15x, standing, (extremity pain abolished)  Shuttle Squats, bilateral, blue band above knees, 2x10, 2 ropes (50#)  Paloff Presses, green band (doubled bands) x20 each side  Quadruped TrA Activation + Bird-Dogs 3x5 each side (Home exercise " "program)  Dead bugs LE only x10  Dead bugs both extremities x10  Bridges 3x10 25# with blue foam for comfort  MET Shotgun, 7x5" hold  Qudruped TA with alt arm lifts x 15   Glute Sets, 20x5" hold  SKFO OTB x 15   TA plus LTR with red weighted ball OH x 15   PPT x 15   Paloff press OTB x 15     Clamshells, 10x5" hold, no resistance (not pursued 2/2 increased discomfort)    Michelle participated in neuromuscular re-education activities to improve: Balance, Coordination, Kinesthetic, Sense, Proprioception and Posture for 00 minutes. The following activities were included:  Rhythmic Stabilization, supine    Hip-Hinging with Dowel Savage, Sit<>Stand --> progressed to no savage (3x10)  Squats with emphasis on hip hinge  Hip-Hinging with Dowel Savage, seated, 10x  Hip-Hinging with Dowel Savage, standing, 10x-->progressed to no savage (10x)  Rhythmic Stabilization, supine, UE/LE core activation, unilateral 10x each side --> f/b alternating 10x    Michelle participated in dynamic functional therapeutic activities to improve functional performance for 30  minutes, including:  Lifting 5# KB and 10# KB, 4x trials each weight from 30" surface, 15" surface, 5" surface, and from floor to waist  Lifting 6" Box, 5x trials from floor to waist  Flores Carries, 10# bilateral, 20 feet, 3x trials   Flores Carries, 15# bilateral, 20 feet, 3x trials   Flores Carries, 15# uniateral, 20 feet, 3x trials    Home Exercises Provided and Patient Education Provided     Education provided:   - HEP, TA activation, posture education while seated   -Lumbar Roll (Chacho roll) for improving sitting posture during driving and working    Written Home Exercises Provided: yes.  Exercises were reviewed and Michelle was able to demonstrate them prior to the end of the session.  Michelle demonstrated good  understanding of the education provided.     See EMR under Patient Instructions for exercises provided 12/2/2021.  Assessment   Patient demonstrated good hip-hinge with " lifting technique while slowly progressed to differing resistances and more range of motion of trunk and hips without increased lumbar spine pain. 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    Michelle is progressing well towards her goals.   Pt prognosis is Good.     Pt will continue to benefit from skilled outpatient physical therapy to address the deficits listed in the problem list box on initial evaluation, provide pt/family education and to maximize pt's level of independence in the home and community environment.     Pt's spiritual, cultural and educational needs considered and pt agreeable to plan of care and goals.     Anticipated barriers to physical therapy: None    Goals:   SHORT TERM GOALS:  4 weeks 11/30/2021   1. Recent signs and systems trend is improving in order to progress towards LTG's.     2. Patient will be independent with HEP in order to further progress and return to maximal function.     3. Pain rating at Worst: 5/10 in order to progress towards increased independence with activity.     4. Patient will be able to correct postural deviations in sitting and standing, to decrease pain and promote postural awareness for injury prevention.         LONG TERM GOALS: 10 weeks 11/30/2021   1. Patient will return to normal ADL, recreational, and work related activities with less pain and limitation.      2. Patient will improve AROM to stated goals in order to return to maximal functional potential.      3. Patient will improve Strength to stated goals of appropriate musculature in order to improve functional independence.      4. Pain Rating at Best: 1/10 to improve Quality of Life.      5. Patient will meet predicted functional outcome (FOTO) score: 20% to increase self-worth & perceived  functional ability.     6. Patient will have met/partially met personal goal of: Being able to work a full day without activity modification for pain.        Plan     Continue plan of care for lumbopelvic stabilization, TrA, multifidi and abdominal training and re-training motor coordination impairments as tolerated.    Sarah Edmondson, PT, DPT, OCS

## 2022-01-18 ENCOUNTER — CLINICAL SUPPORT (OUTPATIENT)
Dept: REHABILITATION | Facility: HOSPITAL | Age: 37
End: 2022-01-18
Payer: COMMERCIAL

## 2022-01-18 ENCOUNTER — OFFICE VISIT (OUTPATIENT)
Dept: SURGERY | Facility: CLINIC | Age: 37
End: 2022-01-18
Payer: COMMERCIAL

## 2022-01-18 VITALS
SYSTOLIC BLOOD PRESSURE: 99 MMHG | HEIGHT: 65 IN | HEART RATE: 61 BPM | DIASTOLIC BLOOD PRESSURE: 61 MMHG | WEIGHT: 110 LBS | BODY MASS INDEX: 18.33 KG/M2

## 2022-01-18 DIAGNOSIS — G89.29 CHRONIC BILATERAL LOW BACK PAIN WITHOUT SCIATICA: Primary | ICD-10-CM

## 2022-01-18 DIAGNOSIS — Z85.3 PERSONAL HISTORY OF BREAST CANCER: Primary | ICD-10-CM

## 2022-01-18 DIAGNOSIS — M54.50 CHRONIC BILATERAL LOW BACK PAIN WITHOUT SCIATICA: Primary | ICD-10-CM

## 2022-01-18 PROCEDURE — 3074F SYST BP LT 130 MM HG: CPT | Mod: CPTII,S$GLB,, | Performed by: PHYSICIAN ASSISTANT

## 2022-01-18 PROCEDURE — 97530 THERAPEUTIC ACTIVITIES: CPT | Mod: PO

## 2022-01-18 PROCEDURE — 99999 PR PBB SHADOW E&M-EST. PATIENT-LVL IV: CPT | Mod: PBBFAC,,, | Performed by: PHYSICIAN ASSISTANT

## 2022-01-18 PROCEDURE — 1160F RVW MEDS BY RX/DR IN RCRD: CPT | Mod: CPTII,S$GLB,, | Performed by: PHYSICIAN ASSISTANT

## 2022-01-18 PROCEDURE — 1160F PR REVIEW ALL MEDS BY PRESCRIBER/CLIN PHARMACIST DOCUMENTED: ICD-10-PCS | Mod: CPTII,S$GLB,, | Performed by: PHYSICIAN ASSISTANT

## 2022-01-18 PROCEDURE — 3074F PR MOST RECENT SYSTOLIC BLOOD PRESSURE < 130 MM HG: ICD-10-PCS | Mod: CPTII,S$GLB,, | Performed by: PHYSICIAN ASSISTANT

## 2022-01-18 PROCEDURE — 1159F PR MEDICATION LIST DOCUMENTED IN MEDICAL RECORD: ICD-10-PCS | Mod: CPTII,S$GLB,, | Performed by: PHYSICIAN ASSISTANT

## 2022-01-18 PROCEDURE — 3078F PR MOST RECENT DIASTOLIC BLOOD PRESSURE < 80 MM HG: ICD-10-PCS | Mod: CPTII,S$GLB,, | Performed by: PHYSICIAN ASSISTANT

## 2022-01-18 PROCEDURE — 3008F BODY MASS INDEX DOCD: CPT | Mod: CPTII,S$GLB,, | Performed by: PHYSICIAN ASSISTANT

## 2022-01-18 PROCEDURE — 99213 PR OFFICE/OUTPT VISIT, EST, LEVL III, 20-29 MIN: ICD-10-PCS | Mod: S$GLB,,, | Performed by: PHYSICIAN ASSISTANT

## 2022-01-18 PROCEDURE — 1159F MED LIST DOCD IN RCRD: CPT | Mod: CPTII,S$GLB,, | Performed by: PHYSICIAN ASSISTANT

## 2022-01-18 PROCEDURE — 97110 THERAPEUTIC EXERCISES: CPT | Mod: PO

## 2022-01-18 PROCEDURE — 99213 OFFICE O/P EST LOW 20 MIN: CPT | Mod: S$GLB,,, | Performed by: PHYSICIAN ASSISTANT

## 2022-01-18 PROCEDURE — 3078F DIAST BP <80 MM HG: CPT | Mod: CPTII,S$GLB,, | Performed by: PHYSICIAN ASSISTANT

## 2022-01-18 PROCEDURE — 3008F PR BODY MASS INDEX (BMI) DOCUMENTED: ICD-10-PCS | Mod: CPTII,S$GLB,, | Performed by: PHYSICIAN ASSISTANT

## 2022-01-18 PROCEDURE — 99999 PR PBB SHADOW E&M-EST. PATIENT-LVL IV: ICD-10-PCS | Mod: PBBFAC,,, | Performed by: PHYSICIAN ASSISTANT

## 2022-01-18 NOTE — PROGRESS NOTES
"  Physical Therapy Daily Treatment Note     Name: Michelle Gage  Clinic Number: [id number]    Therapy Diagnosis:   Encounter Diagnosis   Name Primary?    Chronic bilateral low back pain without sciatica Yes   Physician: Telma Wheatley NP    Visit Date: 1/18/2022    Physician Orders: PT Eval and Treat  Medical Diagnosis from Referral:   M54.50,G89.29 (ICD-10-CM) - Chronic bilateral low back pain without sciatica   M47.816 (ICD-10-CM) - Spondylosis of lumbar region without myelopathy or radiculopathy   M51.36 (ICD-10-CM) - Annular tear of lumbar disc     Evaluation Date: 11/30/2021  Authorization Period Expiration: 11/04/2022  Plan of Care Expiration: 3/1/2022  Progress Note Due: 2/11/2022  Visit # / Visits authorized: 4/ 20  FOTO: 1/ 3     Time In: 7:54 pm  Time Out: 8:30 pm  Total Billable Time: 41 minutes  Precautions: Standard and Cancer (Breast); Neutropenia   Subjective     Pt reports: Pt states that she had good weekend. Pt states that she's not "hurting as I'm doing stuff, but I do feel some fatigue afterwards." Pt is being more conscious with her lifting mechanics and is able to lift objects without issue  She was compliant with home exercise program.  Response to previous treatment: no adverse effects   Functional change: Ongoing  Pain: 2/10  Location: SI joint  Objective     Michelle received therapeutic exercises to develop strength, endurance, ROM, flexibility, posture and core stabilization for 18 minutes including:  Planks on Knees and Elbows, 10x8" hold  Open Books, 15x5" hold, bilaterally  Bridges 3x10 25# with blue foam for comfort  Paloff Presses, 10# pulley  x10 each side    Not today:  5 minutes treadmill 1.5mph  Repeated Extensions in prone, 15x, standing, (extremity pain abolished)  Shuttle Squats, bilateral, blue band above knees, 2x10, 2 ropes (50#)    Quadruped TrA Activation + Bird-Dogs 3x5 each side (Home exercise program)  Dead bugs LE only x10  Dead bugs both extremities " "x10    MET Shotgun, 7x5" hold  Qudruped TA with alt arm lifts x 15   Glute Sets, 20x5" hold  SKFO OTB x 15   TA plus LTR with red weighted ball OH x 15   PPT x 15   Paloff press OTB x 15     Clamshells, 10x5" hold, no resistance (not pursued 2/2 increased discomfort)    Michelle participated in neuromuscular re-education activities to improve: Balance, Coordination, Kinesthetic, Sense, Proprioception and Posture for 00 minutes. The following activities were included:  Rhythmic Stabilization, supine    Hip-Hinging with Dowel Savage, Sit<>Stand --> progressed to no savage (3x10)  Squats with emphasis on hip hinge  Hip-Hinging with Dowel Savage, seated, 10x  Hip-Hinging with Dowel Savage, standing, 10x-->progressed to no savage (10x)  Rhythmic Stabilization, supine, UE/LE core activation, unilateral 10x each side --> f/b alternating 10x    Michelle participated in dynamic functional therapeutic activities to improve functional performance for 23  minutes, including:  Lifting 15# DB 2x10 from 12" black metal box  Flores Carries, 15# bilateral, 20 feet, 3x trials   Flores Carries, 15# uniateral, 20 feet, 3x trials    Lifting 5# KB and 10# KB, 4x trials each weight from 30" surface, 15" surface, 5" surface, and from floor to waist  Flores Carries, 10# bilateral, 20 feet, 3x trials  Lifting 6" Box, 5x trials from floor to waist  Home Exercises Provided and Patient Education Provided     Education provided:   - HEP, TA activation, posture education while seated   -Lumbar Roll (Chacho roll) for improving sitting posture during driving and working    Written Home Exercises Provided: yes.  Exercises were reviewed and Michelle was able to demonstrate them prior to the end of the session.  Michelle demonstrated good  understanding of the education provided.     See EMR under Patient Instructions for exercises provided 12/2/2021.  Assessment   Patient's body awareness during lifting activities outside of clinic indiactes pt has carried over good " body mechanics to her ADLs. Functional lifts on a lower surface performed for reinforcement of hip hinging and lifting mechanics. Continue to progress core stabilization and lifting as tolerated.    Michelle is progressing well towards her goals.   Pt prognosis is Good.     Pt will continue to benefit from skilled outpatient physical therapy to address the deficits listed in the problem list box on initial evaluation, provide pt/family education and to maximize pt's level of independence in the home and community environment.     Pt's spiritual, cultural and educational needs considered and pt agreeable to plan of care and goals.     Anticipated barriers to physical therapy: None    Goals:   SHORT TERM GOALS:  4 weeks 11/30/2021   1. Recent signs and systems trend is improving in order to progress towards LTG's.     2. Patient will be independent with HEP in order to further progress and return to maximal function.     3. Pain rating at Worst: 5/10 in order to progress towards increased independence with activity.     4. Patient will be able to correct postural deviations in sitting and standing, to decrease pain and promote postural awareness for injury prevention.         LONG TERM GOALS: 10 weeks 11/30/2021   1. Patient will return to normal ADL, recreational, and work related activities with less pain and limitation.      2. Patient will improve AROM to stated goals in order to return to maximal functional potential.      3. Patient will improve Strength to stated goals of appropriate musculature in order to improve functional independence.      4. Pain Rating at Best: 1/10 to improve Quality of Life.      5. Patient will meet predicted functional outcome (FOTO) score: 20% to increase self-worth & perceived functional ability.     6. Patient will have met/partially met personal goal of: Being able to work a full day without activity modification for pain.        Plan     Continue plan of care for lumbopelvic  stabilization, TrA, multifidi and abdominal training and re-training motor coordination impairments as tolerated.    Jonathan Abrams, PT, DPT

## 2022-01-19 NOTE — PROGRESS NOTES
[facility]  Department of Surgery  01/19/2022    REFERRING PROVIDER: No referring provider defined for this encounter. Primary Doctor No  CHIEF COMPLAINT:   Chief Complaint   Patient presents with    Follow-up     6 mo f/u       DIAGNOSIS:   This is a 36 y.o. 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    TREATMENT:   1. Completed 6 cycles neoadjuvant chemotherapy on 10/27/2020 with Mukesh Salazar and Skyla.   2. S/p bilateral mastectomy with sentinel node biopsy on 11/3/2020. Dr. Tim M.D. Surgical Oncology  3. Final Path: IDC, 3 mm, grade 1. Less than 1 mm from the inked margin of resection. Negative node.  4. XRT not recommended.   5. On Kadcyla and hormone suppression with Dr. Crum. Off Bustillo right now due to side effects (mostly brain fog)    HISTORY OF PRESENT ILLNESS:   Michelle Gage is a 36 y.o. female comes in for oncological follow up.  She denies change in her breast self-exam specifically denying new masses, skin or nipple changes, or nipple discharge. Past medical and surgical history is updated without new changes. There have been no changes to family history. The patient denies constitutional symptoms of night sweats, chills, weight loss, new headaches, visual changes, new back or bony pain, chest pain, or shortness of breath.        Review of Systems: See HPI/Interval History for other systems reviewed.     IMAGING:   None needed      MEDICATIONS/ALLERGIES:     Current Outpatient Medications   Medication Sig    ALPRAZolam (XANAX) 0.5 MG tablet TAKE 1 TABLET(0.5 MG) BY MOUTH THREE TIMES DAILY AS NEEDED FOR INSOMNIA OR ANXIETY    ATRALIN 0.05 % gel 2 (two) times daily as needed.     HYDROcodone-acetaminophen (NORCO) 5-325 mg per tablet Take 1 tablet by mouth every 8 (eight) hours as needed for Pain.    KADCYLA 100 mg     LIDOcaine-prilocaine (EMLA) cream Apply topically as needed.    naproxen (NAPROSYN) 500 MG tablet Hold until after  "surgery    ondansetron (ZOFRAN) 8 MG tablet Take 1 tablet (8 mg total) by mouth every 8 (eight) hours as needed for Nausea.    spironolactone (ALDACTONE) 50 MG tablet TAKE 1 TABLET(50 MG) BY MOUTH EVERY EVENING    sulfacetamide sodium-sulfur 8-4 % Susp     tamoxifen (NOLVADEX) 20 MG Tab TAKE 1 TABLET(20 MG) BY MOUTH EVERY DAY    valACYclovir (VALTREX) 500 MG tablet Take 1 pill twice daily x 3 days as needed for fever blister.    venlafaxine (EFFEXOR XR) 75 MG 24 hr capsule Take 1 capsule (75 mg total) by mouth once daily.    gabapentin (NEURONTIN) 100 MG capsule Take 1 capsule (100 mg total) by mouth 3 (three) times daily. For pain    methylphenidate HCl (RITALIN) 10 MG tablet Take 1 tablet (10 mg total) by mouth 2 (two) times daily with meals.     Current Facility-Administered Medications   Medication    copper intrauterine device 380 square mm  mm    testosterone cypionate injection 50 mg     Facility-Administered Medications Ordered in Other Visits   Medication    ceFAZolin 1 g, gentamicin 80 mg in sodium chloride 0.9% 500 mL irrigation    ceFAZolin 1 g, gentamicin 80 mg in sodium chloride 0.9% 500 mL irrigation    ceFAZolin 1 g, gentamicin 80 mg in sodium chloride 0.9% 500 mL irrigation    ceFAZolin 1 g, gentamicin 80 mg in sodium chloride 0.9% 500 mL irrigation      Review of patient's allergies indicates:  No Known Allergies    PHYSICAL EXAM:   BP 99/61 (BP Location: Right arm, Patient Position: Sitting, BP Method: Medium (Automatic))   Pulse 61   Ht 5' 5" (1.651 m)   Wt 49.9 kg (110 lb)   BMI 18.30 kg/m²     Physical Exam   Constitutional: She is oriented to person, place, and time. She appears well-developed and well-nourished. No distress.   HENT:   Head: Normocephalic and atraumatic.   Eyes: Pupils are equal, round, and reactive to light.   Pulmonary/Chest: Effort normal and breath sounds normal. No respiratory distress. She exhibits no mass, no tenderness and no edema. Right " breast exhibits no inverted nipple, no mass, no nipple discharge, no skin change and no tenderness. Left breast exhibits no inverted nipple, no mass, no nipple discharge, no skin change and no tenderness.       Abdominal: Soft.   Neurological: She is alert and oriented to person, place, and time.   Skin: Skin is warm and dry. She is not diaphoretic. No erythema.     Psychiatric: She has a normal mood and affect. Her behavior is normal.       ASSESSMENT:   This is a 36 y.o. female without evidence of recurrence by exam, history or imaging.       PLAN:   1. Continue to follow up with Dr. Crum in Medical Oncology.   2. Continue monthly self breast exams and call the clinic with any changes or problems.  3. Return to clinic in 1 year .    The patient is in agreement with the plan. Questions were encouraged and answered to patient's satisfaction. Michelle will call our office with any questions or concerns.     Cathie Shah PA-C  Breast Surgery

## 2022-01-21 ENCOUNTER — CLINICAL SUPPORT (OUTPATIENT)
Dept: REHABILITATION | Facility: HOSPITAL | Age: 37
End: 2022-01-21
Payer: COMMERCIAL

## 2022-01-21 DIAGNOSIS — G89.29 CHRONIC BILATERAL LOW BACK PAIN WITHOUT SCIATICA: ICD-10-CM

## 2022-01-21 DIAGNOSIS — M54.50 CHRONIC BILATERAL LOW BACK PAIN WITHOUT SCIATICA: ICD-10-CM

## 2022-01-21 PROCEDURE — 97530 THERAPEUTIC ACTIVITIES: CPT | Mod: PO,CQ

## 2022-01-21 PROCEDURE — 97110 THERAPEUTIC EXERCISES: CPT | Mod: PO,CQ

## 2022-01-21 PROCEDURE — 97140 MANUAL THERAPY 1/> REGIONS: CPT | Mod: PO,CQ

## 2022-01-21 NOTE — PROGRESS NOTES
"  Physical Therapy Daily Treatment Note     Name: Michelle Gage  Clinic Number: [id number]    Therapy Diagnosis:   Encounter Diagnosis   Name Primary?    Chronic bilateral low back pain without sciatica    Physician: Telma Wheatley NP    Visit Date: 1/21/2022    Physician Orders: PT Eval and Treat  Medical Diagnosis from Referral:   M54.50,G89.29 (ICD-10-CM) - Chronic bilateral low back pain without sciatica   M47.816 (ICD-10-CM) - Spondylosis of lumbar region without myelopathy or radiculopathy   M51.36 (ICD-10-CM) - Annular tear of lumbar disc     Evaluation Date: 11/30/2021  Authorization Period Expiration: 12/31/2022  Plan of Care Expiration: 3/1/2022  Progress Note Due: 2/11/2022  Visit # / Visits authorized: 5/ 20   FOTO: 1/ 3     Time In: 7:50 am  Time Out: 8:38 am  Total Billable Time: 48 minutes  Precautions: Standard and Cancer (Breast); Neutropenia   Subjective     Pt reports: she started having more pain last night, reports pain is in low back/butt  She was compliant with home exercise program.  Response to previous treatment: no adverse effects   Functional change: Ongoing  Pain: 3-4/10  Location: SI joint  Objective     Michelle received therapeutic exercises to develop strength, endurance, ROM, flexibility, posture and core stabilization for 23 minutes including:  Planks on Knees and Elbows, 10x8" hold  Open Books, 15x5" hold, bilaterally  Bridges 3x10 25# with blue foam for comfort  Paloff Presses, 10# pulley  x10 each side    Not today:  5 minutes treadmill 1.5mph  Repeated Extensions in prone, 15x, standing, (extremity pain abolished)  Shuttle Squats, bilateral, blue band above knees, 2x10, 2 ropes (50#)    Quadruped TrA Activation + Bird-Dogs 3x5 each side (Home exercise program)  Dead bugs LE only x10   Dead bugs both extremities x10    MET Shotgun, 7x5" hold  Qudruped TA with alt arm lifts x 15   Glute Sets, 20x5" hold  SKFO OTB x 15   TA plus LTR with red weighted ball OH x 15 " "  PPT x 15   Paloff press OTB x 15     Clamshells, 10x5" hold, no resistance (not pursued 2/2 increased discomfort)    Michelle participated in neuromuscular re-education activities to improve: Balance, Coordination, Kinesthetic, Sense, Proprioception and Posture for 00 minutes. The following activities were included:  Rhythmic Stabilization, supine    Hip-Hinging with Dowel Savage, Sit<>Stand --> progressed to no savage (3x10)  Squats with emphasis on hip hinge  Hip-Hinging with Dowel Savage, seated, 10x  Hip-Hinging with Dowel Savage, standing, 10x-->progressed to no savage (10x)  Rhythmic Stabilization, supine, UE/LE core activation, unilateral 10x each side --> f/b alternating 10x    Michelle participated in dynamic functional therapeutic activities to improve functional performance for 17  minutes, including:  Lifting 15# DB 2x10 from 12" black metal box  Flores Carries, 15# bilateral, 20 feet, 3x trials   Flores Carries, 15# uniateral, 20 feet, 3x trials    Lifting 5# KB and 10# KB, 4x trials each weight from 30" surface, 15" surface, 5" surface, and from floor to waist  Flores Carries, 10# bilateral, 20 feet, 3x trials  Lifting 6" Box, 5x trials from floor to waist        manual therapy techniques: Soft tissue Mobilization were applied to the: SI for 8 minutes, including:    MET push/pull, Shotgun           Home Exercises Provided and Patient Education Provided     Education provided:   - HEP, TA activation, posture education while seated   -Lumbar Roll (Chacho roll) for improving sitting posture during driving and working    Written Home Exercises Provided: yes.  Exercises were reviewed and Michelle was able to demonstrate them prior to the end of the session.  Michelle demonstrated good  understanding of the education provided.     See EMR under Patient Instructions for exercises provided 12/2/2021.  Assessment     Patient presents with increased pain in her low back/buttocks. She had a favorable response to MET performed " and handout was provided for HEP. She did require cueing for proper lifting technique. Education also on core activation with all lifting and transitional movements. Continue to progress patient as tolerated.     Michelle is progressing well towards her goals.   Pt prognosis is Good.     Pt will continue to benefit from skilled outpatient physical therapy to address the deficits listed in the problem list box on initial evaluation, provide pt/family education and to maximize pt's level of independence in the home and community environment.     Pt's spiritual, cultural and educational needs considered and pt agreeable to plan of care and goals.     Anticipated barriers to physical therapy: None    Goals:   SHORT TERM GOALS:  4 weeks 11/30/2021   1. Recent signs and systems trend is improving in order to progress towards LTG's.     2. Patient will be independent with HEP in order to further progress and return to maximal function.     3. Pain rating at Worst: 5/10 in order to progress towards increased independence with activity.     4. Patient will be able to correct postural deviations in sitting and standing, to decrease pain and promote postural awareness for injury prevention.         LONG TERM GOALS: 10 weeks 11/30/2021   1. Patient will return to normal ADL, recreational, and work related activities with less pain and limitation.      2. Patient will improve AROM to stated goals in order to return to maximal functional potential.      3. Patient will improve Strength to stated goals of appropriate musculature in order to improve functional independence.      4. Pain Rating at Best: 1/10 to improve Quality of Life.      5. Patient will meet predicted functional outcome (FOTO) score: 20% to increase self-worth & perceived functional ability.     6. Patient will have met/partially met personal goal of: Being able to work a full day without activity modification for pain.        Plan     Continue plan of care for  lumbopelvic stabilization, TrA, multifidi and abdominal training and re-training motor coordination impairments as tolerated.    Yumiko Burgos, PTA

## 2022-01-21 NOTE — PROGRESS NOTES
Subjective:       Patient ID: Michelle Gage is a 36 y.o. female.    Chief Complaint: Malignant neoplasm of upper-outer quadrant of left breast i       HPI Currently on tamoxifen and Zoladex.  She does have hot flashes and vaginal dryness. She increased her effexor with some relief in hot flashes.  She has not moved forward with testosterone injections.   Also feels the increase in effexor has helped with depression. Physical therapy has helped as well.     Doing well. Seems better.  Appetite is good. Positive for constipation.   Planning to start PT for back.  Pain is ok today however had increased intensity post port removal.    Seeing Dr. Warren next week.   Currently on Tamoxifen and zoladex and doing well.         Diagnosis: Stage I ([id number]) invasive ductal carcinoma of left breast, upper outer quadrant, ER 95%, WY 90%, Her2 3+, Grade 3, Ki67 25%  Premenopausal status.     Dr. Crum's previous note: Oncologic History:  Presentation  - [id number] - presented for palpable left breast mass around 2018 - was being watched on imaging at outside hospital.    - 5/11/2020 - Diagnostic bilateral mammogram and left breast US at [facility] showed left breast 1:00 mass, 1.4 cm, 8 CFN, mild left axillary adenopathy  - 5/11/2020 - Biopsy - Grade 3 IDC, estrogen receptor 95%, progesterone receptor 90%, Her2 delphine 3+, Ki67 25%. LN biopsy negative  Surgery consultation with Dr Dumont on 5/14. Breast cancer is far in axillary tail and felt to be difficult to obtain clear margins without neoadjuvant therapy.               - 5/14/2020 - Genetics Myriad MyRisk BRACAnalysis neg; VUS AP             Medical oncology consultation on 5/15/2020 -  This case was discussed with Dr. Dumont.  She does feel like the patient will benefit from neoadjuvant therapy to help improve her surgical margins.  Since the tumor is less than 2 cm and clinically lymph node negative (MRI pending), I recommended treatment with Taxotere,  carboplatin and Herceptin without Perjeta.  Discussed with her that there is data in tumors less than 2 cm to consider Taxol/Herceptin however would not typically use this in a neoadjuvant setting for concern for under treatment.                - Eggs Retrieval - has 2 embryos.               * 6/16/2020 started neoadjuvant TCH (no perjeta since < 2 cm and LN negative). Neoadjuvant therapy chosen due to location of tumor.     11/6/2020 Imaging Significant Findings     Breast MRI  Impression:     The previously described upper outer quadrant known malignancy in the left breast is no longer visualized consistent with complete imaging response to therapy.      BI-RADS Category:   Overall: 6 - Known Biopsy-Proven Malignancy     Recommendation:  Continued breast surgery and oncology management.             11/30/2020 Breast Surgery     Surgery: Dr Jessica Dumont, [phone] performed bilateral mastectomy with sentinel lymph node biopsy with pathology showing grade 1 invasive ductal carcinoma,  3 mm with 0 positive lymph nodes.          11/30/2020 Cancer Staged     [id number]      12/15/2020 Tumor Conference       Post mastectomy radiation not recommended but patient will meet with radiation oncology. Systemically,standard of care would be Kadcyla followed by endocrine therapy.     Adjuvant Kadcyla started 12/28/2020  Tamoxifen holiday 4/4/2021- 7/12/2021 due to side effects.   Kadcyla stopped due to difficulty recovering counts- last dose 8/9/2021  Now on zoladex and Tamoxifen.           Review of Systems   Constitutional: Negative for activity change, appetite change, chills, diaphoresis, fatigue (intermittent), fever and unexpected weight change.   HENT: Negative for nosebleeds.    Respiratory: Negative for cough and shortness of breath.    Cardiovascular: Negative for chest pain, palpitations and leg swelling.   Gastrointestinal: Positive for constipation (intermittent). Negative for abdominal distention, abdominal pain,  blood in stool, diarrhea, nausea and vomiting.   Genitourinary: Positive for hot flashes and vaginal dryness. Negative for hematuria and vaginal bleeding.   Musculoskeletal: Positive for back pain (imrpoved with PT). Negative for arthralgias and myalgias.   Integumentary:  Negative for pallor and rash.   Allergic/Immunologic: Negative for immunocompromised state.   Neurological: Negative for dizziness, weakness, light-headedness, numbness and headaches.   Hematological: Negative for adenopathy. Does not bruise/bleed easily.   Psychiatric/Behavioral: Negative for confusion. The patient is not nervous/anxious.         Depression  Struggling with clarity - on ritalin   All other systems reviewed and are negative.        Objective:      Physical Exam  Vitals and nursing note reviewed.   Constitutional:       General: She is not in acute distress.     Appearance: Normal appearance. She is well-developed and well-nourished.   HENT:      Head: Normocephalic.      Mouth/Throat:      Mouth: Oropharynx is clear and moist.   Cardiovascular:      Rate and Rhythm: Normal rate and regular rhythm.      Heart sounds: Normal heart sounds.   Pulmonary:      Effort: Pulmonary effort is normal.      Breath sounds: Normal breath sounds.   Chest:   Breasts:      Right: No axillary adenopathy or supraclavicular adenopathy.      Left: No axillary adenopathy or supraclavicular adenopathy.        Comments: Bilateral mastectomies with reconstruction  No masses, no tenderness. Skin and nipple are normal  No axillary lymph nodes palpated.    Abdominal:      General: Bowel sounds are normal. There is no distension.      Palpations: Abdomen is soft.      Tenderness: There is no abdominal tenderness.   Musculoskeletal:         General: No edema.      Cervical back: Normal range of motion.   Lymphadenopathy:      Cervical: No cervical adenopathy.      Upper Body:      Right upper body: No supraclavicular or axillary adenopathy.      Left upper  body: No supraclavicular or axillary adenopathy.   Skin:     General: Skin is warm and dry.      Findings: No rash.   Neurological:      Mental Status: She is alert and oriented to person, place, and time.   Psychiatric:         Mood and Affect: Mood and affect normal.         Behavior: Behavior normal.           Assessment:       1. Malignant neoplasm of upper-outer quadrant of left breast in female, estrogen receptor positive    2. Malignant neoplasm of axillary tail of left breast in female, estrogen receptor positive    3. Chemotherapy induced neutropenia    4. SERM use (selective estrogen receptor modulator)    5. Vitamin D deficiency    6. Adjustment disorder with mixed anxiety and depressed mood        Plan:       1-2.Continue Tamoxifen and Zoladex  3. Resolved  4. Tolerating  5. Will have level checked yearly  6. Continue Effexor and f/u with Dr. Powell.     Zoladex monthly.   RTC in 3 months to see Dr. Crum with cbc, cmp.         Return to clinic in 3 months with MD appointment and labs.     Patient is in agreement with the proposed treatment plan. All questions were answered to the patient's satisfaction. Patient knows to call clinic for any new or worsening symptoms and if anything is needed before the next clinic visit.          Tali Cueva, FNP-C  Hematology & Medical Oncology   [facility] [place] [address]  ph. [phone]  Fax. [phone]    Patient discussed with collaborating physician, Dr. Crum.    Approximately 15 minutes were spent face-to-face with the patient.  Approximately 25 minutes in total were spent on this encounter, which includes face-to-face time and non-face-to-face time preparing to see the patient (e.g., review of tests), obtaining and/or reviewing separately obtained history, documenting clinical information in the electronic or other health record, independently interpreting results (not separately reported) and communicating results to the  patient/family/caregiver, or care coordination (not separately reported).     Route Chart for Scheduling    Med Onc Chart Routing  Follow up with physician 3 months. Needs zoladex every 28 days; see Dr. munson in 3 months with zoladex injection    Follow up with [facility]    Labs CBC and CMP   Lab interval: every 12 weeks     Imaging    Pharmacy appointment    Other referrals            Supportive Plan Information  OP BREAST GOSERELIN Q4W   Christy Salazar MD   Upcoming Treatment Dates - OP BREAST GOSERELIN Q4W    3/15/2022       Chemotherapy       goserelin (ZOLADEX) injection 3.6 mg  4/12/2022       Chemotherapy       goserelin (ZOLADEX) injection 3.6 mg  5/10/2022       Chemotherapy       goserelin (ZOLADEX) injection 3.6 mg

## 2022-01-24 ENCOUNTER — PATIENT MESSAGE (OUTPATIENT)
Dept: HEMATOLOGY/ONCOLOGY | Facility: CLINIC | Age: 37
End: 2022-01-24
Payer: COMMERCIAL

## 2022-01-26 ENCOUNTER — PATIENT MESSAGE (OUTPATIENT)
Dept: HEMATOLOGY/ONCOLOGY | Facility: CLINIC | Age: 37
End: 2022-01-26
Payer: COMMERCIAL

## 2022-01-27 ENCOUNTER — CLINICAL SUPPORT (OUTPATIENT)
Dept: REHABILITATION | Facility: HOSPITAL | Age: 37
End: 2022-01-27
Payer: COMMERCIAL

## 2022-01-27 DIAGNOSIS — G89.29 CHRONIC BILATERAL LOW BACK PAIN WITHOUT SCIATICA: Primary | ICD-10-CM

## 2022-01-27 DIAGNOSIS — M54.50 CHRONIC BILATERAL LOW BACK PAIN WITHOUT SCIATICA: Primary | ICD-10-CM

## 2022-01-27 PROCEDURE — 97530 THERAPEUTIC ACTIVITIES: CPT | Mod: PO

## 2022-01-27 PROCEDURE — 97110 THERAPEUTIC EXERCISES: CPT | Mod: PO

## 2022-01-27 NOTE — PROGRESS NOTES
"  Physical Therapy Daily Treatment Note     Name: Michelle Gage  Clinic Number: [id number]    Therapy Diagnosis:   Encounter Diagnosis   Name Primary?    Chronic bilateral low back pain without sciatica Yes      Physician: Telma Wheatley NP    Visit Date: 1/27/2022    Physician Orders: PT Eval and Treat  Medical Diagnosis from Referral:   M54.50,G89.29 (ICD-10-CM) - Chronic bilateral low back pain without sciatica   M47.816 (ICD-10-CM) - Spondylosis of lumbar region without myelopathy or radiculopathy   M51.36 (ICD-10-CM) - Annular tear of lumbar disc     Evaluation Date: 11/30/2021  Authorization Period Expiration: 12/31/2022  Plan of Care Expiration: 3/1/2022  Progress Note Due: 2/11/2022  Visit # / Visits authorized: 6/ 20   FOTO: 1/ 3     Time In: 10:15 am  Time Out: 11:01 am  Total Billable Time: 46 minutes  Precautions: Standard and Cancer (Breast); Neutropenia   Subjective     Pt reports: No pain today, feels good  She was compliant with home exercise program.  Response to previous treatment: no adverse effects   Functional change: Ongoing  Pain: 3-4/10  Location: SI joint  Objective     Michelle received therapeutic exercises to develop strength, endurance, ROM, flexibility, posture and core stabilization for 23 minutes including:  Open Books, 10x5" hold, bilaterally  Planks on Knees and Elbows, 9x10" hold  Bridges 2x8 30# with blue foam for comfort  Paloff Presses, 10# pulley  x10 each side  Dead bugs LE only 2x6  Sidelying hip abduction 3x8      Not today:  5 minutes treadmill 1.5mph  Repeated Extensions in prone, 15x, standing, (extremity pain abolished)  Shuttle Squats, bilateral, blue band above knees, 2x10, 2 ropes (50#)  Quadruped TrA Activation + Bird-Dogs 3x5 each side (Home exercise program)  Dead bugs both extremities x10    MET Shotgun, 7x5" hold  Qudruped TA with alt arm lifts x 15   Glute Sets, 20x5" hold  SKFO OTB x 15   TA plus LTR with red weighted ball OH x 15   PPT x 15 " "  Paloff press OTB x 15   Clamshells, 10x5" hold, no resistance (not pursued 2/2 increased discomfort)    Michelle participated in neuromuscular re-education activities to improve: Balance, Coordination, Kinesthetic, Sense, Proprioception and Posture for 00 minutes. The following activities were included:  Rhythmic Stabilization, supine    Hip-Hinging with Dowel Savage, Sit<>Stand --> progressed to no savage (3x10)  Squats with emphasis on hip hinge  Hip-Hinging with Dowel Savage, seated, 10x  Hip-Hinging with Dowel Savage, standing, 10x-->progressed to no savage (10x)  Rhythmic Stabilization, supine, UE/LE core activation, unilateral 10x each side --> f/b alternating 10x    Michelle participated in dynamic functional therapeutic activities to improve functional performance for 23  minutes, including:  Lifting 15# DB 2x10 from 12" black metal box  Flores Carries, 14#  Exercise ball bilateral, 20 feet, 2x trials   Flores Carries, 15# uniateral, 20 feet, 2x trials  Monster walks with yellow theraband around knees 2x 5 ascending reps in alternating directions    Lifting 5# KB and 10# KB, 4x trials each weight from 30" surface, 15" surface, 5" surface, and from floor to waist  Flores Carries, 10# bilateral, 20 feet, 3x trials  Lifting 6" Box, 5x trials from floor to waist        manual therapy techniques: Soft tissue Mobilization were applied to the: SI for 0 minutes, including:  MET push/pull, Shotgun           Home Exercises Provided and Patient Education Provided     Education provided:   - HEP, TA activation, posture education while seated   -Lumbar Roll ([place] roll) for improving sitting posture during driving and working    Written Home Exercises Provided: yes.  Exercises were reviewed and Michelle was able to demonstrate them prior to the end of the session.  Michelle demonstrated good  understanding of the education provided.     See EMR under Patient Instructions for exercises provided 12/2/2021.  Assessment     Patient " presents with no pain or symptoms in her lumbar or SI region today. Straight leg raises and monster walks incorporated today to increase lateral hip stability secondary to patient's joint laxity at the hip. Farmer's carries and progression of glute bridges for increased core stability and glute strengthening. Education also on core activation with all lifting and transitional movements. Continue to progress patient as tolerated.     Michelle is progressing well towards her goals.   Pt prognosis is Good.     Pt will continue to benefit from skilled outpatient physical therapy to address the deficits listed in the problem list box on initial evaluation, provide pt/family education and to maximize pt's level of independence in the home and community environment.     Pt's spiritual, cultural and educational needs considered and pt agreeable to plan of care and goals.     Anticipated barriers to physical therapy: None    Goals:   SHORT TERM GOALS:  4 weeks 11/30/2021   1. Recent signs and systems trend is improving in order to progress towards LTG's.     2. Patient will be independent with HEP in order to further progress and return to maximal function.     3. Pain rating at Worst: 5/10 in order to progress towards increased independence with activity.     4. Patient will be able to correct postural deviations in sitting and standing, to decrease pain and promote postural awareness for injury prevention.         LONG TERM GOALS: 10 weeks 11/30/2021   1. Patient will return to normal ADL, recreational, and work related activities with less pain and limitation.      2. Patient will improve AROM to stated goals in order to return to maximal functional potential.      3. Patient will improve Strength to stated goals of appropriate musculature in order to improve functional independence.      4. Pain Rating at Best: 1/10 to improve Quality of Life.      5. Patient will meet predicted functional outcome (FOTO) score: 20% to  increase self-worth & perceived functional ability.     6. Patient will have met/partially met personal goal of: Being able to work a full day without activity modification for pain.        Plan     Continue plan of care for lumbopelvic stabilization, TrA, multifidi and abdominal training and re-training motor coordination impairments as tolerated.    Jonathan Abrams, PT, DPT

## 2022-02-04 ENCOUNTER — LAB VISIT (OUTPATIENT)
Dept: LAB | Facility: HOSPITAL | Age: 37
End: 2022-02-04
Payer: COMMERCIAL

## 2022-02-04 ENCOUNTER — CLINICAL SUPPORT (OUTPATIENT)
Dept: REHABILITATION | Facility: HOSPITAL | Age: 37
End: 2022-02-04
Payer: COMMERCIAL

## 2022-02-04 ENCOUNTER — INFUSION (OUTPATIENT)
Dept: INFUSION THERAPY | Facility: HOSPITAL | Age: 37
End: 2022-02-04
Payer: COMMERCIAL

## 2022-02-04 ENCOUNTER — OFFICE VISIT (OUTPATIENT)
Dept: HEMATOLOGY/ONCOLOGY | Facility: CLINIC | Age: 37
End: 2022-02-04
Payer: COMMERCIAL

## 2022-02-04 VITALS
HEART RATE: 108 BPM | SYSTOLIC BLOOD PRESSURE: 109 MMHG | HEIGHT: 65 IN | BODY MASS INDEX: 18.07 KG/M2 | WEIGHT: 108.44 LBS | DIASTOLIC BLOOD PRESSURE: 77 MMHG | RESPIRATION RATE: 18 BRPM | OXYGEN SATURATION: 95 %

## 2022-02-04 DIAGNOSIS — C50.412 MALIGNANT NEOPLASM OF UPPER-OUTER QUADRANT OF LEFT BREAST IN FEMALE, ESTROGEN RECEPTOR POSITIVE: ICD-10-CM

## 2022-02-04 DIAGNOSIS — E55.9 VITAMIN D DEFICIENCY: ICD-10-CM

## 2022-02-04 DIAGNOSIS — Z17.0 MALIGNANT NEOPLASM OF UPPER-OUTER QUADRANT OF LEFT BREAST IN FEMALE, ESTROGEN RECEPTOR POSITIVE: ICD-10-CM

## 2022-02-04 DIAGNOSIS — Z17.0 MALIGNANT NEOPLASM OF UPPER-OUTER QUADRANT OF LEFT BREAST IN FEMALE, ESTROGEN RECEPTOR POSITIVE: Primary | ICD-10-CM

## 2022-02-04 DIAGNOSIS — Z79.810 SERM USE (SELECTIVE ESTROGEN RECEPTOR MODULATOR): ICD-10-CM

## 2022-02-04 DIAGNOSIS — T45.1X5A CHEMOTHERAPY INDUCED NEUTROPENIA: ICD-10-CM

## 2022-02-04 DIAGNOSIS — M54.50 CHRONIC BILATERAL LOW BACK PAIN WITHOUT SCIATICA: ICD-10-CM

## 2022-02-04 DIAGNOSIS — Z17.0 MALIGNANT NEOPLASM OF AXILLARY TAIL OF LEFT BREAST IN FEMALE, ESTROGEN RECEPTOR POSITIVE: ICD-10-CM

## 2022-02-04 DIAGNOSIS — C50.412 MALIGNANT NEOPLASM OF UPPER-OUTER QUADRANT OF LEFT BREAST IN FEMALE, ESTROGEN RECEPTOR POSITIVE: Primary | ICD-10-CM

## 2022-02-04 DIAGNOSIS — F43.23 ADJUSTMENT DISORDER WITH MIXED ANXIETY AND DEPRESSED MOOD: ICD-10-CM

## 2022-02-04 DIAGNOSIS — G89.29 CHRONIC BILATERAL LOW BACK PAIN WITHOUT SCIATICA: ICD-10-CM

## 2022-02-04 DIAGNOSIS — D70.1 CHEMOTHERAPY INDUCED NEUTROPENIA: ICD-10-CM

## 2022-02-04 DIAGNOSIS — E28.39 SUPPRESSION OF OVARIAN SECRETION: Primary | ICD-10-CM

## 2022-02-04 DIAGNOSIS — C50.612 MALIGNANT NEOPLASM OF AXILLARY TAIL OF LEFT BREAST IN FEMALE, ESTROGEN RECEPTOR POSITIVE: ICD-10-CM

## 2022-02-04 LAB
ALBUMIN SERPL BCP-MCNC: 4.2 G/DL (ref 3.5–5.2)
ALP SERPL-CCNC: 75 U/L (ref 55–135)
ALT SERPL W/O P-5'-P-CCNC: 24 U/L (ref 10–44)
ANION GAP SERPL CALC-SCNC: 7 MMOL/L (ref 8–16)
AST SERPL-CCNC: 27 U/L (ref 10–40)
BILIRUB SERPL-MCNC: 0.3 MG/DL (ref 0.1–1)
BUN SERPL-MCNC: 10 MG/DL (ref 6–20)
CALCIUM SERPL-MCNC: 9.8 MG/DL (ref 8.7–10.5)
CHLORIDE SERPL-SCNC: 105 MMOL/L (ref 95–110)
CO2 SERPL-SCNC: 28 MMOL/L (ref 23–29)
CREAT SERPL-MCNC: 0.8 MG/DL (ref 0.5–1.4)
ERYTHROCYTE [DISTWIDTH] IN BLOOD BY AUTOMATED COUNT: 13.7 % (ref 11.5–14.5)
EST. GFR  (AFRICAN AMERICAN): >60 ML/MIN/1.73 M^2
EST. GFR  (NON AFRICAN AMERICAN): >60 ML/MIN/1.73 M^2
GLUCOSE SERPL-MCNC: 118 MG/DL (ref 70–110)
HCT VFR BLD AUTO: 41.7 % (ref 37–48.5)
HGB BLD-MCNC: 13.8 G/DL (ref 12–16)
IMM GRANULOCYTES # BLD AUTO: 0.01 K/UL (ref 0–0.04)
MCH RBC QN AUTO: 31.4 PG (ref 27–31)
MCHC RBC AUTO-ENTMCNC: 33.1 G/DL (ref 32–36)
MCV RBC AUTO: 95 FL (ref 82–98)
NEUTROPHILS # BLD AUTO: 2.2 K/UL (ref 1.8–7.7)
PLATELET # BLD AUTO: 121 K/UL (ref 150–450)
PMV BLD AUTO: 12.2 FL (ref 9.2–12.9)
POTASSIUM SERPL-SCNC: 4.1 MMOL/L (ref 3.5–5.1)
PROT SERPL-MCNC: 7.5 G/DL (ref 6–8.4)
RBC # BLD AUTO: 4.4 M/UL (ref 4–5.4)
SODIUM SERPL-SCNC: 140 MMOL/L (ref 136–145)
WBC # BLD AUTO: 7.69 K/UL (ref 3.9–12.7)

## 2022-02-04 PROCEDURE — 3078F PR MOST RECENT DIASTOLIC BLOOD PRESSURE < 80 MM HG: ICD-10-PCS | Mod: CPTII,S$GLB,, | Performed by: NURSE PRACTITIONER

## 2022-02-04 PROCEDURE — 99214 PR OFFICE/OUTPT VISIT, EST, LEVL IV, 30-39 MIN: ICD-10-PCS | Mod: S$GLB,,, | Performed by: NURSE PRACTITIONER

## 2022-02-04 PROCEDURE — 80053 COMPREHEN METABOLIC PANEL: CPT | Performed by: NURSE PRACTITIONER

## 2022-02-04 PROCEDURE — 97110 THERAPEUTIC EXERCISES: CPT | Mod: PO

## 2022-02-04 PROCEDURE — 1160F PR REVIEW ALL MEDS BY PRESCRIBER/CLIN PHARMACIST DOCUMENTED: ICD-10-PCS | Mod: CPTII,S$GLB,, | Performed by: NURSE PRACTITIONER

## 2022-02-04 PROCEDURE — 96402 CHEMO HORMON ANTINEOPL SQ/IM: CPT

## 2022-02-04 PROCEDURE — 99999 PR PBB SHADOW E&M-EST. PATIENT-LVL V: CPT | Mod: PBBFAC,,, | Performed by: NURSE PRACTITIONER

## 2022-02-04 PROCEDURE — 3074F PR MOST RECENT SYSTOLIC BLOOD PRESSURE < 130 MM HG: ICD-10-PCS | Mod: CPTII,S$GLB,, | Performed by: NURSE PRACTITIONER

## 2022-02-04 PROCEDURE — 3008F PR BODY MASS INDEX (BMI) DOCUMENTED: ICD-10-PCS | Mod: CPTII,S$GLB,, | Performed by: NURSE PRACTITIONER

## 2022-02-04 PROCEDURE — 63600175 PHARM REV CODE 636 W HCPCS: Mod: JG | Performed by: INTERNAL MEDICINE

## 2022-02-04 PROCEDURE — 3078F DIAST BP <80 MM HG: CPT | Mod: CPTII,S$GLB,, | Performed by: NURSE PRACTITIONER

## 2022-02-04 PROCEDURE — 99999 PR PBB SHADOW E&M-EST. PATIENT-LVL V: ICD-10-PCS | Mod: PBBFAC,,, | Performed by: NURSE PRACTITIONER

## 2022-02-04 PROCEDURE — 85027 COMPLETE CBC AUTOMATED: CPT | Performed by: NURSE PRACTITIONER

## 2022-02-04 PROCEDURE — 97140 MANUAL THERAPY 1/> REGIONS: CPT | Mod: PO

## 2022-02-04 PROCEDURE — 1159F MED LIST DOCD IN RCRD: CPT | Mod: CPTII,S$GLB,, | Performed by: NURSE PRACTITIONER

## 2022-02-04 PROCEDURE — 36415 COLL VENOUS BLD VENIPUNCTURE: CPT | Performed by: NURSE PRACTITIONER

## 2022-02-04 PROCEDURE — 99214 OFFICE O/P EST MOD 30 MIN: CPT | Mod: S$GLB,,, | Performed by: NURSE PRACTITIONER

## 2022-02-04 PROCEDURE — 1159F PR MEDICATION LIST DOCUMENTED IN MEDICAL RECORD: ICD-10-PCS | Mod: CPTII,S$GLB,, | Performed by: NURSE PRACTITIONER

## 2022-02-04 PROCEDURE — 3008F BODY MASS INDEX DOCD: CPT | Mod: CPTII,S$GLB,, | Performed by: NURSE PRACTITIONER

## 2022-02-04 PROCEDURE — 1160F RVW MEDS BY RX/DR IN RCRD: CPT | Mod: CPTII,S$GLB,, | Performed by: NURSE PRACTITIONER

## 2022-02-04 PROCEDURE — 3074F SYST BP LT 130 MM HG: CPT | Mod: CPTII,S$GLB,, | Performed by: NURSE PRACTITIONER

## 2022-02-04 RX ADMIN — GOSERELIN ACETATE 3.6 MG: 3.6 IMPLANT SUBCUTANEOUS at 02:02

## 2022-02-04 NOTE — PROGRESS NOTES
"  Physical Therapy Daily Treatment Note     Name: Michelle Gage  Clinic Number: [id number]    Therapy Diagnosis:   Encounter Diagnosis   Name Primary?    Chronic bilateral low back pain without sciatica       Physician: Telma Wheatley NP    Visit Date: 2/4/2022    Physician Orders: PT Eval and Treat  Medical Diagnosis from Referral:   M54.50,G89.29 (ICD-10-CM) - Chronic bilateral low back pain without sciatica   M47.816 (ICD-10-CM) - Spondylosis of lumbar region without myelopathy or radiculopathy   M51.36 (ICD-10-CM) - Annular tear of lumbar disc     Evaluation Date: 11/30/2021  Authorization Period Expiration: 12/31/2022  Plan of Care Expiration: 3/1/2022  Progress Note Due: 2/11/2022  Visit # / Visits authorized: 7/ 20   FOTO: 1/ 3     Time In: 7:55 am (10 minutes late)  Time Out: 8:25 am  Total Billable Time: 30 minutes  Precautions: Standard and Cancer (Breast); Neutropenia   Subjective     Pt reports: she stepped up onto a step with her right foot this past Monday and felt a sharp pain in the left side of her lower back (points to the SI region). Since then she feels it has calmed down in irritability, but she still has some lingering discomfort.  She was compliant with home exercise program.  Response to previous treatment: no adverse effects   Functional change: Ongoing  Pain: 5/10 (minimal pain after treatment session)  Location: SI joint (left)  Objective     Michelle received therapeutic exercises to develop strength, endurance, ROM, flexibility, posture and core stabilization for 10 minutes including:  MET Shotgun, 5x5" (Adduction/Abduction)  MET for (R) Anterior Innominate, 5x5" hold    Not today:  5 minutes treadmill 1.5mph  Repeated Extensions in prone, 15x, standing, (extremity pain abolished)  Shuttle Squats, bilateral, blue band above knees, 2x10, 2 ropes (50#)  Quadruped TrA Activation + Bird-Dogs 3x5 each side (Home exercise program)  Dead bugs both extremities x10    Michelle " "participated in neuromuscular re-education activities to improve: Balance, Coordination, Kinesthetic, Sense, Proprioception and Posture for 00 minutes. The following activities were included:  Rhythmic Stabilization, supine    Hip-Hinging with Dowel Savage, Sit<>Stand --> progressed to no savage (3x10)  Squats with emphasis on hip hinge  Hip-Hinging with Dowel Savage, seated, 10x  Hip-Hinging with Dowel Savage, standing, 10x-->progressed to no savage (10x)  Rhythmic Stabilization, supine, UE/LE core activation, unilateral 10x each side --> f/b alternating 10x    Michelle participated in dynamic functional therapeutic activities to improve functional performance for 20 minutes, including:  Lifting 15# DB 2x10 from 12" black metal box  Flores Carries, 14#  Exercise ball bilateral, 20 feet, 2x trials   Flores Carries, 15# uniateral, 20 feet, 2x trials  Monster walks with yellow theraband around knees 2x 5 ascending reps in alternating directions  Lifting 5# KB and 10# KB, 4x trials each weight from 30" surface, 15" surface, 5" surface, and from floor to waist  Flores Carries, 10# bilateral, 20 feet, 3x trials  Lifting 6" Box, 5x trials from floor to waist    Manual therapy techniques: Soft tissue Mobilization were applied to the: SI for  minutes, including:  Assessment measurements taken (lumbar spine range of motion, pelvic alignment)  MET Shotgun, 5x5" (Adduction/Abduction)  MET for (R) Anterior Innominate, 5x5" hold  Posterior Innominate Mobilizations, Grade IV 3x30" oscillations (patient's permission granted for PT hand placement on ischium)    Home Exercises Provided and Patient Education Provided     Education provided:   - HEP, TA activation, posture education while seated   -Lumbar Roll ([place] roll) for improving sitting posture during driving and working    Written Home Exercises Provided: yes.  Exercises were reviewed and Michelle was able to demonstrate them prior to the end of the session.  Michelle demonstrated good  " understanding of the education provided.     See EMR under Patient Instructions for exercises provided 12/2/2021.  Assessment   Shortened session today due to time constrictions to which the patient was amenable to. Assessment of acute exacerbator of pain today, with patient noting increased pain in left SIJ region, below L5-S1. She was noted to have increased pain in both flexion and extension and with a right anteriorly rotated innominate bone as compared to left. This was addressed with MET techniques and mobilizations, with retesting into flexion and extension improved with less pain and improved mobility.    Michelle is progressing well towards her goals.   Pt prognosis is Good.     Pt will continue to benefit from skilled outpatient physical therapy to address the deficits listed in the problem list box on initial evaluation, provide pt/family education and to maximize pt's level of independence in the home and community environment.     Pt's spiritual, cultural and educational needs considered and pt agreeable to plan of care and goals.     Anticipated barriers to physical therapy: None    Goals:   SHORT TERM GOALS:  4 weeks 11/30/2021   1. Recent signs and systems trend is improving in order to progress towards LTG's.     2. Patient will be independent with HEP in order to further progress and return to maximal function.     3. Pain rating at Worst: 5/10 in order to progress towards increased independence with activity.     4. Patient will be able to correct postural deviations in sitting and standing, to decrease pain and promote postural awareness for injury prevention.         LONG TERM GOALS: 10 weeks 11/30/2021   1. Patient will return to normal ADL, recreational, and work related activities with less pain and limitation.      2. Patient will improve AROM to stated goals in order to return to maximal functional potential.      3. Patient will improve Strength to stated goals of appropriate musculature in  order to improve functional independence.      4. Pain Rating at Best: 1/10 to improve Quality of Life.      5. Patient will meet predicted functional outcome (FOTO) score: 20% to increase self-worth & perceived functional ability.     6. Patient will have met/partially met personal goal of: Being able to work a full day without activity modification for pain.        Plan     Follow up with efficacy of interventions today. Resume plan of care for lumbopelvic stabilization, TrA, multifidi and abdominal training and re-training motor coordination impairments as tolerated.    Sarah Edmondson, PT, DPT, OCS

## 2022-02-08 ENCOUNTER — TELEPHONE (OUTPATIENT)
Dept: HEMATOLOGY/ONCOLOGY | Facility: CLINIC | Age: 37
End: 2022-02-08
Payer: COMMERCIAL

## 2022-02-08 ENCOUNTER — CLINICAL SUPPORT (OUTPATIENT)
Dept: REHABILITATION | Facility: HOSPITAL | Age: 37
End: 2022-02-08
Payer: COMMERCIAL

## 2022-02-08 DIAGNOSIS — G89.29 CHRONIC BILATERAL LOW BACK PAIN WITHOUT SCIATICA: ICD-10-CM

## 2022-02-08 DIAGNOSIS — M54.50 CHRONIC BILATERAL LOW BACK PAIN WITHOUT SCIATICA: ICD-10-CM

## 2022-02-08 PROCEDURE — 97140 MANUAL THERAPY 1/> REGIONS: CPT | Mod: PO

## 2022-02-08 PROCEDURE — 97110 THERAPEUTIC EXERCISES: CPT | Mod: PO

## 2022-02-08 NOTE — PROGRESS NOTES
"  Physical Therapy Daily Treatment Note     Name: Michelle [facility]mana [facility]  Clinic Number: [id number]    Therapy Diagnosis:   Encounter Diagnosis   Name Primary?    Chronic bilateral low back pain without sciatica       Physician: Telma Wheatley NP    Visit Date: 2/8/2022    Physician Orders: PT Eval and Treat  Medical Diagnosis from Referral:   M54.50,G89.29 (ICD-10-CM) - Chronic bilateral low back pain without sciatica   M47.816 (ICD-10-CM) - Spondylosis of lumbar region without myelopathy or radiculopathy   M51.36 (ICD-10-CM) - Annular tear of lumbar disc     Evaluation Date: 11/30/2021  Authorization Period Expiration: 12/31/2022  Plan of Care Expiration: 3/1/2022  Progress Note Due: 2/11/2022  Visit # / Visits authorized: 7/ 20   FOTO: 1/ 3     Time In: 7:38 am (8 minutes late)  Time Out: 8:15 am  Total Billable Time: 38 minutes  Precautions: Standard and Cancer (Breast); Neutropenia   Subjective     Pt reports: her lower back pain was feeling a lot better after last treatment session, but started to bother her on Saturday. It felt better Sunday after rest and has been better overall but still flared up today. She notes that she feels as if her lower back feels as if it is "catching."  She was compliant with home exercise program.  Response to previous treatment: no adverse effects   Functional change: Ongoing  Pain: 3-4/10 (minimal pain after treatment session)  Location: SI joint (left)  Objective   Palpation: (+) (L) Long Dorsal Ligament (patient noted significant sensitivity); not present on the right  (L) ASIS in Anterior Innominate Rotation  (-) TTP of Lumbar 1-5, no concordant pain  Hip E.R: (4-/5)  Hip Abduction : 4-/5    Michelle received therapeutic exercises to develop strength, endurance, ROM, flexibility, posture and core stabilization for 30 minutes including:  MET Shotgun, 5x5" (Adduction/Abduction)  MET for (L) Anterior Innominate, 5x7" hold  Clamshells, orange band. 3x8, 5" hold " "(L)  Bridges + TrA Contraction with Green Band, 2x10 (1 set on each side)  Side-Steps, orange band, 5x down and back length of the table    Michelle participated in neuromuscular re-education activities to improve: Balance, Coordination, Kinesthetic, Sense, Proprioception and Posture for 00 minutes. The following activities were included:    Michelle participated in dynamic functional therapeutic activities to improve functional performance for --0 minutes, includin  Manual therapy techniques: Soft tissue Mobilization were applied to the: SI for 8 minutes, including:  Assessment measurements taken (lumbar spine range of motion, pelvic alignment)  MET Shotgun, 5x5" (Adduction/Abduction)  MET for (R) Anterior Innominate, 5x5" hold  (L) Innominate Internal Rotation Glides, 3x30"  Grade III-IV    Home Exercises Provided and Patient Education Provided     Education provided:   - HEP, TA activation, posture education while seated   -Lumbar Roll (Chacho roll) for improving sitting posture during driving and working    Written Home Exercises Provided: yes.  Exercises were reviewed and Michelle was able to demonstrate them prior to the end of the session.  Michelle demonstrated good  understanding of the education provided.     See EMR under Patient Instructions for exercises provided 2021.  Assessment   Susan demonstrated increased pain to palpation of left long dorsal sacroilliac ligament that was not present on the right. This session she had no pain with joint accessory mobility of any section of the lumbar spine. Single leg stance noted increased Trendelenberg and internal rotation on the left compared to the right. She consistently demonstrated left anteriorly rotated innominate that responded well to MET. Patient's external rotation and abduction were tested due to weakness demonstrated as well as muscles contribution to SIJ stability, and both tested weak. Patient tolerated additional proximal hip " "strengthening well without pain and improved pain and sense of "catching" this visit.    Michelle is progressing well towards her goals.   Pt prognosis is Good.     Pt will continue to benefit from skilled outpatient physical therapy to address the deficits listed in the problem list box on initial evaluation, provide pt/family education and to maximize pt's level of independence in the home and community environment.     Pt's spiritual, cultural and educational needs considered and pt agreeable to plan of care and goals.     Anticipated barriers to physical therapy: None    Goals:   SHORT TERM GOALS:  4 weeks 11/30/2021   1. Recent signs and systems trend is improving in order to progress towards LTG's.     2. Patient will be independent with HEP in order to further progress and return to maximal function.     3. Pain rating at Worst: 5/10 in order to progress towards increased independence with activity.     4. Patient will be able to correct postural deviations in sitting and standing, to decrease pain and promote postural awareness for injury prevention.         LONG TERM GOALS: 10 weeks 11/30/2021   1. Patient will return to normal ADL, recreational, and work related activities with less pain and limitation.      2. Patient will improve AROM to stated goals in order to return to maximal functional potential.      3. Patient will improve Strength to stated goals of appropriate musculature in order to improve functional independence.      4. Pain Rating at Best: 1/10 to improve Quality of Life.      5. Patient will meet predicted functional outcome (FOTO) score: 20% to increase self-worth & perceived functional ability.     6. Patient will have met/partially met personal goal of: Being able to work a full day without activity modification for pain.        Plan     Follow up with efficacy of interventions today. Resume plan of care for lumbopelvic stabilization, TrA, multifidi and abdominal training and re-training " motor coordination impairments as tolerated.  Re-assessment next session.    Sarah Edmondson, PT, DPT, OCS

## 2022-02-15 ENCOUNTER — CLINICAL SUPPORT (OUTPATIENT)
Dept: REHABILITATION | Facility: HOSPITAL | Age: 37
End: 2022-02-15
Payer: COMMERCIAL

## 2022-02-15 DIAGNOSIS — M54.50 CHRONIC BILATERAL LOW BACK PAIN WITHOUT SCIATICA: ICD-10-CM

## 2022-02-15 DIAGNOSIS — G89.29 CHRONIC BILATERAL LOW BACK PAIN WITHOUT SCIATICA: ICD-10-CM

## 2022-02-15 PROCEDURE — 97140 MANUAL THERAPY 1/> REGIONS: CPT | Mod: PO

## 2022-02-15 PROCEDURE — 97110 THERAPEUTIC EXERCISES: CPT | Mod: PO

## 2022-02-15 NOTE — PROGRESS NOTES
"  Physical Therapy Daily Treatment Note and Progress Note     Name: Michelle Umana [place]  Clinic Number: [id number]    Therapy Diagnosis:   Encounter Diagnosis   Name Primary?    Chronic bilateral low back pain without sciatica       Physician: Telma Wheatley NP    Visit Date: 2/15/2022    Physician Orders: PT Eval and Treat  Medical Diagnosis from Referral:   M54.50,G89.29 (ICD-10-CM) - Chronic bilateral low back pain without sciatica   M47.816 (ICD-10-CM) - Spondylosis of lumbar region without myelopathy or radiculopathy   M51.36 (ICD-10-CM) - Annular tear of lumbar disc     Evaluation Date: 11/30/2021  Authorization Period Expiration: 12/31/2022  Plan of Care Expiration: 3/1/2022  Progress Note Due: 3/17/2022  Most Recent Progress Note: 2/15/2022  Visit # / Visits authorized: 7/ 20   FOTO: 1/ 3     Time In: 7:40 am (10 minutes late)  Time Out: 8:15 am  Total Billable Time: 35 minutes  Precautions: Standard and Cancer (Breast); Neutropenia   Subjective     Pt reports: she felt a little "heaviness in her back" while walking a few blocks over the weekend, but this improved the next day. She reports no increase in pain and that overall she has been feeling much better.  She was compliant with home exercise program.  Response to previous treatment: no adverse effects   Functional change: Ongoing  Pain: 1/10   Location: SI joint (left)  Objective     Lumbar AROM Pain/Dysfunction with movement   Flexion Full No pain, slightly decreased hip hinge   Extension Full No pain   Right Side Bending Full No pain   Left Side Bending Full No pain   Right Rotation Full No pain   Left Rotation Full No pain     Special Testing:      Thigh-Thrust Test -   SIJ Distraction Test -   SIJ Compression Test -   Sacral Thrust Test -   ASLR +   ELIGIO's Test -       Michelle received therapeutic exercises to develop strength, endurance, ROM, flexibility, posture and core stabilization for 20 minutes including:  Clamshells, orange " "band. 3x8, 5" hold (L)  Bridges + Lat Pull Down, supine 2x10, blue band  Sit to Stands, 5# KB, 5x, 19" surface; progressed to 10#    Michelle participated in neuromuscular re-education activities to improve: Balance, Coordination, Kinesthetic, Sense, Proprioception and Posture for 00 minutes. The following activities were included:    Manual therapy techniques: Soft tissue Mobilization were applied to the: SI for 8 minutes, including:  Assessment measurements taken (15 minutes)    Home Exercises Provided and Patient Education Provided     Education provided:   - HEP, TA activation, posture education while seated   -Lumbar Roll ([place] roll) for improving sitting posture during driving and working    Written Home Exercises Provided: yes.  Exercises were reviewed and Michelle was able to demonstrate them prior to the end of the session.  Michelle demonstrated good  understanding of the education provided.     See EMR under Patient Instructions for exercises provided 12/2/2021.  Assessment   Michelle demonstrates no pain and improved motor control during all planes of active lumbar range of motion. All SIJ testing negative, and no provocation of pain with testing measures this visit. Patient continues to demonstrates slight core weakness as ASLR test demonstrated greater perceived and observed ease of motion with increased approximation. She continues to demonstrate glute weakness, albeit no tenderness to palpation of SIJ ligaments. Patient feels that she could still benefit from getting stronger, and would like to continue at 1x/per week frequency.    Michelle is progressing well towards her goals.   Pt prognosis is Good.     Pt will continue to benefit from skilled outpatient physical therapy to address the deficits listed in the problem list box on initial evaluation, provide pt/family education and to maximize pt's level of independence in the home and community environment.     Pt's spiritual, cultural and " educational needs considered and pt agreeable to plan of care and goals.     Anticipated barriers to physical therapy: None    Goals:   SHORT TERM GOALS:  4 weeks 11/30/2021   1. Recent signs and systems trend is improving in order to progress towards LTG's. Met   2. Patient will be independent with HEP in order to further progress and return to maximal function. Met    3. Pain rating at Worst: 5/10 in order to progress towards increased independence with activity.  Met   4. Patient will be able to correct postural deviations in sitting and standing, to decrease pain and promote postural awareness for injury prevention.  Met       LONG TERM GOALS: 10 weeks 11/30/2021   1. Patient will return to normal ADL, recreational, and work related activities with less pain and limitation.   Met   2. Patient will improve AROM to stated goals in order to return to maximal functional potential.   Met    3. Patient will improve Strength to stated goals of appropriate musculature in order to improve functional independence.  Ongoing    4. Pain Rating at Best: 1/10 to improve Quality of Life.    Met   5. Patient will meet predicted functional outcome (FOTO) score: 20% to increase self-worth & perceived functional ability.  Ongoing   6. Patient will have met/partially met personal goal of: Being able to work a full day without activity modification for pain.   Met      Plan   Continue core, gluteal strengthening for 1x per week for 4-6 weeks.    Sarah Edmondson, PT, DPT, OCS

## 2022-02-15 NOTE — PLAN OF CARE
"  Physical Therapy Daily Treatment Note and Progress Note     Name: Michelle Umana [place]  Clinic Number: [id number]    Therapy Diagnosis:   Encounter Diagnosis   Name Primary?    Chronic bilateral low back pain without sciatica       Physician: Telma Wheatley NP    Visit Date: 2/15/2022    Physician Orders: PT Eval and Treat  Medical Diagnosis from Referral:   M54.50,G89.29 (ICD-10-CM) - Chronic bilateral low back pain without sciatica   M47.816 (ICD-10-CM) - Spondylosis of lumbar region without myelopathy or radiculopathy   M51.36 (ICD-10-CM) - Annular tear of lumbar disc     Evaluation Date: 11/30/2021  Authorization Period Expiration: 12/31/2022  Plan of Care Expiration: 3/1/2022  Progress Note Due: 3/17/2022  Most Recent Progress Note: 2/15/2022  Visit # / Visits authorized: 7/ 20   FOTO: 1/ 3     Time In: 7:40 am (10 minutes late)  Time Out: 8:15 am  Total Billable Time: 35 minutes  Precautions: Standard and Cancer (Breast); Neutropenia   Subjective     Pt reports: she felt a little "heaviness in her back" while walking a few blocks over the weekend, but this improved the next day. She reports no increase in pain and that overall she has been feeling much better.  She was compliant with home exercise program.  Response to previous treatment: no adverse effects   Functional change: Ongoing  Pain: 1/10   Location: SI joint (left)  Objective     Lumbar AROM Pain/Dysfunction with movement   Flexion Full No pain, slightly decreased hip hinge   Extension Full No pain   Right Side Bending Full No pain   Left Side Bending Full No pain   Right Rotation Full No pain   Left Rotation Full No pain     Special Testing:      Thigh-Thrust Test -   SIJ Distraction Test -   SIJ Compression Test -   Sacral Thrust Test -   ASLR +   ELIGIO's Test -       Michelle received therapeutic exercises to develop strength, endurance, ROM, flexibility, posture and core stabilization for 20 minutes including:  Clamshells, orange " "band. 3x8, 5" hold (L)  Bridges + Lat Pull Down, supine 2x10, blue band  Sit to Stands, 5# KB, 5x, 19" surface; progressed to 10#    Michelle participated in neuromuscular re-education activities to improve: Balance, Coordination, Kinesthetic, Sense, Proprioception and Posture for 00 minutes. The following activities were included:    Manual therapy techniques: Soft tissue Mobilization were applied to the: SI for 8 minutes, including:  Assessment measurements taken (15 minutes)    Home Exercises Provided and Patient Education Provided     Education provided:   - HEP, TA activation, posture education while seated   -Lumbar Roll (Chacho roll) for improving sitting posture during driving and working    Written Home Exercises Provided: yes.  Exercises were reviewed and Michelle was able to demonstrate them prior to the end of the session.  Michelle demonstrated good  understanding of the education provided.     See EMR under Patient Instructions for exercises provided 12/2/2021.  Assessment   Michelle demonstrates no pain and improved motor control during all planes of active lumbar range of motion. All SIJ testing negative, and no provocation of pain with testing measures this visit. Patient continues to demonstrates slight core weakness as ASLR test demonstrated greater perceived and observed ease of motion with increased approximation. She continues to demonstrate glute weakness, albeit no tenderness to palpation of SIJ ligaments. Patient feels that she could still benefit from getting stronger, and would like to continue at 1x/per week frequency.    Michelle is progressing well towards her goals.   Pt prognosis is Good.     Pt will continue to benefit from skilled outpatient physical therapy to address the deficits listed in the problem list box on initial evaluation, provide pt/family education and to maximize pt's level of independence in the home and community environment.     Pt's spiritual, cultural and " educational needs considered and pt agreeable to plan of care and goals.     Anticipated barriers to physical therapy: None    Goals:   SHORT TERM GOALS:  4 weeks 11/30/2021   1. Recent signs and systems trend is improving in order to progress towards LTG's. Met   2. Patient will be independent with HEP in order to further progress and return to maximal function. Met    3. Pain rating at Worst: 5/10 in order to progress towards increased independence with activity.  Met   4. Patient will be able to correct postural deviations in sitting and standing, to decrease pain and promote postural awareness for injury prevention.  Met       LONG TERM GOALS: 10 weeks 11/30/2021   1. Patient will return to normal ADL, recreational, and work related activities with less pain and limitation.   Met   2. Patient will improve AROM to stated goals in order to return to maximal functional potential.   Met    3. Patient will improve Strength to stated goals of appropriate musculature in order to improve functional independence.  Ongoing    4. Pain Rating at Best: 1/10 to improve Quality of Life.    Met   5. Patient will meet predicted functional outcome (FOTO) score: 20% to increase self-worth & perceived functional ability.  Ongoing   6. Patient will have met/partially met personal goal of: Being able to work a full day without activity modification for pain.   Met      Plan   Continue core, gluteal strengthening for 1x per week for 4-6 weeks.    Sarah Edmondson, PT, DPT, OCS

## 2022-03-03 ENCOUNTER — CLINICAL SUPPORT (OUTPATIENT)
Dept: REHABILITATION | Facility: HOSPITAL | Age: 37
End: 2022-03-03
Payer: COMMERCIAL

## 2022-03-03 DIAGNOSIS — M54.50 CHRONIC BILATERAL LOW BACK PAIN WITHOUT SCIATICA: Primary | ICD-10-CM

## 2022-03-03 DIAGNOSIS — G89.29 CHRONIC BILATERAL LOW BACK PAIN WITHOUT SCIATICA: Primary | ICD-10-CM

## 2022-03-03 PROCEDURE — 97110 THERAPEUTIC EXERCISES: CPT | Mod: PO

## 2022-03-03 PROCEDURE — 97140 MANUAL THERAPY 1/> REGIONS: CPT | Mod: PO

## 2022-03-03 NOTE — PROGRESS NOTES
"  Physical Therapy Daily Treatment Note     [facility]  Clinic Number: [id number]    Therapy Diagnosis:   Encounter Diagnosis   Name Primary?    Chronic bilateral low back pain without sciatica Yes      Physician: Telma Wheatley NP    Visit Date: 3/3/2022    Physician Orders: PT Eval and Treat  Medical Diagnosis from Referral:   M54.50,G89.29 (ICD-10-CM) - Chronic bilateral low back pain without sciatica   M47.816 (ICD-10-CM) - Spondylosis of lumbar region without myelopathy or radiculopathy   M51.36 (ICD-10-CM) - Annular tear of lumbar disc     Evaluation Date: 11/30/2021  Authorization Period Expiration: 12/31/2022  Plan of Care Expiration: 3/1/2022  Progress Note Due: 3/17/2022  Most Recent Progress Note: 2/15/2022  Visit # / Visits authorized: 10/ 20   FOTO: 1/ 3     Time In: 5:00 pm  Time Out: 5:46 pm  Total Billable Time: 46 minutes  Precautions: Standard and Cancer (Breast); Neutropenia   Subjective     Pt reports: Pt states that she's only had a couple of days where she's felt in pain and it's only been after long days with activity.    She was compliant with home exercise program.  Response to previous treatment: no adverse effects   Functional change: Ongoing  Pain: 1/10   Location: SI joint (left)  Objective     Michelle received therapeutic exercises to develop strength, endurance, ROM, flexibility, posture and core stabilization for 38 minutes including:    Clamshells, orange band. 3x8, 5" hold (L)  Bridges + Lat Pull Down, supine 2x10, blue band  Bird/dogs 2x8  Dead bugs 3x8  Planks 15" x4  Deadlifts with 25# KB on small black box 3x10. VC for hip hinge and glute activation during exercise    Michelle participated in neuromuscular re-education activities to improve: Balance, Coordination, Kinesthetic, Sense, Proprioception and Posture for 00 minutes. The following activities were included:    Manual therapy techniques: Soft tissue Mobilization were applied to the: SI for 15 " minutes, including:  Shotgun technique MET  STM to lumbar paraspinals, glute med, glute max    Home Exercises Provided and Patient Education Provided     Education provided:   Protein intake after exercise for adequate recovery and muscle adaptation.    Written Home Exercises Provided: yes.  Exercises were reviewed and Michelle was able to demonstrate them prior to the end of the session.  Michelle demonstrated good  understanding of the education provided.     See EMR under Patient Instructions for exercises provided 12/2/2021.  Assessment   Michelle presented today with mild increase in pain secondary to increased volume of activity during the week. Core exercises continue to challenge her as noted by pt fatigue and breakdown in form as sets progressed. She continues to demonstrate glute weakness with mild tenderness to palpation of SIJ ligaments. Deadlifts provided pt with adequate fatigue at her hamstrings and glutes. Continue to progress strengthening program.    Michelle is progressing well towards her goals.   Pt prognosis is Good.     Pt will continue to benefit from skilled outpatient physical therapy to address the deficits listed in the problem list box on initial evaluation, provide pt/family education and to maximize pt's level of independence in the home and community environment.     Pt's spiritual, cultural and educational needs considered and pt agreeable to plan of care and goals.     Anticipated barriers to physical therapy: None    Goals:   SHORT TERM GOALS:  4 weeks 11/30/2021   1. Recent signs and systems trend is improving in order to progress towards LTG's. Met   2. Patient will be independent with HEP in order to further progress and return to maximal function. Met    3. Pain rating at Worst: 5/10 in order to progress towards increased independence with activity.  Met   4. Patient will be able to correct postural deviations in sitting and standing, to decrease pain and promote postural awareness  for injury prevention.  Met       LONG TERM GOALS: 10 weeks 11/30/2021   1. Patient will return to normal ADL, recreational, and work related activities with less pain and limitation.   Met   2. Patient will improve AROM to stated goals in order to return to maximal functional potential.   Met    3. Patient will improve Strength to stated goals of appropriate musculature in order to improve functional independence.  Ongoing    4. Pain Rating at Best: 1/10 to improve Quality of Life.    Met   5. Patient will meet predicted functional outcome (FOTO) score: 20% to increase self-worth & perceived functional ability.  Ongoing   6. Patient will have met/partially met personal goal of: Being able to work a full day without activity modification for pain.   Met      Plan   Continue core, gluteal strengthening for 1x per week for 4-6 weeks.    Jonathan Abrams, PT, DPT

## 2022-03-04 ENCOUNTER — INFUSION (OUTPATIENT)
Dept: INFUSION THERAPY | Facility: HOSPITAL | Age: 37
End: 2022-03-04
Payer: COMMERCIAL

## 2022-03-04 DIAGNOSIS — E28.39 SUPPRESSION OF OVARIAN SECRETION: Primary | ICD-10-CM

## 2022-03-04 PROCEDURE — 63600175 PHARM REV CODE 636 W HCPCS: Mod: JG | Performed by: INTERNAL MEDICINE

## 2022-03-04 PROCEDURE — 96402 CHEMO HORMON ANTINEOPL SQ/IM: CPT

## 2022-03-04 RX ADMIN — GOSERELIN ACETATE 3.6 MG: 3.6 IMPLANT SUBCUTANEOUS at 11:03

## 2022-03-04 NOTE — NURSING
Pt here for zoladex injection. emla cream applied to injection area per pt pre arrival to unit. Pt tolerated injection to left lower abdomen without difficulty.

## 2022-03-10 ENCOUNTER — CLINICAL SUPPORT (OUTPATIENT)
Dept: REHABILITATION | Facility: HOSPITAL | Age: 37
End: 2022-03-10
Payer: COMMERCIAL

## 2022-03-10 DIAGNOSIS — M51.36 ANNULAR TEAR OF LUMBAR DISC: ICD-10-CM

## 2022-03-10 DIAGNOSIS — M54.50 CHRONIC BILATERAL LOW BACK PAIN WITHOUT SCIATICA: Primary | ICD-10-CM

## 2022-03-10 DIAGNOSIS — G89.29 CHRONIC BILATERAL LOW BACK PAIN WITHOUT SCIATICA: Primary | ICD-10-CM

## 2022-03-10 DIAGNOSIS — M47.816 SPONDYLOSIS OF LUMBAR REGION WITHOUT MYELOPATHY OR RADICULOPATHY: ICD-10-CM

## 2022-03-10 PROCEDURE — 97110 THERAPEUTIC EXERCISES: CPT | Mod: PO

## 2022-03-10 NOTE — PROGRESS NOTES
"  Physical Therapy Daily Treatment Note     Name: Michelle Gage  Clinic Number: [id number]    Therapy Diagnosis:   Encounter Diagnoses   Name Primary?    Chronic bilateral low back pain without sciatica Yes    Annular tear of lumbar disc     Spondylosis of lumbar region without myelopathy or radiculopathy       Physician: Telma Wheatley NP    Visit Date: 3/10/2022    Physician Orders: PT Eval and Treat  Medical Diagnosis from Referral:   M54.50,G89.29 (ICD-10-CM) - Chronic bilateral low back pain without sciatica   M47.816 (ICD-10-CM) - Spondylosis of lumbar region without myelopathy or radiculopathy   M51.36 (ICD-10-CM) - Annular tear of lumbar disc     Evaluation Date: 11/30/2021  Authorization Period Expiration: 12/31/2022  Plan of Care Expiration: 3/1/2022  Progress Note Due: 3/17/2022  Most Recent Progress Note: 2/15/2022  Visit # / Visits authorized: 11/20   FOTO: 1/ 3   Time In: 5:45 pm  Time Out: 6:30 pm  Total Billable Time: 46 minutes  Precautions: Standard and Cancer (Breast); Neutropenia   Subjective   Pt reports: she feels that occasionally she will get a sharp pain in the front of her right hip, that will come on suddenly and disappear right away. She feels that her back is about 80% better, and she has not had a feeling of "crunchiness" or that her tailbone is "twisted any longer." She notes that she has been doing Pilates for the last 3 weeks and feels this has been a very beneficial supplement to her PT care.  She was compliant with home exercise program.  Response to previous treatment: no adverse effects   Functional change: Ongoing  Pain: 0/10   Location: SI joint (left)  Objective   (-) Scour Test  (-) FADDIR Test  Lower Abdominal Test: (+), loss of posterior tilt at 80 degrees from vertical    Michelle received therapeutic exercises to develop strength, endurance, ROM, flexibility, posture and core stabilization for 45 minutes including:  Clamshells, orange band. 3x8, 5" hold " "(L) -not today  Paloff Walk-Out, green band, 10x each side, 3 steps each side  Bridges + Lat Pull Down, supine 2x10, blue band (1x10 with pink Cooper Cook band)  +Multifidi Extension, orange band, 2x10  Modified Dead bugs 2x10 each side, hook-lying position  Bear Crawl Static Planks 10x8" hold --> Progressing to alternating LE climbers 2x30" hold  Deadlifts with 25# KB on small black box 3x10. VC for hip hinge and glute activation during exercise (not today)    Home Exercises Provided and Patient Education Provided     Education provided:   Protein intake after exercise for adequate recovery and muscle adaptation.    Written Home Exercises Provided: yes.  Exercises were reviewed and Michelle was able to demonstrate them prior to the end of the session.  Michelle demonstrated good  understanding of the education provided.     See EMR under Patient Instructions for exercises provided 12/2/2021.  Assessment   Jamies hip pain was not reproduced by provocative testing for hip joint pathology or impingement, and she was encouraged monitor symptoms going forward in the event that this may need further assessment from PT or from additional medical provider. She was able to tolerate all core stability progressions with good tolerance, but still remains with decreased core and abdominal stability as noted with increased pelvic rotation in supine with LE movement, and ~80 degrees from horizontal prior to loss of posterior tilt.    Michelle is progressing well towards her goals.   Pt prognosis is Good.     Pt will continue to benefit from skilled outpatient physical therapy to address the deficits listed in the problem list box on initial evaluation, provide pt/family education and to maximize pt's level of independence in the home and community environment.     Pt's spiritual, cultural and educational needs considered and pt agreeable to plan of care and goals.     Anticipated barriers to physical therapy: None    Goals: "   SHORT TERM GOALS:  4 weeks 11/30/2021   1. Recent signs and systems trend is improving in order to progress towards LTG's. Met   2. Patient will be independent with HEP in order to further progress and return to maximal function. Met    3. Pain rating at Worst: 5/10 in order to progress towards increased independence with activity.  Met   4. Patient will be able to correct postural deviations in sitting and standing, to decrease pain and promote postural awareness for injury prevention.  Met       LONG TERM GOALS: 10 weeks 11/30/2021   1. Patient will return to normal ADL, recreational, and work related activities with less pain and limitation.   Met   2. Patient will improve AROM to stated goals in order to return to maximal functional potential.   Met    3. Patient will improve Strength to stated goals of appropriate musculature in order to improve functional independence.  Ongoing    4. Pain Rating at Best: 1/10 to improve Quality of Life.    Met   5. Patient will meet predicted functional outcome (FOTO) score: 20% to increase self-worth & perceived functional ability.  Ongoing   6. Patient will have met/partially met personal goal of: Being able to work a full day without activity modification for pain.   Met      Plan   Continue core, gluteal strengthening for 1x per week for 4-6 weeks.    Sarah Edmondson PT, DPT, OCS  License Number: [id number]

## 2022-03-17 ENCOUNTER — CLINICAL SUPPORT (OUTPATIENT)
Dept: REHABILITATION | Facility: HOSPITAL | Age: 37
End: 2022-03-17
Payer: COMMERCIAL

## 2022-03-17 DIAGNOSIS — M47.816 SPONDYLOSIS OF LUMBAR REGION WITHOUT MYELOPATHY OR RADICULOPATHY: ICD-10-CM

## 2022-03-17 DIAGNOSIS — M51.36 ANNULAR TEAR OF LUMBAR DISC: ICD-10-CM

## 2022-03-17 DIAGNOSIS — G89.29 CHRONIC BILATERAL LOW BACK PAIN WITHOUT SCIATICA: Primary | ICD-10-CM

## 2022-03-17 DIAGNOSIS — M54.50 CHRONIC BILATERAL LOW BACK PAIN WITHOUT SCIATICA: Primary | ICD-10-CM

## 2022-03-17 PROCEDURE — 97140 MANUAL THERAPY 1/> REGIONS: CPT | Mod: PO

## 2022-03-17 PROCEDURE — 97110 THERAPEUTIC EXERCISES: CPT | Mod: PO

## 2022-03-17 NOTE — PLAN OF CARE
"  Physical Therapy Daily Treatment Note, Progress Note, and Update to POC     Name: Michelle Gage  Clinic Number: [id number]    Therapy Diagnosis:   Encounter Diagnoses   Name Primary?    Chronic bilateral low back pain without sciatica Yes    Annular tear of lumbar disc     Spondylosis of lumbar region without myelopathy or radiculopathy       Physician: Telma Wheatley NP    Visit Date: 3/17/2022    Physician Orders: PT Eval and Treat  Medical Diagnosis from Referral:   M54.50,G89.29 (ICD-10-CM) - Chronic bilateral low back pain without sciatica   M47.816 (ICD-10-CM) - Spondylosis of lumbar region without myelopathy or radiculopathy   M51.36 (ICD-10-CM) - Annular tear of lumbar disc     Evaluation Date: 11/30/2021  Authorization Period Expiration: 12/31/2022  Plan of Care Expiration: 3/1/2022 (Updated to 3/31/2022)  Progress Note Due: 4/18/2020  Most Recent Progress Note: 3/17/2022  Visit # / Visits authorized: 12/20   FOTO: 1/ 3   Time In: 5:50 pm  Time Out: 6:30 pm  Total Billable Time: 40 minutes  Precautions: Standard and Cancer (Breast); Neutropenia   Subjective   Pt reports: she feels that her right hip is much "stiffer than the left one" and feels that this might be causing the increased hip pain. She also notes that she feels an uncomfortable "burning" when she performs the clamshells.  She was compliant with home exercise program.  Response to previous treatment: no adverse effects   Functional change: Ongoing  Pain: 0/10   Location: SI joint (left)  Objective   Lower Abdominal Test: (+), loss of posterior tilt at 80 degrees from vertical  Step-Up Test: (-)  Hip Extension: 4/5 Increased lumbar multifidi activity  Hip Abduction: 4-/5 (left)  Hip External Rotation: 4+/5 (left) 4+/5 right ("burning bilaterally")  Say's Test: (-) bilaterally  Otoniel Test: (-) bilaterally  Hip external rotation range of motion: Normal bilaterally in prone and supine  Hip internal rotation rotation range of " "motion: Normal bilaterally in prone and supine  (-) TTP to (B) Piriformis Test  (-) SLR Test, bilaterally  (-) Sign of the Buttock, bilaterally    Michelle received therapeutic exercises to develop strength, endurance, ROM, flexibility, posture and core stabilization for 20 minutes including:  Paloff Walk-Out, green band above knees, 10x each side, 3 steps each side  Step-Ups + Lat Pull Down, 7# cable 2x10, (first set with 8 aerobic step, 2nd set with 11 inch plyobox)  Kneeling Hip Thrusts + Lat Pull Down, blue power band for hip thrusts, black theraband for lat pull down, 15x    Michelle received the following manual therapy techniques: Soft tissue Mobilization were applied for 20 minutes, including:  (R) Illiopsoas Release  Re-assessment measures taken.    Home Exercises Provided and Patient Education Provided     Education provided:   Protein intake after exercise for adequate recovery and muscle adaptation.    Written Home Exercises Provided: yes.  Exercises were reviewed and Michelle was able to demonstrate them prior to the end of the session.  Michelle demonstrated good  understanding of the education provided.     See EMR under Patient Instructions for exercises provided 12/2/2021.  Assessment   Lyndsay presents with bilateral hip "burning" when performing active external rotation, that is not present with passive hip external rotation range of motion, palpation or other hip activation exercises. These descriptions, combined with objective measures, may be increased muscle activation however Lyndsay was encourage to continue monitoring in the event that referral may be necessary. Illiopsoas on the right was more tender than the left. She continues to demonstrate lower abdominal weakness, hip abduction weakness, and hip extensor weakness. She is progressing in clinic to more functional activities and is progressing well in her daily activities as well.    Michelle is progressing well towards her goals.   Pt " prognosis is Good.     Pt will continue to benefit from skilled outpatient physical therapy to address the deficits listed in the problem list box on initial evaluation, provide pt/family education and to maximize pt's level of independence in the home and community environment.     Pt's spiritual, cultural and educational needs considered and pt agreeable to plan of care and goals.     Anticipated barriers to physical therapy: None    Goals:   SHORT TERM GOALS:  4 weeks 11/30/2021   1. Recent signs and systems trend is improving in order to progress towards LTG's. Met   2. Patient will be independent with HEP in order to further progress and return to maximal function. Met    3. Pain rating at Worst: 5/10 in order to progress towards increased independence with activity.  Met   4. Patient will be able to correct postural deviations in sitting and standing, to decrease pain and promote postural awareness for injury prevention.  Met       LONG TERM GOALS: 10 weeks 11/30/2021   1. Patient will return to normal ADL, recreational, and work related activities with less pain and limitation.   Met   2. Patient will improve AROM to stated goals in order to return to maximal functional potential.   Met    3. Patient will improve Strength to stated goals of appropriate musculature in order to improve functional independence.  Met    4. Pain Rating at Best: 1/10 to improve Quality of Life.    Met   5. Patient will meet predicted functional outcome (FOTO) score: 20% to increase self-worth & perceived functional ability.  Ongoing   6. Patient will have met/partially met personal goal of: Being able to work a full day without activity modification for pain.   Met      Plan   Continue plan of care until last visit on March 31.2022.    Sarah Edmondson PT, DPT, OCS  License Number: [id number]

## 2022-03-17 NOTE — PROGRESS NOTES
"  Physical Therapy Daily Treatment Note, Progress Note, and Update to POC     Name: Michelle Gage  Clinic Number: [id number]    Therapy Diagnosis:   Encounter Diagnoses   Name Primary?    Chronic bilateral low back pain without sciatica Yes    Annular tear of lumbar disc     Spondylosis of lumbar region without myelopathy or radiculopathy       Physician: Telma Wheatley NP    Visit Date: 3/17/2022    Physician Orders: PT Eval and Treat  Medical Diagnosis from Referral:   M54.50,G89.29 (ICD-10-CM) - Chronic bilateral low back pain without sciatica   M47.816 (ICD-10-CM) - Spondylosis of lumbar region without myelopathy or radiculopathy   M51.36 (ICD-10-CM) - Annular tear of lumbar disc     Evaluation Date: 11/30/2021  Authorization Period Expiration: 12/31/2022  Plan of Care Expiration: 3/1/2022 (Updated to 3/31/2022)  Progress Note Due: 4/18/2020  Most Recent Progress Note: 3/17/2022  Visit # / Visits authorized: 12/20   FOTO: 1/ 3   Time In: 5:50 pm  Time Out: 6:30 pm  Total Billable Time: 40 minutes  Precautions: Standard and Cancer (Breast); Neutropenia   Subjective   Pt reports: she feels that her right hip is much "stiffer than the left one" and feels that this might be causing the increased hip pain. She also notes that she feels an uncomfortable "burning" when she performs the clamshells.  She was compliant with home exercise program.  Response to previous treatment: no adverse effects   Functional change: Ongoing  Pain: 0/10   Location: SI joint (left)  Objective   Lower Abdominal Test: (+), loss of posterior tilt at 80 degrees from vertical  Step-Up Test: (-)  Hip Extension: 4/5 Increased lumbar multifidi activity  Hip Abduction: 4-/5 (left)  Hip External Rotation: 4+/5 (left) 4+/5 right ("burning bilaterally")  Say's Test: (-) bilaterally  Otoniel Test: (-) bilaterally  Hip external rotation range of motion: Normal bilaterally in prone and supine  Hip internal rotation rotation range of " "motion: Normal bilaterally in prone and supine  (-) TTP to (B) Piriformis Test  (-) SLR Test, bilaterally  (-) Sign of the Buttock, bilaterally    Michelle received therapeutic exercises to develop strength, endurance, ROM, flexibility, posture and core stabilization for 20 minutes including:  Paloff Walk-Out, green band above knees, 10x each side, 3 steps each side  Step-Ups + Lat Pull Down, 7# cable 2x10, (first set with 8 aerobic step, 2nd set with 11 inch plyobox)  Kneeling Hip Thrusts + Lat Pull Down, blue power band for hip thrusts, black theraband for lat pull down, 15x    Michelle received the following manual therapy techniques: Soft tissue Mobilization were applied for 20 minutes, including:  (R) Illiopsoas Release  Re-assessment measures taken.    Home Exercises Provided and Patient Education Provided     Education provided:   Protein intake after exercise for adequate recovery and muscle adaptation.    Written Home Exercises Provided: yes.  Exercises were reviewed and Michelle was able to demonstrate them prior to the end of the session.  Michelle demonstrated good  understanding of the education provided.     See EMR under Patient Instructions for exercises provided 12/2/2021.  Assessment   Lyndsay presents with bilateral hip "burning" when performing active external rotation, that is not present with passive hip external rotation range of motion, palpation or other hip activation exercises. These descriptions, combined with objective measures, may be increased muscle activation however Lyndsay was encourage to continue monitoring in the event that referral may be necessary. Illiopsoas on the right was more tender than the left. She continues to demonstrate lower abdominal weakness, hip abduction weakness, and hip extensor weakness. She is progressing in clinic to more functional activities and is progressing well in her daily activities as well.    Michelle is progressing well towards her goals.   Pt " prognosis is Good.     Pt will continue to benefit from skilled outpatient physical therapy to address the deficits listed in the problem list box on initial evaluation, provide pt/family education and to maximize pt's level of independence in the home and community environment.     Pt's spiritual, cultural and educational needs considered and pt agreeable to plan of care and goals.     Anticipated barriers to physical therapy: None    Goals:   SHORT TERM GOALS:  4 weeks 11/30/2021   1. Recent signs and systems trend is improving in order to progress towards LTG's. Met   2. Patient will be independent with HEP in order to further progress and return to maximal function. Met    3. Pain rating at Worst: 5/10 in order to progress towards increased independence with activity.  Met   4. Patient will be able to correct postural deviations in sitting and standing, to decrease pain and promote postural awareness for injury prevention.  Met       LONG TERM GOALS: 10 weeks 11/30/2021   1. Patient will return to normal ADL, recreational, and work related activities with less pain and limitation.   Met   2. Patient will improve AROM to stated goals in order to return to maximal functional potential.   Met    3. Patient will improve Strength to stated goals of appropriate musculature in order to improve functional independence.  Met    4. Pain Rating at Best: 1/10 to improve Quality of Life.    Met   5. Patient will meet predicted functional outcome (FOTO) score: 20% to increase self-worth & perceived functional ability.  Ongoing   6. Patient will have met/partially met personal goal of: Being able to work a full day without activity modification for pain.   Met      Plan   Continue plan of care until last visit on March 31.2022.    Sarah Edmondson PT, DPT, OCS  License Number: [id number]

## 2022-03-24 ENCOUNTER — CLINICAL SUPPORT (OUTPATIENT)
Dept: REHABILITATION | Facility: HOSPITAL | Age: 37
End: 2022-03-24
Payer: COMMERCIAL

## 2022-03-24 DIAGNOSIS — G89.29 CHRONIC BILATERAL LOW BACK PAIN WITHOUT SCIATICA: Primary | ICD-10-CM

## 2022-03-24 DIAGNOSIS — M54.50 CHRONIC BILATERAL LOW BACK PAIN WITHOUT SCIATICA: Primary | ICD-10-CM

## 2022-03-24 DIAGNOSIS — M47.816 SPONDYLOSIS OF LUMBAR REGION WITHOUT MYELOPATHY OR RADICULOPATHY: ICD-10-CM

## 2022-03-24 DIAGNOSIS — M51.36 ANNULAR TEAR OF LUMBAR DISC: ICD-10-CM

## 2022-03-24 PROCEDURE — 97110 THERAPEUTIC EXERCISES: CPT | Mod: PO

## 2022-03-24 NOTE — PROGRESS NOTES
Physical Therapy Daily Treatment Note     Name: Michelle Gage  Clinic Number: [id number]    Therapy Diagnosis:   Encounter Diagnoses   Name Primary?    Chronic bilateral low back pain without sciatica Yes    Annular tear of lumbar disc     Spondylosis of lumbar region without myelopathy or radiculopathy       Physician: Telma Wheatley NP    Visit Date: 3/24/2022    Physician Orders: PT Eval and Treat  Medical Diagnosis from Referral:   M54.50,G89.29 (ICD-10-CM) - Chronic bilateral low back pain without sciatica   M47.816 (ICD-10-CM) - Spondylosis of lumbar region without myelopathy or radiculopathy   M51.36 (ICD-10-CM) - Annular tear of lumbar disc     Evaluation Date: 11/30/2021  Authorization Period Expiration: 12/31/2022  Plan of Care Expiration: 3/1/2022 (Updated to 3/31/2022)  Progress Note Due: 4/18/2020  Most Recent Progress Note: 3/17/2022  Visit # / Visits authorized: 13/20   FOTO: 1/ 3   Time In: 5:46 pm  Time Out: 6:35 pm  Total Billable Time: 49 minutes  Precautions: Standard and Cancer (Breast); Neutropenia   Subjective   Pt reports: her back has been feeling pretty good, and she has had no issues with any activities at this point.  She was compliant with home exercise program.  Response to previous treatment: no adverse effects   Functional change: Ongoing  Pain: 0/10   Location: SI joint (left)  Objective     Michelle received therapeutic exercises to develop strength, endurance, ROM, flexibility, posture and core stabilization for 49 minutes including:  Shuttle Pull Downs, 12.5#, hooklying position (10x), 90/90 position (2x10)  Deadlifts, bilateral legs, 3# bar +2-4# cuff weights, 2x10  Single Leg Deadlifts, 3# bar, 1x10 each leg --> regressed to support of 1 leg on weight bench for stability  Paloff Walk-Out, green band above knees, 10x each side, 3 steps each side  Step-Ups + Lat Pull Down, green band 2x10, (11 inch plyobox)  Kneeling Hip Thrusts + Lat Pull Down, blue power  band for hip thrusts, black theraband for lat pull down, 15x  1/2 Kneeling Lifts and Chops, 2x10 each side (bilateral) green band    Home Exercises Provided and Patient Education Provided     Education provided:   -Paloff Press Walkouts with Band Above Knees  -See Patient Instructions  Written Home Exercises Provided: yes.  Exercises were reviewed and Michelle was able to demonstrate them prior to the end of the session.  Michelle demonstrated good  understanding of the education provided.     See EMR under Patient Instructions for exercises provided 12/2/2021.  Assessment   Lyndsay tolerated progressions to core stability and exercises well without increased pain with stabilizing exercises. She demonstrated some challenges in her balance during Single Leg Deadlifts with quality of motion improving with one leg support on weight bench. She demonstrates some return to increased lumbar flexion with fatigue but is able to correct with proper hip hinge.    Michelle is progressing well towards her goals.   Pt prognosis is Good.     Pt will continue to benefit from skilled outpatient physical therapy to address the deficits listed in the problem list box on initial evaluation, provide pt/family education and to maximize pt's level of independence in the home and community environment.     Pt's spiritual, cultural and educational needs considered and pt agreeable to plan of care and goals.     Anticipated barriers to physical therapy: None    Goals:   SHORT TERM GOALS:  4 weeks 11/30/2021   1. Recent signs and systems trend is improving in order to progress towards LTG's. Met   2. Patient will be independent with HEP in order to further progress and return to maximal function. Met    3. Pain rating at Worst: 5/10 in order to progress towards increased independence with activity.  Met   4. Patient will be able to correct postural deviations in sitting and standing, to decrease pain and promote postural awareness for injury  prevention.  Met       LONG TERM GOALS: 10 weeks 11/30/2021   1. Patient will return to normal ADL, recreational, and work related activities with less pain and limitation.   Met   2. Patient will improve AROM to stated goals in order to return to maximal functional potential.   Met    3. Patient will improve Strength to stated goals of appropriate musculature in order to improve functional independence.  Met    4. Pain Rating at Best: 1/10 to improve Quality of Life.    Met   5. Patient will meet predicted functional outcome (FOTO) score: 20% to increase self-worth & perceived functional ability.  Ongoing   6. Patient will have met/partially met personal goal of: Being able to work a full day without activity modification for pain.   Met      Plan   Continue plan of care until last visit on March 31.2022. FOTO next visit.    Sarah Edmondson PT, DPT, OCS  License Number: [id number]

## 2022-03-31 ENCOUNTER — CLINICAL SUPPORT (OUTPATIENT)
Dept: REHABILITATION | Facility: HOSPITAL | Age: 37
End: 2022-03-31
Payer: COMMERCIAL

## 2022-03-31 DIAGNOSIS — M54.50 CHRONIC BILATERAL LOW BACK PAIN WITHOUT SCIATICA: Primary | ICD-10-CM

## 2022-03-31 DIAGNOSIS — G89.29 CHRONIC BILATERAL LOW BACK PAIN WITHOUT SCIATICA: Primary | ICD-10-CM

## 2022-03-31 PROCEDURE — 97110 THERAPEUTIC EXERCISES: CPT | Mod: PO

## 2022-03-31 NOTE — PLAN OF CARE
Physical Therapy Daily Treatment Note/Discharge Note     [facility]  Clinic Number: [id number]    Therapy Diagnosis:   Encounter Diagnosis   Name Primary?    Chronic bilateral low back pain without sciatica Yes      Physician: Telma Wheatley NP    Visit Date: 3/31/2022  Physician Orders: PT Eval and Treat  Medical Diagnosis from Referral:   M54.50,G89.29 (ICD-10-CM) - Chronic bilateral low back pain without sciatica   M47.816 (ICD-10-CM) - Spondylosis of lumbar region without myelopathy or radiculopathy   M51.36 (ICD-10-CM) - Annular tear of lumbar disc     Evaluation Date: 11/30/2021  Authorization Period Expiration: 12/31/2022  Plan of Care Expiration: 3/1/2022 (Updated to 3/31/2022)  Progress Note Due: 4/18/2020  Most Recent Progress Note: 3/17/2022  Visit # / Visits authorized: 14/20   FOTO: (Final): 17% (33 at first visit)  Time In: 4:20 pm  Time Out: 4:58 pm  Total Billable Time: 40 minutes  Precautions: Standard and Cancer (Breast); Neutropenia   Subjective   Pt reports: her back has been feeling pretty good, and she has had no issues with any activities at this point. She notes that she was even putting her progress to the test by helping her parents move, and was lifting (not very heavy) boxes without any increased back pain. She feels ready to manage her progress on her own at home. She has continued to attend Pilates 1x per week and has been compliant with home exercise program.  Response to previous treatment: no adverse effects   Functional change: Ongoing  Pain: 0/10   Location: SI joint (left)  Objective     Michelle received therapeutic exercises to develop strength, endurance, ROM, flexibility, posture and core stabilization for 38 minutes including:  Paloff Walk-Out, green band, orange band above knees, 10x each side, 3 steps each side  Posterior Sling Lunges + Lat Pull Down, blue band band 2x10 each side, chair to assist for balance  Bridges + Isometric Lat Dorsi Press  Down, 2x10  Cross-Legged Bridges, (Figure 4), 2x10 each side  Clamshells, green band, 2x10 each side    Home Exercises Provided and Patient Education Provided     Education provided:   -Paloff Press Walkouts with Band Above Knees  -See Patient Instructions  -Home exercise program    Written Home Exercises Provided: yes.  Exercises were reviewed and Michelle was able to demonstrate them prior to the end of the session.  Michelle demonstrated good  understanding of the education provided.     See EMR under Patient Instructions for exercises provided 12/2/2021.  Assessment   Lyndsay has undergone 14 visits in the care of her lower back pain/SI joint pain. She demonstrates ability to perform stepping up, lunging, squatting, and lifting without increased pain. She has remained symptom-free for the last 3 visits, and has progressed in her activities at home. She has not yet attempted running and is eager to maintain progress at home.    Michelle is progressing well towards her goals.   Pt prognosis is Good.     Pt will continue to benefit from skilled outpatient physical therapy to address the deficits listed in the problem list box on initial evaluation, provide pt/family education and to maximize pt's level of independence in the home and community environment.     Pt's spiritual, cultural and educational needs considered and pt agreeable to plan of care and goals.     Anticipated barriers to physical therapy: None    Goals:   SHORT TERM GOALS:  4 weeks 11/30/2021   1. Recent signs and systems trend is improving in order to progress towards LTG's. Met   2. Patient will be independent with HEP in order to further progress and return to maximal function. Met    3. Pain rating at Worst: 5/10 in order to progress towards increased independence with activity.  Met   4. Patient will be able to correct postural deviations in sitting and standing, to decrease pain and promote postural awareness for injury prevention.  Met        LONG TERM GOALS: 10 weeks 11/30/2021   1. Patient will return to normal ADL, recreational, and work related activities with less pain and limitation.   Met   2. Patient will improve AROM to stated goals in order to return to maximal functional potential.   Met    3. Patient will improve Strength to stated goals of appropriate musculature in order to improve functional independence.  Met    4. Pain Rating at Best: 1/10 to improve Quality of Life.    Met   5. Patient will meet predicted functional outcome (FOTO) score: 20% to increase self-worth & perceived functional ability.   Met   6. Patient will have met/partially met personal goal of: Being able to work a full day without activity modification for pain.   Met      Plan   Patient to be discharged from skilled PT treatment this visit. Lyndsay was gracious and thankful for services provided. PT sent patient home with print-outs for home exercises, resistance bands, and encouraged patient to reach out to PT services for any additional needs.    Sarah Edmondson PT, DPT, OCS  License Number: [id number]

## 2022-03-31 NOTE — PROGRESS NOTES
Physical Therapy Daily Treatment Note/Discharge Note     [facility]  Cl[facility] Number: [id number]    Therapy Diagnosis:   Encounter Diagnosis   Name Primary?    Chronic bilateral low back pain without sciatica Yes      Physician: Telma Wheatley NP    Visit Date: 3/31/2022  Physician Orders: PT Eval and Treat  Medical Diagnosis from Referral:   M54.50,G89.29 (ICD-10-CM) - Chronic bilateral low back pain without sciatica   M47.816 (ICD-10-CM) - Spondylosis of lumbar region without myelopathy or radiculopathy   M51.36 (ICD-10-CM) - Annular tear of lumbar disc     Evaluation Date: 11/30/2021  Authorization Period Expiration: 12/31/2022  Plan of Care Expiration: 3/1/2022 (Updated to 3/31/2022)  Progress Note Due: 4/18/2020  Most Recent Progress Note: 3/17/2022  Visit # / Visits authorized: 14/20   FOTO: (Final): 17% (33 at first visit)  Time In: 4:20 pm  Time Out: 4:58 pm  Total Billable Time: 40 minutes  Precautions: Standard and Cancer (Breast); Neutropenia   Subjective   Pt reports: her back has been feeling pretty good, and she has had no issues with any activities at this point. She notes that she was even putting her progress to the test by helping her parents move, and was lifting (not very heavy) boxes without any increased back pain. She feels ready to manage her progress on her own at home. She has continued to attend Pilates 1x per week and has been compliant with home exercise program.  Response to previous treatment: no adverse effects   Functional change: Ongoing  Pain: 0/10   Location: SI joint (left)  Objective     Michelle received therapeutic exercises to develop strength, endurance, ROM, flexibility, posture and core stabilization for 38 minutes including:  Paloff Walk-Out, green band, orange band above knees, 10x each side, 3 steps each side  Posterior Sling Lunges + Lat Pull Down, blue band band 2x10 each side, chair to assist for balance  Bridges + Isometric Lat Dorsi Press  Down, 2x10  Cross-Legged Bridges, (Figure 4), 2x10 each side  Clamshells, green band, 2x10 each side    Home Exercises Provided and Patient Education Provided     Education provided:   -Paloff Press Walkouts with Band Above Knees  -See Patient Instructions  -Home exercise program    Written Home Exercises Provided: yes.  Exercises were reviewed and Michelle was able to demonstrate them prior to the end of the session.  Michelle demonstrated good  understanding of the education provided.     See EMR under Patient Instructions for exercises provided 12/2/2021.  Assessment   Lyndsay has undergone 14 visits in the care of her lower back pain/SI joint pain. She demonstrates ability to perform stepping up, lunging, squatting, and lifting without increased pain. She has remained symptom-free for the last 3 visits, and has progressed in her activities at home. She has not yet attempted running and is eager to maintain progress at home.    Michelle is progressing well towards her goals.   Pt prognosis is Good.     Pt will continue to benefit from skilled outpatient physical therapy to address the deficits listed in the problem list box on initial evaluation, provide pt/family education and to maximize pt's level of independence in the home and community environment.     Pt's spiritual, cultural and educational needs considered and pt agreeable to plan of care and goals.     Anticipated barriers to physical therapy: None    Goals:   SHORT TERM GOALS:  4 weeks 11/30/2021   1. Recent signs and systems trend is improving in order to progress towards LTG's. Met   2. Patient will be independent with HEP in order to further progress and return to maximal function. Met    3. Pain rating at Worst: 5/10 in order to progress towards increased independence with activity.  Met   4. Patient will be able to correct postural deviations in sitting and standing, to decrease pain and promote postural awareness for injury prevention.  Met        LONG TERM GOALS: 10 weeks 11/30/2021   1. Patient will return to normal ADL, recreational, and work related activities with less pain and limitation.   Met   2. Patient will improve AROM to stated goals in order to return to maximal functional potential.   Met    3. Patient will improve Strength to stated goals of appropriate musculature in order to improve functional independence.  Met    4. Pain Rating at Best: 1/10 to improve Quality of Life.    Met   5. Patient will meet predicted functional outcome (FOTO) score: 20% to increase self-worth & perceived functional ability.   Met   6. Patient will have met/partially met personal goal of: Being able to work a full day without activity modification for pain.   Met      Plan   Patient to be discharged from skilled PT treatment this visit. Lyndsay was gracious and thankful for services provided. PT sent patient home with print-outs for home exercises, resistance bands, and encouraged patient to reach out to PT services for any additional needs.    Sarah Edmnodson PT, DPT, OCS  License Number: [id number]

## 2022-04-01 ENCOUNTER — INFUSION (OUTPATIENT)
Dept: INFUSION THERAPY | Facility: HOSPITAL | Age: 37
End: 2022-04-01
Payer: COMMERCIAL

## 2022-04-01 DIAGNOSIS — E28.39 SUPPRESSION OF OVARIAN SECRETION: Primary | ICD-10-CM

## 2022-04-01 PROCEDURE — 96402 CHEMO HORMON ANTINEOPL SQ/IM: CPT

## 2022-04-01 PROCEDURE — 63600175 PHARM REV CODE 636 W HCPCS: Mod: JG | Performed by: INTERNAL MEDICINE

## 2022-04-01 RX ADMIN — GOSERELIN ACETATE 3.6 MG: 3.6 IMPLANT SUBCUTANEOUS at 11:04

## 2022-04-04 ENCOUNTER — PATIENT MESSAGE (OUTPATIENT)
Dept: HEMATOLOGY/ONCOLOGY | Facility: CLINIC | Age: 37
End: 2022-04-04
Payer: COMMERCIAL

## 2022-04-11 ENCOUNTER — PATIENT MESSAGE (OUTPATIENT)
Dept: HEMATOLOGY/ONCOLOGY | Facility: CLINIC | Age: 37
End: 2022-04-11
Payer: COMMERCIAL

## 2022-04-19 ENCOUNTER — PATIENT MESSAGE (OUTPATIENT)
Dept: HEMATOLOGY/ONCOLOGY | Facility: CLINIC | Age: 37
End: 2022-04-19
Payer: COMMERCIAL

## 2022-04-20 ENCOUNTER — PATIENT MESSAGE (OUTPATIENT)
Dept: HEMATOLOGY/ONCOLOGY | Facility: CLINIC | Age: 37
End: 2022-04-20
Payer: COMMERCIAL

## 2022-04-29 ENCOUNTER — LAB VISIT (OUTPATIENT)
Dept: LAB | Facility: HOSPITAL | Age: 37
End: 2022-04-29
Attending: INTERNAL MEDICINE
Payer: COMMERCIAL

## 2022-04-29 ENCOUNTER — INFUSION (OUTPATIENT)
Dept: INFUSION THERAPY | Facility: HOSPITAL | Age: 37
End: 2022-04-29
Payer: COMMERCIAL

## 2022-04-29 ENCOUNTER — PATIENT MESSAGE (OUTPATIENT)
Dept: HEMATOLOGY/ONCOLOGY | Facility: CLINIC | Age: 37
End: 2022-04-29
Payer: COMMERCIAL

## 2022-04-29 DIAGNOSIS — Z17.0 MALIGNANT NEOPLASM OF UPPER-OUTER QUADRANT OF LEFT BREAST IN FEMALE, ESTROGEN RECEPTOR POSITIVE: ICD-10-CM

## 2022-04-29 DIAGNOSIS — B00.1 FEVER BLISTER: ICD-10-CM

## 2022-04-29 DIAGNOSIS — E28.39 SUPPRESSION OF OVARIAN SECRETION: Primary | ICD-10-CM

## 2022-04-29 DIAGNOSIS — R53.0 NEOPLASTIC MALIGNANT RELATED FATIGUE: ICD-10-CM

## 2022-04-29 DIAGNOSIS — Z51.11 CHEMOTHERAPY MANAGEMENT, ENCOUNTER FOR: ICD-10-CM

## 2022-04-29 DIAGNOSIS — C50.412 MALIGNANT NEOPLASM OF UPPER-OUTER QUADRANT OF LEFT BREAST IN FEMALE, ESTROGEN RECEPTOR POSITIVE: ICD-10-CM

## 2022-04-29 LAB
ALBUMIN SERPL BCP-MCNC: 4 G/DL (ref 3.5–5.2)
ALP SERPL-CCNC: 65 U/L (ref 55–135)
ALT SERPL W/O P-5'-P-CCNC: 39 U/L (ref 10–44)
ANION GAP SERPL CALC-SCNC: 8 MMOL/L (ref 8–16)
AST SERPL-CCNC: 37 U/L (ref 10–40)
BASOPHILS # BLD AUTO: 0.04 K/UL (ref 0–0.2)
BASOPHILS NFR BLD: 0.7 % (ref 0–1.9)
BILIRUB SERPL-MCNC: 0.5 MG/DL (ref 0.1–1)
BUN SERPL-MCNC: 9 MG/DL (ref 6–20)
CALCIUM SERPL-MCNC: 10 MG/DL (ref 8.7–10.5)
CHLORIDE SERPL-SCNC: 107 MMOL/L (ref 95–110)
CO2 SERPL-SCNC: 26 MMOL/L (ref 23–29)
CREAT SERPL-MCNC: 0.8 MG/DL (ref 0.5–1.4)
DIFFERENTIAL METHOD: ABNORMAL
EOSINOPHIL # BLD AUTO: 0.1 K/UL (ref 0–0.5)
EOSINOPHIL NFR BLD: 1.8 % (ref 0–8)
ERYTHROCYTE [DISTWIDTH] IN BLOOD BY AUTOMATED COUNT: 13.7 % (ref 11.5–14.5)
EST. GFR  (AFRICAN AMERICAN): >60 ML/MIN/1.73 M^2
EST. GFR  (NON AFRICAN AMERICAN): >60 ML/MIN/1.73 M^2
GLUCOSE SERPL-MCNC: 83 MG/DL (ref 70–110)
HCT VFR BLD AUTO: 42.4 % (ref 37–48.5)
HGB BLD-MCNC: 13.7 G/DL (ref 12–16)
IMM GRANULOCYTES # BLD AUTO: 0.01 K/UL (ref 0–0.04)
IMM GRANULOCYTES NFR BLD AUTO: 0.2 % (ref 0–0.5)
LYMPHOCYTES # BLD AUTO: 3.3 K/UL (ref 1–4.8)
LYMPHOCYTES NFR BLD: 54.3 % (ref 18–48)
MCH RBC QN AUTO: 30.2 PG (ref 27–31)
MCHC RBC AUTO-ENTMCNC: 32.3 G/DL (ref 32–36)
MCV RBC AUTO: 94 FL (ref 82–98)
MONOCYTES # BLD AUTO: 0.5 K/UL (ref 0.3–1)
MONOCYTES NFR BLD: 8.1 % (ref 4–15)
NEUTROPHILS # BLD AUTO: 2.1 K/UL (ref 1.8–7.7)
NEUTROPHILS NFR BLD: 34.9 % (ref 38–73)
NRBC BLD-RTO: 0 /100 WBC
PLATELET # BLD AUTO: 154 K/UL (ref 150–450)
PMV BLD AUTO: 11.7 FL (ref 9.2–12.9)
POTASSIUM SERPL-SCNC: 4.6 MMOL/L (ref 3.5–5.1)
PROT SERPL-MCNC: 7.2 G/DL (ref 6–8.4)
RBC # BLD AUTO: 4.53 M/UL (ref 4–5.4)
SODIUM SERPL-SCNC: 141 MMOL/L (ref 136–145)
WBC # BLD AUTO: 6.02 K/UL (ref 3.9–12.7)

## 2022-04-29 PROCEDURE — 85025 COMPLETE CBC W/AUTO DIFF WBC: CPT | Performed by: INTERNAL MEDICINE

## 2022-04-29 PROCEDURE — 96402 CHEMO HORMON ANTINEOPL SQ/IM: CPT

## 2022-04-29 PROCEDURE — 63600175 PHARM REV CODE 636 W HCPCS: Mod: JG | Performed by: INTERNAL MEDICINE

## 2022-04-29 PROCEDURE — 80053 COMPREHEN METABOLIC PANEL: CPT | Performed by: INTERNAL MEDICINE

## 2022-04-29 PROCEDURE — 36415 COLL VENOUS BLD VENIPUNCTURE: CPT | Performed by: INTERNAL MEDICINE

## 2022-04-29 RX ORDER — LIDOCAINE AND PRILOCAINE 25; 25 MG/G; MG/G
CREAM TOPICAL
Qty: 30 G | Refills: 0 | Status: SHIPPED | OUTPATIENT
Start: 2022-04-29 | End: 2023-06-30

## 2022-04-29 RX ORDER — METHYLPHENIDATE HYDROCHLORIDE 10 MG/1
10 TABLET ORAL 2 TIMES DAILY WITH MEALS
Qty: 60 TABLET | Refills: 0 | Status: SHIPPED | OUTPATIENT
Start: 2022-04-29 | End: 2022-09-23 | Stop reason: SDUPTHER

## 2022-04-29 RX ORDER — VALACYCLOVIR HYDROCHLORIDE 500 MG/1
TABLET, FILM COATED ORAL
Qty: 18 TABLET | Refills: 0 | Status: SHIPPED | OUTPATIENT
Start: 2022-04-29 | End: 2022-09-05 | Stop reason: SDUPTHER

## 2022-04-29 RX ORDER — LIDOCAINE AND PRILOCAINE 25; 25 MG/G; MG/G
CREAM TOPICAL
Qty: 30 G | Refills: 0 | Status: SHIPPED | OUTPATIENT
Start: 2022-04-29 | End: 2022-04-29 | Stop reason: SDUPTHER

## 2022-04-29 RX ADMIN — GOSERELIN ACETATE 3.6 MG: 3.6 IMPLANT SUBCUTANEOUS at 11:04

## 2022-04-29 NOTE — TELEPHONE ENCOUNTER
Left vm for patient to let her know that follow-up appointment has been scheduled for 5/2/22 at 9 AM .  Provided clinic telephone number to call back for questions or to reschedule.

## 2022-04-29 NOTE — TELEPHONE ENCOUNTER
"----- Message from Rehan Charleson sent at 4/29/2022 12:19 PM CDT -----  Consult/Advisory:          Name Of Caller:  [facility] #[id number]           Contact Preference?:  Phone: [phone]    Fax: [phone]        Provider Name: Skyla      Does patient feel the need to be seen today? No      What is the nature of the call?: Requesting clarification for directions of LIDOcaine-prilocaine (EMLA) cream refill.          Additional Notes:  "Thank you for all that you do for our patients"      "

## 2022-05-02 ENCOUNTER — OFFICE VISIT (OUTPATIENT)
Dept: HEMATOLOGY/ONCOLOGY | Facility: CLINIC | Age: 37
End: 2022-05-02
Payer: COMMERCIAL

## 2022-05-02 VITALS
WEIGHT: 115.44 LBS | BODY MASS INDEX: 19.23 KG/M2 | HEART RATE: 55 BPM | TEMPERATURE: 98 F | OXYGEN SATURATION: 98 % | HEIGHT: 65 IN | DIASTOLIC BLOOD PRESSURE: 65 MMHG | SYSTOLIC BLOOD PRESSURE: 122 MMHG

## 2022-05-02 DIAGNOSIS — K59.00 CONSTIPATION, UNSPECIFIED CONSTIPATION TYPE: Primary | ICD-10-CM

## 2022-05-02 DIAGNOSIS — Z17.0 MALIGNANT NEOPLASM OF UPPER-OUTER QUADRANT OF LEFT BREAST IN FEMALE, ESTROGEN RECEPTOR POSITIVE: ICD-10-CM

## 2022-05-02 DIAGNOSIS — C50.412 MALIGNANT NEOPLASM OF UPPER-OUTER QUADRANT OF LEFT BREAST IN FEMALE, ESTROGEN RECEPTOR POSITIVE: ICD-10-CM

## 2022-05-02 PROCEDURE — 1159F PR MEDICATION LIST DOCUMENTED IN MEDICAL RECORD: ICD-10-PCS | Mod: CPTII,S$GLB,, | Performed by: INTERNAL MEDICINE

## 2022-05-02 PROCEDURE — 3008F BODY MASS INDEX DOCD: CPT | Mod: CPTII,S$GLB,, | Performed by: INTERNAL MEDICINE

## 2022-05-02 PROCEDURE — 1160F PR REVIEW ALL MEDS BY PRESCRIBER/CLIN PHARMACIST DOCUMENTED: ICD-10-PCS | Mod: CPTII,S$GLB,, | Performed by: INTERNAL MEDICINE

## 2022-05-02 PROCEDURE — 3074F PR MOST RECENT SYSTOLIC BLOOD PRESSURE < 130 MM HG: ICD-10-PCS | Mod: CPTII,S$GLB,, | Performed by: INTERNAL MEDICINE

## 2022-05-02 PROCEDURE — 1159F MED LIST DOCD IN RCRD: CPT | Mod: CPTII,S$GLB,, | Performed by: INTERNAL MEDICINE

## 2022-05-02 PROCEDURE — 99214 PR OFFICE/OUTPT VISIT, EST, LEVL IV, 30-39 MIN: ICD-10-PCS | Mod: S$GLB,,, | Performed by: INTERNAL MEDICINE

## 2022-05-02 PROCEDURE — 1160F RVW MEDS BY RX/DR IN RCRD: CPT | Mod: CPTII,S$GLB,, | Performed by: INTERNAL MEDICINE

## 2022-05-02 PROCEDURE — 3078F DIAST BP <80 MM HG: CPT | Mod: CPTII,S$GLB,, | Performed by: INTERNAL MEDICINE

## 2022-05-02 PROCEDURE — 3074F SYST BP LT 130 MM HG: CPT | Mod: CPTII,S$GLB,, | Performed by: INTERNAL MEDICINE

## 2022-05-02 PROCEDURE — 3008F PR BODY MASS INDEX (BMI) DOCUMENTED: ICD-10-PCS | Mod: CPTII,S$GLB,, | Performed by: INTERNAL MEDICINE

## 2022-05-02 PROCEDURE — 99999 PR PBB SHADOW E&M-EST. PATIENT-LVL V: CPT | Mod: PBBFAC,,, | Performed by: INTERNAL MEDICINE

## 2022-05-02 PROCEDURE — 99214 OFFICE O/P EST MOD 30 MIN: CPT | Mod: S$GLB,,, | Performed by: INTERNAL MEDICINE

## 2022-05-02 PROCEDURE — 3078F PR MOST RECENT DIASTOLIC BLOOD PRESSURE < 80 MM HG: ICD-10-PCS | Mod: CPTII,S$GLB,, | Performed by: INTERNAL MEDICINE

## 2022-05-02 PROCEDURE — 99999 PR PBB SHADOW E&M-EST. PATIENT-LVL V: ICD-10-PCS | Mod: PBBFAC,,, | Performed by: INTERNAL MEDICINE

## 2022-05-02 NOTE — PROGRESS NOTES
Subjective:       Patient ID: Michelle Gage is a 36 y.o. female.    Chief Complaint: Malignant neoplasm of upper-outer quadrant of left breast i    HPI     Returns for routine follow up  Reports feeling well in the interval  Recent URI infection (2 weeks ago)- received a steroid injection and did not take prescribed the Zpak    Currently on Tamoxifen and Zoladex  Reports chronic low energy level- describes as floating- harder to multitask  Notes hot flashes - generally at night  Effexor has helped some with the hot flashes- definitely has had an effect on her overall mood   She has not tried acupuncture- she did try during treatment and did not enjoy  She has not moved forward with testosterone injections to date although previously discussed    Notes stomach issues- reports not regular  Takes stool softeners which generally do not help  Sometimes will take a laxative and then everything comes out and not comfortable  Reports prior gallbladder issue before treatment and opted for diet management- has an appt with PCP to follow up     Has questions about surrogacy    Diagnosis: Stage I ([id number]) invasive ductal carcinoma of left breast, upper outer quadrant, ER 95%, MN 90%, Her2 3+, Grade 3, Ki67 25%  Premenopausal status.     Oncologic History:  Presentation  - [id number] - presented for palpable left breast mass around 2018 - was being watched on imaging at outside hospital.    - 5/11/2020 - Diagnostic bilateral mammogram and left breast US at [facility] showed left breast 1:00 mass, 1.4 cm, 8 CFN, mild left axillary adenopathy  - 5/11/2020 - Biopsy - Grade 3 IDC, estrogen receptor 95%, progesterone receptor 90%, Her2 delphine 3+, Ki67 25%. LN biopsy negative  Surgery consultation with Dr Dumont on 5/14. Breast cancer is far in axillary tail and felt to be difficult to obtain clear margins without neoadjuvant therapy.               - 5/14/2020 - Genetics Konbini David Davidalysis neg; VUS  AP             Medical oncology consultation on 5/15/2020 -  This case was discussed with Dr. Dumont.  She does feel like the patient will benefit from neoadjuvant therapy to help improve her surgical margins.  Since the tumor is less than 2 cm and clinically lymph node negative (MRI pending), I recommended treatment with Taxotere, carboplatin and Herceptin without Perjeta.  Discussed with her that there is data in tumors less than 2 cm to consider Taxol/Herceptin however would not typically use this in a neoadjuvant setting for concern for under treatment.                - Eggs Retrieval - has 2 embryos.               * 6/16/2020 started neoadjuvant TCH (no perjeta since < 2 cm and LN negative). Neoadjuvant therapy chosen due to location of tumor.     11/6/2020 Imaging Significant Findings     Breast MRI  Impression:     The previously described upper outer quadrant known malignancy in the left breast is no longer visualized consistent with complete imaging response to therapy.      BI-RADS Category:   Overall: 6 - Known Biopsy-Proven Malignancy     Recommendation:  Continued breast surgery and oncology management.             11/30/2020 Breast Surgery     Surgery: Dr Jessica Dumont, [phone] performed bilateral mastectomy with sentinel lymph node biopsy with pathology showing grade 1 invasive ductal carcinoma,  3 mm with 0 positive lymph nodes.          11/30/2020 Cancer Staged     [id number]      12/15/2020 Tumor Conference       Post mastectomy radiation not recommended but patient will meet with radiation oncology. Systemically,standard of care would be Kadcyla followed by endocrine therapy.      Adjuvant Kadcyla started 12/28/2020  Tamoxifen holiday 4/4/2021- 7/12/2021 due to side effects.   Kadcyla stopped due to difficulty recovering counts- last dose 8/9/2021  Now on Zoladex and Tamoxifen.     Review of Systems   Constitutional: Positive for fatigue. Negative for activity change, appetite change and  unexpected weight change.   Respiratory: Negative for cough, shortness of breath and wheezing.    Cardiovascular: Negative for chest pain, palpitations and leg swelling.   Gastrointestinal: Negative for abdominal distention, abdominal pain, change in bowel habit, constipation, diarrhea, nausea, vomiting and change in bowel habit.   Genitourinary: Negative for decreased urine volume and dysuria.   Musculoskeletal: Positive for arthralgias and joint deformity. Negative for joint swelling.   Neurological: Negative for weakness, numbness and headaches.   Hematological: Negative for adenopathy. Does not bruise/bleed easily.   Psychiatric/Behavioral: Negative for decreased concentration, dysphoric mood and sleep disturbance. The patient is not nervous/anxious.          Objective:      Physical Exam  Vitals and nursing note reviewed.   Constitutional:       General: She is not in acute distress.     Appearance: Normal appearance. She is well-developed and normal weight.   HENT:      Head: Normocephalic and atraumatic.   Eyes:      General: No scleral icterus.     Conjunctiva/sclera: Conjunctivae normal.      Pupils: Pupils are equal, round, and reactive to light.      Right eye: Pupil is round and reactive.      Left eye: Pupil is round and reactive.   Neck:      Thyroid: No thyromegaly.      Vascular: No JVD.      Trachea: No tracheal deviation.   Cardiovascular:      Rate and Rhythm: Normal rate and regular rhythm.      Heart sounds: Normal heart sounds. No murmur heard.    No friction rub. No gallop.   Pulmonary:      Effort: Pulmonary effort is normal. No respiratory distress.      Breath sounds: Normal breath sounds. No wheezing, rhonchi or rales.      Comments: Breast- bilateral implants  No chest wall abnormalities or LAD  Chest:   Breasts:      Right: No supraclavicular adenopathy.      Left: No supraclavicular adenopathy.       Abdominal:      General: Abdomen is flat. Bowel sounds are normal. There is no  distension.      Palpations: Abdomen is soft. There is no mass.      Tenderness: There is no abdominal tenderness. There is no guarding or rebound.      Comments: No organomegaly   Musculoskeletal:         General: No swelling or tenderness. Normal range of motion.      Cervical back: Normal range of motion and neck supple.      Right lower leg: No edema.      Left lower leg: No edema.   Lymphadenopathy:      Head:      Right side of head: No submandibular adenopathy.      Left side of head: No submandibular adenopathy.      Cervical: No cervical adenopathy.      Right cervical: No superficial, deep or posterior cervical adenopathy.     Left cervical: No superficial, deep or posterior cervical adenopathy.      Upper Body:      Right upper body: No supraclavicular adenopathy.      Left upper body: No supraclavicular adenopathy.   Skin:     General: Skin is warm and dry.      Coloration: Skin is not jaundiced or pale.      Findings: No bruising, erythema, lesion, petechiae or rash.   Neurological:      General: No focal deficit present.      Mental Status: She is alert and oriented to person, place, and time. Mental status is at baseline.      Sensory: No sensory deficit.      Motor: No weakness.      Coordination: Coordination normal.      Gait: Gait normal.      Deep Tendon Reflexes: Reflexes are normal and symmetric.   Psychiatric:         Mood and Affect: Mood normal. Mood is not anxious or depressed.         Behavior: Behavior normal.         Thought Content: Thought content normal.         Judgment: Judgment normal.         Assessment:       Problem List Items Addressed This Visit     Malignant neoplasm of upper-outer quadrant of left breast in female, estrogen receptor positive      Other Visit Diagnoses     Constipation, unspecified constipation type    -  Primary    Relevant Orders    Ambulatory referral/consult to Gastroenterology          Plan:       Breast cancer- RENETTA  RTC 3 months  Monthly Zoladex  Knows  to call for any issues    GI referral        Route Chart for Scheduling    Med Onc Chart Routing      Follow up with physician    Follow up with [facility] 3 months. Continue monthly zoladex    Labs    Imaging    Pharmacy appointment    Other referrals            Supportive Plan Information  OP BREAST GOSERELIN Q4W   Christy Salazar MD   Upcoming Treatment Dates - OP BREAST GOSERELIN Q4W    6/7/2022       Chemotherapy       goserelin (ZOLADEX) injection 3.6 mg

## 2022-05-13 ENCOUNTER — PATIENT MESSAGE (OUTPATIENT)
Dept: HEMATOLOGY/ONCOLOGY | Facility: CLINIC | Age: 37
End: 2022-05-13
Payer: COMMERCIAL

## 2022-05-24 ENCOUNTER — PATIENT MESSAGE (OUTPATIENT)
Dept: HEMATOLOGY/ONCOLOGY | Facility: CLINIC | Age: 37
End: 2022-05-24
Payer: COMMERCIAL

## 2022-05-27 ENCOUNTER — INFUSION (OUTPATIENT)
Dept: INFUSION THERAPY | Facility: HOSPITAL | Age: 37
End: 2022-05-27
Payer: COMMERCIAL

## 2022-05-27 DIAGNOSIS — E28.39 SUPPRESSION OF OVARIAN SECRETION: Primary | ICD-10-CM

## 2022-05-27 PROCEDURE — 96402 CHEMO HORMON ANTINEOPL SQ/IM: CPT

## 2022-05-27 PROCEDURE — 63600175 PHARM REV CODE 636 W HCPCS: Mod: JG | Performed by: INTERNAL MEDICINE

## 2022-05-27 RX ADMIN — GOSERELIN ACETATE 3.6 MG: 3.6 IMPLANT SUBCUTANEOUS at 10:05

## 2022-05-27 NOTE — NURSING
Pt tolerated Zoladex injection to the abdomen today. NAD.  declined AVS. Uses my [facility]. Discharged home. Ambulated independently.

## 2022-05-31 ENCOUNTER — PATIENT MESSAGE (OUTPATIENT)
Dept: HEMATOLOGY/ONCOLOGY | Facility: CLINIC | Age: 37
End: 2022-05-31
Payer: COMMERCIAL

## 2022-05-31 DIAGNOSIS — F41.8 DEPRESSION WITH ANXIETY: ICD-10-CM

## 2022-05-31 RX ORDER — VENLAFAXINE HYDROCHLORIDE 75 MG/1
CAPSULE, EXTENDED RELEASE ORAL
Qty: 90 CAPSULE | Refills: 2 | Status: SHIPPED | OUTPATIENT
Start: 2022-05-31 | End: 2023-05-24 | Stop reason: SDUPTHER

## 2022-06-22 ENCOUNTER — PATIENT MESSAGE (OUTPATIENT)
Dept: INTERNAL MEDICINE | Facility: CLINIC | Age: 37
End: 2022-06-22

## 2022-06-22 ENCOUNTER — OFFICE VISIT (OUTPATIENT)
Dept: INTERNAL MEDICINE | Facility: CLINIC | Age: 37
End: 2022-06-22
Payer: COMMERCIAL

## 2022-06-22 VITALS
HEIGHT: 65 IN | SYSTOLIC BLOOD PRESSURE: 106 MMHG | WEIGHT: 109.13 LBS | TEMPERATURE: 99 F | DIASTOLIC BLOOD PRESSURE: 64 MMHG | OXYGEN SATURATION: 99 % | HEART RATE: 62 BPM | BODY MASS INDEX: 18.18 KG/M2

## 2022-06-22 DIAGNOSIS — F43.23 ADJUSTMENT DISORDER WITH MIXED ANXIETY AND DEPRESSED MOOD: ICD-10-CM

## 2022-06-22 DIAGNOSIS — Z17.0 MALIGNANT NEOPLASM OF UPPER-OUTER QUADRANT OF LEFT BREAST IN FEMALE, ESTROGEN RECEPTOR POSITIVE: ICD-10-CM

## 2022-06-22 DIAGNOSIS — Z00.00 WELLNESS EXAMINATION: Primary | ICD-10-CM

## 2022-06-22 DIAGNOSIS — C50.412 MALIGNANT NEOPLASM OF UPPER-OUTER QUADRANT OF LEFT BREAST IN FEMALE, ESTROGEN RECEPTOR POSITIVE: ICD-10-CM

## 2022-06-22 DIAGNOSIS — K59.00 CONSTIPATION, UNSPECIFIED CONSTIPATION TYPE: ICD-10-CM

## 2022-06-22 DIAGNOSIS — Z11.59 ENCOUNTER FOR HEPATITIS C SCREENING TEST FOR LOW RISK PATIENT: ICD-10-CM

## 2022-06-22 PROBLEM — C50.912 MALIGNANT NEOPLASM OF LEFT BREAST: Status: RESOLVED | Noted: 2020-11-30 | Resolved: 2022-06-22

## 2022-06-22 PROCEDURE — 3078F DIAST BP <80 MM HG: CPT | Mod: CPTII,S$GLB,, | Performed by: INTERNAL MEDICINE

## 2022-06-22 PROCEDURE — 3008F PR BODY MASS INDEX (BMI) DOCUMENTED: ICD-10-PCS | Mod: CPTII,S$GLB,, | Performed by: INTERNAL MEDICINE

## 2022-06-22 PROCEDURE — 3078F PR MOST RECENT DIASTOLIC BLOOD PRESSURE < 80 MM HG: ICD-10-PCS | Mod: CPTII,S$GLB,, | Performed by: INTERNAL MEDICINE

## 2022-06-22 PROCEDURE — 99385 PR PREVENTIVE VISIT,NEW,18-39: ICD-10-PCS | Mod: S$GLB,,, | Performed by: INTERNAL MEDICINE

## 2022-06-22 PROCEDURE — 99385 PREV VISIT NEW AGE 18-39: CPT | Mod: S$GLB,,, | Performed by: INTERNAL MEDICINE

## 2022-06-22 PROCEDURE — 3074F PR MOST RECENT SYSTOLIC BLOOD PRESSURE < 130 MM HG: ICD-10-PCS | Mod: CPTII,S$GLB,, | Performed by: INTERNAL MEDICINE

## 2022-06-22 PROCEDURE — 3074F SYST BP LT 130 MM HG: CPT | Mod: CPTII,S$GLB,, | Performed by: INTERNAL MEDICINE

## 2022-06-22 PROCEDURE — 99999 PR PBB SHADOW E&M-EST. PATIENT-LVL III: ICD-10-PCS | Mod: PBBFAC,,, | Performed by: INTERNAL MEDICINE

## 2022-06-22 PROCEDURE — 3008F BODY MASS INDEX DOCD: CPT | Mod: CPTII,S$GLB,, | Performed by: INTERNAL MEDICINE

## 2022-06-22 PROCEDURE — 99999 PR PBB SHADOW E&M-EST. PATIENT-LVL III: CPT | Mod: PBBFAC,,, | Performed by: INTERNAL MEDICINE

## 2022-06-22 NOTE — PROGRESS NOTES
[facility] Note    Chief Complaint      Chief Complaint   Patient presents with    Annual Exam    Establish Care     History of Present Illness      Michelle Gage is a 36 y.o. female who presents today for chief complaint annual wellness and est care. Patient is new to me.    PCP: Madonna Jones MD  Patient comes to appointment alone .     HPI   H/o breast cancer, sees Dr Crum q3 mo currently,taking prn xanax, ritalin, effexor for hot flashes   Initially dx as axillary mass by Dr Chandler, was having a mmg and did not have axillary LN bx, was monitored as surgeon as well   minimum 5 years tamoxifen as well as monthly zoladex for 5 years as well, does have frozen embryos, also has IUD in place   Is seeing breast surgery annually   Genetic counseling was negative, PGM with breast cancer  Doing high dose vit c which feels like gives her some mental sharpness   Due for FLP and HCV   situational anxiety using prn xanax 4-5x a month, last fill in sept, using effexor for mood and vasomotor,  No mood disorder prior to dx   - was started on methylphenidate by DINORA Salazar taking 1/2-1 daily prn     Pap: Briana 6.21  MMG: s/p bilateral mastectomy   DEXA: ordered   Colonoscopy: not  Had   Td: 2017  Flu: n/a  COVID:utd  Shingles:n/a  PNA: no on active immunuspuuressing ctx   Tobacco: never habitual   Other MDs:Tim Crum [place], Rae derm, Seo plastic surgeon, Dr Garcia at the remedy room for high dose vit c   I personally reviewed all past medical, surgical, social and family history.  Mom and dad alive no medical issues   Works in [employer]   Mood is up and down but feels fluctuations are provoked, does not want to change meds, has severe menopause symptoms and that effects her mood, struggles with sleep on hormone suppression, feels her circadian rhythm is not in a good place, jordan stake melatonin did not elvis sonata,declihnes med at present   - was seeing   Jd Hines which helps  Walks in neighborhood a few times a week and does pilates 1x a week   Watching diet closely     Active Problem List with Overview Notes    Diagnosis Date Noted    Constipation 06/22/2022    Chronic bilateral low back pain without sciatica 01/11/2022    Elevated liver enzymes 07/19/2021    Vitamin D deficiency 02/09/2021    SERM use (selective estrogen receptor modulator) 02/09/2021    Insomnia 01/19/2021    Chemotherapy-induced thrombocytopenia 01/19/2021    Anxiety associated with cancer diagnosis 06/16/2020    Adjustment disorder with mixed anxiety and depressed mood 06/05/2020    Malignant neoplasm of upper-outer quadrant of left breast in female, estrogen receptor positive 05/15/2020     Stage I (Q2wL6T5) invasive ductal carcinoma of left breast, upper outer quadrant, ER 95%, MT 90%, Her2 3+, Grade 3, Ki67 25% dx on mmg 5.11.2020, did neoadjuvant ctx followed by bilateral mastectomy   curently on tamoxifen and monthly zoladex(gnrh agonist)  injxn - sees HO Nebo and       Chemotherapy-induced nausea 05/15/2020    Chemotherapy induced neutropenia 05/15/2020    Encounter for chemotherapy management 05/15/2020    Suppression of ovarian secretion 05/15/2020    Upper abdominal pain 04/16/2019       Health Maintenance   Topic Date Due    Hepatitis C Screening  Never done    TETANUS VACCINE  03/22/2027    Lipid Panel  Completed       Past Medical History:   Diagnosis Date    Anxiety     Back pain     Brain fog     Constipation     Depression     Genetic testing 05/2020    BRCA+myRisk NEGATIVE with APC VUS c.3875C>T ([id number]), aka P6210E (3875C>T)    Hx of psychiatric care     Xanax    Malignant neoplasm of upper-outer quadrant of left breast in female, estrogen receptor positive 5/15/2020       Past Surgical History:   Procedure Laterality Date    BILATERAL MASTECTOMY Bilateral 11/30/2020    Procedure: MASTECTOMY, BILATERAL;  Surgeon: Jessica Dumont MD;   Location: [facility] OR;  Service: General;  Laterality: Bilateral;    BREAST RECONSTRUCTION Bilateral 11/30/2020    Procedure: RECONSTRUCTION, BREAST;  Surgeon: Lamin Seo MD;  Location: [facility];  Service: General;  Laterality: Bilateral;    BREAST SURGERY      Breast biopsy    INSERTION OF BREAST IMPLANT Bilateral 11/30/2020    Procedure: INSERTION, BREAST TISSUE EXPANDER - BILATERAL BREAST RECONSTRUCTION WITH TISSUE EXPANDERS AND ACELLULAR DERMAL MATRIX;  Surgeon: Lamin Seo MD;  Location: [facility];  Service: General;  Laterality: Bilateral;    INSERTION OF TUNNELED CENTRAL VENOUS CATHETER (CVC) WITH SUBCUTANEOUS PORT N/A 5/28/2020    Procedure: [id number];  Surgeon: Dosc Diagnostic Provider;  Location: [facility] OR [facility] FLR;  Service: Radiology;  Laterality: N/A;  189 port placement    MEDIPORT REMOVAL Right 11/9/2021    Procedure: REMOVAL, CATHETER, CENTRAL VENOUS, TUNNELED, WITH PORT;  Surgeon: Сергей Rowan MD;  Location: [facility] CATH LAB;  Service: Radiology;  Laterality: Right;    SENTINEL LYMPH NODE BIOPSY Left 11/30/2020    Procedure: BIOPSY, LYMPH NODE, SENTINEL;  Surgeon: Jessica Dumont MD;  Location: [facility];  Service: General;  Laterality: Left;    WISDOM TOOTH EXTRACTION         family history includes Alcohol abuse in her maternal grandmother; Alzheimer's disease in her maternal grandmother; Bipolar disorder in her maternal grandmother; Breast cancer in her other, other, and paternal grandmother; Diabetes in her maternal grandfather and paternal grandfather; No Known Problems in her father; Osteoporosis in her mother.    Social History     Tobacco Use    Smoking status: Former Smoker    Smokeless tobacco: Never Used    Tobacco comment: occasional past while drinking   Substance Use Topics    Alcohol use: Yes     Comment: socially    Drug use: Never       Review of Systems   Constitutional: Negative for chills and fever.   HENT: Negative for congestion and sore throat.     Eyes: Negative for blurred vision and discharge.   Respiratory: Negative for cough and shortness of breath.    Cardiovascular: Negative for chest pain and palpitations.   Gastrointestinal: Negative for constipation, diarrhea, nausea and vomiting.   Genitourinary: Negative for dysuria and hematuria.   Musculoskeletal: Negative for falls and myalgias.   Skin: Negative for itching and rash.   Neurological: Negative for dizziness and headaches.        Outpatient Encounter Medications as of 6/22/2022   Medication Sig Dispense Refill    ALPRAZolam (XANAX) 0.5 MG tablet TAKE 1 TABLET(0.5 MG) BY MOUTH THREE TIMES DAILY AS NEEDED FOR INSOMNIA OR ANXIETY 30 tablet 4    goserelin (ZOLADEX) 3.6 mg injection Inject 3.6 mg into the skin every 28 days.      HYDROcodone-acetaminophen (NORCO) 5-325 mg per tablet Take 1 tablet by mouth every 8 (eight) hours as needed for Pain. 30 tablet 0    LIDOcaine-prilocaine (EMLA) cream Apply topically as needed. 30 g 0    methylphenidate HCl (RITALIN) 10 MG tablet Take 1 tablet (10 mg total) by mouth 2 (two) times daily with meals. 60 tablet 0    naproxen (NAPROSYN) 500 MG tablet Hold until after surgery      tamoxifen (NOLVADEX) 20 MG Tab TAKE 1 TABLET(20 MG) BY MOUTH EVERY DAY 90 tablet 3    valACYclovir (VALTREX) 500 MG tablet TAKE 1 TABLET BY MOUTH TWICE DAILY FOR 3 DAYS AS NEEDED FOR FEVER BLISTER 18 tablet 0    venlafaxine (EFFEXOR-XR) 75 MG 24 hr capsule TAKE 1 CAPSULE(75 MG) BY MOUTH EVERY DAY 90 capsule 2    ATRALIN 0.05 % gel 2 (two) times daily as needed.       [DISCONTINUED] KADCYLA 100 mg       [DISCONTINUED] ondansetron (ZOFRAN) 8 MG tablet Take 1 tablet (8 mg total) by mouth every 8 (eight) hours as needed for Nausea. (Patient not taking: Reported on 6/22/2022) 30 tablet 2    [DISCONTINUED] spironolactone (ALDACTONE) 50 MG tablet TAKE 1 TABLET(50 MG) BY MOUTH EVERY EVENING 30 tablet 11    [DISCONTINUED] sulfacetamide sodium-sulfur 8-4 % Susp     "    Facility-Administered Encounter Medications as of 6/22/2022   Medication Dose Route Frequency Provider Last Rate Last Admin    ceFAZolin 1 g, gentamicin 80 mg in sodium chloride 0.9% 500 mL irrigation   Irrigation On Call Procedure Lamin Seo MD        ceFAZolin 1 g, gentamicin 80 mg in sodium chloride 0.9% 500 mL irrigation   Irrigation On Call Procedure Lamin Seo MD        ceFAZolin 1 g, gentamicin 80 mg in sodium chloride 0.9% 500 mL irrigation   Irrigation On Call Procedure Lamin Seo MD        ceFAZolin 1 g, gentamicin 80 mg in sodium chloride 0.9% 500 mL irrigation   Irrigation On Call Procedure Lamin Seo MD        copper intrauterine device 380 square mm  mm  380 mm Intrauterine  Lyndsay Warren MD   380 mm at 06/25/20 1030        Review of patient's allergies indicates:  No Known Allergies        Physical Exam      Vital Signs  Temp: 98.5 °F (36.9 °C)  Temp src: Oral  Pulse: 62  SpO2: 99 %  BP: 106/64  BP Location: Right arm  Patient Position: Sitting  Pain Score:   2  Height and Weight  Height: 5' 5" (165.1 cm)  Weight: 49.5 kg (109 lb 2 oz)  BSA (Calculated - sq m): 1.51 sq meters  BMI (Calculated): 18.2  Weight in (lb) to have BMI = 25: 149.9]    Physical Exam  Vitals reviewed.   Constitutional:       Appearance: She is well-developed.   HENT:      Head: Normocephalic and atraumatic.      Right Ear: External ear normal.      Left Ear: External ear normal.   Eyes:      General:         Right eye: No discharge.         Left eye: No discharge.   Neck:      Thyroid: No thyromegaly.   Cardiovascular:      Rate and Rhythm: Normal rate and regular rhythm.      Heart sounds: Normal heart sounds. No murmur heard.  Pulmonary:      Effort: Pulmonary effort is normal. No respiratory distress.      Breath sounds: Normal breath sounds.   Abdominal:      General: Bowel sounds are normal. There is no distension.      Palpations: Abdomen is soft.      Tenderness: There " is no abdominal tenderness.   Musculoskeletal:         General: No deformity. Normal range of motion.      Cervical back: Normal range of motion.   Skin:     General: Skin is warm and dry.      Findings: No rash.   Neurological:      Mental Status: She is alert and oriented to person, place, and time.   Psychiatric:         Behavior: Behavior normal.          Laboratory:  CBC:  Recent Labs   Lab Result Units 04/29/22  1022   WBC K/uL 6.02   RBC M/uL 4.53   Hemoglobin g/dL 13.7   Hematocrit % 42.4   Platelets K/uL 154   MCV fL 94   MCH pg 30.2   MCHC g/dL 32.3     CMP:  Recent Labs   Lab Result Units 04/29/22  1022   Glucose mg/dL 83   Calcium mg/dL 10.0   Albumin g/dL 4.0   Total Protein g/dL 7.2   Sodium mmol/L 141   Potassium mmol/L 4.6   CO2 mmol/L 26   Chloride mmol/L 107   BUN mg/dL 9   Alkaline Phosphatase U/L 65   ALT U/L 39   AST U/L 37   Total Bilirubin mg/dL 0.5       Assessment/Plan     Michelle Gage is a 36 y.o.female with:    1. Wellness examination  -     Lipid Panel    2. Malignant neoplasm of upper-outer quadrant of left breast in female, estrogen receptor positive  Overview:  Stage I (B3xN0L5) invasive ductal carcinoma of left breast, upper outer quadrant, ER 95%, MI 90%, Her2 3+, Grade 3, Ki67 25% dx on mmg 5.11.2020, did neoadjuvant ctx followed by bilateral mastectomy   curently on tamoxifen and monthly zoladex(gnrh agonist)  injxn - sees DINORA Crum and     Assessment & Plan:  contiue follow up with onc, discussed return to breast surgery for physical exams       3. Encounter for hepatitis C screening test for low risk patient  -     Hepatitis C Antibody    4. Constipation, unspecified constipation type  Assessment & Plan:  Likely IBS C  FODMAP diet   IB guard   Non stimulant stool softner - miralax adjust dose as needed     Orders:  -     T4, Free  -     TSH    5. Adjustment disorder with mixed anxiety and depressed mood  Assessment & Plan:  Doing well no change ok for prn benzo           Use of the [employer] Patient Portal discussed and encouraged during today's visit  -Continue current medications and maintain follow up with specialists.  Return to clinic in 12.  Future Appointments   Date Time Provider Department Center   7/21/2022 11:00 AM Curtis Benedict NP [facility] HEMONC3 [facility]z   7/21/2022 11:45 AM INJECTION, [facility]   6/27/2023  8:30 AM Madonna Jones MD [facility]       Madonna Jones MD  6/27/2022 8:17 AM    Primary Care Internal Medicine

## 2022-06-22 NOTE — PATIENT INSTRUCTIONS
FODMAP diet   IB guard   Non stimulant stool softner - miralax adjust dose as needed       Labs with next heme onc appt

## 2022-06-24 ENCOUNTER — INFUSION (OUTPATIENT)
Dept: INFUSION THERAPY | Facility: HOSPITAL | Age: 37
End: 2022-06-24
Payer: COMMERCIAL

## 2022-06-24 DIAGNOSIS — E28.39 SUPPRESSION OF OVARIAN SECRETION: Primary | ICD-10-CM

## 2022-06-24 PROCEDURE — 63600175 PHARM REV CODE 636 W HCPCS: Mod: JG | Performed by: INTERNAL MEDICINE

## 2022-06-24 PROCEDURE — 96402 CHEMO HORMON ANTINEOPL SQ/IM: CPT

## 2022-06-24 RX ADMIN — GOSERELIN ACETATE 3.6 MG: 3.6 IMPLANT SUBCUTANEOUS at 12:06

## 2022-06-27 NOTE — ASSESSMENT & PLAN NOTE
Likely IBS C  FODMAP diet   IB guard   Non stimulant stool softner - miralax adjust dose as needed

## 2022-06-28 ENCOUNTER — PATIENT MESSAGE (OUTPATIENT)
Dept: HEMATOLOGY/ONCOLOGY | Facility: CLINIC | Age: 37
End: 2022-06-28
Payer: COMMERCIAL

## 2022-07-12 NOTE — PROGRESS NOTES
Subjective:       Patient ID: Michelle Gage is a 34 y.o. female.    Chief Complaint: Nausea (reduced), Pain (reduced, has bilat quad pain), and Breast Cancer    Patient had nausea after chemotherapy was pushed back (no acupuncture day before infusion) but did well previous times. Pain symptoms reducing. Will continue to do acupuncture day before. ANC was .08 today - continued with treatment.    Review of Systems   Gastrointestinal: Positive for nausea.         Objective:          Assessment:       1. Malignant neoplasm of upper-outer quadrant of left breast in female, estrogen receptor positive    2. Chemotherapy-induced nausea    3. Anxiety        Plan:       1x a week, adelso before infusion.*           Pre-Symptom Score: 2  Post-Symptom Score: 2     Nausea (reduced), Pain (reduced, has bilat quad pain), and Breast Cancer       Encounter Diagnoses   Name Primary?    Malignant neoplasm of upper-outer quadrant of left breast in female, estrogen receptor positive     Chemotherapy-induced nausea     Anxiety        TCM Diagnosis: Toxic Heat    Physical Exam   Constitutional:            Acupuncture points used:  Li11, Pc6, St36, Sp9, Sp6, Du20, Lr3, Gb34, ER HIRA and HEDRICK MEN - SEE CHART ABOVE FOR ADDITONAL    NO STIM USED      NEEDLES IN: 19  NEEDLES OUT: 19    NEEDLES INSERTED: 11:10 AM  NEEDLES REMOVED: 11:40 AM                 [id number] Inpatient care - low complexity - 15 minutes

## 2022-07-20 ENCOUNTER — TELEPHONE (OUTPATIENT)
Dept: HEMATOLOGY/ONCOLOGY | Facility: CLINIC | Age: 37
End: 2022-07-20
Payer: COMMERCIAL

## 2022-07-21 ENCOUNTER — LAB VISIT (OUTPATIENT)
Dept: LAB | Facility: HOSPITAL | Age: 37
End: 2022-07-21
Payer: COMMERCIAL

## 2022-07-21 ENCOUNTER — INFUSION (OUTPATIENT)
Dept: INFUSION THERAPY | Facility: HOSPITAL | Age: 37
End: 2022-07-21
Payer: COMMERCIAL

## 2022-07-21 ENCOUNTER — OFFICE VISIT (OUTPATIENT)
Dept: HEMATOLOGY/ONCOLOGY | Facility: CLINIC | Age: 37
End: 2022-07-21
Payer: COMMERCIAL

## 2022-07-21 VITALS
DIASTOLIC BLOOD PRESSURE: 74 MMHG | WEIGHT: 112.88 LBS | BODY MASS INDEX: 18.81 KG/M2 | TEMPERATURE: 98 F | OXYGEN SATURATION: 100 % | RESPIRATION RATE: 16 BRPM | HEIGHT: 65 IN | SYSTOLIC BLOOD PRESSURE: 116 MMHG | HEART RATE: 65 BPM

## 2022-07-21 DIAGNOSIS — Z79.810 SERM USE (SELECTIVE ESTROGEN RECEPTOR MODULATOR): ICD-10-CM

## 2022-07-21 DIAGNOSIS — C50.412 MALIGNANT NEOPLASM OF UPPER-OUTER QUADRANT OF LEFT BREAST IN FEMALE, ESTROGEN RECEPTOR POSITIVE: Primary | ICD-10-CM

## 2022-07-21 DIAGNOSIS — E28.39 SUPPRESSION OF OVARIAN SECRETION: Primary | ICD-10-CM

## 2022-07-21 DIAGNOSIS — C50.412 MALIGNANT NEOPLASM OF UPPER-OUTER QUADRANT OF LEFT BREAST IN FEMALE, ESTROGEN RECEPTOR POSITIVE: ICD-10-CM

## 2022-07-21 DIAGNOSIS — D69.6 THROMBOCYTOPENIA: ICD-10-CM

## 2022-07-21 DIAGNOSIS — K59.00 CONSTIPATION, UNSPECIFIED CONSTIPATION TYPE: ICD-10-CM

## 2022-07-21 DIAGNOSIS — Z17.0 MALIGNANT NEOPLASM OF UPPER-OUTER QUADRANT OF LEFT BREAST IN FEMALE, ESTROGEN RECEPTOR POSITIVE: Primary | ICD-10-CM

## 2022-07-21 DIAGNOSIS — D70.9 NEUTROPENIA, UNSPECIFIED TYPE: ICD-10-CM

## 2022-07-21 DIAGNOSIS — Z17.0 MALIGNANT NEOPLASM OF UPPER-OUTER QUADRANT OF LEFT BREAST IN FEMALE, ESTROGEN RECEPTOR POSITIVE: ICD-10-CM

## 2022-07-21 LAB
ALBUMIN SERPL BCP-MCNC: 4.1 G/DL (ref 3.5–5.2)
ALP SERPL-CCNC: 58 U/L (ref 55–135)
ALT SERPL W/O P-5'-P-CCNC: 17 U/L (ref 10–44)
ANION GAP SERPL CALC-SCNC: 5 MMOL/L (ref 8–16)
AST SERPL-CCNC: 22 U/L (ref 10–40)
BILIRUB SERPL-MCNC: 0.6 MG/DL (ref 0.1–1)
BUN SERPL-MCNC: 11 MG/DL (ref 6–20)
CALCIUM SERPL-MCNC: 9.7 MG/DL (ref 8.7–10.5)
CHLORIDE SERPL-SCNC: 108 MMOL/L (ref 95–110)
CO2 SERPL-SCNC: 27 MMOL/L (ref 23–29)
CREAT SERPL-MCNC: 0.8 MG/DL (ref 0.5–1.4)
ERYTHROCYTE [DISTWIDTH] IN BLOOD BY AUTOMATED COUNT: 14.6 % (ref 11.5–14.5)
EST. GFR  (AFRICAN AMERICAN): >60 ML/MIN/1.73 M^2
EST. GFR  (NON AFRICAN AMERICAN): >60 ML/MIN/1.73 M^2
GLUCOSE SERPL-MCNC: 74 MG/DL (ref 70–110)
HCT VFR BLD AUTO: 41.4 % (ref 37–48.5)
HGB BLD-MCNC: 13.6 G/DL (ref 12–16)
IMM GRANULOCYTES # BLD AUTO: 0.01 K/UL (ref 0–0.04)
MCH RBC QN AUTO: 31.6 PG (ref 27–31)
MCHC RBC AUTO-ENTMCNC: 32.9 G/DL (ref 32–36)
MCV RBC AUTO: 96 FL (ref 82–98)
NEUTROPHILS # BLD AUTO: 1.5 K/UL (ref 1.8–7.7)
PLATELET # BLD AUTO: 112 K/UL (ref 150–450)
PMV BLD AUTO: 12.8 FL (ref 9.2–12.9)
POTASSIUM SERPL-SCNC: 4.7 MMOL/L (ref 3.5–5.1)
PROT SERPL-MCNC: 7 G/DL (ref 6–8.4)
RBC # BLD AUTO: 4.31 M/UL (ref 4–5.4)
SODIUM SERPL-SCNC: 140 MMOL/L (ref 136–145)
WBC # BLD AUTO: 5.62 K/UL (ref 3.9–12.7)

## 2022-07-21 PROCEDURE — 85027 COMPLETE CBC AUTOMATED: CPT | Performed by: NURSE PRACTITIONER

## 2022-07-21 PROCEDURE — 3074F PR MOST RECENT SYSTOLIC BLOOD PRESSURE < 130 MM HG: ICD-10-PCS | Mod: CPTII,S$GLB,, | Performed by: NURSE PRACTITIONER

## 2022-07-21 PROCEDURE — 1159F MED LIST DOCD IN RCRD: CPT | Mod: CPTII,S$GLB,, | Performed by: NURSE PRACTITIONER

## 2022-07-21 PROCEDURE — 3074F SYST BP LT 130 MM HG: CPT | Mod: CPTII,S$GLB,, | Performed by: NURSE PRACTITIONER

## 2022-07-21 PROCEDURE — 36415 COLL VENOUS BLD VENIPUNCTURE: CPT | Performed by: NURSE PRACTITIONER

## 2022-07-21 PROCEDURE — 3078F DIAST BP <80 MM HG: CPT | Mod: CPTII,S$GLB,, | Performed by: NURSE PRACTITIONER

## 2022-07-21 PROCEDURE — 99214 PR OFFICE/OUTPT VISIT, EST, LEVL IV, 30-39 MIN: ICD-10-PCS | Mod: S$GLB,,, | Performed by: NURSE PRACTITIONER

## 2022-07-21 PROCEDURE — 99999 PR PBB SHADOW E&M-EST. PATIENT-LVL IV: CPT | Mod: PBBFAC,,, | Performed by: NURSE PRACTITIONER

## 2022-07-21 PROCEDURE — 1159F PR MEDICATION LIST DOCUMENTED IN MEDICAL RECORD: ICD-10-PCS | Mod: CPTII,S$GLB,, | Performed by: NURSE PRACTITIONER

## 2022-07-21 PROCEDURE — 3078F PR MOST RECENT DIASTOLIC BLOOD PRESSURE < 80 MM HG: ICD-10-PCS | Mod: CPTII,S$GLB,, | Performed by: NURSE PRACTITIONER

## 2022-07-21 PROCEDURE — 99214 OFFICE O/P EST MOD 30 MIN: CPT | Mod: S$GLB,,, | Performed by: NURSE PRACTITIONER

## 2022-07-21 PROCEDURE — 99999 PR PBB SHADOW E&M-EST. PATIENT-LVL IV: ICD-10-PCS | Mod: PBBFAC,,, | Performed by: NURSE PRACTITIONER

## 2022-07-21 PROCEDURE — 3008F PR BODY MASS INDEX (BMI) DOCUMENTED: ICD-10-PCS | Mod: CPTII,S$GLB,, | Performed by: NURSE PRACTITIONER

## 2022-07-21 PROCEDURE — 3008F BODY MASS INDEX DOCD: CPT | Mod: CPTII,S$GLB,, | Performed by: NURSE PRACTITIONER

## 2022-07-21 PROCEDURE — 96402 CHEMO HORMON ANTINEOPL SQ/IM: CPT

## 2022-07-21 PROCEDURE — 80053 COMPREHEN METABOLIC PANEL: CPT | Performed by: NURSE PRACTITIONER

## 2022-07-21 PROCEDURE — 63600175 PHARM REV CODE 636 W HCPCS: Mod: JG | Performed by: NURSE PRACTITIONER

## 2022-07-21 RX ADMIN — GOSERELIN ACETATE 3.6 MG: 3.6 IMPLANT SUBCUTANEOUS at 11:07

## 2022-07-21 NOTE — PROGRESS NOTES
Subjective:       Patient ID: Michelle Gage is a 36 y.o. female.    Chief Complaint: Malignant neoplasm of upper-outer quadrant of left breast i    HPI     Returns for routine follow up    Started with new PCP and will start to refill all meds except tamoxifen  Overall feels well.   No new issues or complaints.   No VB  No f/c.   No cp/sob  No new pain issues.   Rare chest pains since surgery- overall improved.   Energy level slightly down. She has seen Dr. Warren in past and testosterone was discussed.   Exercising once a week.   Sleeping well at night - states in the legal marijuana program but low energy regardless of gummy ingestion.   Still with brain fog and difficult with multi tasking. Taking ritalin.   Constipation- referred to GI last visit but didn't get appt.   No bleeding      Currently on Tamoxifen and Zoladex        Diagnosis: Stage I ([id number]) invasive ductal carcinoma of left breast, upper outer quadrant, ER 95%, AL 90%, Her2 3+, Grade 3, Ki67 25%  Premenopausal status.     Oncologic History:  Presentation  - [id number] - presented for palpable left breast mass around 2018 - was being watched on imaging at outside hospital.    - 5/11/2020 - Diagnostic bilateral mammogram and left breast US at [facility] showed left breast 1:00 mass, 1.4 cm, 8 CFN, mild left axillary adenopathy  - 5/11/2020 - Biopsy - Grade 3 IDC, estrogen receptor 95%, progesterone receptor 90%, Her2 delphine 3+, Ki67 25%. LN biopsy negative  Surgery consultation with Dr Dumont on 5/14. Breast cancer is far in axillary tail and felt to be difficult to obtain clear margins without neoadjuvant therapy.               - 5/14/2020 - Genetics Who is [employer] David BRACAnalysis neg; VUS AP             Medical oncology consultation on 5/15/2020 -  This case was discussed with Dr. Dumont.  She does feel like the patient will benefit from neoadjuvant therapy to help improve her surgical margins.  Since the tumor is less than 2 cm and  clinically lymph node negative (MRI pending), I recommended treatment with Taxotere, carboplatin and Herceptin without Perjeta.  Discussed with her that there is data in tumors less than 2 cm to consider Taxol/Herceptin however would not typically use this in a neoadjuvant setting for concern for under treatment.                - Eggs Retrieval - has 2 embryos.               * 6/16/2020 started neoadjuvant TCH (no perjeta since < 2 cm and LN negative). Neoadjuvant therapy chosen due to location of tumor.     11/6/2020 Imaging Significant Findings     Breast MRI  Impression:     The previously described upper outer quadrant known malignancy in the left breast is no longer visualized consistent with complete imaging response to therapy.      BI-RADS Category:   Overall: 6 - Known Biopsy-Proven Malignancy     Recommendation:  Continued breast surgery and oncology management.             11/30/2020 Breast Surgery     Surgery: Dr Jessica Dumont, [phone] performed bilateral mastectomy with sentinel lymph node biopsy with pathology showing grade 1 invasive ductal carcinoma,  3 mm with 0 positive lymph nodes.          11/30/2020 Cancer Staged     [id number]      12/15/2020 Tumor Conference       Post mastectomy radiation not recommended but patient will meet with radiation oncology. Systemically,standard of care would be Kadcyla followed by endocrine therapy.      Adjuvant Kadcyla started 12/28/2020  Tamoxifen holiday 4/4/2021- 7/12/2021 due to side effects.   Kadcyla stopped due to difficulty recovering counts- last dose 8/9/2021  Now on Zoladex and Tamoxifen.     Review of Systems   Constitutional:        See above   All other systems reviewed and are negative.        Objective:      Physical Exam  Vitals and nursing note reviewed.   Constitutional:       General: She is not in acute distress.     Appearance: Normal appearance. She is well-developed and normal weight.   HENT:      Head: Normocephalic and atraumatic.    Eyes:      General: No scleral icterus.     Conjunctiva/sclera: Conjunctivae normal.      Pupils: Pupils are equal, round, and reactive to light.      Right eye: Pupil is round and reactive.      Left eye: Pupil is round and reactive.   Neck:      Thyroid: No thyromegaly.      Vascular: No JVD.      Trachea: No tracheal deviation.   Cardiovascular:      Rate and Rhythm: Normal rate and regular rhythm.      Heart sounds: Normal heart sounds. No murmur heard.    No friction rub. No gallop.   Pulmonary:      Effort: Pulmonary effort is normal. No respiratory distress.      Breath sounds: Normal breath sounds. No wheezing, rhonchi or rales.      Comments: Breast- bilateral implants  No chest wall abnormalities or LAD  Chest:   Breasts:      Right: No supraclavicular adenopathy.      Left: No supraclavicular adenopathy.       Abdominal:      General: Abdomen is flat. Bowel sounds are normal. There is no distension.      Palpations: Abdomen is soft. There is no mass.      Tenderness: There is no abdominal tenderness. There is no guarding or rebound.      Comments: No organomegaly   Musculoskeletal:         General: No swelling or tenderness. Normal range of motion.      Cervical back: Normal range of motion and neck supple.      Right lower leg: No edema.      Left lower leg: No edema.      Comments: No spinal or paraspinal tenderness.    Lymphadenopathy:      Head:      Right side of head: No submandibular adenopathy.      Left side of head: No submandibular adenopathy.      Cervical: No cervical adenopathy.      Right cervical: No superficial, deep or posterior cervical adenopathy.     Left cervical: No superficial, deep or posterior cervical adenopathy.      Upper Body:      Right upper body: No supraclavicular adenopathy.      Left upper body: No supraclavicular adenopathy.   Skin:     General: Skin is warm and dry.      Coloration: Skin is not jaundiced or pale.      Findings: No bruising, erythema, lesion,  petechiae or rash.      Comments: No petechia   Neurological:      General: No focal deficit present.      Mental Status: She is alert and oriented to person, place, and time. Mental status is at baseline.      Sensory: No sensory deficit.      Motor: No weakness.      Coordination: Coordination normal.      Gait: Gait normal.      Deep Tendon Reflexes: Reflexes are normal and symmetric.   Psychiatric:         Mood and Affect: Mood normal. Mood is not anxious or depressed.         Behavior: Behavior normal.         Thought Content: Thought content normal.         Judgment: Judgment normal.         Labs: reviewed.   Assessment:       Problem List Items Addressed This Visit        Renal/    SERM use (selective estrogen receptor modulator)       Oncology    Malignant neoplasm of upper-outer quadrant of left breast in female, estrogen receptor positive - Primary       GI    Constipation      Other Visit Diagnoses     Thrombocytopenia        Neutropenia, unspecified type              Plan:       Breast cancer  -RENETTA  -Continue tamoxifen   -Monthly Zoladex  -continue f/u with gyn for uterine lining monitoring    Thrombocytopenia  -trend reviewed.  No new medications or NSAID use.   -Will monitor closely as may need further workup  -bleeding precautions.     Neutropenia  - no infectious complaints.   - will monitor  - neutropenic precautions.     Fatigue   Continue f/u with Dr. Warren    Constipation   Referred to GI previously - stomach pains; reports gall bladder issues prior to cancer dx; constipation.   -prn stool softeners.             Route Chart for Scheduling    Med Onc Chart Routing      Follow up with physician 3 months. Zoladex monthly; cbc in 1 month; see Dr. Crum in 3 months with cbc, cmp; refer to GI now    Follow up with YUMIKO    Infusion scheduling note    Injection scheduling note    Labs    Lab interval:  Cbc in 1 month   Imaging    Pharmacy appointment    Other referrals Additional referrals needed            Supportive Plan Information  OP BREAST GOSERELIN Q4W   Christy Salazar MD   Upcoming Treatment Dates - OP BREAST GOSERELIN Q4W    8/7/2022       Chemotherapy       goserelin (ZOLADEX) injection 3.6 mg  8/24/2022       Chemotherapy       goserelin (ZOLADEX) injection 3.6 mg      Patient is in agreement with the proposed treatment plan. All questions were answered to the patient's satisfaction. Pt knows to call clinic for any new or worsening symptoms and if anything is needed before the next clinic visit.      ROD Reed-BON  Hematology & Oncology  [facility] [place] [address]  ph. [phone]  Fax. [phone]     Face to Face time with patient: 25 minutes  30minutes of total time spent on the encounter, which includes face to face time and non-face to face time preparing to see the patient (eg, review of tests), Obtaining and/or reviewing separately obtained history, Documenting clinical information in the electronic or other health record, Independently interpreting results (not separately reported) and communicating results to the patient/family/caregiver, or Care coordination (not separately reported).

## 2022-07-29 ENCOUNTER — TELEPHONE (OUTPATIENT)
Dept: GASTROENTEROLOGY | Facility: CLINIC | Age: 37
End: 2022-07-29
Payer: COMMERCIAL

## 2022-07-29 NOTE — TELEPHONE ENCOUNTER
Attempted to contact pt regarding message below. Pt didn't answer and I left a detail message.   ----- Message from Dacia Mcdowell sent at 7/29/2022  7:48 AM CDT -----  Regarding: pt needing appointment  Good morning, can you please assist with getting patient scheduled as Dr Crum is requesting she be seen soon.    Please let me know if theres anything I can do to help.    Thanks,  Bob Gibbs

## 2022-08-05 ENCOUNTER — PATIENT MESSAGE (OUTPATIENT)
Dept: INTERNAL MEDICINE | Facility: CLINIC | Age: 37
End: 2022-08-05
Payer: COMMERCIAL

## 2022-08-05 ENCOUNTER — PATIENT MESSAGE (OUTPATIENT)
Dept: HEMATOLOGY/ONCOLOGY | Facility: CLINIC | Age: 37
End: 2022-08-05
Payer: COMMERCIAL

## 2022-08-05 DIAGNOSIS — Z17.0 MALIGNANT NEOPLASM OF UPPER-OUTER QUADRANT OF LEFT BREAST IN FEMALE, ESTROGEN RECEPTOR POSITIVE: Primary | ICD-10-CM

## 2022-08-05 DIAGNOSIS — C50.412 MALIGNANT NEOPLASM OF UPPER-OUTER QUADRANT OF LEFT BREAST IN FEMALE, ESTROGEN RECEPTOR POSITIVE: Primary | ICD-10-CM

## 2022-08-08 ENCOUNTER — TELEPHONE (OUTPATIENT)
Dept: OBSTETRICS AND GYNECOLOGY | Facility: CLINIC | Age: 37
End: 2022-08-08
Payer: COMMERCIAL

## 2022-08-08 DIAGNOSIS — N95.1 MENOPAUSAL SYMPTOM: Primary | ICD-10-CM

## 2022-08-08 RX ORDER — TESTOSTERONE CYPIONATE 200 MG/ML
50 INJECTION, SOLUTION INTRAMUSCULAR
Status: COMPLETED | OUTPATIENT
Start: 2022-08-08 | End: 2022-08-22

## 2022-08-18 ENCOUNTER — LAB VISIT (OUTPATIENT)
Dept: LAB | Facility: HOSPITAL | Age: 37
End: 2022-08-18
Attending: INTERNAL MEDICINE
Payer: COMMERCIAL

## 2022-08-18 ENCOUNTER — PATIENT MESSAGE (OUTPATIENT)
Dept: HEMATOLOGY/ONCOLOGY | Facility: CLINIC | Age: 37
End: 2022-08-18
Payer: COMMERCIAL

## 2022-08-18 ENCOUNTER — INFUSION (OUTPATIENT)
Dept: INFUSION THERAPY | Facility: HOSPITAL | Age: 37
End: 2022-08-18
Attending: INTERNAL MEDICINE
Payer: COMMERCIAL

## 2022-08-18 DIAGNOSIS — K59.00 CONSTIPATION, UNSPECIFIED CONSTIPATION TYPE: ICD-10-CM

## 2022-08-18 DIAGNOSIS — E28.39 SUPPRESSION OF OVARIAN SECRETION: Primary | ICD-10-CM

## 2022-08-18 DIAGNOSIS — C50.412 MALIGNANT NEOPLASM OF UPPER-OUTER QUADRANT OF LEFT BREAST IN FEMALE, ESTROGEN RECEPTOR POSITIVE: ICD-10-CM

## 2022-08-18 DIAGNOSIS — Z17.0 MALIGNANT NEOPLASM OF UPPER-OUTER QUADRANT OF LEFT BREAST IN FEMALE, ESTROGEN RECEPTOR POSITIVE: ICD-10-CM

## 2022-08-18 DIAGNOSIS — Z00.00 WELLNESS EXAMINATION: ICD-10-CM

## 2022-08-18 DIAGNOSIS — Z11.59 ENCOUNTER FOR HEPATITIS C SCREENING TEST FOR LOW RISK PATIENT: ICD-10-CM

## 2022-08-18 LAB
BASOPHILS # BLD AUTO: 0.05 K/UL (ref 0–0.2)
BASOPHILS NFR BLD: 0.8 % (ref 0–1.9)
CHOLEST SERPL-MCNC: 175 MG/DL (ref 120–199)
CHOLEST/HDLC SERPL: 2 {RATIO} (ref 2–5)
DIFFERENTIAL METHOD: ABNORMAL
EOSINOPHIL # BLD AUTO: 0.1 K/UL (ref 0–0.5)
EOSINOPHIL NFR BLD: 2 % (ref 0–8)
ERYTHROCYTE [DISTWIDTH] IN BLOOD BY AUTOMATED COUNT: 14.4 % (ref 11.5–14.5)
HCT VFR BLD AUTO: 40 % (ref 37–48.5)
HCV AB SERPL QL IA: NEGATIVE
HDLC SERPL-MCNC: 88 MG/DL (ref 40–75)
HDLC SERPL: 50.3 % (ref 20–50)
HGB BLD-MCNC: 13.5 G/DL (ref 12–16)
IMM GRANULOCYTES # BLD AUTO: 0 K/UL (ref 0–0.04)
IMM GRANULOCYTES NFR BLD AUTO: 0 % (ref 0–0.5)
LDLC SERPL CALC-MCNC: 77.6 MG/DL (ref 63–159)
LYMPHOCYTES # BLD AUTO: 3.1 K/UL (ref 1–4.8)
LYMPHOCYTES NFR BLD: 50.9 % (ref 18–48)
MCH RBC QN AUTO: 32.1 PG (ref 27–31)
MCHC RBC AUTO-ENTMCNC: 33.8 G/DL (ref 32–36)
MCV RBC AUTO: 95 FL (ref 82–98)
MONOCYTES # BLD AUTO: 0.4 K/UL (ref 0.3–1)
MONOCYTES NFR BLD: 7.2 % (ref 4–15)
NEUTROPHILS # BLD AUTO: 2.4 K/UL (ref 1.8–7.7)
NEUTROPHILS NFR BLD: 39.1 % (ref 38–73)
NONHDLC SERPL-MCNC: 87 MG/DL
NRBC BLD-RTO: 0 /100 WBC
PLATELET # BLD AUTO: 123 K/UL (ref 150–450)
PMV BLD AUTO: 12 FL (ref 9.2–12.9)
RBC # BLD AUTO: 4.21 M/UL (ref 4–5.4)
T4 FREE SERPL-MCNC: 0.79 NG/DL (ref 0.71–1.51)
TRIGL SERPL-MCNC: 47 MG/DL (ref 30–150)
TSH SERPL DL<=0.005 MIU/L-ACNC: 0.62 UIU/ML (ref 0.4–4)
WBC # BLD AUTO: 6.07 K/UL (ref 3.9–12.7)

## 2022-08-18 PROCEDURE — 84443 ASSAY THYROID STIM HORMONE: CPT | Performed by: INTERNAL MEDICINE

## 2022-08-18 PROCEDURE — 84439 ASSAY OF FREE THYROXINE: CPT | Performed by: INTERNAL MEDICINE

## 2022-08-18 PROCEDURE — 36415 COLL VENOUS BLD VENIPUNCTURE: CPT | Performed by: INTERNAL MEDICINE

## 2022-08-18 PROCEDURE — 85025 COMPLETE CBC W/AUTO DIFF WBC: CPT | Performed by: INTERNAL MEDICINE

## 2022-08-18 PROCEDURE — 63600175 PHARM REV CODE 636 W HCPCS: Mod: JG | Performed by: INTERNAL MEDICINE

## 2022-08-18 PROCEDURE — 86803 HEPATITIS C AB TEST: CPT | Performed by: INTERNAL MEDICINE

## 2022-08-18 PROCEDURE — 80061 LIPID PANEL: CPT | Performed by: INTERNAL MEDICINE

## 2022-08-18 PROCEDURE — 96402 CHEMO HORMON ANTINEOPL SQ/IM: CPT

## 2022-08-18 RX ADMIN — GOSERELIN ACETATE 3.6 MG: 3.6 IMPLANT SUBCUTANEOUS at 11:08

## 2022-08-19 NOTE — PROGRESS NOTES
Patient arrives today for her monthly hormone injection. Risks, benefits, side effects, administration, frequency and reasons warranting provider notification reviewed with patient. Patient verbalizes understanding.     50 mg Testosterone administered IM to right outer quadrant of gluteus using aseptic technique and 22 gauge 1.5 inch needle.     Site secured with Band-Aid, needle tip remains intact, patient tolerated well without pain.       Patient's next injection scheduled prior to clinic departure.    Dose 1 of 6   Metric Last Due   Estradiol  After Dose 3   Free Testosterone  After Dose 3   Progesterone (if applicable)     Well Woman Exam 6/21 Due   Annual w/PCP 6/22 6/23   MMG 7/22 7/23       Recent/Upcoming Surgery or Admissions (last 30 days):  No   Recent Health Changes (last 30 days): No   Allergy Changes: No   Medication Changes: No   Family History Updates (message Bridget for any family cancer updates):  No   Applicable Clearances Obtained: Yes   BP (if checked) under 180/100:  n/a

## 2022-08-22 ENCOUNTER — CLINICAL SUPPORT (OUTPATIENT)
Dept: OBSTETRICS AND GYNECOLOGY | Facility: CLINIC | Age: 37
End: 2022-08-22
Payer: COMMERCIAL

## 2022-08-22 DIAGNOSIS — N95.1 MENOPAUSAL SYMPTOMS: Primary | ICD-10-CM

## 2022-08-22 PROCEDURE — 99999 PR PBB SHADOW E&M-EST. PATIENT-LVL I: CPT | Mod: PBBFAC,,,

## 2022-08-22 PROCEDURE — 96372 PR INJECTION,THERAP/PROPH/DIAG2ST, IM OR SUBCUT: ICD-10-PCS | Mod: S$GLB,,, | Performed by: OBSTETRICS & GYNECOLOGY

## 2022-08-22 PROCEDURE — 96372 THER/PROPH/DIAG INJ SC/IM: CPT | Mod: S$GLB,,, | Performed by: OBSTETRICS & GYNECOLOGY

## 2022-08-22 PROCEDURE — 99999 PR PBB SHADOW E&M-EST. PATIENT-LVL I: ICD-10-PCS | Mod: PBBFAC,,,

## 2022-08-22 RX ADMIN — TESTOSTERONE CYPIONATE 50 MG: 200 INJECTION, SOLUTION INTRAMUSCULAR at 10:08

## 2022-09-05 ENCOUNTER — PATIENT MESSAGE (OUTPATIENT)
Dept: INTERNAL MEDICINE | Facility: CLINIC | Age: 37
End: 2022-09-05
Payer: COMMERCIAL

## 2022-09-05 DIAGNOSIS — B00.1 FEVER BLISTER: ICD-10-CM

## 2022-09-05 RX ORDER — VALACYCLOVIR HYDROCHLORIDE 500 MG/1
TABLET, FILM COATED ORAL
Qty: 18 TABLET | Refills: 0 | Status: SHIPPED | OUTPATIENT
Start: 2022-09-05 | End: 2022-12-27

## 2022-09-15 ENCOUNTER — INFUSION (OUTPATIENT)
Dept: INFUSION THERAPY | Facility: HOSPITAL | Age: 37
End: 2022-09-15
Payer: COMMERCIAL

## 2022-09-15 DIAGNOSIS — E28.39 SUPPRESSION OF OVARIAN SECRETION: Primary | ICD-10-CM

## 2022-09-15 PROCEDURE — 63600175 PHARM REV CODE 636 W HCPCS: Mod: JG | Performed by: INTERNAL MEDICINE

## 2022-09-15 PROCEDURE — 96402 CHEMO HORMON ANTINEOPL SQ/IM: CPT

## 2022-09-15 RX ADMIN — GOSERELIN ACETATE 3.6 MG: 3.6 IMPLANT SUBCUTANEOUS at 11:09

## 2022-09-19 NOTE — PROGRESS NOTES
Patient arrives today for her monthly hormone injection. Risks, benefits, side effects, administration, frequency and reasons warranting provider notification reviewed with patient. Patient verbalizes understanding.     50 mg Testosterone administered IM to left outer quadrant of gluteus using aseptic technique and 22 gauge 1.5 inch needle.     Site secured with Band-Aid, needle tip remains intact, patient tolerated well without pain.  Patient states she feels that the inj wears off faster, discussed that it can take a while to get the full effects will get next injection and then labs to see where levels are. Message sent to Dr. Warren.     Patient's next injection scheduled prior to clinic departure.     Dose 2 of 6     Metric Last Due   Estradiol   After Dose 3   Free Testosterone   After Dose 3   Progesterone (if applicable)       Well Woman Exam 6/21 Due   Annual w/PCP 6/22 6/23   MMG 7/22 7/23         Recent/Upcoming Surgery or Admissions (last 30 days):  No   Recent Health Changes (last 30 days): No   Allergy Changes: No   Medication Changes: No   Family History Updates (message Bridget for any family cancer updates):  No   Applicable Clearances Obtained: Yes   BP (if checked) under 180/100:  n/a

## 2022-09-21 ENCOUNTER — CLINICAL SUPPORT (OUTPATIENT)
Dept: OBSTETRICS AND GYNECOLOGY | Facility: CLINIC | Age: 37
End: 2022-09-21
Payer: COMMERCIAL

## 2022-09-21 DIAGNOSIS — N95.1 MENOPAUSAL SYMPTOMS: Primary | ICD-10-CM

## 2022-09-21 PROCEDURE — 96372 THER/PROPH/DIAG INJ SC/IM: CPT | Mod: S$GLB,,, | Performed by: OBSTETRICS & GYNECOLOGY

## 2022-09-21 PROCEDURE — 99999 PR PBB SHADOW E&M-EST. PATIENT-LVL I: ICD-10-PCS | Mod: PBBFAC,,,

## 2022-09-21 PROCEDURE — 96372 PR INJECTION,THERAP/PROPH/DIAG2ST, IM OR SUBCUT: ICD-10-PCS | Mod: S$GLB,,, | Performed by: OBSTETRICS & GYNECOLOGY

## 2022-09-21 PROCEDURE — 99999 PR PBB SHADOW E&M-EST. PATIENT-LVL I: CPT | Mod: PBBFAC,,,

## 2022-09-21 RX ORDER — TESTOSTERONE CYPIONATE 200 MG/ML
50 INJECTION, SOLUTION INTRAMUSCULAR
Status: COMPLETED | OUTPATIENT
Start: 2022-09-21 | End: 2023-02-09

## 2022-09-21 RX ADMIN — TESTOSTERONE CYPIONATE 50 MG: 200 INJECTION, SOLUTION INTRAMUSCULAR at 09:09

## 2022-09-22 ENCOUNTER — TELEPHONE (OUTPATIENT)
Dept: OBSTETRICS AND GYNECOLOGY | Facility: CLINIC | Age: 37
End: 2022-09-22
Payer: COMMERCIAL

## 2022-09-22 DIAGNOSIS — N95.1 MENOPAUSAL SYMPTOMS: Primary | ICD-10-CM

## 2022-09-22 NOTE — TELEPHONE ENCOUNTER
----- Message from Lyndsay Warren MD sent at 9/21/2022  3:36 PM CDT -----  She has not had labs since last December. She will need labs and a virtual visit  Free testosterone and estradiol  ----- Message -----  From: Jessie Jim LPN  Sent: 9/21/2022   9:53 AM CDT  To: Lyndsay Warren MD, Meli Bauer MA    Patient states that she feels that the injection wears off quicker. Please advice. Thanks!

## 2022-09-23 ENCOUNTER — PATIENT MESSAGE (OUTPATIENT)
Dept: OBSTETRICS AND GYNECOLOGY | Facility: CLINIC | Age: 37
End: 2022-09-23
Payer: COMMERCIAL

## 2022-09-23 ENCOUNTER — PATIENT MESSAGE (OUTPATIENT)
Dept: INTERNAL MEDICINE | Facility: CLINIC | Age: 37
End: 2022-09-23
Payer: COMMERCIAL

## 2022-09-23 DIAGNOSIS — Z17.0 MALIGNANT NEOPLASM OF UPPER-OUTER QUADRANT OF LEFT BREAST IN FEMALE, ESTROGEN RECEPTOR POSITIVE: ICD-10-CM

## 2022-09-23 DIAGNOSIS — R53.0 NEOPLASTIC MALIGNANT RELATED FATIGUE: ICD-10-CM

## 2022-09-23 DIAGNOSIS — C50.412 MALIGNANT NEOPLASM OF UPPER-OUTER QUADRANT OF LEFT BREAST IN FEMALE, ESTROGEN RECEPTOR POSITIVE: ICD-10-CM

## 2022-09-23 DIAGNOSIS — R45.89 ANXIETY ABOUT HEALTH: ICD-10-CM

## 2022-09-23 NOTE — TELEPHONE ENCOUNTER
No new care gaps identified.  [facility] Embedded Care Gaps. Reference number: [id number]. 9/23/2022   1:39:30 PM CDT

## 2022-09-25 RX ORDER — METHYLPHENIDATE HYDROCHLORIDE 10 MG/1
10 TABLET ORAL 2 TIMES DAILY WITH MEALS
Qty: 60 TABLET | Refills: 0 | Status: SHIPPED | OUTPATIENT
Start: 2022-09-25 | End: 2023-01-26 | Stop reason: SDUPTHER

## 2022-09-25 RX ORDER — ALPRAZOLAM 0.5 MG/1
TABLET ORAL
Qty: 30 TABLET | Refills: 4 | Status: SHIPPED | OUTPATIENT
Start: 2022-09-25 | End: 2023-01-26 | Stop reason: SDUPTHER

## 2022-10-06 ENCOUNTER — PATIENT MESSAGE (OUTPATIENT)
Dept: OBSTETRICS AND GYNECOLOGY | Facility: CLINIC | Age: 37
End: 2022-10-06
Payer: COMMERCIAL

## 2022-10-07 ENCOUNTER — LAB VISIT (OUTPATIENT)
Dept: LAB | Facility: HOSPITAL | Age: 37
End: 2022-10-07
Attending: OBSTETRICS & GYNECOLOGY
Payer: COMMERCIAL

## 2022-10-07 DIAGNOSIS — N95.1 MENOPAUSAL SYMPTOMS: ICD-10-CM

## 2022-10-07 LAB — ESTRADIOL SERPL-MCNC: 15 PG/ML

## 2022-10-07 PROCEDURE — 36415 COLL VENOUS BLD VENIPUNCTURE: CPT | Performed by: OBSTETRICS & GYNECOLOGY

## 2022-10-07 PROCEDURE — 84402 ASSAY OF FREE TESTOSTERONE: CPT | Performed by: OBSTETRICS & GYNECOLOGY

## 2022-10-07 PROCEDURE — 82670 ASSAY OF TOTAL ESTRADIOL: CPT | Performed by: OBSTETRICS & GYNECOLOGY

## 2022-10-11 LAB — TESTOST FREE SERPL-MCNC: 1.5 PG/ML

## 2022-10-16 NOTE — PROGRESS NOTES
"Subjective:       Patient ID: Michelle Gage is a 36 y.o. female.    Chief Complaint: Malignant neoplasm of upper-outer quadrant of left breast i    HPI    Returns for routine follow up  Covid in 6/2022- fatigue and aches- completely resolved    Currently on Tamoxifen and Zoladex  Weight stable  No pain issues  Post 3rd testosterone injection- reports brief impact on improved energy level- states after that sweats return at night and mental fatigue return  Effexor has also helped with managing somewhat the hot flashes and helps her mood  Due for Zoladex injection today    Notes continued stomach issues- reports not regular  She has not yet been able to schedule the ultrasound  Some days stomach "not right"  Denies diarrhea or nausea- tends to mor constipation  Tries to balance with stool softeners  Reports prior gallbladder issue before treatment and opted for diet management- has an appt with PCP to follow up      Diagnosis: Stage I ([id number]) invasive ductal carcinoma of left breast, upper outer quadrant, ER 95%, NM 90%, Her2 3+, Grade 3, Ki67 25%  Premenopausal status.     Oncologic History:  Presentation  - [id number] - presented for palpable left breast mass around 2018 - was being watched on imaging at outside hospital.    - 5/11/2020 - Diagnostic bilateral mammogram and left breast US at [facility] showed left breast 1:00 mass, 1.4 cm, 8 CFN, mild left axillary adenopathy  - 5/11/2020 - Biopsy - Grade 3 IDC, estrogen receptor 95%, progesterone receptor 90%, Her2 delphine 3+, Ki67 25%. LN biopsy negative  Surgery consultation with Dr Dumont on 5/14. Breast cancer is far in axillary tail and felt to be difficult to obtain clear margins without neoadjuvant therapy.               - 5/14/2020 - Genetics TreatFeed David Peoplesis neg; VUS AP             Medical oncology consultation on 5/15/2020 -  This case was discussed with Dr. Dumont.  She does feel like the patient will benefit from neoadjuvant " therapy to help improve her surgical margins.  Since the tumor is less than 2 cm and clinically lymph node negative (MRI pending), I recommended treatment with Taxotere, carboplatin and Herceptin without Perjeta.  Discussed with her that there is data in tumors less than 2 cm to consider Taxol/Herceptin however would not typically use this in a neoadjuvant setting for concern for under treatment.                - Eggs Retrieval - has 2 embryos.               * 6/16/2020 started neoadjuvant TCH (no perjeta since < 2 cm and LN negative). Neoadjuvant therapy chosen due to location of tumor.     11/6/2020 Imaging Significant Findings     Breast MRI  Impression:     The previously described upper outer quadrant known malignancy in the left breast is no longer visualized consistent with complete imaging response to therapy.      BI-RADS Category:   Overall: 6 - Known Biopsy-Proven Malignancy     Recommendation:  Continued breast surgery and oncology management.             11/30/2020 Breast Surgery     Surgery: Dr Jessica Dumont, [phone] performed bilateral mastectomy with sentinel lymph node biopsy with pathology showing grade 1 invasive ductal carcinoma,  3 mm with 0 positive lymph nodes.          11/30/2020 Cancer Staged     [id number]      12/15/2020 Tumor Conference       Post mastectomy radiation not recommended but patient will meet with radiation oncology. Systemically,standard of care would be Kadcyla followed by endocrine therapy.      Adjuvant Kadcyla started 12/28/2020  Tamoxifen holiday 4/4/2021- 7/12/2021 due to side effects.   Kadcyla stopped due to difficulty recovering counts- last dose 8/9/2021  Now on Zoladex and Tamoxifen.     Review of Systems   Constitutional:  Negative for activity change, appetite change, fatigue and unexpected weight change.   Respiratory:  Negative for cough, shortness of breath and wheezing.    Cardiovascular:  Negative for chest pain, palpitations and leg swelling.    Gastrointestinal:  Negative for abdominal distention, abdominal pain, change in bowel habit, constipation, diarrhea, nausea, vomiting and change in bowel habit.   Genitourinary:  Negative for decreased urine volume and dysuria.   Musculoskeletal:  Positive for arthralgias and joint deformity. Negative for joint swelling.   Neurological:  Negative for weakness, numbness and headaches.   Hematological:  Negative for adenopathy. Does not bruise/bleed easily.   Psychiatric/Behavioral:  Negative for decreased concentration, dysphoric mood and sleep disturbance. The patient is not nervous/anxious.        Objective:      Physical Exam  Vitals and nursing note reviewed.   Constitutional:       General: She is not in acute distress.     Appearance: Normal appearance. She is well-developed and normal weight. She is not ill-appearing.   HENT:      Head: Normocephalic and atraumatic.   Eyes:      General: No scleral icterus.     Conjunctiva/sclera: Conjunctivae normal.      Pupils: Pupils are equal, round, and reactive to light.      Right eye: Pupil is round and reactive.      Left eye: Pupil is round and reactive.   Neck:      Thyroid: No thyromegaly.      Vascular: No JVD.      Trachea: No tracheal deviation.   Cardiovascular:      Rate and Rhythm: Normal rate and regular rhythm.      Heart sounds: Normal heart sounds. No murmur heard.    No friction rub. No gallop.   Pulmonary:      Effort: Pulmonary effort is normal. No respiratory distress.      Breath sounds: Normal breath sounds. No wheezing, rhonchi or rales.      Comments: Breast- bilateral implants  No chest wall abnormalities or LAD  Abdominal:      General: Abdomen is flat. Bowel sounds are normal. There is no distension.      Palpations: Abdomen is soft. There is no mass.      Tenderness: There is no abdominal tenderness. There is no guarding or rebound.      Comments: No organomegaly   Musculoskeletal:         General: No swelling or tenderness. Normal range of  motion.      Cervical back: Normal range of motion and neck supple.      Right lower leg: No edema.      Left lower leg: No edema.   Lymphadenopathy:      Head:      Right side of head: No submandibular adenopathy.      Left side of head: No submandibular adenopathy.      Cervical: No cervical adenopathy.      Right cervical: No superficial, deep or posterior cervical adenopathy.     Left cervical: No superficial, deep or posterior cervical adenopathy.      Upper Body:      Right upper body: No supraclavicular adenopathy.      Left upper body: No supraclavicular adenopathy.   Skin:     General: Skin is warm and dry.      Coloration: Skin is not jaundiced or pale.      Findings: No bruising, erythema, lesion, petechiae or rash.   Neurological:      General: No focal deficit present.      Mental Status: She is alert and oriented to person, place, and time. Mental status is at baseline.      Sensory: No sensory deficit.      Motor: No weakness.      Coordination: Coordination normal.      Gait: Gait normal.      Deep Tendon Reflexes: Reflexes are normal and symmetric.   Psychiatric:         Mood and Affect: Mood normal. Mood is not anxious or depressed.         Behavior: Behavior normal.         Thought Content: Thought content normal.         Judgment: Judgment normal.       Assessment:       Problem List Items Addressed This Visit       Thrombocytopenia    Malignant neoplasm of upper-outer quadrant of left breast in female, estrogen receptor positive - Primary    Abdominal pain       Plan:       Thrombocytopenia  Long standing > 5 years, likely ITP- blue top platelets with next draw     Breast cancer- continue Zoladex and Tamoxifen  Continue zoaldex monthly    GI referral    Route Chart for Scheduling    Med Onc Chart Routing      Follow up with physician . Zoladex 11/14, 12/12   Follow up with [facility] 3 months. cbc, cmp, and EP and zoaldex   Infusion scheduling note    Injection scheduling note    Labs    Imaging     Pharmacy appointment    Other referrals          Supportive Plan Information  OP BREAST GOSERELIN Q4W   Christy Salazar MD   Upcoming Treatment Dates - OP BREAST GOSERELIN Q4W    10/18/2022       Chemotherapy       goserelin (ZOLADEX) injection 3.6 mg

## 2022-10-17 ENCOUNTER — OFFICE VISIT (OUTPATIENT)
Dept: HEMATOLOGY/ONCOLOGY | Facility: CLINIC | Age: 37
End: 2022-10-17
Payer: COMMERCIAL

## 2022-10-17 ENCOUNTER — INFUSION (OUTPATIENT)
Dept: INFUSION THERAPY | Facility: HOSPITAL | Age: 37
End: 2022-10-17
Payer: COMMERCIAL

## 2022-10-17 ENCOUNTER — LAB VISIT (OUTPATIENT)
Dept: LAB | Facility: HOSPITAL | Age: 37
End: 2022-10-17
Payer: COMMERCIAL

## 2022-10-17 VITALS
TEMPERATURE: 98 F | HEART RATE: 67 BPM | WEIGHT: 111.75 LBS | SYSTOLIC BLOOD PRESSURE: 111 MMHG | HEIGHT: 65 IN | DIASTOLIC BLOOD PRESSURE: 65 MMHG | BODY MASS INDEX: 18.62 KG/M2 | OXYGEN SATURATION: 97 % | RESPIRATION RATE: 18 BRPM

## 2022-10-17 DIAGNOSIS — D69.6 THROMBOCYTOPENIA: ICD-10-CM

## 2022-10-17 DIAGNOSIS — Z17.0 MALIGNANT NEOPLASM OF UPPER-OUTER QUADRANT OF LEFT BREAST IN FEMALE, ESTROGEN RECEPTOR POSITIVE: Primary | ICD-10-CM

## 2022-10-17 DIAGNOSIS — C50.412 MALIGNANT NEOPLASM OF UPPER-OUTER QUADRANT OF LEFT BREAST IN FEMALE, ESTROGEN RECEPTOR POSITIVE: Primary | ICD-10-CM

## 2022-10-17 DIAGNOSIS — Z17.0 MALIGNANT NEOPLASM OF UPPER-OUTER QUADRANT OF LEFT BREAST IN FEMALE, ESTROGEN RECEPTOR POSITIVE: ICD-10-CM

## 2022-10-17 DIAGNOSIS — R10.10 PAIN OF UPPER ABDOMEN: ICD-10-CM

## 2022-10-17 DIAGNOSIS — C50.412 MALIGNANT NEOPLASM OF UPPER-OUTER QUADRANT OF LEFT BREAST IN FEMALE, ESTROGEN RECEPTOR POSITIVE: ICD-10-CM

## 2022-10-17 DIAGNOSIS — E28.39 SUPPRESSION OF OVARIAN SECRETION: Primary | ICD-10-CM

## 2022-10-17 PROBLEM — R10.9 ABDOMINAL PAIN: Status: ACTIVE | Noted: 2022-10-17

## 2022-10-17 LAB
ALBUMIN SERPL BCP-MCNC: 3.8 G/DL (ref 3.5–5.2)
ALP SERPL-CCNC: 53 U/L (ref 55–135)
ALT SERPL W/O P-5'-P-CCNC: 28 U/L (ref 10–44)
ANION GAP SERPL CALC-SCNC: 5 MMOL/L (ref 8–16)
AST SERPL-CCNC: 38 U/L (ref 10–40)
BILIRUB SERPL-MCNC: 0.5 MG/DL (ref 0.1–1)
BUN SERPL-MCNC: 10 MG/DL (ref 6–20)
CALCIUM SERPL-MCNC: 9.3 MG/DL (ref 8.7–10.5)
CHLORIDE SERPL-SCNC: 109 MMOL/L (ref 95–110)
CO2 SERPL-SCNC: 28 MMOL/L (ref 23–29)
CREAT SERPL-MCNC: 0.7 MG/DL (ref 0.5–1.4)
ERYTHROCYTE [DISTWIDTH] IN BLOOD BY AUTOMATED COUNT: 14.6 % (ref 11.5–14.5)
EST. GFR  (NO RACE VARIABLE): >60 ML/MIN/1.73 M^2
GLUCOSE SERPL-MCNC: 91 MG/DL (ref 70–110)
HCT VFR BLD AUTO: 40.9 % (ref 37–48.5)
HGB BLD-MCNC: 13 G/DL (ref 12–16)
IMM GRANULOCYTES # BLD AUTO: 0.01 K/UL (ref 0–0.04)
MCH RBC QN AUTO: 31 PG (ref 27–31)
MCHC RBC AUTO-ENTMCNC: 31.8 G/DL (ref 32–36)
MCV RBC AUTO: 98 FL (ref 82–98)
NEUTROPHILS # BLD AUTO: 1.8 K/UL (ref 1.8–7.7)
PLATELET # BLD AUTO: 96 K/UL (ref 150–450)
PMV BLD AUTO: 12.7 FL (ref 9.2–12.9)
POTASSIUM SERPL-SCNC: 4.6 MMOL/L (ref 3.5–5.1)
PROT SERPL-MCNC: 6.5 G/DL (ref 6–8.4)
RBC # BLD AUTO: 4.19 M/UL (ref 4–5.4)
SODIUM SERPL-SCNC: 142 MMOL/L (ref 136–145)
WBC # BLD AUTO: 5.56 K/UL (ref 3.9–12.7)

## 2022-10-17 PROCEDURE — 3078F PR MOST RECENT DIASTOLIC BLOOD PRESSURE < 80 MM HG: ICD-10-PCS | Mod: CPTII,S$GLB,, | Performed by: INTERNAL MEDICINE

## 2022-10-17 PROCEDURE — 3074F PR MOST RECENT SYSTOLIC BLOOD PRESSURE < 130 MM HG: ICD-10-PCS | Mod: CPTII,S$GLB,, | Performed by: INTERNAL MEDICINE

## 2022-10-17 PROCEDURE — 96402 CHEMO HORMON ANTINEOPL SQ/IM: CPT

## 2022-10-17 PROCEDURE — 36415 COLL VENOUS BLD VENIPUNCTURE: CPT | Performed by: NURSE PRACTITIONER

## 2022-10-17 PROCEDURE — 3078F DIAST BP <80 MM HG: CPT | Mod: CPTII,S$GLB,, | Performed by: INTERNAL MEDICINE

## 2022-10-17 PROCEDURE — 99999 PR PBB SHADOW E&M-EST. PATIENT-LVL IV: ICD-10-PCS | Mod: PBBFAC,,, | Performed by: INTERNAL MEDICINE

## 2022-10-17 PROCEDURE — 99999 PR PBB SHADOW E&M-EST. PATIENT-LVL IV: CPT | Mod: PBBFAC,,, | Performed by: INTERNAL MEDICINE

## 2022-10-17 PROCEDURE — 63600175 PHARM REV CODE 636 W HCPCS: Mod: JG | Performed by: INTERNAL MEDICINE

## 2022-10-17 PROCEDURE — 3074F SYST BP LT 130 MM HG: CPT | Mod: CPTII,S$GLB,, | Performed by: INTERNAL MEDICINE

## 2022-10-17 PROCEDURE — 99215 PR OFFICE/OUTPT VISIT, EST, LEVL V, 40-54 MIN: ICD-10-PCS | Mod: S$GLB,,, | Performed by: INTERNAL MEDICINE

## 2022-10-17 PROCEDURE — 80053 COMPREHEN METABOLIC PANEL: CPT | Performed by: NURSE PRACTITIONER

## 2022-10-17 PROCEDURE — 85027 COMPLETE CBC AUTOMATED: CPT | Performed by: NURSE PRACTITIONER

## 2022-10-17 PROCEDURE — 99215 OFFICE O/P EST HI 40 MIN: CPT | Mod: S$GLB,,, | Performed by: INTERNAL MEDICINE

## 2022-10-17 RX ADMIN — GOSERELIN ACETATE 3.6 MG: 3.6 IMPLANT SUBCUTANEOUS at 11:10

## 2022-10-18 NOTE — PROGRESS NOTES
Patient arrives today for her monthly hormone injection. Risks, benefits, side effects, administration, frequency and reasons warranting provider notification reviewed with patient. Patient verbalizes understanding.     50 mg Testosterone administered IM to left outer quadrant of gluteus using aseptic technique and 22 gauge 1.5 inch needle.     Site secured with Band-Aid, needle tip remains intact, patient tolerated well without pain.       Patient's next injection scheduled prior to clinic departure.     Dose 3 of 6      Metric Last Due   Estradiol   After Dose 3   Free Testosterone   After Dose 3   Progesterone (if applicable)       Well Woman Exam 6/21 Due   Annual w/PCP 6/22 6/23   MMG 7/22 7/23         Recent/Upcoming Surgery or Admissions (last 30 days):  No   Recent Health Changes (last 30 days): No   Allergy Changes: No   Medication Changes: No   Family History Updates (message Bridget for any family cancer updates):  No   Applicable Clearances Obtained: Yes   BP (if checked) under 180/100:  n/a

## 2022-10-20 ENCOUNTER — OFFICE VISIT (OUTPATIENT)
Dept: OBSTETRICS AND GYNECOLOGY | Facility: CLINIC | Age: 37
End: 2022-10-20
Attending: OBSTETRICS & GYNECOLOGY
Payer: COMMERCIAL

## 2022-10-20 ENCOUNTER — CLINICAL SUPPORT (OUTPATIENT)
Dept: OBSTETRICS AND GYNECOLOGY | Facility: CLINIC | Age: 37
End: 2022-10-20
Payer: COMMERCIAL

## 2022-10-20 VITALS
HEIGHT: 65 IN | SYSTOLIC BLOOD PRESSURE: 102 MMHG | BODY MASS INDEX: 18.33 KG/M2 | HEART RATE: 47 BPM | WEIGHT: 110 LBS | DIASTOLIC BLOOD PRESSURE: 64 MMHG

## 2022-10-20 DIAGNOSIS — Z17.0 MALIGNANT NEOPLASM OF UPPER-OUTER QUADRANT OF LEFT BREAST IN FEMALE, ESTROGEN RECEPTOR POSITIVE: Primary | ICD-10-CM

## 2022-10-20 DIAGNOSIS — N95.1 MENOPAUSAL SYMPTOMS: Primary | ICD-10-CM

## 2022-10-20 DIAGNOSIS — C50.412 MALIGNANT NEOPLASM OF UPPER-OUTER QUADRANT OF LEFT BREAST IN FEMALE, ESTROGEN RECEPTOR POSITIVE: Primary | ICD-10-CM

## 2022-10-20 DIAGNOSIS — Z79.810 SERM USE (SELECTIVE ESTROGEN RECEPTOR MODULATOR): ICD-10-CM

## 2022-10-20 DIAGNOSIS — N95.1 MENOPAUSAL SYMPTOM: ICD-10-CM

## 2022-10-20 PROCEDURE — 99999 PR PBB SHADOW E&M-EST. PATIENT-LVL III: CPT | Mod: PBBFAC,,, | Performed by: OBSTETRICS & GYNECOLOGY

## 2022-10-20 PROCEDURE — 3078F DIAST BP <80 MM HG: CPT | Mod: CPTII,S$GLB,, | Performed by: OBSTETRICS & GYNECOLOGY

## 2022-10-20 PROCEDURE — 99999 PR PBB SHADOW E&M-EST. PATIENT-LVL III: ICD-10-PCS | Mod: PBBFAC,,, | Performed by: OBSTETRICS & GYNECOLOGY

## 2022-10-20 PROCEDURE — 3078F PR MOST RECENT DIASTOLIC BLOOD PRESSURE < 80 MM HG: ICD-10-PCS | Mod: CPTII,S$GLB,, | Performed by: OBSTETRICS & GYNECOLOGY

## 2022-10-20 PROCEDURE — 99999 PR PBB SHADOW E&M-EST. PATIENT-LVL I: CPT | Mod: PBBFAC,,,

## 2022-10-20 PROCEDURE — 99213 OFFICE O/P EST LOW 20 MIN: CPT | Mod: 25,S$GLB,, | Performed by: OBSTETRICS & GYNECOLOGY

## 2022-10-20 PROCEDURE — 99999 PR PBB SHADOW E&M-EST. PATIENT-LVL I: ICD-10-PCS | Mod: PBBFAC,,,

## 2022-10-20 PROCEDURE — 3074F PR MOST RECENT SYSTOLIC BLOOD PRESSURE < 130 MM HG: ICD-10-PCS | Mod: CPTII,S$GLB,, | Performed by: OBSTETRICS & GYNECOLOGY

## 2022-10-20 PROCEDURE — 1159F PR MEDICATION LIST DOCUMENTED IN MEDICAL RECORD: ICD-10-PCS | Mod: CPTII,S$GLB,, | Performed by: OBSTETRICS & GYNECOLOGY

## 2022-10-20 PROCEDURE — 96372 PR INJECTION,THERAP/PROPH/DIAG2ST, IM OR SUBCUT: ICD-10-PCS | Mod: S$GLB,,, | Performed by: OBSTETRICS & GYNECOLOGY

## 2022-10-20 PROCEDURE — 96372 THER/PROPH/DIAG INJ SC/IM: CPT | Mod: S$GLB,,, | Performed by: OBSTETRICS & GYNECOLOGY

## 2022-10-20 PROCEDURE — 99213 PR OFFICE/OUTPT VISIT, EST, LEVL III, 20-29 MIN: ICD-10-PCS | Mod: 25,S$GLB,, | Performed by: OBSTETRICS & GYNECOLOGY

## 2022-10-20 PROCEDURE — 1159F MED LIST DOCD IN RCRD: CPT | Mod: CPTII,S$GLB,, | Performed by: OBSTETRICS & GYNECOLOGY

## 2022-10-20 PROCEDURE — 3074F SYST BP LT 130 MM HG: CPT | Mod: CPTII,S$GLB,, | Performed by: OBSTETRICS & GYNECOLOGY

## 2022-10-20 RX ADMIN — TESTOSTERONE CYPIONATE 50 MG: 200 INJECTION, SOLUTION INTRAMUSCULAR at 09:10

## 2022-10-20 NOTE — PROGRESS NOTES
SUBJECTIVE:   36 y.o. female   presents today to discuss menopausal symptoms and injection. No LMP recorded. Patient has had an ablation..  She reports that she has noticed recently worsening of her sleep.  She had her Zoladex injection on Monday and reports having night sweats since then.she is due for her testosterone injection today .      She reports since starting testosterone injections she has had significant decrease in severity and frequency of both her hot flashes in her night sweats.  Sleep has typically become good for her..  She denies acne, hair growth or hair loss  Past Medical History:   Diagnosis Date    Anxiety     Back pain     Brain fog     Constipation     Depression     Genetic testing 2020    BRCA+myRisk NEGATIVE with APC VUS c.3875C>T ([id number]), aka O1960A (3875C>T)    Hx of psychiatric care     Xanax    Malignant neoplasm of upper-outer quadrant of left breast in female, estrogen receptor positive 5/15/2020     Past Surgical History:   Procedure Laterality Date    BILATERAL MASTECTOMY Bilateral 2020    Procedure: MASTECTOMY, BILATERAL;  Surgeon: Jessica Dumont MD;  Location: [facility];  Service: General;  Laterality: Bilateral;    BREAST RECONSTRUCTION Bilateral 2020    Procedure: RECONSTRUCTION, BREAST;  Surgeon: Lamin Seo MD;  Location: [facility];  Service: General;  Laterality: Bilateral;    BREAST SURGERY      Breast biopsy    INSERTION OF BREAST IMPLANT Bilateral 2020    Procedure: INSERTION, BREAST TISSUE EXPANDER - BILATERAL BREAST RECONSTRUCTION WITH TISSUE EXPANDERS AND ACELLULAR DERMAL MATRIX;  Surgeon: Lamin Seo MD;  Location: [facility];  Service: General;  Laterality: Bilateral;    INSERTION OF TUNNELED CENTRAL VENOUS CATHETER (CVC) WITH SUBCUTANEOUS PORT N/A 2020    Procedure: [id number]-CATH;  Surgeon: Dosc Diagnostic Provider;  Location: [address];  Service: Radiology;  Laterality: N/A;  189 port placement    [facility]  REMOVAL Right 2021    Procedure: REMOVAL, CATHETER, CENTRAL VENOUS, TUNNELED, WITH PORT;  Surgeon: Сергей Rowan MD;  Location: [facility] CATH LAB;  Service: Radiology;  Laterality: Right;    SENTINEL LYMPH NODE BIOPSY Left 2020    Procedure: BIOPSY, LYMPH NODE, SENTINEL;  Surgeon: Jessica Dumont MD;  Location: [facility] OR;  Service: General;  Laterality: Left;    WISDOM TOOTH EXTRACTION       Social History     Socioeconomic History    Marital status:      Spouse name: Elio   Occupational History    Occupation:    Tobacco Use    Smoking status: Former    Smokeless tobacco: Former    Tobacco comments:     occasional past while drinking   Substance and Sexual Activity    Alcohol use: Yes     Comment: socially    Drug use: Never    Sexual activity: Yes     Partners: Male     Birth control/protection: None, Other-see comments     Comment: spouse   Social History Narrative    , no children, construction , originally from [place]     Family History   Problem Relation Age of Onset    Osteoporosis Mother     No Known Problems Father     Alzheimer's disease Maternal Grandmother     Bipolar disorder Maternal Grandmother     Alcohol abuse Maternal Grandmother     Diabetes Maternal Grandfather     Breast cancer Paternal Grandmother         bilat noncurrent, @ 50 & 62    Diabetes Paternal Grandfather     Breast cancer Other     Breast cancer Other     Colon cancer Neg Hx     Ovarian cancer Neg Hx      OB History    Para Term  AB Living   0 0 0 0 0 0   SAB IAB Ectopic Multiple Live Births   0 0 0 0 0           Current Outpatient Medications   Medication Sig Dispense Refill    ALPRAZolam (XANAX) 0.5 MG tablet TAKE 1 TABLET(0.5 MG) BY MOUTH THREE TIMES DAILY AS NEEDED FOR INSOMNIA OR ANXIETY  Strength: 0.5 mg 30 tablet 4    ATRALIN 0.05 % gel 2 (two) times daily as needed.       goserelin (ZOLADEX) 3.6 mg injection Inject 3.6 mg into the skin every 28 days.       HYDROcodone-acetaminophen (NORCO) 5-325 mg per tablet Take 1 tablet by mouth every 8 (eight) hours as needed for Pain. 30 tablet 0    LIDOcaine-prilocaine (EMLA) cream Apply topically as needed. 30 g 0    methylphenidate HCl (RITALIN) 10 MG tablet Take 1 tablet (10 mg total) by mouth 2 (two) times daily with meals. 60 tablet 0    naproxen (NAPROSYN) 500 MG tablet Hold until after surgery      tamoxifen (NOLVADEX) 20 MG Tab TAKE 1 TABLET(20 MG) BY MOUTH EVERY DAY 90 tablet 3    valACYclovir (VALTREX) 500 MG tablet TAKE 1 TABLET BY MOUTH TWICE DAILY FOR 3 DAYS AS NEEDED FOR FEVER BLISTER 18 tablet 0    venlafaxine (EFFEXOR-XR) 75 MG 24 hr capsule TAKE 1 CAPSULE(75 MG) BY MOUTH EVERY DAY 90 capsule 2     Current Facility-Administered Medications   Medication Dose Route Frequency Provider Last Rate Last Admin    copper intrauterine device 380 square mm  mm  380 mm Intrauterine  Lyndsay Warren MD   380 mm at 06/25/20 1030    testosterone cypionate injection 50 mg  50 mg Intramuscular Q28 Days Lyndsay Warren MD   50 mg at 10/20/22 0919     Facility-Administered Medications Ordered in Other Visits   Medication Dose Route Frequency Provider Last Rate Last Admin    ceFAZolin 1 g, gentamicin 80 mg in sodium chloride 0.9% 500 mL irrigation   Irrigation On Call Procedure Lamin Seo MD        ceFAZolin 1 g, gentamicin 80 mg in sodium chloride 0.9% 500 mL irrigation   Irrigation On Call Procedure Lamin Seo MD        ceFAZolin 1 g, gentamicin 80 mg in sodium chloride 0.9% 500 mL irrigation   Irrigation On Call Procedure Lamin Seo MD        ceFAZolin 1 g, gentamicin 80 mg in sodium chloride 0.9% 500 mL irrigation   Irrigation On Call Procedure Lamin Seo MD         Allergies: Patient has no known allergies.     The ASCVD Risk score (Nancy DK, et al., 2019) failed to calculate for the following reasons:    The 2019 ASCVD risk score is only valid for ages 40 to  79      ROS:  Constitutional: no weight loss, weight gain, fever, fatigue  Eyes:  No vision changes, glasses/contacts  ENT/Mouth: No ulcers, sinus problems, ears ringing, headache  Cardiovascular: No inability to lie flat, chest pain, exercise intolerance, swelling, heart palpitations  Respiratory: No wheezing, coughing blood, shortness of breath, or cough  Gastrointestinal: No diarrhea, bloody stool, nausea/vomiting, constipation, gas, hemorrhoids  Genitourinary: No blood in urine, painful urination, urgency of urination, frequency of urination, incomplete emptying, incontinence, abnormal bleeding, painful periods, heavy periods, vaginal discharge, vaginal odor, painful intercourse, sexual problems, bleeding after intercourse.  Musculoskeletal: No muscle weakness  Skin/Breast: No painful breasts, nipple discharge, masses, rash, ulcers  Neurological: No passing out, seizures, numbness, headache  Endocrine: No diabetes, hypothyroid, hyperthyroid, +hot flashes, hair loss, abnormal hair growth, acne  Psychiatric: No depression, crying  Hematologic: No bruises, bleeding, swollen lymph nodes, anemia.      Physical Exam  deferred    Labs 10/7/2022  Estradiol 15  Free testosterone 1.5  ASSESSMENT:   1. Malignant neoplasm of upper-outer quadrant of left breast in female, estrogen receptor positive        2. SERM use (selective estrogen receptor modulator)        3. Menopausal symptom              PLAN: Totat time 20 minutes- face to face and review of medical record  Counseled her that her labs are in the normal range and she is experiencing a positive benefit from the testosterone  I do not recommend increasing the dose but we can try to match up her testosterone dose around the time of her Zoladex to improve her symptom control  Patient was in agreement with this plan

## 2022-11-09 ENCOUNTER — PATIENT MESSAGE (OUTPATIENT)
Dept: OBSTETRICS AND GYNECOLOGY | Facility: CLINIC | Age: 37
End: 2022-11-09
Payer: COMMERCIAL

## 2022-11-14 ENCOUNTER — INFUSION (OUTPATIENT)
Dept: INFUSION THERAPY | Facility: HOSPITAL | Age: 37
End: 2022-11-14
Payer: COMMERCIAL

## 2022-11-14 DIAGNOSIS — E28.39 SUPPRESSION OF OVARIAN SECRETION: Primary | ICD-10-CM

## 2022-11-14 PROCEDURE — 63600175 PHARM REV CODE 636 W HCPCS: Mod: JG | Performed by: INTERNAL MEDICINE

## 2022-11-14 PROCEDURE — 96402 CHEMO HORMON ANTINEOPL SQ/IM: CPT

## 2022-11-14 RX ADMIN — GOSERELIN ACETATE 3.6 MG: 3.6 IMPLANT SUBCUTANEOUS at 11:11

## 2022-11-17 ENCOUNTER — CLINICAL SUPPORT (OUTPATIENT)
Dept: OBSTETRICS AND GYNECOLOGY | Facility: CLINIC | Age: 37
End: 2022-11-17
Payer: COMMERCIAL

## 2022-11-17 DIAGNOSIS — N95.1 MENOPAUSAL SYMPTOM: Primary | ICD-10-CM

## 2022-11-17 PROCEDURE — 96372 PR INJECTION,THERAP/PROPH/DIAG2ST, IM OR SUBCUT: ICD-10-PCS | Mod: S$GLB,,, | Performed by: OBSTETRICS & GYNECOLOGY

## 2022-11-17 PROCEDURE — 99999 PR PBB SHADOW E&M-EST. PATIENT-LVL I: ICD-10-PCS | Mod: PBBFAC,,,

## 2022-11-17 PROCEDURE — 99999 PR PBB SHADOW E&M-EST. PATIENT-LVL I: CPT | Mod: PBBFAC,,,

## 2022-11-17 PROCEDURE — 96372 THER/PROPH/DIAG INJ SC/IM: CPT | Mod: S$GLB,,, | Performed by: OBSTETRICS & GYNECOLOGY

## 2022-11-17 RX ADMIN — TESTOSTERONE CYPIONATE 50 MG: 200 INJECTION, SOLUTION INTRAMUSCULAR at 10:11

## 2022-11-17 NOTE — PROGRESS NOTES
...Ordering Provider  Dr. Warren       11/17/22 Pt administered 50 mg of testosterone to the right upper outer glut. Pt tolerated well. Pt instructed next injection is due on 12/15/22. Pt verbalizes understanding.       Pain Before:  None    Pain After: None    Patient Complaints: None

## 2022-12-12 ENCOUNTER — INFUSION (OUTPATIENT)
Dept: INFUSION THERAPY | Facility: HOSPITAL | Age: 37
End: 2022-12-12
Payer: COMMERCIAL

## 2022-12-12 DIAGNOSIS — E28.39 SUPPRESSION OF OVARIAN SECRETION: Primary | ICD-10-CM

## 2022-12-12 PROCEDURE — 63600175 PHARM REV CODE 636 W HCPCS: Mod: JG | Performed by: INTERNAL MEDICINE

## 2022-12-12 PROCEDURE — 96402 CHEMO HORMON ANTINEOPL SQ/IM: CPT

## 2022-12-12 RX ADMIN — GOSERELIN ACETATE 3.6 MG: 3.6 IMPLANT SUBCUTANEOUS at 11:12

## 2022-12-15 ENCOUNTER — CLINICAL SUPPORT (OUTPATIENT)
Dept: OBSTETRICS AND GYNECOLOGY | Facility: CLINIC | Age: 37
End: 2022-12-15
Payer: COMMERCIAL

## 2022-12-15 DIAGNOSIS — N95.1 MENOPAUSAL SYMPTOM: Primary | ICD-10-CM

## 2022-12-15 PROCEDURE — 96372 THER/PROPH/DIAG INJ SC/IM: CPT | Mod: S$GLB,,, | Performed by: OBSTETRICS & GYNECOLOGY

## 2022-12-15 PROCEDURE — 96372 PR INJECTION,THERAP/PROPH/DIAG2ST, IM OR SUBCUT: ICD-10-PCS | Mod: S$GLB,,, | Performed by: OBSTETRICS & GYNECOLOGY

## 2022-12-15 PROCEDURE — 99999 PR PBB SHADOW E&M-EST. PATIENT-LVL I: ICD-10-PCS | Mod: PBBFAC,,,

## 2022-12-15 PROCEDURE — 99999 PR PBB SHADOW E&M-EST. PATIENT-LVL I: CPT | Mod: PBBFAC,,,

## 2022-12-15 RX ADMIN — TESTOSTERONE CYPIONATE 50 MG: 200 INJECTION, SOLUTION INTRAMUSCULAR at 03:12

## 2022-12-15 NOTE — PROGRESS NOTES
...Ordering Provider  Dr. Warren       12/15/22 Pt administered 50 mg of testosterone to the left upper outer glut. Pt tolerated well. Pt instructed next injection is due in 4 weeks, pt instructed to call Dr. Warren's office to schedule next injection. Pt verbalizes understanding.       Pain Before:  None    Pain After: None    Patient Complaints: None

## 2022-12-26 ENCOUNTER — PATIENT MESSAGE (OUTPATIENT)
Dept: HEMATOLOGY/ONCOLOGY | Facility: CLINIC | Age: 37
End: 2022-12-26
Payer: COMMERCIAL

## 2022-12-26 ENCOUNTER — PATIENT MESSAGE (OUTPATIENT)
Dept: OBSTETRICS AND GYNECOLOGY | Facility: CLINIC | Age: 37
End: 2022-12-26
Payer: COMMERCIAL

## 2022-12-27 ENCOUNTER — PATIENT MESSAGE (OUTPATIENT)
Dept: INTERNAL MEDICINE | Facility: CLINIC | Age: 37
End: 2022-12-27
Payer: COMMERCIAL

## 2022-12-27 DIAGNOSIS — B00.1 FEVER BLISTER: Primary | ICD-10-CM

## 2022-12-27 RX ORDER — VALACYCLOVIR HYDROCHLORIDE 1 G/1
TABLET, FILM COATED ORAL
Qty: 4 TABLET | Refills: 1 | Status: SHIPPED | OUTPATIENT
Start: 2022-12-27 | End: 2023-10-15 | Stop reason: SDUPTHER

## 2023-01-10 ENCOUNTER — PATIENT MESSAGE (OUTPATIENT)
Dept: HEMATOLOGY/ONCOLOGY | Facility: CLINIC | Age: 38
End: 2023-01-10
Payer: COMMERCIAL

## 2023-01-12 ENCOUNTER — CLINICAL SUPPORT (OUTPATIENT)
Dept: OBSTETRICS AND GYNECOLOGY | Facility: CLINIC | Age: 38
End: 2023-01-12
Payer: COMMERCIAL

## 2023-01-12 ENCOUNTER — INFUSION (OUTPATIENT)
Dept: INFUSION THERAPY | Facility: HOSPITAL | Age: 38
End: 2023-01-12
Payer: COMMERCIAL

## 2023-01-12 DIAGNOSIS — N95.1 MENOPAUSAL SYMPTOM: Primary | ICD-10-CM

## 2023-01-12 DIAGNOSIS — E28.39 SUPPRESSION OF OVARIAN SECRETION: Primary | ICD-10-CM

## 2023-01-12 PROCEDURE — 96372 THER/PROPH/DIAG INJ SC/IM: CPT | Mod: S$GLB,,, | Performed by: OBSTETRICS & GYNECOLOGY

## 2023-01-12 PROCEDURE — 96372 PR INJECTION,THERAP/PROPH/DIAG2ST, IM OR SUBCUT: ICD-10-PCS | Mod: S$GLB,,, | Performed by: OBSTETRICS & GYNECOLOGY

## 2023-01-12 PROCEDURE — 99999 PR PBB SHADOW E&M-EST. PATIENT-LVL I: CPT | Mod: PBBFAC,,,

## 2023-01-12 PROCEDURE — 96402 CHEMO HORMON ANTINEOPL SQ/IM: CPT

## 2023-01-12 PROCEDURE — 63600175 PHARM REV CODE 636 W HCPCS: Mod: JG | Performed by: INTERNAL MEDICINE

## 2023-01-12 PROCEDURE — 99999 PR PBB SHADOW E&M-EST. PATIENT-LVL I: ICD-10-PCS | Mod: PBBFAC,,,

## 2023-01-12 RX ADMIN — GOSERELIN ACETATE 3.6 MG: 3.6 IMPLANT SUBCUTANEOUS at 11:01

## 2023-01-12 RX ADMIN — TESTOSTERONE CYPIONATE 50 MG: 200 INJECTION, SOLUTION INTRAMUSCULAR at 02:01

## 2023-01-12 NOTE — PROGRESS NOTES
Here for hormone therapy injection, no complaints at this time. Injection given as ordered, tolerated well no reports of pain prior to or post injections. Advised to return to clinic as scheduled .    Site: RB    Testosterone 50mg    CLINIC SUPPLIED MEDICATION

## 2023-01-17 ENCOUNTER — PATIENT MESSAGE (OUTPATIENT)
Dept: OBSTETRICS AND GYNECOLOGY | Facility: CLINIC | Age: 38
End: 2023-01-17
Payer: COMMERCIAL

## 2023-01-26 ENCOUNTER — PATIENT MESSAGE (OUTPATIENT)
Dept: INTERNAL MEDICINE | Facility: CLINIC | Age: 38
End: 2023-01-26
Payer: COMMERCIAL

## 2023-01-26 ENCOUNTER — PATIENT MESSAGE (OUTPATIENT)
Dept: HEMATOLOGY/ONCOLOGY | Facility: CLINIC | Age: 38
End: 2023-01-26
Payer: COMMERCIAL

## 2023-01-26 DIAGNOSIS — Z17.0 MALIGNANT NEOPLASM OF UPPER-OUTER QUADRANT OF LEFT BREAST IN FEMALE, ESTROGEN RECEPTOR POSITIVE: ICD-10-CM

## 2023-01-26 DIAGNOSIS — R45.89 ANXIETY ABOUT HEALTH: ICD-10-CM

## 2023-01-26 DIAGNOSIS — C50.412 MALIGNANT NEOPLASM OF UPPER-OUTER QUADRANT OF LEFT BREAST IN FEMALE, ESTROGEN RECEPTOR POSITIVE: ICD-10-CM

## 2023-01-26 NOTE — TELEPHONE ENCOUNTER
No new care gaps identified.  [facility] Embedded Care Gaps. Reference number: [id number]. 1/26/2023   11:27:49 AM CST

## 2023-01-31 ENCOUNTER — PATIENT MESSAGE (OUTPATIENT)
Dept: INTERNAL MEDICINE | Facility: CLINIC | Age: 38
End: 2023-01-31
Payer: COMMERCIAL

## 2023-01-31 RX ORDER — ALPRAZOLAM 0.5 MG/1
0.5 TABLET ORAL DAILY PRN
Qty: 30 TABLET | Refills: 0 | Status: SHIPPED | OUTPATIENT
Start: 2023-01-31

## 2023-02-03 ENCOUNTER — PATIENT MESSAGE (OUTPATIENT)
Dept: HEMATOLOGY/ONCOLOGY | Facility: CLINIC | Age: 38
End: 2023-02-03
Payer: COMMERCIAL

## 2023-02-06 ENCOUNTER — PATIENT MESSAGE (OUTPATIENT)
Dept: OBSTETRICS AND GYNECOLOGY | Facility: CLINIC | Age: 38
End: 2023-02-06
Payer: COMMERCIAL

## 2023-02-09 ENCOUNTER — OFFICE VISIT (OUTPATIENT)
Dept: HEMATOLOGY/ONCOLOGY | Facility: CLINIC | Age: 38
End: 2023-02-09
Payer: COMMERCIAL

## 2023-02-09 ENCOUNTER — CLINICAL SUPPORT (OUTPATIENT)
Dept: OBSTETRICS AND GYNECOLOGY | Facility: CLINIC | Age: 38
End: 2023-02-09
Payer: COMMERCIAL

## 2023-02-09 ENCOUNTER — LAB VISIT (OUTPATIENT)
Dept: LAB | Facility: HOSPITAL | Age: 38
End: 2023-02-09
Payer: COMMERCIAL

## 2023-02-09 ENCOUNTER — PATIENT MESSAGE (OUTPATIENT)
Dept: HEMATOLOGY/ONCOLOGY | Facility: CLINIC | Age: 38
End: 2023-02-09
Payer: COMMERCIAL

## 2023-02-09 VITALS
OXYGEN SATURATION: 100 % | BODY MASS INDEX: 18.18 KG/M2 | RESPIRATION RATE: 17 BRPM | HEART RATE: 84 BPM | HEIGHT: 65 IN | WEIGHT: 109.13 LBS | SYSTOLIC BLOOD PRESSURE: 137 MMHG | TEMPERATURE: 99 F | DIASTOLIC BLOOD PRESSURE: 76 MMHG

## 2023-02-09 DIAGNOSIS — N95.1 MENOPAUSAL SYMPTOM: Primary | ICD-10-CM

## 2023-02-09 DIAGNOSIS — Z79.810 SERM USE (SELECTIVE ESTROGEN RECEPTOR MODULATOR): ICD-10-CM

## 2023-02-09 DIAGNOSIS — E28.39 SUPPRESSION OF OVARIAN SECRETION: ICD-10-CM

## 2023-02-09 DIAGNOSIS — D69.6 THROMBOCYTOPENIA: ICD-10-CM

## 2023-02-09 DIAGNOSIS — C50.412 MALIGNANT NEOPLASM OF UPPER-OUTER QUADRANT OF LEFT BREAST IN FEMALE, ESTROGEN RECEPTOR POSITIVE: ICD-10-CM

## 2023-02-09 DIAGNOSIS — Z17.0 MALIGNANT NEOPLASM OF UPPER-OUTER QUADRANT OF LEFT BREAST IN FEMALE, ESTROGEN RECEPTOR POSITIVE: Primary | ICD-10-CM

## 2023-02-09 DIAGNOSIS — Z17.0 MALIGNANT NEOPLASM OF UPPER-OUTER QUADRANT OF LEFT BREAST IN FEMALE, ESTROGEN RECEPTOR POSITIVE: ICD-10-CM

## 2023-02-09 DIAGNOSIS — C50.412 MALIGNANT NEOPLASM OF UPPER-OUTER QUADRANT OF LEFT BREAST IN FEMALE, ESTROGEN RECEPTOR POSITIVE: Primary | ICD-10-CM

## 2023-02-09 LAB
ALBUMIN SERPL BCP-MCNC: 4.3 G/DL (ref 3.5–5.2)
ALP SERPL-CCNC: 57 U/L (ref 55–135)
ALT SERPL W/O P-5'-P-CCNC: 33 U/L (ref 10–44)
ANION GAP SERPL CALC-SCNC: 10 MMOL/L (ref 8–16)
AST SERPL-CCNC: 30 U/L (ref 10–40)
BILIRUB SERPL-MCNC: 0.2 MG/DL (ref 0.1–1)
BUN SERPL-MCNC: 9 MG/DL (ref 6–20)
CALCIUM SERPL-MCNC: 9.4 MG/DL (ref 8.7–10.5)
CHLORIDE SERPL-SCNC: 108 MMOL/L (ref 95–110)
CO2 SERPL-SCNC: 25 MMOL/L (ref 23–29)
CREAT SERPL-MCNC: 0.9 MG/DL (ref 0.5–1.4)
ERYTHROCYTE [DISTWIDTH] IN BLOOD BY AUTOMATED COUNT: 14 % (ref 11.5–14.5)
EST. GFR  (NO RACE VARIABLE): >60 ML/MIN/1.73 M^2
GLUCOSE SERPL-MCNC: 64 MG/DL (ref 70–110)
HCT VFR BLD AUTO: 43.2 % (ref 37–48.5)
HGB BLD-MCNC: 14.1 G/DL (ref 12–16)
IMM GRANULOCYTES # BLD AUTO: 0.01 K/UL (ref 0–0.04)
MCH RBC QN AUTO: 30.9 PG (ref 27–31)
MCHC RBC AUTO-ENTMCNC: 32.6 G/DL (ref 32–36)
MCV RBC AUTO: 95 FL (ref 82–98)
NEUTROPHILS # BLD AUTO: 2.3 K/UL (ref 1.8–7.7)
PLATELET # BLD AUTO: 115 K/UL (ref 150–450)
PMV BLD AUTO: 12.1 FL (ref 9.2–12.9)
POTASSIUM SERPL-SCNC: 4.4 MMOL/L (ref 3.5–5.1)
PROT SERPL-MCNC: 7.2 G/DL (ref 6–8.4)
RBC # BLD AUTO: 4.57 M/UL (ref 4–5.4)
SODIUM SERPL-SCNC: 143 MMOL/L (ref 136–145)
WBC # BLD AUTO: 7.09 K/UL (ref 3.9–12.7)

## 2023-02-09 PROCEDURE — 96372 PR INJECTION,THERAP/PROPH/DIAG2ST, IM OR SUBCUT: ICD-10-PCS | Mod: S$GLB,,, | Performed by: OBSTETRICS & GYNECOLOGY

## 2023-02-09 PROCEDURE — 1160F PR REVIEW ALL MEDS BY PRESCRIBER/CLIN PHARMACIST DOCUMENTED: ICD-10-PCS | Mod: CPTII,S$GLB,, | Performed by: INTERNAL MEDICINE

## 2023-02-09 PROCEDURE — 99999 PR PBB SHADOW E&M-EST. PATIENT-LVL I: CPT | Mod: PBBFAC,,,

## 2023-02-09 PROCEDURE — 3008F BODY MASS INDEX DOCD: CPT | Mod: CPTII,S$GLB,, | Performed by: INTERNAL MEDICINE

## 2023-02-09 PROCEDURE — 99999 PR PBB SHADOW E&M-EST. PATIENT-LVL IV: CPT | Mod: PBBFAC,,, | Performed by: INTERNAL MEDICINE

## 2023-02-09 PROCEDURE — 1160F RVW MEDS BY RX/DR IN RCRD: CPT | Mod: CPTII,S$GLB,, | Performed by: INTERNAL MEDICINE

## 2023-02-09 PROCEDURE — 96372 THER/PROPH/DIAG INJ SC/IM: CPT | Mod: S$GLB,,, | Performed by: OBSTETRICS & GYNECOLOGY

## 2023-02-09 PROCEDURE — 3075F SYST BP GE 130 - 139MM HG: CPT | Mod: CPTII,S$GLB,, | Performed by: INTERNAL MEDICINE

## 2023-02-09 PROCEDURE — 80053 COMPREHEN METABOLIC PANEL: CPT | Performed by: NURSE PRACTITIONER

## 2023-02-09 PROCEDURE — 85027 COMPLETE CBC AUTOMATED: CPT | Performed by: NURSE PRACTITIONER

## 2023-02-09 PROCEDURE — 1159F PR MEDICATION LIST DOCUMENTED IN MEDICAL RECORD: ICD-10-PCS | Mod: CPTII,S$GLB,, | Performed by: INTERNAL MEDICINE

## 2023-02-09 PROCEDURE — 3008F PR BODY MASS INDEX (BMI) DOCUMENTED: ICD-10-PCS | Mod: CPTII,S$GLB,, | Performed by: INTERNAL MEDICINE

## 2023-02-09 PROCEDURE — 3078F DIAST BP <80 MM HG: CPT | Mod: CPTII,S$GLB,, | Performed by: INTERNAL MEDICINE

## 2023-02-09 PROCEDURE — 99214 OFFICE O/P EST MOD 30 MIN: CPT | Mod: S$GLB,,, | Performed by: INTERNAL MEDICINE

## 2023-02-09 PROCEDURE — 36415 COLL VENOUS BLD VENIPUNCTURE: CPT | Performed by: NURSE PRACTITIONER

## 2023-02-09 PROCEDURE — 99999 PR PBB SHADOW E&M-EST. PATIENT-LVL IV: ICD-10-PCS | Mod: PBBFAC,,, | Performed by: INTERNAL MEDICINE

## 2023-02-09 PROCEDURE — 1159F MED LIST DOCD IN RCRD: CPT | Mod: CPTII,S$GLB,, | Performed by: INTERNAL MEDICINE

## 2023-02-09 PROCEDURE — 3075F PR MOST RECENT SYSTOLIC BLOOD PRESS GE 130-139MM HG: ICD-10-PCS | Mod: CPTII,S$GLB,, | Performed by: INTERNAL MEDICINE

## 2023-02-09 PROCEDURE — 3078F PR MOST RECENT DIASTOLIC BLOOD PRESSURE < 80 MM HG: ICD-10-PCS | Mod: CPTII,S$GLB,, | Performed by: INTERNAL MEDICINE

## 2023-02-09 PROCEDURE — 99214 PR OFFICE/OUTPT VISIT, EST, LEVL IV, 30-39 MIN: ICD-10-PCS | Mod: S$GLB,,, | Performed by: INTERNAL MEDICINE

## 2023-02-09 PROCEDURE — 99999 PR PBB SHADOW E&M-EST. PATIENT-LVL I: ICD-10-PCS | Mod: PBBFAC,,,

## 2023-02-09 RX ADMIN — TESTOSTERONE CYPIONATE 50 MG: 200 INJECTION, SOLUTION INTRAMUSCULAR at 08:02

## 2023-02-09 NOTE — PROGRESS NOTES
Here for hormone therapy injection, no complaints at this time. Injection given as ordered, tolerated well no reports of pain prior to or post injection. Advised to return to clinic as scheduled      Site:lb    Testerone:50mg    CLINIC SUPPLIED MEDICATION

## 2023-02-09 NOTE — PROGRESS NOTES
Subjective:       Patient ID: Michelle Gage is a 37 y.o. female.    Chief Complaint: Malignant neoplasm of upper-outer quadrant of left breast i    HPI    Returns for routine follow up  Notes menopausal symptoms - these include fatigue, mental fog   Considering more frequent testosterone injections    Currently on Tamoxifen and Zoladex  Weight stable  No pain issues  Effexor has also helped with managing somewhat the hot flashes and helps her mood  Due for Zoladex injection today- we discussed cessation     Diagnosis: Stage I ([id number]) invasive ductal carcinoma of left breast, upper outer quadrant, ER 95%, GA 90%, Her2 3+, Grade 3, Ki67 25%  Premenopausal status.     Oncologic History:  Presentation  - [id number] - presented for palpable left breast mass around 2018 - was being watched on imaging at outside hospital.    - 5/11/2020 - Diagnostic bilateral mammogram and left breast US at [facility] showed left breast 1:00 mass, 1.4 cm, 8 CFN, mild left axillary adenopathy  - 5/11/2020 - Biopsy - Grade 3 IDC, estrogen receptor 95%, progesterone receptor 90%, Her2 delphine 3+, Ki67 25%. LN biopsy negative  Surgery consultation with Dr Dumont on 5/14. Breast cancer is far in axillary tail and felt to be difficult to obtain clear margins without neoadjuvant therapy.               - 5/14/2020 - Genetics Myriad [facility]ysk BRACAnalysis neg; VUS AP             Medical oncology consultation on 5/15/2020 -  This case was discussed with Dr. Dumont.  She does feel like the patient will benefit from neoadjuvant therapy to help improve her surgical margins.  Since the tumor is less than 2 cm and clinically lymph node negative (MRI pending), I recommended treatment with Taxotere, carboplatin and Herceptin without Perjeta.  Discussed with her that there is data in tumors less than 2 cm to consider Taxol/Herceptin however would not typically use this in a neoadjuvant setting for concern for under treatment.                 - Eggs Retrieval - has 2 embryos.               * 6/16/2020 started neoadjuvant TCH (no perjeta since < 2 cm and LN negative). Neoadjuvant therapy chosen due to location of tumor.     11/6/2020 Imaging Significant Findings     Breast MRI  Impression:     The previously described upper outer quadrant known malignancy in the left breast is no longer visualized consistent with complete imaging response to therapy.      BI-RADS Category:   Overall: 6 - Known Biopsy-Proven Malignancy     Recommendation:  Continued breast surgery and oncology management.             11/30/2020 Breast Surgery     Surgery: Dr Jessica Dumont, [phone] performed bilateral mastectomy with sentinel lymph node biopsy with pathology showing grade 1 invasive ductal carcinoma,  3 mm with 0 positive lymph nodes.          11/30/2020 Cancer Staged     [id number]      12/15/2020 Tumor Conference       Post mastectomy radiation not recommended but patient will meet with radiation oncology. Systemically,standard of care would be Kadcyla followed by endocrine therapy.      Adjuvant Kadcyla started 12/28/2020  Tamoxifen holiday 4/4/2021- 7/12/2021 due to side effects.   Kadcyla stopped due to difficulty recovering counts- last dose 8/9/2021  Now on Zoladex and Tamoxifen.     Review of Systems   Constitutional:  Positive for fatigue. Negative for activity change, appetite change and unexpected weight change.   Respiratory:  Negative for cough, shortness of breath and wheezing.    Cardiovascular:  Negative for chest pain, palpitations and leg swelling.   Gastrointestinal:  Negative for abdominal distention, abdominal pain, change in bowel habit, constipation, diarrhea, nausea, vomiting and change in bowel habit.   Genitourinary:  Negative for decreased urine volume and dysuria.   Musculoskeletal:  Positive for arthralgias and joint deformity. Negative for joint swelling.   Neurological:  Negative for weakness, numbness and headaches.   Hematological:   Negative for adenopathy. Does not bruise/bleed easily.   Psychiatric/Behavioral:  Negative for decreased concentration, dysphoric mood and sleep disturbance. The patient is not nervous/anxious.        Objective:      Physical Exam  Vitals and nursing note reviewed.   Constitutional:       General: She is not in acute distress.     Appearance: Normal appearance. She is well-developed and normal weight. She is not ill-appearing.   HENT:      Head: Normocephalic and atraumatic.   Eyes:      General: No scleral icterus.     Extraocular Movements: Extraocular movements intact.      Conjunctiva/sclera: Conjunctivae normal.      Pupils: Pupils are equal, round, and reactive to light.      Right eye: Pupil is round and reactive.      Left eye: Pupil is round and reactive.   Neck:      Thyroid: No thyromegaly.      Vascular: No JVD.      Trachea: No tracheal deviation.   Cardiovascular:      Rate and Rhythm: Normal rate and regular rhythm.      Heart sounds: Normal heart sounds. No murmur heard.    No friction rub. No gallop.   Pulmonary:      Effort: Pulmonary effort is normal. No respiratory distress.      Breath sounds: Normal breath sounds. No wheezing, rhonchi or rales.      Comments: Breast- bilateral implants  No chest wall abnormalities or LAD  Abdominal:      General: Abdomen is flat. Bowel sounds are normal. There is no distension.      Palpations: Abdomen is soft. There is no mass.      Tenderness: There is no abdominal tenderness. There is no guarding or rebound.      Comments: No organomegaly   Musculoskeletal:         General: No swelling or tenderness. Normal range of motion.      Cervical back: Normal range of motion and neck supple.      Right lower leg: No edema.      Left lower leg: No edema.   Lymphadenopathy:      Head:      Right side of head: No submandibular adenopathy.      Left side of head: No submandibular adenopathy.      Cervical: No cervical adenopathy.      Right cervical: No superficial, deep  or posterior cervical adenopathy.     Left cervical: No superficial, deep or posterior cervical adenopathy.      Upper Body:      Right upper body: No supraclavicular adenopathy.      Left upper body: No supraclavicular adenopathy.   Skin:     General: Skin is warm and dry.      Coloration: Skin is not jaundiced or pale.      Findings: No bruising, erythema, lesion, petechiae or rash.   Neurological:      General: No focal deficit present.      Mental Status: She is alert and oriented to person, place, and time. Mental status is at baseline.      Sensory: No sensory deficit.      Motor: No weakness.      Coordination: Coordination normal.      Gait: Gait normal.      Deep Tendon Reflexes: Reflexes are normal and symmetric.   Psychiatric:         Mood and Affect: Mood normal. Mood is not anxious or depressed.         Behavior: Behavior normal.         Thought Content: Thought content normal.         Judgment: Judgment normal.       Labs- reviewed  Assessment:       Problem List Items Addressed This Visit       Thrombocytopenia    Suppression of ovarian secretion    SERM use (selective estrogen receptor modulator)    Malignant neoplasm of upper-outer quadrant of left breast in female, estrogen receptor positive - Primary       Plan:       Thrombocytopenia  Long standing > 5 years, likely ITP and monitored     Breast cancer- on Zoladex and Tamoxifen  Has completed > 2 years of zoladex and with above QOL we will cease and continue Tamoxifen  Will loop in Dr. Warren    Route Chart for Scheduling    Med Onc Chart Routing      Follow up with physician    Follow up with YUMIKO 6 months. labs prior   Infusion scheduling note    Injection scheduling note    Labs    Imaging    Pharmacy appointment    Other referrals          Supportive Plan Information  OP BREAST GOSERELIN Q4W   Christy Salazar MD   Upcoming Treatment Dates - OP BREAST GOSERELIN Q4W    1/13/2023       Chemotherapy       goserelin (ZOLADEX) injection 3.6 mg         30 minutes total on encounter

## 2023-03-09 ENCOUNTER — CLINICAL SUPPORT (OUTPATIENT)
Dept: OBSTETRICS AND GYNECOLOGY | Facility: CLINIC | Age: 38
End: 2023-03-09
Payer: COMMERCIAL

## 2023-03-09 DIAGNOSIS — N95.1 MENOPAUSAL SYMPTOM: Primary | ICD-10-CM

## 2023-03-09 PROCEDURE — 96372 THER/PROPH/DIAG INJ SC/IM: CPT | Mod: S$GLB,,, | Performed by: OBSTETRICS & GYNECOLOGY

## 2023-03-09 PROCEDURE — 96372 PR INJECTION,THERAP/PROPH/DIAG2ST, IM OR SUBCUT: ICD-10-PCS | Mod: S$GLB,,, | Performed by: OBSTETRICS & GYNECOLOGY

## 2023-03-09 PROCEDURE — 99999 PR PBB SHADOW E&M-EST. PATIENT-LVL I: CPT | Mod: PBBFAC,,,

## 2023-03-09 PROCEDURE — 99999 PR PBB SHADOW E&M-EST. PATIENT-LVL I: ICD-10-PCS | Mod: PBBFAC,,,

## 2023-03-09 RX ORDER — TESTOSTERONE CYPIONATE 200 MG/ML
50 INJECTION, SOLUTION INTRAMUSCULAR
Status: COMPLETED | OUTPATIENT
Start: 2023-03-09 | End: 2023-06-01

## 2023-03-09 RX ADMIN — TESTOSTERONE CYPIONATE 50 MG: 200 INJECTION, SOLUTION INTRAMUSCULAR at 09:03

## 2023-04-06 ENCOUNTER — CLINICAL SUPPORT (OUTPATIENT)
Dept: OBSTETRICS AND GYNECOLOGY | Facility: CLINIC | Age: 38
End: 2023-04-06
Payer: COMMERCIAL

## 2023-04-06 DIAGNOSIS — N95.1 MENOPAUSAL SYMPTOM: Primary | ICD-10-CM

## 2023-04-06 PROCEDURE — 99999 PR PBB SHADOW E&M-EST. PATIENT-LVL I: ICD-10-PCS | Mod: PBBFAC,,,

## 2023-04-06 PROCEDURE — 96372 THER/PROPH/DIAG INJ SC/IM: CPT | Mod: S$GLB,,, | Performed by: OBSTETRICS & GYNECOLOGY

## 2023-04-06 PROCEDURE — 99999 PR PBB SHADOW E&M-EST. PATIENT-LVL I: CPT | Mod: PBBFAC,,,

## 2023-04-06 PROCEDURE — 96372 PR INJECTION,THERAP/PROPH/DIAG2ST, IM OR SUBCUT: ICD-10-PCS | Mod: S$GLB,,, | Performed by: OBSTETRICS & GYNECOLOGY

## 2023-04-06 RX ADMIN — TESTOSTERONE CYPIONATE 50 MG: 200 INJECTION, SOLUTION INTRAMUSCULAR at 09:04

## 2023-04-06 NOTE — PROGRESS NOTES
Here for hormone therapy injection, no complaints at this time. Injection given as ordered, tolerated well no reports of pain prior to or post injection. Advised to return to clinic as scheduled      Site:[facility]    Testerone:50 mg    CLINIC SUPPLIED MEDICATION

## 2023-05-04 ENCOUNTER — CLINICAL SUPPORT (OUTPATIENT)
Dept: OBSTETRICS AND GYNECOLOGY | Facility: CLINIC | Age: 38
End: 2023-05-04
Payer: COMMERCIAL

## 2023-05-04 DIAGNOSIS — N95.1 MENOPAUSAL SYMPTOM: Primary | ICD-10-CM

## 2023-05-04 PROCEDURE — 99999 PR PBB SHADOW E&M-EST. PATIENT-LVL I: ICD-10-PCS | Mod: PBBFAC,,,

## 2023-05-04 PROCEDURE — 96372 THER/PROPH/DIAG INJ SC/IM: CPT | Mod: S$GLB,,, | Performed by: OBSTETRICS & GYNECOLOGY

## 2023-05-04 PROCEDURE — 96372 PR INJECTION,THERAP/PROPH/DIAG2ST, IM OR SUBCUT: ICD-10-PCS | Mod: S$GLB,,, | Performed by: OBSTETRICS & GYNECOLOGY

## 2023-05-04 PROCEDURE — 99999 PR PBB SHADOW E&M-EST. PATIENT-LVL I: CPT | Mod: PBBFAC,,,

## 2023-05-04 RX ADMIN — TESTOSTERONE CYPIONATE 50 MG: 200 INJECTION, SOLUTION INTRAMUSCULAR at 10:05

## 2023-05-04 NOTE — PROGRESS NOTES
Here for hormone therapy injection, no complaints at this time, Injection given as ordered, tolerated well, no report of pain prior to or after injection. Return to clinic as scheduled.     Site - [facility]    Testosterone  50 mg    Clinic Supplied Medication

## 2023-05-22 ENCOUNTER — PATIENT MESSAGE (OUTPATIENT)
Dept: HEMATOLOGY/ONCOLOGY | Facility: CLINIC | Age: 38
End: 2023-05-22

## 2023-05-22 ENCOUNTER — PATIENT MESSAGE (OUTPATIENT)
Dept: PRIMARY CARE CLINIC | Facility: CLINIC | Age: 38
End: 2023-05-22

## 2023-05-22 DIAGNOSIS — R53.0 NEOPLASTIC MALIGNANT RELATED FATIGUE: ICD-10-CM

## 2023-05-22 DIAGNOSIS — F41.8 DEPRESSION WITH ANXIETY: ICD-10-CM

## 2023-05-22 RX ORDER — VENLAFAXINE HYDROCHLORIDE 75 MG/1
CAPSULE, EXTENDED RELEASE ORAL
Qty: 90 CAPSULE | Refills: 2 | OUTPATIENT
Start: 2023-05-22

## 2023-05-23 ENCOUNTER — PATIENT MESSAGE (OUTPATIENT)
Dept: PRIMARY CARE CLINIC | Facility: CLINIC | Age: 38
End: 2023-05-23

## 2023-05-24 ENCOUNTER — PATIENT MESSAGE (OUTPATIENT)
Dept: PRIMARY CARE CLINIC | Facility: CLINIC | Age: 38
End: 2023-05-24

## 2023-05-24 RX ORDER — METHYLPHENIDATE HYDROCHLORIDE 10 MG/1
10 TABLET ORAL 2 TIMES DAILY WITH MEALS
Qty: 60 TABLET | Refills: 0 | Status: SHIPPED | OUTPATIENT
Start: 2023-05-24 | End: 2023-08-29 | Stop reason: SDUPTHER

## 2023-05-24 RX ORDER — VENLAFAXINE HYDROCHLORIDE 75 MG/1
CAPSULE, EXTENDED RELEASE ORAL
Qty: 90 CAPSULE | Refills: 2 | Status: SHIPPED | OUTPATIENT
Start: 2023-05-24 | End: 2024-02-06 | Stop reason: SDUPTHER

## 2023-05-29 ENCOUNTER — PATIENT MESSAGE (OUTPATIENT)
Dept: HEMATOLOGY/ONCOLOGY | Facility: CLINIC | Age: 38
End: 2023-05-29

## 2023-06-01 ENCOUNTER — CLINICAL SUPPORT (OUTPATIENT)
Dept: OBSTETRICS AND GYNECOLOGY | Facility: CLINIC | Age: 38
End: 2023-06-01
Payer: COMMERCIAL

## 2023-06-01 DIAGNOSIS — N95.1 MENOPAUSAL SYMPTOM: Primary | ICD-10-CM

## 2023-06-01 PROCEDURE — 96372 THER/PROPH/DIAG INJ SC/IM: CPT | Mod: S$GLB,,, | Performed by: OBSTETRICS & GYNECOLOGY

## 2023-06-01 PROCEDURE — 99999 PR PBB SHADOW E&M-EST. PATIENT-LVL I: ICD-10-PCS | Mod: PBBFAC,,,

## 2023-06-01 PROCEDURE — 96372 PR INJECTION,THERAP/PROPH/DIAG2ST, IM OR SUBCUT: ICD-10-PCS | Mod: S$GLB,,, | Performed by: OBSTETRICS & GYNECOLOGY

## 2023-06-01 PROCEDURE — 99999 PR PBB SHADOW E&M-EST. PATIENT-LVL I: CPT | Mod: PBBFAC,,,

## 2023-06-01 RX ADMIN — TESTOSTERONE CYPIONATE 50 MG: 200 INJECTION, SOLUTION INTRAMUSCULAR at 08:06

## 2023-06-29 ENCOUNTER — CLINICAL SUPPORT (OUTPATIENT)
Dept: OBSTETRICS AND GYNECOLOGY | Facility: CLINIC | Age: 38
End: 2023-06-29
Payer: COMMERCIAL

## 2023-06-29 DIAGNOSIS — N95.1 MENOPAUSAL SYMPTOM: Primary | ICD-10-CM

## 2023-06-29 PROCEDURE — 96372 PR INJECTION,THERAP/PROPH/DIAG2ST, IM OR SUBCUT: ICD-10-PCS | Mod: S$GLB,,, | Performed by: OBSTETRICS & GYNECOLOGY

## 2023-06-29 PROCEDURE — 99999 PR PBB SHADOW E&M-EST. PATIENT-LVL I: ICD-10-PCS | Mod: PBBFAC,,,

## 2023-06-29 PROCEDURE — 99999 PR PBB SHADOW E&M-EST. PATIENT-LVL I: CPT | Mod: PBBFAC,,,

## 2023-06-29 PROCEDURE — 96372 THER/PROPH/DIAG INJ SC/IM: CPT | Mod: S$GLB,,, | Performed by: OBSTETRICS & GYNECOLOGY

## 2023-06-29 RX ORDER — TESTOSTERONE CYPIONATE 200 MG/ML
50 INJECTION, SOLUTION INTRAMUSCULAR
Status: SHIPPED | OUTPATIENT
Start: 2023-06-29 | End: 2023-11-16

## 2023-06-29 RX ADMIN — TESTOSTERONE CYPIONATE 50 MG: 200 INJECTION, SOLUTION INTRAMUSCULAR at 08:06

## 2023-06-30 ENCOUNTER — OFFICE VISIT (OUTPATIENT)
Dept: PRIMARY CARE CLINIC | Facility: CLINIC | Age: 38
End: 2023-06-30
Payer: COMMERCIAL

## 2023-06-30 VITALS
HEIGHT: 65 IN | HEART RATE: 67 BPM | WEIGHT: 108.69 LBS | OXYGEN SATURATION: 98 % | BODY MASS INDEX: 18.11 KG/M2 | SYSTOLIC BLOOD PRESSURE: 126 MMHG | DIASTOLIC BLOOD PRESSURE: 64 MMHG

## 2023-06-30 DIAGNOSIS — C80.1 ANXIETY ASSOCIATED WITH CANCER DIAGNOSIS: ICD-10-CM

## 2023-06-30 DIAGNOSIS — Z00.00 WELLNESS EXAMINATION: Primary | ICD-10-CM

## 2023-06-30 DIAGNOSIS — F43.23 ADJUSTMENT DISORDER WITH MIXED ANXIETY AND DEPRESSED MOOD: ICD-10-CM

## 2023-06-30 DIAGNOSIS — F41.1 ANXIETY ASSOCIATED WITH CANCER DIAGNOSIS: ICD-10-CM

## 2023-06-30 PROBLEM — T45.1X5A CHEMOTHERAPY-INDUCED NAUSEA: Status: RESOLVED | Noted: 2020-05-15 | Resolved: 2023-06-30

## 2023-06-30 PROBLEM — D70.1 CHEMOTHERAPY INDUCED NEUTROPENIA: Status: RESOLVED | Noted: 2020-05-15 | Resolved: 2023-06-30

## 2023-06-30 PROBLEM — T45.1X5A CHEMOTHERAPY INDUCED NEUTROPENIA: Status: RESOLVED | Noted: 2020-05-15 | Resolved: 2023-06-30

## 2023-06-30 PROBLEM — R11.0 CHEMOTHERAPY-INDUCED NAUSEA: Status: RESOLVED | Noted: 2020-05-15 | Resolved: 2023-06-30

## 2023-06-30 PROBLEM — R10.9 ABDOMINAL PAIN: Status: RESOLVED | Noted: 2022-10-17 | Resolved: 2023-06-30

## 2023-06-30 PROBLEM — R74.8 ELEVATED LIVER ENZYMES: Status: RESOLVED | Noted: 2021-07-19 | Resolved: 2023-06-30

## 2023-06-30 PROBLEM — R10.10 UPPER ABDOMINAL PAIN: Status: RESOLVED | Noted: 2019-04-16 | Resolved: 2023-06-30

## 2023-06-30 PROCEDURE — 99999 PR PBB SHADOW E&M-EST. PATIENT-LVL III: CPT | Mod: PBBFAC,,, | Performed by: INTERNAL MEDICINE

## 2023-06-30 PROCEDURE — 99395 PREV VISIT EST AGE 18-39: CPT | Mod: S$GLB,,, | Performed by: INTERNAL MEDICINE

## 2023-06-30 PROCEDURE — 3078F DIAST BP <80 MM HG: CPT | Mod: CPTII,S$GLB,, | Performed by: INTERNAL MEDICINE

## 2023-06-30 PROCEDURE — 1159F PR MEDICATION LIST DOCUMENTED IN MEDICAL RECORD: ICD-10-PCS | Mod: CPTII,S$GLB,, | Performed by: INTERNAL MEDICINE

## 2023-06-30 PROCEDURE — 1159F MED LIST DOCD IN RCRD: CPT | Mod: CPTII,S$GLB,, | Performed by: INTERNAL MEDICINE

## 2023-06-30 PROCEDURE — 99999 PR PBB SHADOW E&M-EST. PATIENT-LVL III: ICD-10-PCS | Mod: PBBFAC,,, | Performed by: INTERNAL MEDICINE

## 2023-06-30 PROCEDURE — 99395 PR PREVENTIVE VISIT,EST,18-39: ICD-10-PCS | Mod: S$GLB,,, | Performed by: INTERNAL MEDICINE

## 2023-06-30 PROCEDURE — 3078F PR MOST RECENT DIASTOLIC BLOOD PRESSURE < 80 MM HG: ICD-10-PCS | Mod: CPTII,S$GLB,, | Performed by: INTERNAL MEDICINE

## 2023-06-30 PROCEDURE — 3008F PR BODY MASS INDEX (BMI) DOCUMENTED: ICD-10-PCS | Mod: CPTII,S$GLB,, | Performed by: INTERNAL MEDICINE

## 2023-06-30 PROCEDURE — 3074F SYST BP LT 130 MM HG: CPT | Mod: CPTII,S$GLB,, | Performed by: INTERNAL MEDICINE

## 2023-06-30 PROCEDURE — 3074F PR MOST RECENT SYSTOLIC BLOOD PRESSURE < 130 MM HG: ICD-10-PCS | Mod: CPTII,S$GLB,, | Performed by: INTERNAL MEDICINE

## 2023-06-30 PROCEDURE — 3008F BODY MASS INDEX DOCD: CPT | Mod: CPTII,S$GLB,, | Performed by: INTERNAL MEDICINE

## 2023-06-30 NOTE — PROGRESS NOTES
[facility] Note    Chief Complaint    No chief complaint on file.    History of Present Illness      Michelle Gage is a 37 y.o. female who presents today for chief complaint annual wellness .     HPI   Last seen 6.22 annual, she has cbc cmp in Feb   She is due for a lipid panel   Thrombocytopenia stable last labs 115- no bruising. Has begi=in having menorrhagia will discuss iron studies  prior to August labs if continues   H/o breast cancer, sees Dr Crum q3 mo currently,taking prn xanax, ritalin, effexor for hot flashes   Initially dx as axillary mass by Dr Chandler, was having a mmg and did not have axillary LN bx, was monitored as surgeon as well. minimum 5 years tamoxifen,  monthly zoladex dc'd in Feb, does have frozen embryos, also has IUD in place. She has had a return of menstrual cycles, 2 so far, have been heavy- discuss OTC LH detection   Feels less emotional and overwhelmed. Fewer hot flashes, She is on testosterone injections   Is seeing breast surgery annually. She is s/p bilateral nipple sparing mastectomies with reconstruction   Genetic counseling was negative, PGM with breast cancer  Doing high dose vit c which feels like gives her some mental sharpness   situational anxiety using prn xanax 4-5x a month, last fill in sept, using effexor for mood and vasomotor,  No mood disorder prior to dx - was started on methylphenidate by DINORA Salazar taking 1/2-1 daily prn. - was seeing Dr Jd Hines which helps    Had bronchitis last month, still small residual cough, feels ok      Pap: Briana 6.21  MMG: s/p bilateral mastectomy   DEXA: 11.21 nl  Colonoscopy: not  Had   Td: 2017  Flu: n/a  COVID:utd  Shingles:n/a  PNA: no on active immunuspuuressing ctx   Tobacco: never habitual   Other MDs:Tim Crum [place], Rae derm, Seo plastic surgeon, Dr Garcia at the remedy room for high dose vit c   I personally reviewed all past medical, surgical, social and family history.  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, arthur guy   Walks in neighborhood a few times a week and does pilates 1x a week   Watching diet closely   She is from [facility]   Active Problem List with Overview Notes    Diagnosis Date Noted    Thrombocytopenia 10/17/2022    Constipation 06/22/2022    Chronic bilateral low back pain without sciatica 01/11/2022    Vitamin D deficiency 02/09/2021    SERM use (selective estrogen receptor modulator) 02/09/2021    Insomnia 01/19/2021    Chemotherapy-induced thrombocytopenia 01/19/2021    Anxiety associated with cancer diagnosis 06/16/2020    Adjustment disorder with mixed anxiety and depressed mood 06/05/2020    Malignant neoplasm of upper-outer quadrant of left breast in female, estrogen receptor positive 05/15/2020     Stage I ([id number]) invasive ductal carcinoma of left breast, upper outer quadrant, ER 95%, MO 90%, Her2 3+, Grade 3, Ki67 25% dx on mmg 5.11.2020, did neoadjuvant ctx followed by bilateral mastectomy   curently on tamoxifen and monthly zoladex(gnrh agonist)  injxn - sees DINORA Crum and       Encounter for chemotherapy management 05/15/2020    Suppression of ovarian secretion 05/15/2020       Health Maintenance   Topic Date Due    TETANUS VACCINE  03/22/2027    Hepatitis C Screening  Completed    Lipid Panel  Completed       Past Medical History:   Diagnosis Date    Anxiety     Back pain     Brain fog     Constipation     Depression     Genetic testing 05/2020    BRCA+Hari NEGATIVE with APC VUS c.3875C>T ([id number]), aka [id number] (3875C>T)    Hx of psychiatric care     Xanax    Malignant neoplasm of upper-outer quadrant of left breast in female, estrogen receptor positive 5/15/2020       Past Surgical History:   Procedure Laterality Date    BILATERAL MASTECTOMY Bilateral 11/30/2020    Procedure: MASTECTOMY, BILATERAL;  Surgeon: Jessica Dumont MD;  Location: [facility];  Service: General;  Laterality: Bilateral;    BREAST  RECONSTRUCTION Bilateral 11/30/2020    Procedure: RECONSTRUCTION, BREAST;  Surgeon: Lamin Seo MD;  Location: [facility] OR;  Service: General;  Laterality: Bilateral;    BREAST SURGERY      Breast biopsy    INSERTION OF BREAST IMPLANT Bilateral 11/30/2020    Procedure: INSERTION, BREAST TISSUE EXPANDER - BILATERAL BREAST RECONSTRUCTION WITH TISSUE EXPANDERS AND ACELLULAR DERMAL MATRIX;  Surgeon: Lamin Seo MD;  Location: [facility] OR;  Service: General;  Laterality: Bilateral;    INSERTION OF TUNNELED CENTRAL VENOUS CATHETER (CVC) WITH SUBCUTANEOUS PORT N/A 05/28/2020    Procedure: [id number];  [facility];  [facility];  Service: Radiology;  Laterality: N/A;  189 port placement    MEDIPORT REMOVAL Right 11/09/2021    Procedure: REMOVAL, CATHETER, CENTRAL VENOUS, TUNNELED, WITH PORT;  Surgeon: Сергей Rowan MD;  Location: [facility] CATH LAB;  Service: Radiology;  Laterality: Right;    SENTINEL LYMPH NODE BIOPSY Left 11/30/2020    Procedure: BIOPSY, LYMPH NODE, SENTINEL;  Surgeon: Jessica Dumont MD;  Location: [facility];  Service: General;  Laterality: Left;    WISDOM TOOTH EXTRACTION         family history includes Alcohol abuse in her maternal grandmother; Alzheimer's disease in her maternal grandmother; Bipolar disorder in her maternal grandmother; Breast cancer in her paternal grandmother and other family members; Diabetes in her maternal grandfather and paternal grandfather; No Known Problems in her father; Osteoporosis in her mother.    Social History     Tobacco Use    Smoking status: Former    Smokeless tobacco: Former    Tobacco comments:     occasional past while drinking   Substance Use Topics    Alcohol use: Yes     Comment: socially    Drug use: Never       Review of Systems   Constitutional:  Negative for chills, fever, malaise/fatigue and weight loss.   Respiratory:  Negative for cough, sputum production, shortness of breath and wheezing.     Cardiovascular:  Negative for chest pain, palpitations, orthopnea and leg swelling.   Gastrointestinal:  Negative for constipation, diarrhea, nausea and vomiting.   Genitourinary:  Negative for dysuria, frequency, hematuria and urgency.      Outpatient Encounter Medications as of 6/30/2023   Medication Sig Dispense Refill    ALPRAZolam (XANAX) 0.5 MG tablet Take 1 tablet (0.5 mg total) by mouth daily as needed for Anxiety. TAKE 1 TABLET(0.5 MG) BY MOUTH THREE TIMES DAILY AS NEEDED FOR INSOMNIA OR ANXIETY Strength: 0.5 mg 30 tablet 0    ATRALIN 0.05 % gel 2 (two) times daily as needed.       methylphenidate HCl (RITALIN) 10 MG tablet Take 1 tablet (10 mg total) by mouth 2 (two) times daily with meals. 60 tablet 0    tamoxifen (NOLVADEX) 20 MG Tab TAKE 1 TABLET(20 MG) BY MOUTH EVERY DAY 90 tablet 3    valACYclovir (VALTREX) 1000 MG tablet Take 2 tablets by mouth once and again in 12 hours 4 tablet 1    venlafaxine (EFFEXOR-XR) 75 MG 24 hr capsule TAKE 1 CAPSULE(75 MG) BY MOUTH EVERY DAY 90 capsule 2    naproxen (NAPROSYN) 500 MG tablet Hold until after surgery      [DISCONTINUED] goserelin (ZOLADEX) 3.6 mg injection Inject 3.6 mg into the skin every 28 days.      [DISCONTINUED] HYDROcodone-acetaminophen (NORCO) 5-325 mg per tablet Take 1 tablet by mouth every 8 (eight) hours as needed for Pain. 30 tablet 0    [DISCONTINUED] LIDOcaine-prilocaine (EMLA) cream Apply topically as needed. 30 g 0     Facility-Administered Encounter Medications as of 6/30/2023   Medication Dose Route Frequency Provider Last Rate Last Admin    ceFAZolin 1 g, gentamicin 80 mg in sodium chloride 0.9% 500 mL irrigation   Irrigation On Call Procedure Lamin Seo MD        ceFAZolin 1 g, gentamicin 80 mg in sodium chloride 0.9% 500 mL irrigation   Irrigation On Call Procedure Lamin Seo MD        ceFAZolin 1 g, gentamicin 80 mg in sodium chloride 0.9% 500 mL irrigation   Irrigation On Call Procedure Lamin Seo MD         "ceFAZolin 1 g, gentamicin 80 mg in sodium chloride 0.9% 500 mL irrigation   Irrigation On Call Procedure Lamin Seo MD        copper intrauterine device 380 square mm  mm  380 mm Intrauterine  Lyndsay Warren MD   380 mm at 06/25/20 1030    testosterone cypionate injection 50 mg  50 mg Intramuscular Q28 Days Lyndsay Warren MD   50 mg at 06/29/23 0832        Review of patient's allergies indicates:  No Known Allergies        Physical Exam      Vital Signs  Pulse: 67  SpO2: 98 %  BP: 126/64  BP Location: Left arm  Patient Position: Sitting  Height and Weight  Height: 5' 5" (165.1 cm)  Weight: 49.3 kg (108 lb 11 oz)  BSA (Calculated - sq m): 1.5 sq meters  BMI (Calculated): 18.1  Weight in (lb) to have BMI = 25: 149.9]    Physical Exam  Vitals reviewed.   Constitutional:       General: She is not in acute distress.     Appearance: She is well-developed. She is not diaphoretic.   HENT:      Head: Normocephalic and atraumatic.      Right Ear: External ear normal.      Left Ear: External ear normal.      Nose: Nose normal.   Eyes:      General:         Right eye: No discharge.         Left eye: No discharge.      Conjunctiva/sclera: Conjunctivae normal.   Cardiovascular:      Rate and Rhythm: Normal rate and regular rhythm.      Heart sounds: Normal heart sounds.   Pulmonary:      Effort: Pulmonary effort is normal. No respiratory distress.      Breath sounds: Normal breath sounds.   Musculoskeletal:         General: Normal range of motion.      Cervical back: Normal range of motion.   Skin:     Coloration: Skin is not pale.      Findings: No rash.   Neurological:      Mental Status: She is alert and oriented to person, place, and time.   Psychiatric:         Behavior: Behavior normal.         Thought Content: Thought content normal.        Laboratory:  CBC:  No results for input(s): WBC, RBC, HGB, HCT, PLT, MCV, MCH, MCHC in the last 2160 hours.  CMP:  No results for input(s): GLU, CALCIUM, " ALBUMIN, PROT, NA, K, CO2, CL, BUN, ALKPHOS, ALT, AST, BILITOT in the last 2160 hours.    Invalid input(s): CREATININ  URINALYSIS:  No results for input(s): COLORU, CLARITYU, SPECGRAV, PHUR, PROTEINUA, GLUCOSEU, BILIRUBINCON, BLOODU, WBCU, RBCU, BACTERIA, MUCUS, NITRITE, LEUKOCYTESUR, UROBILINOGEN, HYALINECASTS in the last 2160 hours.   LIPIDS:  No results for input(s): TSH, HDL, CHOL, TRIG, LDLCALC, CHOLHDL, NONHDLCHOL, TOTALCHOLEST in the last 2160 hours.  TSH:  No results for input(s): TSH in the last 2160 hours.  A1C:  No results for input(s): HGBA1C in the last 2160 hours.        Assessment/Plan     Michelle Gage is a 37 y.o.female with:    1. Wellness examination  -     Lipid Panel; Future; Expected date: 06/30/2023    2. Anxiety associated with cancer diagnosis  Assessment & Plan:  Doing well       3. Adjustment disorder with mixed anxiety and depressed mood  Assessment & Plan:  Stable        Use of the Pager Patient Portal discussed and encouraged during today's visit  -Continue current medications and maintain follow up with specialists.  Return to clinic in 1 year .  Future Appointments   Date Time Provider Department Center   7/27/2023  8:30 AM GYN, INJECTION [facility]   8/18/2023  9:00 AM LAB, HEMONC CANCER [facility] DINORA Ramu Ibanez   8/21/2023  8:30 [facility]  6/30/2023 8:36 AM    [facility] Internal Medicine

## 2023-07-13 ENCOUNTER — PATIENT MESSAGE (OUTPATIENT)
Dept: OBSTETRICS AND GYNECOLOGY | Facility: CLINIC | Age: 38
End: 2023-07-13

## 2023-07-13 ENCOUNTER — PATIENT MESSAGE (OUTPATIENT)
Dept: HEMATOLOGY/ONCOLOGY | Facility: CLINIC | Age: 38
End: 2023-07-13

## 2023-07-17 ENCOUNTER — TELEPHONE (OUTPATIENT)
Dept: OBSTETRICS AND GYNECOLOGY | Facility: CLINIC | Age: 38
End: 2023-07-17

## 2023-07-17 DIAGNOSIS — C50.412 MALIGNANT NEOPLASM OF UPPER-OUTER QUADRANT OF LEFT BREAST IN FEMALE, ESTROGEN RECEPTOR POSITIVE: ICD-10-CM

## 2023-07-17 DIAGNOSIS — N95.1 MENOPAUSAL SYMPTOMS: Primary | ICD-10-CM

## 2023-07-17 DIAGNOSIS — Z17.0 MALIGNANT NEOPLASM OF UPPER-OUTER QUADRANT OF LEFT BREAST IN FEMALE, ESTROGEN RECEPTOR POSITIVE: ICD-10-CM

## 2023-07-17 NOTE — NURSING
Patient phoned with return contact information provided in hopes of coordinating labs, WWE and following up on current symptoms, portal message will follow, PEYTON Deras.

## 2023-07-20 ENCOUNTER — LAB VISIT (OUTPATIENT)
Dept: LAB | Facility: HOSPITAL | Age: 38
End: 2023-07-20
Attending: OBSTETRICS & GYNECOLOGY
Payer: COMMERCIAL

## 2023-07-20 ENCOUNTER — PATIENT MESSAGE (OUTPATIENT)
Dept: HEMATOLOGY/ONCOLOGY | Facility: CLINIC | Age: 38
End: 2023-07-20

## 2023-07-20 DIAGNOSIS — Z17.0 MALIGNANT NEOPLASM OF UPPER-OUTER QUADRANT OF LEFT BREAST IN FEMALE, ESTROGEN RECEPTOR POSITIVE: ICD-10-CM

## 2023-07-20 DIAGNOSIS — N95.1 MENOPAUSAL SYMPTOMS: ICD-10-CM

## 2023-07-20 DIAGNOSIS — C50.412 MALIGNANT NEOPLASM OF UPPER-OUTER QUADRANT OF LEFT BREAST IN FEMALE, ESTROGEN RECEPTOR POSITIVE: ICD-10-CM

## 2023-07-20 LAB — ESTRADIOL SERPL-MCNC: 672 PG/ML

## 2023-07-20 PROCEDURE — 84402 ASSAY OF FREE TESTOSTERONE: CPT | Performed by: OBSTETRICS & GYNECOLOGY

## 2023-07-20 PROCEDURE — 36415 COLL VENOUS BLD VENIPUNCTURE: CPT | Mod: PO | Performed by: OBSTETRICS & GYNECOLOGY

## 2023-07-20 PROCEDURE — 82670 ASSAY OF TOTAL ESTRADIOL: CPT | Performed by: OBSTETRICS & GYNECOLOGY

## 2023-07-21 ENCOUNTER — TELEPHONE (OUTPATIENT)
Dept: OBSTETRICS AND GYNECOLOGY | Facility: CLINIC | Age: 38
End: 2023-07-21

## 2023-07-21 ENCOUNTER — OFFICE VISIT (OUTPATIENT)
Dept: HEMATOLOGY/ONCOLOGY | Facility: CLINIC | Age: 38
End: 2023-07-21
Payer: COMMERCIAL

## 2023-07-21 DIAGNOSIS — N95.1 MENOPAUSAL SYMPTOM: Primary | ICD-10-CM

## 2023-07-21 DIAGNOSIS — C50.412 MALIGNANT NEOPLASM OF UPPER-OUTER QUADRANT OF LEFT BREAST IN FEMALE, ESTROGEN RECEPTOR POSITIVE: ICD-10-CM

## 2023-07-21 DIAGNOSIS — Z17.0 MALIGNANT NEOPLASM OF UPPER-OUTER QUADRANT OF LEFT BREAST IN FEMALE, ESTROGEN RECEPTOR POSITIVE: ICD-10-CM

## 2023-07-21 DIAGNOSIS — Z79.810 SERM USE (SELECTIVE ESTROGEN RECEPTOR MODULATOR): ICD-10-CM

## 2023-07-21 PROCEDURE — 99214 PR OFFICE/OUTPT VISIT, EST, LEVL IV, 30-39 MIN: ICD-10-PCS | Mod: 95,,, | Performed by: OBSTETRICS & GYNECOLOGY

## 2023-07-21 PROCEDURE — 99214 OFFICE O/P EST MOD 30 MIN: CPT | Mod: 95,,, | Performed by: OBSTETRICS & GYNECOLOGY

## 2023-07-21 NOTE — PROGRESS NOTES
The patient location is: home  The chief complaint leading to consultation is:  labs and symptoms- I requested to review labs and discuss new medication regimen    Visit type: audiovisual    Face to Face time with patient: 25   minutes of total time spent on the encounter, which includes face to face time and non-face to face time preparing to see the patient (eg, review of tests), Obtaining and/or reviewing separately obtained history, Documenting clinical information in the electronic or other health record, Independently interpreting results (not separately reported) and communicating results to the patient/family/caregiver, or Care coordination (not separately reported).         Each patient to whom he or she provides medical services by telemedicine is:  (1) informed of the relationship between the physician and patient and the respective role of any other health care provider with respect to management of the patient; and (2) notified that he or she may decline to receive medical services by telemedicine and may withdraw from such care at any time.    Notes:   SUBJECTIVE:   37 y.o. female   presents today to discuss new medication regimen and testosterone inejctions and labs She has copper IUd in place.   She reports that she is feeling better off of zoladex injections. .      She stopped Zoladex in January  and is on 20mg Tamoxifen. She reports period restarted in May. She reports having heavy periods for 3 days.   She did not have a period for a few years secondary to birth control.   She is getting Testosterone injections - feels not as tired, sleeps better and no hot  flashes. She denies acne, hair growth and no hair loss.   Past Medical History:   Diagnosis Date    Anxiety     Back pain     Brain fog     Constipation     Depression     Genetic testing 2020    BRCA+Hari NEGATIVE with APC VUS c.3875C>T ([id number]), aka Q3622P (3875C>T)    Hx of psychiatric care     Xanax    Malignant  neoplasm of upper-outer quadrant of left breast in female, estrogen receptor positive 5/15/2020     Past Surgical History:   Procedure Laterality Date    BILATERAL MASTECTOMY Bilateral 11/30/2020    Procedure: MASTECTOMY, BILATERAL;  Surgeon: Jessica Dumont MD;  Location: [facility];  Service: General;  Laterality: Bilateral;    BREAST RECONSTRUCTION Bilateral 11/30/2020    Procedure: RECONSTRUCTION, BREAST;  Surgeon: Lamin Seo MD;  Location: [facility];  Service: General;  Laterality: Bilateral;    BREAST SURGERY      Breast biopsy    INSERTION OF BREAST IMPLANT Bilateral 11/30/2020    Procedure: INSERTION, BREAST TISSUE EXPANDER - BILATERAL BREAST RECONSTRUCTION WITH TISSUE EXPANDERS AND ACELLULAR DERMAL MATRIX;  Surgeon: Lamin Seo MD;  Location: [facility];  Service: General;  Laterality: Bilateral;    INSERTION OF TUNNELED CENTRAL VENOUS CATHETER (CVC) WITH SUBCUTANEOUS PORT N/A 05/28/2020    Procedure: [id number];  Surgeon: Ralph Diagnostic Provider;  Location: [address];  Service: Radiology;  Laterality: N/A;  189 port placement    MEDIPORT REMOVAL Right 11/09/2021    Procedure: REMOVAL, CATHETER, CENTRAL VENOUS, TUNNELED, WITH PORT;  Surgeon: Сергей Rowan MD;  Location: [facility] CATH LAB;  Service: Radiology;  Laterality: Right;    SENTINEL LYMPH NODE BIOPSY Left 11/30/2020    Procedure: BIOPSY, LYMPH NODE, SENTINEL;  Surgeon: Jessica Dumont MD;  Location: [facility];  Service: General;  Laterality: Left;    WISDOM TOOTH EXTRACTION       Social History     Socioeconomic History    Marital status:      Spouse name: Elio   Occupational History    Occupation:    Tobacco Use    Smoking status: Former    Smokeless tobacco: Former    Tobacco comments:     occasional past while drinking   Substance and Sexual Activity    Alcohol use: Yes     Comment: socially    Drug use: Never    Sexual activity: Yes     Partners: Male     Birth control/protection: I.U.D., Other-see comments      Comment: spouse   Social History Narrative    , no children, construction , originally from [place]     Family History   Problem Relation Age of Onset    Osteoporosis Mother     No Known Problems Father     Alzheimer's disease Maternal Grandmother     Bipolar disorder Maternal Grandmother     Alcohol abuse Maternal Grandmother     Diabetes Maternal Grandfather     Breast cancer Paternal Grandmother         bilat noncurrent, @ 50 & 62    Diabetes Paternal Grandfather     Breast cancer Other     Breast cancer Other     Colon cancer Neg Hx     Ovarian cancer Neg Hx      OB History    Para Term  AB Living   0 0 0 0 0 0   SAB IAB Ectopic Multiple Live Births   0 0 0 0 0           Current Outpatient Medications   Medication Sig Dispense Refill    ALPRAZolam (XANAX) 0.5 MG tablet Take 1 tablet (0.5 mg total) by mouth daily as needed for Anxiety. TAKE 1 TABLET(0.5 MG) BY MOUTH THREE TIMES DAILY AS NEEDED FOR INSOMNIA OR ANXIETY Strength: 0.5 mg 30 tablet 0    ATRALIN 0.05 % gel 2 (two) times daily as needed.       methylphenidate HCl (RITALIN) 10 MG tablet Take 1 tablet (10 mg total) by mouth 2 (two) times daily with meals. 60 tablet 0    tamoxifen (NOLVADEX) 20 MG Tab TAKE 1 TABLET(20 MG) BY MOUTH EVERY DAY 90 tablet 3    valACYclovir (VALTREX) 1000 MG tablet Take 2 tablets by mouth once and again in 12 hours 4 tablet 1    venlafaxine (EFFEXOR-XR) 75 MG 24 hr capsule TAKE 1 CAPSULE(75 MG) BY MOUTH EVERY DAY 90 capsule 2     Current Facility-Administered Medications   Medication Dose Route Frequency Provider Last Rate Last Admin    copper intrauterine device 380 square mm  mm  380 mm Intrauterine  Lyndsay Warren MD   380 mm at 20 1030    testosterone cypionate injection 50 mg  50 mg Intramuscular Q28 Days Lyndsay Warren MD   50 mg at 23 0832     Facility-Administered Medications Ordered in Other Visits   Medication Dose Route Frequency Provider Last Rate  Last Admin    ceFAZolin 1 g, gentamicin 80 mg in sodium chloride 0.9% 500 mL irrigation   Irrigation On Call Procedure Lamin Seo MD        ceFAZolin 1 g, gentamicin 80 mg in sodium chloride 0.9% 500 mL irrigation   Irrigation On Call Procedure Lamin Seo MD        ceFAZolin 1 g, gentamicin 80 mg in sodium chloride 0.9% 500 mL irrigation   Irrigation On Call Procedure Lamin Seo MD        ceFAZolin 1 g, gentamicin 80 mg in sodium chloride 0.9% 500 mL irrigation   Irrigation On Call Procedure Lamin Seo MD         Allergies: Patient has no known allergies.     The ASCVD Risk score ([place] DK, et al., 2019) failed to calculate for the following reasons:    The 2019 ASCVD risk score is only valid for ages 40 to 79      ROS:  Constitutional: no weight loss, weight gain, fever, fatigue    Cardiovascular: No inability to lie flat, chest pain, exercise intolerance, swelling, heart palpitations  Respiratory: No wheezing, coughing blood, shortness of breath, or cough  Gastrointestinal: No diarrhea, bloody stool, nausea/vomiting, constipation, gas, hemorrhoids  Genitourinary: No blood in urine, painful urination, urgency of urination, frequency of urination, incomplete emptying, incontinence, abnormal bleeding, painful periods, heavy periods, vaginal discharge, vaginal odor, painful intercourse, sexual problems, bleeding after intercourse.  Musculoskeletal: No muscle weakness  Skin/Breast: No painful breasts, nipple discharge, masses, rash, ulcers  Neurological: No passing out, seizures, numbness, headache  Endocrine: No diabetes, hypothyroid, hyperthyroid, hot flashes, hair loss, abnormal hair growth, acne  Psychiatric: No depression, crying  Hematologic: No bruises, bleeding, swollen lymph nodes, anemia.    Labs 7/20/2023  Estradiol 672   Free testosterone - in process  Physical Exam  deferred    ASSESSMENT:     1. Menopausal symptom  ESTRADIOL    Testosterone, free    TESTOSTERONE      2.  Malignant neoplasm of upper-outer quadrant of left breast in female, estrogen receptor positive        3. SERM use (selective estrogen receptor modulator)            PLAN:   Counseled her that we need to see how she feels off of Zoladex  She is having a period   HEr last labs showed elevated estradiol and free testosterone pending  Counseled her that I want to hold the next injection and repeat labs in 1 month- she may not need testosterone any longer but will see what labs look like and how she is feeling  She voiced understanding    Total time 25 minutes- face to face, review of medical record and arranging follow up

## 2023-07-21 NOTE — TELEPHONE ENCOUNTER
----- Message from Lyndsay Warren MD sent at 7/21/2023  3:21 PM CDT -----  Can you plese cancel her injection next week and schedule labs in [employer] ([facility]) on august 23  thanks

## 2023-07-24 LAB — TESTOST FREE SERPL-MCNC: 1.5 PG/ML

## 2023-08-15 ENCOUNTER — PATIENT MESSAGE (OUTPATIENT)
Dept: OBSTETRICS AND GYNECOLOGY | Facility: CLINIC | Age: 38
End: 2023-08-15
Payer: COMMERCIAL

## 2023-08-29 DIAGNOSIS — R53.0 NEOPLASTIC MALIGNANT RELATED FATIGUE: ICD-10-CM

## 2023-08-29 RX ORDER — METHYLPHENIDATE HYDROCHLORIDE 10 MG/1
10 TABLET ORAL 2 TIMES DAILY WITH MEALS
Qty: 60 TABLET | Refills: 0 | Status: SHIPPED | OUTPATIENT
Start: 2023-08-29 | End: 2023-11-15 | Stop reason: SDUPTHER

## 2023-09-05 ENCOUNTER — PATIENT MESSAGE (OUTPATIENT)
Dept: HEMATOLOGY/ONCOLOGY | Facility: CLINIC | Age: 38
End: 2023-09-05
Payer: COMMERCIAL

## 2023-09-11 ENCOUNTER — PATIENT MESSAGE (OUTPATIENT)
Dept: HEMATOLOGY/ONCOLOGY | Facility: CLINIC | Age: 38
End: 2023-09-11
Payer: COMMERCIAL

## 2023-09-18 ENCOUNTER — OFFICE VISIT (OUTPATIENT)
Dept: OBSTETRICS AND GYNECOLOGY | Facility: CLINIC | Age: 38
End: 2023-09-18
Payer: COMMERCIAL

## 2023-09-18 ENCOUNTER — TELEPHONE (OUTPATIENT)
Dept: HEMATOLOGY/ONCOLOGY | Facility: CLINIC | Age: 38
End: 2023-09-18
Payer: COMMERCIAL

## 2023-09-18 ENCOUNTER — LAB VISIT (OUTPATIENT)
Dept: LAB | Facility: HOSPITAL | Age: 38
End: 2023-09-18
Attending: INTERNAL MEDICINE
Payer: COMMERCIAL

## 2023-09-18 ENCOUNTER — OFFICE VISIT (OUTPATIENT)
Dept: HEMATOLOGY/ONCOLOGY | Facility: CLINIC | Age: 38
End: 2023-09-18
Payer: COMMERCIAL

## 2023-09-18 VITALS
WEIGHT: 114.81 LBS | SYSTOLIC BLOOD PRESSURE: 99 MMHG | DIASTOLIC BLOOD PRESSURE: 65 MMHG | HEART RATE: 66 BPM | HEIGHT: 65 IN | BODY MASS INDEX: 19.13 KG/M2

## 2023-09-18 VITALS
HEART RATE: 58 BPM | HEIGHT: 65 IN | WEIGHT: 127.56 LBS | DIASTOLIC BLOOD PRESSURE: 68 MMHG | SYSTOLIC BLOOD PRESSURE: 126 MMHG | BODY MASS INDEX: 21.25 KG/M2 | TEMPERATURE: 98 F | OXYGEN SATURATION: 100 % | RESPIRATION RATE: 17 BRPM

## 2023-09-18 DIAGNOSIS — Z17.0 MALIGNANT NEOPLASM OF UPPER-OUTER QUADRANT OF LEFT BREAST IN FEMALE, ESTROGEN RECEPTOR POSITIVE: ICD-10-CM

## 2023-09-18 DIAGNOSIS — Z79.810 SERM USE (SELECTIVE ESTROGEN RECEPTOR MODULATOR): ICD-10-CM

## 2023-09-18 DIAGNOSIS — N92.4 EXCESSIVE BLEEDING IN PREMENOPAUSAL PERIOD: ICD-10-CM

## 2023-09-18 DIAGNOSIS — Z00.00 WELLNESS EXAMINATION: ICD-10-CM

## 2023-09-18 DIAGNOSIS — N95.1 MENOPAUSAL SYMPTOM: ICD-10-CM

## 2023-09-18 DIAGNOSIS — C50.412 MALIGNANT NEOPLASM OF UPPER-OUTER QUADRANT OF LEFT BREAST IN FEMALE, ESTROGEN RECEPTOR POSITIVE: ICD-10-CM

## 2023-09-18 DIAGNOSIS — Z17.0 MALIGNANT NEOPLASM OF UPPER-OUTER QUADRANT OF LEFT BREAST IN FEMALE, ESTROGEN RECEPTOR POSITIVE: Primary | ICD-10-CM

## 2023-09-18 DIAGNOSIS — D69.6 THROMBOCYTOPENIA: ICD-10-CM

## 2023-09-18 DIAGNOSIS — C50.412 MALIGNANT NEOPLASM OF UPPER-OUTER QUADRANT OF LEFT BREAST IN FEMALE, ESTROGEN RECEPTOR POSITIVE: Primary | ICD-10-CM

## 2023-09-18 DIAGNOSIS — Z01.419 ENCOUNTER FOR GYNECOLOGICAL EXAMINATION WITHOUT ABNORMAL FINDING: Primary | ICD-10-CM

## 2023-09-18 PROBLEM — Z51.11 ENCOUNTER FOR CHEMOTHERAPY MANAGEMENT: Status: RESOLVED | Noted: 2020-05-15 | Resolved: 2023-09-18

## 2023-09-18 LAB
ALBUMIN SERPL BCP-MCNC: 3.8 G/DL (ref 3.5–5.2)
ALP SERPL-CCNC: 42 U/L (ref 55–135)
ALT SERPL W/O P-5'-P-CCNC: 23 U/L (ref 10–44)
ANION GAP SERPL CALC-SCNC: 7 MMOL/L (ref 8–16)
AST SERPL-CCNC: 26 U/L (ref 10–40)
BASOPHILS # BLD AUTO: 0.05 K/UL (ref 0–0.2)
BASOPHILS NFR BLD: 1 % (ref 0–1.9)
BILIRUB SERPL-MCNC: 0.2 MG/DL (ref 0.1–1)
BUN SERPL-MCNC: 11 MG/DL (ref 6–20)
CALCIUM SERPL-MCNC: 9.2 MG/DL (ref 8.7–10.5)
CHLORIDE SERPL-SCNC: 109 MMOL/L (ref 95–110)
CHOLEST SERPL-MCNC: 178 MG/DL (ref 120–199)
CHOLEST/HDLC SERPL: 2.5 {RATIO} (ref 2–5)
CO2 SERPL-SCNC: 26 MMOL/L (ref 23–29)
CREAT SERPL-MCNC: 0.8 MG/DL (ref 0.5–1.4)
DIFFERENTIAL METHOD: ABNORMAL
EOSINOPHIL # BLD AUTO: 0.4 K/UL (ref 0–0.5)
EOSINOPHIL NFR BLD: 8.2 % (ref 0–8)
ERYTHROCYTE [DISTWIDTH] IN BLOOD BY AUTOMATED COUNT: 14.1 % (ref 11.5–14.5)
EST. GFR  (NO RACE VARIABLE): >60 ML/MIN/1.73 M^2
ESTRADIOL SERPL-MCNC: 99 PG/ML
GLUCOSE SERPL-MCNC: 86 MG/DL (ref 70–110)
HCT VFR BLD AUTO: 36.4 % (ref 37–48.5)
HDLC SERPL-MCNC: 71 MG/DL (ref 40–75)
HDLC SERPL: 39.9 % (ref 20–50)
HGB BLD-MCNC: 12.4 G/DL (ref 12–16)
IMM GRANULOCYTES # BLD AUTO: 0.01 K/UL (ref 0–0.04)
IMM GRANULOCYTES NFR BLD AUTO: 0.2 % (ref 0–0.5)
LDLC SERPL CALC-MCNC: 90.8 MG/DL (ref 63–159)
LYMPHOCYTES # BLD AUTO: 2.2 K/UL (ref 1–4.8)
LYMPHOCYTES NFR BLD: 42 % (ref 18–48)
MCH RBC QN AUTO: 31.2 PG (ref 27–31)
MCHC RBC AUTO-ENTMCNC: 34.1 G/DL (ref 32–36)
MCV RBC AUTO: 92 FL (ref 82–98)
MONOCYTES # BLD AUTO: 0.4 K/UL (ref 0.3–1)
MONOCYTES NFR BLD: 7.4 % (ref 4–15)
NEUTROPHILS # BLD AUTO: 2.2 K/UL (ref 1.8–7.7)
NEUTROPHILS NFR BLD: 41.2 % (ref 38–73)
NONHDLC SERPL-MCNC: 107 MG/DL
NRBC BLD-RTO: 0 /100 WBC
PLATELET # BLD AUTO: 121 K/UL (ref 150–450)
PMV BLD AUTO: 12.3 FL (ref 9.2–12.9)
POTASSIUM SERPL-SCNC: 4 MMOL/L (ref 3.5–5.1)
PROT SERPL-MCNC: 6.6 G/DL (ref 6–8.4)
RBC # BLD AUTO: 3.97 M/UL (ref 4–5.4)
SODIUM SERPL-SCNC: 142 MMOL/L (ref 136–145)
TESTOST SERPL-MCNC: 40 NG/DL (ref 5–73)
TRIGL SERPL-MCNC: 81 MG/DL (ref 30–150)
WBC # BLD AUTO: 5.24 K/UL (ref 3.9–12.7)

## 2023-09-18 PROCEDURE — 1160F RVW MEDS BY RX/DR IN RCRD: CPT | Mod: CPTII,S$GLB,, | Performed by: INTERNAL MEDICINE

## 2023-09-18 PROCEDURE — 80061 LIPID PANEL: CPT | Performed by: INTERNAL MEDICINE

## 2023-09-18 PROCEDURE — 3078F PR MOST RECENT DIASTOLIC BLOOD PRESSURE < 80 MM HG: ICD-10-PCS | Mod: CPTII,S$GLB,, | Performed by: INTERNAL MEDICINE

## 2023-09-18 PROCEDURE — 3008F PR BODY MASS INDEX (BMI) DOCUMENTED: ICD-10-PCS | Mod: CPTII,S$GLB,, | Performed by: PHYSICIAN ASSISTANT

## 2023-09-18 PROCEDURE — 3074F PR MOST RECENT SYSTOLIC BLOOD PRESSURE < 130 MM HG: ICD-10-PCS | Mod: CPTII,S$GLB,, | Performed by: INTERNAL MEDICINE

## 2023-09-18 PROCEDURE — 3074F SYST BP LT 130 MM HG: CPT | Mod: CPTII,S$GLB,, | Performed by: PHYSICIAN ASSISTANT

## 2023-09-18 PROCEDURE — 3008F BODY MASS INDEX DOCD: CPT | Mod: CPTII,S$GLB,, | Performed by: PHYSICIAN ASSISTANT

## 2023-09-18 PROCEDURE — 3074F PR MOST RECENT SYSTOLIC BLOOD PRESSURE < 130 MM HG: ICD-10-PCS | Mod: CPTII,S$GLB,, | Performed by: PHYSICIAN ASSISTANT

## 2023-09-18 PROCEDURE — 82670 ASSAY OF TOTAL ESTRADIOL: CPT | Performed by: OBSTETRICS & GYNECOLOGY

## 2023-09-18 PROCEDURE — 85025 COMPLETE CBC W/AUTO DIFF WBC: CPT | Performed by: INTERNAL MEDICINE

## 2023-09-18 PROCEDURE — 99395 PR PREVENTIVE VISIT,EST,18-39: ICD-10-PCS | Mod: S$GLB,,, | Performed by: PHYSICIAN ASSISTANT

## 2023-09-18 PROCEDURE — 99999 PR PBB SHADOW E&M-EST. PATIENT-LVL IV: ICD-10-PCS | Mod: PBBFAC,,, | Performed by: INTERNAL MEDICINE

## 2023-09-18 PROCEDURE — 84403 ASSAY OF TOTAL TESTOSTERONE: CPT | Performed by: OBSTETRICS & GYNECOLOGY

## 2023-09-18 PROCEDURE — 1159F PR MEDICATION LIST DOCUMENTED IN MEDICAL RECORD: ICD-10-PCS | Mod: CPTII,S$GLB,, | Performed by: INTERNAL MEDICINE

## 2023-09-18 PROCEDURE — 99214 PR OFFICE/OUTPT VISIT, EST, LEVL IV, 30-39 MIN: ICD-10-PCS | Mod: S$GLB,,, | Performed by: INTERNAL MEDICINE

## 2023-09-18 PROCEDURE — 1159F MED LIST DOCD IN RCRD: CPT | Mod: CPTII,S$GLB,, | Performed by: INTERNAL MEDICINE

## 2023-09-18 PROCEDURE — 1160F RVW MEDS BY RX/DR IN RCRD: CPT | Mod: CPTII,S$GLB,, | Performed by: PHYSICIAN ASSISTANT

## 2023-09-18 PROCEDURE — 99214 OFFICE O/P EST MOD 30 MIN: CPT | Mod: S$GLB,,, | Performed by: INTERNAL MEDICINE

## 2023-09-18 PROCEDURE — 3074F SYST BP LT 130 MM HG: CPT | Mod: CPTII,S$GLB,, | Performed by: INTERNAL MEDICINE

## 2023-09-18 PROCEDURE — 3008F BODY MASS INDEX DOCD: CPT | Mod: CPTII,S$GLB,, | Performed by: INTERNAL MEDICINE

## 2023-09-18 PROCEDURE — 1159F PR MEDICATION LIST DOCUMENTED IN MEDICAL RECORD: ICD-10-PCS | Mod: CPTII,S$GLB,, | Performed by: PHYSICIAN ASSISTANT

## 2023-09-18 PROCEDURE — 99999 PR PBB SHADOW E&M-EST. PATIENT-LVL IV: CPT | Mod: PBBFAC,,, | Performed by: INTERNAL MEDICINE

## 2023-09-18 PROCEDURE — 3078F PR MOST RECENT DIASTOLIC BLOOD PRESSURE < 80 MM HG: ICD-10-PCS | Mod: CPTII,S$GLB,, | Performed by: PHYSICIAN ASSISTANT

## 2023-09-18 PROCEDURE — 99395 PREV VISIT EST AGE 18-39: CPT | Mod: S$GLB,,, | Performed by: PHYSICIAN ASSISTANT

## 2023-09-18 PROCEDURE — 1160F PR REVIEW ALL MEDS BY PRESCRIBER/CLIN PHARMACIST DOCUMENTED: ICD-10-PCS | Mod: CPTII,S$GLB,, | Performed by: INTERNAL MEDICINE

## 2023-09-18 PROCEDURE — 88175 CYTOPATH C/V AUTO FLUID REDO: CPT | Performed by: PHYSICIAN ASSISTANT

## 2023-09-18 PROCEDURE — 3078F DIAST BP <80 MM HG: CPT | Mod: CPTII,S$GLB,, | Performed by: INTERNAL MEDICINE

## 2023-09-18 PROCEDURE — 3008F PR BODY MASS INDEX (BMI) DOCUMENTED: ICD-10-PCS | Mod: CPTII,S$GLB,, | Performed by: INTERNAL MEDICINE

## 2023-09-18 PROCEDURE — 99999 PR PBB SHADOW E&M-EST. PATIENT-LVL IV: ICD-10-PCS | Mod: PBBFAC,,, | Performed by: PHYSICIAN ASSISTANT

## 2023-09-18 PROCEDURE — 36415 COLL VENOUS BLD VENIPUNCTURE: CPT | Performed by: INTERNAL MEDICINE

## 2023-09-18 PROCEDURE — 3078F DIAST BP <80 MM HG: CPT | Mod: CPTII,S$GLB,, | Performed by: PHYSICIAN ASSISTANT

## 2023-09-18 PROCEDURE — 99999 PR PBB SHADOW E&M-EST. PATIENT-LVL IV: CPT | Mod: PBBFAC,,, | Performed by: PHYSICIAN ASSISTANT

## 2023-09-18 PROCEDURE — 1159F MED LIST DOCD IN RCRD: CPT | Mod: CPTII,S$GLB,, | Performed by: PHYSICIAN ASSISTANT

## 2023-09-18 PROCEDURE — 1160F PR REVIEW ALL MEDS BY PRESCRIBER/CLIN PHARMACIST DOCUMENTED: ICD-10-PCS | Mod: CPTII,S$GLB,, | Performed by: PHYSICIAN ASSISTANT

## 2023-09-18 PROCEDURE — 87624 HPV HI-RISK TYP POOLED RSLT: CPT | Performed by: PHYSICIAN ASSISTANT

## 2023-09-18 PROCEDURE — 80053 COMPREHEN METABOLIC PANEL: CPT | Performed by: INTERNAL MEDICINE

## 2023-09-18 PROCEDURE — 84402 ASSAY OF FREE TESTOSTERONE: CPT | Performed by: OBSTETRICS & GYNECOLOGY

## 2023-09-18 NOTE — PROGRESS NOTES
Subjective     Patient ID: Michelle Gage is a 37 y.o. female.    Chief Complaint: Malignant neoplasm of upper-outer quadrant of left breast i    HPI    Returns for routine follow-up  States overall    Stopped Zoladex in 2/2023 and menses returned 5/2023- notes irregular and heavy  Dr. Warren stopped her testosterone based on last labs in 6/2023    Currently on Tamoxifen alone  Notes insomnia, hot flashes, some fatigue  Notes very busy between here and [facility]  No changes in mood   Weight up (states at home historically 110 lbs and weighted this at home 1 month ago)  Notes losing her hair (noted when she stopped Tamoxifen)  Skin changes- neck- acne    Diagnosis: Stage I ([id number]) invasive ductal carcinoma of left breast, upper outer quadrant, ER 95%, MA 90%, Her2 3+, Grade 3, Ki67 25%  Premenopausal status.     Oncologic History:  Presentation  - [id number] - presented for palpable left breast mass around 2018 - was being watched on imaging at outside hospital.    - 5/11/2020 - Diagnostic bilateral mammogram and left breast US at [facility], [place] showed left breast 1:00 mass, 1.4 cm, 8 CFN, mild left axillary adenopathy  - 5/11/2020 - Biopsy - Grade 3 IDC, estrogen receptor 95%, progesterone receptor 90%, Her2 delphine 3+, Ki67 25%. LN biopsy negative  Surgery consultation with Dr Dumont on 5/14. Breast cancer is far in axillary tail and felt to be difficult to obtain clear margins without neoadjuvant therapy.               - 5/14/2020 - [facility] [employer] [facility] BRACAnalysis neg; VUS AP             Medical oncology consultation on 5/15/2020 -  This case was discussed with Dr. Dumont.  She does feel like the patient will benefit from neoadjuvant therapy to help improve her surgical margins.  Since the tumor is less than 2 cm and clinically lymph node negative (MRI pending), I recommended treatment with Taxotere, carboplatin and Herceptin without Perjeta.  Discussed with her that there is data in tumors  less than 2 cm to consider Taxol/Herceptin however would not typically use this in a neoadjuvant setting for concern for under treatment.                - Eggs Retrieval - has 2 embryos.               * 6/16/2020 started neoadjuvant TCH (no perjeta since < 2 cm and LN negative). Neoadjuvant therapy chosen due to location of tumor.     11/6/2020 Imaging Significant Findings     Breast MRI  Impression:     The previously described upper outer quadrant known malignancy in the left breast is no longer visualized consistent with complete imaging response to therapy.      BI-RADS Category:   Overall: 6 - Known Biopsy-Proven Malignancy     Recommendation:  Continued breast surgery and oncology management.             11/30/2020 Breast Surgery     Surgery: Dr Jessica Dumont, [phone] performed bilateral mastectomy with sentinel lymph node biopsy with pathology showing grade 1 invasive ductal carcinoma,  3 mm with 0 positive lymph nodes.          11/30/2020 Cancer Staged     [id number]      12/15/2020 Tumor Conference       Post mastectomy radiation not recommended but patient will meet with radiation oncology. Systemically,standard of care would be Kadcyla followed by endocrine therapy.      - Adjuvant Kadcyla started 12/28/2020    - Tamoxifen holiday 4/4/2021- 7/12/2021 due to side effects.     - Kadcyla stopped due to difficulty recovering counts- last dose 8/9/2021    - Zoladex and Tamoxifen.     Review of Systems   Constitutional:  Positive for fatigue. Negative for activity change, appetite change and fever.   HENT:  Negative for nasal congestion, postnasal drip and sore throat.    Eyes:  Negative for visual disturbance.   Respiratory:  Negative for cough and shortness of breath.    Cardiovascular:  Negative for chest pain, palpitations and leg swelling.   Gastrointestinal:  Negative for abdominal distention, abdominal pain, change in bowel habit, constipation, diarrhea, nausea, vomiting and change in bowel habit.    Endocrine: Positive for heat intolerance (night sweats).   Genitourinary:  Negative for bladder incontinence, decreased urine volume, difficulty urinating, dysuria and urgency.   Musculoskeletal:  Negative for arthralgias, back pain and myalgias.   Integumentary:  Negative for breast mass and breast tenderness.   Neurological:  Positive for headaches (with her periods). Negative for dizziness, weakness and numbness.   Hematological:  Negative for adenopathy. Does not bruise/bleed easily.   Psychiatric/Behavioral:  Positive for sleep disturbance. Negative for dysphoric mood. The patient is not nervous/anxious.    Breast: Negative for mass and tenderness         Objective     Physical Exam  Vitals and nursing note reviewed.   Constitutional:       General: She is not in acute distress.     Appearance: Normal appearance. She is well-developed and normal weight. She is not ill-appearing.   HENT:      Head: Normocephalic and atraumatic.   Eyes:      General: No scleral icterus.     Extraocular Movements: Extraocular movements intact.      Conjunctiva/sclera: Conjunctivae normal.      Pupils: Pupils are equal, round, and reactive to light.      Right eye: Pupil is round and reactive.      Left eye: Pupil is round and reactive.   Neck:      Thyroid: No thyromegaly.      Vascular: No JVD.      Trachea: No tracheal deviation.   Cardiovascular:      Rate and Rhythm: Normal rate and regular rhythm.      Heart sounds: Normal heart sounds. No murmur heard.     No friction rub. No gallop.   Pulmonary:      Effort: Pulmonary effort is normal. No respiratory distress.      Breath sounds: Normal breath sounds. No wheezing, rhonchi or rales.      Comments: Breast- bilateral implants  No chest wall abnormalities or LAD  Abdominal:      General: Abdomen is flat. Bowel sounds are normal. There is no distension.      Palpations: Abdomen is soft. There is no mass.      Tenderness: There is no abdominal tenderness. There is no guarding  or rebound.      Comments: No organomegaly   Musculoskeletal:         General: No swelling or tenderness. Normal range of motion.      Cervical back: Normal range of motion and neck supple.      Right lower leg: No edema.      Left lower leg: No edema.   Lymphadenopathy:      Head:      Right side of head: No submandibular adenopathy.      Left side of head: No submandibular adenopathy.      Cervical: No cervical adenopathy.      Right cervical: No superficial, deep or posterior cervical adenopathy.     Left cervical: No superficial, deep or posterior cervical adenopathy.      Upper Body:      Right upper body: No supraclavicular adenopathy.      Left upper body: No supraclavicular adenopathy.   Skin:     General: Skin is warm and dry.      Coloration: Skin is not jaundiced or pale.      Findings: No bruising, erythema, lesion, petechiae or rash.   Neurological:      General: No focal deficit present.      Mental Status: She is alert and oriented to person, place, and time. Mental status is at baseline.      Sensory: No sensory deficit.      Motor: No weakness.      Coordination: Coordination normal.      Gait: Gait normal.      Deep Tendon Reflexes: Reflexes are normal and symmetric.   Psychiatric:         Mood and Affect: Mood normal. Mood is not anxious or depressed.         Behavior: Behavior normal.         Thought Content: Thought content normal.         Judgment: Judgment normal.   Labs- reviewed       Assessment and Plan     1. Malignant neoplasm of upper-outer quadrant of left breast in female, estrogen receptor positive  Overview:  Stage I ([id number]) invasive ductal carcinoma of left breast, upper outer quadrant, ER 95%, MO 90%, Her2 3+, Grade 3, Ki67 25% dx on [facility] 5.11.2020, did neoadjuvant ctx followed by bilateral mastectomy   curently on tamoxifen and monthly zoladex(gnrh agonist)  injxn - sees DINORA Crum and       2. Excessive bleeding in premenopausal period  -     Iron and TIBC; Future; Expected date:  09/18/2023  -     FERRITIN; Future; Expected date: 09/18/2023    3. SERM use (selective estrogen receptor modulator)    4. Thrombocytopenia      Breast cancer  Currently just on Tamoxifen and Zoladex stopped at 2 years  However, heavy periods with irregularity now an issue  We will discuss with Nell Lawson and Dr. Warren  [facility] RENETTA- bilateral mastectomy so no breast imaging needed    Excessive menstrual bleeding- see above    SERM use- remains on Tamoxifen as pre-menopausal    Thrombocytopenia  Long standing > 5 years, likely ITP and monitored     Route Chart for Scheduling    Med Onc Chart Routing      Follow up with physician    Follow up with YUMIKO 6 months. cbc, cmp prior   Infusion scheduling note    Injection scheduling note    Labs    Imaging    Pharmacy appointment    Other referrals            Supportive Plan Information  OP BREAST GOSERELIN Q4W   Christy Salazar MD   Upcoming Treatment Dates - OP BREAST GOSERELIN Q4W    1/13/2023       Chemotherapy       goserelin (ZOLADEX) injection 3.6 mg

## 2023-09-18 NOTE — PROGRESS NOTES
CC: Well woman exam    Michelle Gage is a 37 y.o. female  presents for well woman exam.  LMP: Patient's last menstrual period was 2023. She has a history of ER+/CT+/Her2+ left breast cancer. Treated with neoadjuvant chemotherapy. She is s/p bilateral mastectomy on 2020. Adjuvant Kadcyla stopped on 2021. She was getting Zoladex q28 days and Tamoxifen daily. However zoladex was stopped in 2023 due to side effects. Started having periods again in April or May. Reports heavy periods lasting about 7 days every 2-3 weeks. Prior to breast cancer treatment, periods were regular and light. She was getting testosterone 50mg Q28day but stopped due elevated estradiol levels in this summer. She reports hot flahse and insomnia. She is interested in restarting testosterone in fthe future. She saw Dr. Crum today and may be restarting Zoladex.  Paragard inserted 2020.     Mammogram: s/p bilateral mastectomy  Pap: 2021 normal, + Other HPV  PCP: Madonna Jones MD  Routine Screening Labs: 2023      Past Medical History:   Diagnosis Date    Anxiety     Back pain     Brain fog     Constipation     Depression     Genetic testing 2020    BRCA+myRisk NEGATIVE with APC VUS c.3875C>T (p.[id number]), aka [id number] (3875C>T)    Hx of psychiatric care     Xanax    Malignant neoplasm of upper-outer quadrant of left breast in female, estrogen receptor positive 5/15/2020     Past Surgical History:   Procedure Laterality Date    BILATERAL MASTECTOMY Bilateral 2020    Procedure: MASTECTOMY, BILATERAL;  Surgeon: Jessica Dumont MD;  Location: [facility] OR;  Service: General;  Laterality: Bilateral;    BREAST RECONSTRUCTION Bilateral 2020    Procedure: RECONSTRUCTION, BREAST;  Surgeon: Lamin Seo MD;  Location: [facility];  Service: General;  Laterality: Bilateral;    BREAST SURGERY      Breast biopsy    INSERTION OF BREAST IMPLANT Bilateral 2020    Procedure: INSERTION, BREAST TISSUE  EXPANDER - BILATERAL BREAST RECONSTRUCTION WITH TISSUE EXPANDERS AND ACELLULAR DERMAL MATRIX;  Surgeon: Lamin Seo MD;  Location: [facility] OR;  Service: General;  Laterality: Bilateral;    INSERTION OF TUNNELED CENTRAL VENOUS CATHETER (CVC) WITH SUBCUTANEOUS PORT N/A 2020    Procedure: [id number];  Surgeon: Ralph Diagnostic Provider;  Location: [facility] OR 2ND FLR;  Service: Radiology;  Laterality: N/A;  189 port placement    MEDIPORT REMOVAL Right 2021    Procedure: REMOVAL, CATHETER, CENTRAL VENOUS, TUNNELED, WITH PORT;  Surgeon: Сергей Rowan MD;  Location: [facility] CATH LAB;  Service: Radiology;  Laterality: Right;    SENTINEL LYMPH NODE BIOPSY Left 2020    Procedure: BIOPSY, LYMPH NODE, SENTINEL;  Surgeon: Jessica Dumont MD;  Location: [facility] OR;  Service: General;  Laterality: Left;    WISDOM TOOTH EXTRACTION       Social History     Socioeconomic History    Marital status:      Spouse name: Elio   Occupational History    Occupation:    Tobacco Use    Smoking status: Former    Smokeless tobacco: Former    Tobacco comments:     occasional past while drinking   Substance and Sexual Activity    Alcohol use: Yes     Comment: socially    Drug use: Never    Sexual activity: Yes     Partners: Male     Birth control/protection: I.U.D., Other-see comments     Comment: spouse   Social History Narrative    , no children, construction , originally from [place]     Family History   Problem Relation Age of Onset    Osteoporosis Mother     No Known Problems Father     Alzheimer's disease Maternal Grandmother     Bipolar disorder Maternal Grandmother     Alcohol abuse Maternal Grandmother     Diabetes Maternal Grandfather     Breast cancer Paternal Grandmother         bilat noncurrent, @ 50 & 62    Diabetes Paternal Grandfather     Breast cancer Other     Breast cancer Other     Colon cancer Neg Hx     Ovarian cancer Neg Hx      OB History          0    Para    0    Term   0       0    AB   0    Living   0         SAB   0    IAB   0    Ectopic   0    Multiple   0    Live Births   0                 Current Outpatient Medications:     ALPRAZolam (XANAX) 0.5 MG tablet, Take 1 tablet (0.5 mg total) by mouth daily as needed for Anxiety. TAKE 1 TABLET(0.5 MG) BY MOUTH THREE TIMES DAILY AS NEEDED FOR INSOMNIA OR ANXIETY Strength: 0.5 mg, Disp: 30 tablet, Rfl: 0    ATRALIN 0.05 % gel, 2 (two) times daily as needed. , Disp: , Rfl:     methylphenidate HCl (RITALIN) 10 MG tablet, Take 1 tablet (10 mg total) by mouth 2 (two) times daily with meals., Disp: 60 tablet, Rfl: 0    tamoxifen (NOLVADEX) 20 MG Tab, TAKE 1 TABLET(20 MG) BY MOUTH EVERY DAY, Disp: 90 tablet, Rfl: 3    valACYclovir (VALTREX) 1000 MG tablet, Take 2 tablets by mouth once and again in 12 hours, Disp: 4 tablet, Rfl: 1    venlafaxine (EFFEXOR-XR) 75 MG 24 hr capsule, TAKE 1 CAPSULE(75 MG) BY MOUTH EVERY DAY, Disp: 90 capsule, Rfl: 2    Current Facility-Administered Medications:     copper intrauterine device 380 square mm  mm, 380 mm, Intrauterine, , Lyndsay Warren MD, 380 mm at 20 1030    testosterone cypionate injection 50 mg, 50 mg, Intramuscular, Q28 Days, Lyndsay Warren MD, 50 mg at 23 0832    Facility-Administered Medications Ordered in Other Visits:     ceFAZolin 1 g, gentamicin 80 mg in sodium chloride 0.9% 500 mL irrigation, , Irrigation, On Call Procedure, Lamin Seo MD    ceFAZolin 1 g, gentamicin 80 mg in sodium chloride 0.9% 500 mL irrigation, , Irrigation, On Call Procedure, Lamin Seo MD    ceFAZolin 1 g, gentamicin 80 mg in sodium chloride 0.9% 500 mL irrigation, , Irrigation, On Call Procedure, Lamin Seo MD    ceFAZolin 1 g, gentamicin 80 mg in sodium chloride 0.9% 500 mL irrigation, , Irrigation, On Call Procedure, Lamin Seo MD    The ASCVD Risk score (Nancy RAYA, et al., 2019) failed to calculate for the following  "reasons:    The 2019 ASCVD risk score is only valid for ages 40 to 79    BP 99/65   Pulse 66   Ht 5' 5" (1.651 m)   Wt 52.1 kg (114 lb 12.8 oz)   LMP 09/12/2023   BMI 19.10 kg/m²       ROS:  GENERAL: Denies weight gain or weight loss. Feeling well overall.   SKIN: Denies rash or lesions.   HEAD: Denies head injury or headache.   NODES: Denies enlarged lymph nodes.   CHEST: Denies chest pain or shortness of breath.   CARDIOVASCULAR: Denies palpitations or left sided chest pain.   ABDOMEN: No abdominal pain, constipation, diarrhea, nausea, vomiting or rectal bleeding.   URINARY: No frequency, dysuria, hematuria, or burning on urination.  REPRODUCTIVE: See HPI.   BREASTS: Denies pain, lumps, or nipple discharge.   HEMATOLOGIC: No easy bruisability or excessive bleeding.   MUSCULOSKELETAL: Denies joint pain or swelling.   NEUROLOGIC: Denies syncope or weakness.   PSYCHIATRIC: Denies depression, anxiety or mood swings.    PHYSICAL EXAM:  APPEARANCE: Well nourished, well developed, in no acute distress.  AFFECT: WNL, alert and oriented x 3  CHEST: Good respiratory effect  ABDOMEN: Soft.  No tenderness or masses.  No hepatosplenomegaly.  No hernias.  BREASTS: Symmetrical, no skin changes or visible lesions.  No palpable masses, nipple discharge bilaterally. S/p bilateral mastectomy with reconstruction.  PELVIC: Normal external genitalia without lesions.  Normal hair distribution.  Adequate perineal body, normal urethral meatus.  Vagina moist and well rugated without lesions, +blood  Cervix pink, without lesions, discharge or tenderness.  No significant cystocele or rectocele.  Bimanual exam shows uterus to be normal size, regular, mobile and nontender.  Adnexa without masses or tenderness.    EXTREMITIES: No edema.    ASSESSMENT:   Encounter for gynecological examination without abnormal finding  -     Liquid-Based Pap Smear, Screening  -     HPV High Risk Genotypes, PCR    Malignant neoplasm of upper-outer quadrant " of left breast in female, estrogen receptor positive    Excessive bleeding in premenopausal period  -     US Pelvis Comp with Transvag NON-OB (xpd); Future; Expected date: 09/18/2023        PLAN:   Pap/HPV  Schedule pelvic US  Discussed that she will likely need endo bx due to heavy, persistent bleeding on tamoxifen.  Hormone labs were obtained earlier today.  Will follow up with results for next steps after reviewing with Dr. Warren    Patient was counseled today on A.C.S. Pap guidelines and recommendations for yearly pelvic exams, mammograms and monthly self breast exams; to see her PCP for other health maintenance.

## 2023-09-18 NOTE — NURSING
Patient phoned to request additional labs to be linked to 8am lab draw, orders linked per request, annual exam confirmed for 1115 with CHANTAL Andrews, PEYTON Deras.

## 2023-09-20 LAB — TESTOST FREE SERPL-MCNC: 0.6 PG/ML

## 2023-09-27 ENCOUNTER — TELEPHONE (OUTPATIENT)
Dept: OBSTETRICS AND GYNECOLOGY | Facility: CLINIC | Age: 38
End: 2023-09-27
Payer: COMMERCIAL

## 2023-09-27 ENCOUNTER — PATIENT MESSAGE (OUTPATIENT)
Dept: OBSTETRICS AND GYNECOLOGY | Facility: CLINIC | Age: 38
End: 2023-09-27
Payer: COMMERCIAL

## 2023-09-27 NOTE — TELEPHONE ENCOUNTER
----- Message from Lois Espinoza MA sent at 9/27/2023  3:11 PM CDT -----  Pt returning your call #[phone]

## 2023-09-27 NOTE — TELEPHONE ENCOUNTER
----- Message from Lyndsay Warren MD sent at 9/27/2023  9:16 AM CDT -----  Needs endometrial biopsy scheduled

## 2023-09-27 NOTE — TELEPHONE ENCOUNTER
Pt returned call. Called pt and offered 10/10 but pt can not do that day. Advised pt will get back with her on Monday to see where we can fit her in

## 2023-09-28 LAB
FINAL PATHOLOGIC DIAGNOSIS: NORMAL
Lab: NORMAL

## 2023-10-03 ENCOUNTER — TELEPHONE (OUTPATIENT)
Dept: OBSTETRICS AND GYNECOLOGY | Facility: CLINIC | Age: 38
End: 2023-10-03
Payer: COMMERCIAL

## 2023-10-03 ENCOUNTER — PATIENT MESSAGE (OUTPATIENT)
Dept: OBSTETRICS AND GYNECOLOGY | Facility: CLINIC | Age: 38
End: 2023-10-03
Payer: COMMERCIAL

## 2023-10-03 NOTE — TELEPHONE ENCOUNTER
----- Message from Nell Lawson PA-C sent at 10/2/2023 12:37 PM CDT -----  Please schedule for colpo with the endo bx with Dr. Warren. I reviewed it with Dr. Warren as well so she is aware. Pt pelvic US is on 10/9/23, so after that. Thank you!

## 2023-10-09 ENCOUNTER — HOSPITAL ENCOUNTER (OUTPATIENT)
Dept: RADIOLOGY | Facility: HOSPITAL | Age: 38
Discharge: HOME | End: 2023-10-09
Attending: PHYSICIAN ASSISTANT
Payer: COMMERCIAL

## 2023-10-09 ENCOUNTER — PATIENT MESSAGE (OUTPATIENT)
Dept: OBSTETRICS AND GYNECOLOGY | Facility: CLINIC | Age: 38
End: 2023-10-09
Payer: COMMERCIAL

## 2023-10-09 DIAGNOSIS — N92.4 EXCESSIVE BLEEDING IN PREMENOPAUSAL PERIOD: ICD-10-CM

## 2023-10-09 PROCEDURE — 76856 US PELVIS COMPLETE WITH TRANSVAG FOR IUD: ICD-10-PCS | Mod: 26,,, | Performed by: STUDENT IN AN ORGANIZED HEALTH CARE EDUCATION/TRAINING PROGRAM

## 2023-10-09 PROCEDURE — 76830 TRANSVAGINAL US NON-OB: CPT | Mod: 26,,, | Performed by: STUDENT IN AN ORGANIZED HEALTH CARE EDUCATION/TRAINING PROGRAM

## 2023-10-09 PROCEDURE — 76856 US EXAM PELVIC COMPLETE: CPT | Mod: 26,,, | Performed by: STUDENT IN AN ORGANIZED HEALTH CARE EDUCATION/TRAINING PROGRAM

## 2023-10-09 PROCEDURE — 76830 US PELVIS COMPLETE WITH TRANSVAG FOR IUD: ICD-10-PCS | Mod: 26,,, | Performed by: STUDENT IN AN ORGANIZED HEALTH CARE EDUCATION/TRAINING PROGRAM

## 2023-10-09 PROCEDURE — 76830 TRANSVAGINAL US NON-OB: CPT | Mod: TC

## 2023-10-10 ENCOUNTER — PATIENT MESSAGE (OUTPATIENT)
Dept: OBSTETRICS AND GYNECOLOGY | Facility: CLINIC | Age: 38
End: 2023-10-10
Payer: COMMERCIAL

## 2023-10-10 DIAGNOSIS — N83.202 LEFT OVARIAN CYST: Primary | ICD-10-CM

## 2023-10-11 ENCOUNTER — PATIENT MESSAGE (OUTPATIENT)
Dept: OBSTETRICS AND GYNECOLOGY | Facility: CLINIC | Age: 38
End: 2023-10-11
Payer: COMMERCIAL

## 2023-10-26 ENCOUNTER — PROCEDURE VISIT (OUTPATIENT)
Dept: OBSTETRICS AND GYNECOLOGY | Facility: CLINIC | Age: 38
End: 2023-10-26
Attending: OBSTETRICS & GYNECOLOGY
Payer: COMMERCIAL

## 2023-10-26 VITALS
WEIGHT: 112.81 LBS | BODY MASS INDEX: 18.8 KG/M2 | HEIGHT: 65 IN | SYSTOLIC BLOOD PRESSURE: 110 MMHG | DIASTOLIC BLOOD PRESSURE: 77 MMHG

## 2023-10-26 DIAGNOSIS — N92.0 MENORRHAGIA WITH REGULAR CYCLE: Primary | ICD-10-CM

## 2023-10-26 DIAGNOSIS — Z79.810 SERM USE (SELECTIVE ESTROGEN RECEPTOR MODULATOR): ICD-10-CM

## 2023-10-26 DIAGNOSIS — B97.7 HPV IN FEMALE: ICD-10-CM

## 2023-10-26 LAB
B-HCG UR QL: NEGATIVE
CTP QC/QA: YES

## 2023-10-26 PROCEDURE — 88305 TISSUE EXAM BY PATHOLOGIST: CPT | Mod: 26,,, | Performed by: PATHOLOGY

## 2023-10-26 PROCEDURE — 81025 URINE PREGNANCY TEST: CPT | Mod: S$GLB,,, | Performed by: OBSTETRICS & GYNECOLOGY

## 2023-10-26 PROCEDURE — 88305 TISSUE EXAM BY PATHOLOGIST: ICD-10-PCS | Mod: 26,,, | Performed by: PATHOLOGY

## 2023-10-26 PROCEDURE — 57454 BX/CURETT OF CERVIX W/SCOPE: CPT | Mod: S$GLB,,, | Performed by: OBSTETRICS & GYNECOLOGY

## 2023-10-26 PROCEDURE — 57454 PR COLPOSC,CERVIX W/ADJ VAG,W/BX & CURRETAG: ICD-10-PCS | Mod: S$GLB,,, | Performed by: OBSTETRICS & GYNECOLOGY

## 2023-10-26 PROCEDURE — 88305 TISSUE EXAM BY PATHOLOGIST: CPT | Performed by: PATHOLOGY

## 2023-10-26 PROCEDURE — 81025 POCT URINE PREGNANCY: ICD-10-PCS | Mod: S$GLB,,, | Performed by: OBSTETRICS & GYNECOLOGY

## 2023-10-26 NOTE — PROCEDURES
Colposcopy    Date/Time: 10/26/2023 9:30 AM    Performed by: Lyndsay Warren MD  Authorized by: Lyndsay Warren MD    Consent Done?:  Yes (Written)  Timeout:Immediately prior to procedure a time out was called to verify the correct patient, procedure, equipment, support staff and site/side marked as required  Prep:Patient was prepped and draped in the usual sterile fashion  Assistants?: No      Colposcopy Site:  Cervix  Position:  Supine  Acrowhite Lesion? Yes    Atypical Vessels: No    Transformation Zone Adequate?: No    Biopsy?: Yes         Location:  Cervix ((12 00 and 11 00))  ECC Performed?: Yes    LEEP Performed?: No    Estimated blood loss (cc):  8   Patient tolerated the procedure well with no immediate complications.     Attempted endometrila biopsy. Cervix is stenotic. IUD strings visualized. Single tooth tenaculum applied. Patient did not tolerate endometrial biopsy- could not use os finder. Procedure stopped

## 2023-10-26 NOTE — PROCEDURES
Endometrial biopsy    Date/Time: 10/26/2023 9:30 AM    Performed by: Lyndsay Warren MD  Authorized by: Lyndsay Warren MD    Consent:     Consent obtained:  Written    Consent given by:  Patient    Patient questions answered: yes      Patient agrees, verbalizes understanding, and wants to proceed: yes      Educational handouts given: yes      Instructions and paperwork completed: yes    Indication:     Indications: Menorrhagia    Pre-procedure:     Pre-procedure timeout performed: yes    Procedure:     Procedure: endometrial biopsy with Pipelle      Cervix cleaned and prepped: yes      A paracervical block was performed: no      An intracervical block was performed: no      The cervix was dilated: no      Uterus sounded: no      Unable to perform due to: pain and cervical stenosis    Comments:     Procedure comments:  Patient has menorrhagia on Tamoxifen  Unable to do endometrial biopsy- patient not able to tolerate and has cervical stenosis  IUD strings visible

## 2023-10-31 LAB
FINAL PATHOLOGIC DIAGNOSIS: NORMAL
GROSS: NORMAL
Lab: NORMAL

## 2023-11-01 ENCOUNTER — PATIENT MESSAGE (OUTPATIENT)
Dept: OBSTETRICS AND GYNECOLOGY | Facility: CLINIC | Age: 38
End: 2023-11-01
Payer: COMMERCIAL

## 2023-11-01 ENCOUNTER — TELEPHONE (OUTPATIENT)
Dept: OBSTETRICS AND GYNECOLOGY | Facility: CLINIC | Age: 38
End: 2023-11-01
Payer: COMMERCIAL

## 2023-11-01 DIAGNOSIS — N92.0 MENORRHAGIA WITH REGULAR CYCLE: Primary | ICD-10-CM

## 2023-11-01 NOTE — TELEPHONE ENCOUNTER
----- Message from Lyndsay Warren MD sent at 11/1/2023  2:39 PM CDT -----  Needs D&C/st in January

## 2023-11-01 NOTE — TELEPHONE ENCOUNTER
Counseled patient on colpo findings - will need another pap and HPV in 1 year  Recommend ECC and D&C/hysteroscopy to evaluate heavy bleeding on Tamoxifen. Would also remove and replace Paragard IUD  Patient voiced understanding and message sent to Dr. Crum

## 2023-11-15 ENCOUNTER — HOSPITAL ENCOUNTER (OUTPATIENT)
Dept: RADIOLOGY | Facility: HOSPITAL | Age: 38
Discharge: HOME | End: 2023-11-15
Attending: PHYSICIAN ASSISTANT
Payer: COMMERCIAL

## 2023-11-15 DIAGNOSIS — N83.202 LEFT OVARIAN CYST: ICD-10-CM

## 2023-11-15 DIAGNOSIS — R53.0 NEOPLASTIC MALIGNANT RELATED FATIGUE: ICD-10-CM

## 2023-11-15 PROCEDURE — 76830 TRANSVAGINAL US NON-OB: CPT | Mod: TC

## 2023-11-15 PROCEDURE — 76856 US PELVIS COMPLETE WITH TRANSVAG FOR IUD: ICD-10-PCS | Mod: 26,,, | Performed by: RADIOLOGY

## 2023-11-15 PROCEDURE — 76830 US PELVIS COMPLETE WITH TRANSVAG FOR IUD: ICD-10-PCS | Mod: 26,,, | Performed by: RADIOLOGY

## 2023-11-15 PROCEDURE — 76856 US EXAM PELVIC COMPLETE: CPT | Mod: 26,,, | Performed by: RADIOLOGY

## 2023-11-15 PROCEDURE — 76830 TRANSVAGINAL US NON-OB: CPT | Mod: 26,,, | Performed by: RADIOLOGY

## 2023-11-15 RX ORDER — METHYLPHENIDATE HYDROCHLORIDE 10 MG/1
10 TABLET ORAL 2 TIMES DAILY WITH MEALS
Qty: 60 TABLET | Refills: 0 | Status: SHIPPED | OUTPATIENT
Start: 2023-11-15 | End: 2023-11-21 | Stop reason: SDUPTHER

## 2023-11-15 NOTE — TELEPHONE ENCOUNTER
No care due was identified.  Health [facility] Embedded Care Due Messages. Reference number: [id number].   11/15/2023 12:29:04 PM CST

## 2023-11-21 ENCOUNTER — PATIENT MESSAGE (OUTPATIENT)
Dept: PRIMARY CARE CLINIC | Facility: CLINIC | Age: 38
End: 2023-11-21
Payer: COMMERCIAL

## 2023-11-21 DIAGNOSIS — R53.0 NEOPLASTIC MALIGNANT RELATED FATIGUE: ICD-10-CM

## 2023-11-21 NOTE — TELEPHONE ENCOUNTER
No care due was identified.  Health [facility] Embedded Care Due Messages. Reference number: [id number].   11/21/2023 8:54:08 AM CST

## 2023-11-21 NOTE — TELEPHONE ENCOUNTER
----- Message from Radha Simental Alonzo sent at 11/21/2023  8:48 AM CST -----  Contact: [phone]  Pt states she is out of town for 3 weeks and couldn't  her medication. Would like a new script sent to pharmacy below. Please advise. Pt would like confirmation call.         Requesting an RX refill or new RX.  Is this a refill or new RX:  refill   RX name and strength (copy/paste from chart):  methylphenidate HCl (RITALIN) 10 MG tablet  Is this a 30 day or 90 day RX: 30  Pharmacy name and phone # (copy/paste from chart):    [id number] - [place], [place] - [address] [place] [address] AT [facility] [address] [address]  Phone: [phone] Fax: [phone]

## 2023-11-22 RX ORDER — METHYLPHENIDATE HYDROCHLORIDE 10 MG/1
10 TABLET ORAL 2 TIMES DAILY WITH MEALS
Qty: 60 TABLET | Refills: 0 | Status: SHIPPED | OUTPATIENT
Start: 2023-11-22 | End: 2024-02-28 | Stop reason: SDUPTHER

## 2023-11-22 RX ORDER — METHYLPHENIDATE HYDROCHLORIDE 10 MG/1
10 TABLET ORAL 2 TIMES DAILY WITH MEALS
Qty: 60 TABLET | Refills: 0 | Status: SHIPPED | OUTPATIENT
Start: 2023-11-22 | End: 2023-11-22 | Stop reason: SDUPTHER

## 2023-11-22 NOTE — TELEPHONE ENCOUNTER
No care due was identified.  Health [facility] Embedded Care Due Messages. Reference number: [id number].   11/22/2023 8:13:54 AM CST

## 2024-01-08 ENCOUNTER — ANESTHESIA EVENT (OUTPATIENT)
Dept: SURGERY | Facility: OTHER | Age: 39
End: 2024-01-08
Payer: COMMERCIAL

## 2024-01-09 ENCOUNTER — PATIENT MESSAGE (OUTPATIENT)
Dept: OBSTETRICS AND GYNECOLOGY | Facility: CLINIC | Age: 39
End: 2024-01-09
Payer: COMMERCIAL

## 2024-01-19 ENCOUNTER — OFFICE VISIT (OUTPATIENT)
Dept: HEMATOLOGY/ONCOLOGY | Facility: CLINIC | Age: 39
End: 2024-01-19
Payer: COMMERCIAL

## 2024-01-19 ENCOUNTER — TELEPHONE (OUTPATIENT)
Dept: HEMATOLOGY/ONCOLOGY | Facility: CLINIC | Age: 39
End: 2024-01-19

## 2024-01-19 DIAGNOSIS — Z79.810 SERM USE (SELECTIVE ESTROGEN RECEPTOR MODULATOR): ICD-10-CM

## 2024-01-19 DIAGNOSIS — C50.412 MALIGNANT NEOPLASM OF UPPER-OUTER QUADRANT OF LEFT BREAST IN FEMALE, ESTROGEN RECEPTOR POSITIVE: Primary | ICD-10-CM

## 2024-01-19 DIAGNOSIS — Z17.0 MALIGNANT NEOPLASM OF UPPER-OUTER QUADRANT OF LEFT BREAST IN FEMALE, ESTROGEN RECEPTOR POSITIVE: Primary | ICD-10-CM

## 2024-01-19 DIAGNOSIS — G47.00 INSOMNIA: ICD-10-CM

## 2024-01-19 DIAGNOSIS — R23.2 HOT FLASHES: ICD-10-CM

## 2024-01-19 DIAGNOSIS — N92.0 MENORRHAGIA WITH REGULAR CYCLE: ICD-10-CM

## 2024-01-19 DIAGNOSIS — F41.8 DEPRESSION WITH ANXIETY: ICD-10-CM

## 2024-01-19 PROCEDURE — 99214 OFFICE O/P EST MOD 30 MIN: CPT | Mod: 95,,, | Performed by: OBSTETRICS & GYNECOLOGY

## 2024-01-19 NOTE — PROGRESS NOTES
The patient location is: home  The chief complaint leading to consultation is: discuss hormones    Visit type: audiovisual    Face to Face time with patient: 25   minutes of total time spent on the encounter, which includes face to face time and non-face to face time preparing to see the patient (eg, review of tests), Obtaining and/or reviewing separately obtained history, Documenting clinical information in the electronic or other health record, Independently interpreting results (not separately reported) and communicating results to the patient/family/caregiver, or Care coordination (not separately reported).         Each patient to whom he or she provides medical services by telemedicine is:  (1) informed of the relationship between the physician and patient and the respective role of any other health care provider with respect to management of the patient; and (2) notified that he or she may decline to receive medical services by telemedicine and may withdraw from such care at any time.    Notes:   SUBJECTIVE:   38 y.o. female   presents today to discuss getting hormone levels checked. . No LMP recorded..  She was on zoladex and stopped because of side effects. She is taking Tamoxifen. She did have elevated Estradiol levels but was having regular periods. She did do Testosterone injections in the past and liked how she felt on them.   She has D&C/hysteroscopy scheduled for heavy menses    2023  Estradiol . 672  Free testosterone 1.5    2023  Estradiol  99  Free testosterone 0.6           Past Medical History:   Diagnosis Date    Anxiety     Back pain     Brain fog     Constipation     Depression     Genetic testing 2020    BRCA+Hari NEGATIVE with APC VUS c.3875C>T (p.[id number]), aka [id number] (3875C>T)    Hx of psychiatric care     Xanax    Malignant neoplasm of upper-outer quadrant of left breast in female, estrogen receptor positive 5/15/2020     Past Surgical History:   Procedure  Laterality Date    BILATERAL MASTECTOMY Bilateral 11/30/2020    Procedure: MASTECTOMY, BILATERAL;  Surgeon: Jessica Dumont MD;  Location: [facility];  Service: General;  Laterality: Bilateral;    BREAST RECONSTRUCTION Bilateral 11/30/2020    Procedure: RECONSTRUCTION, BREAST;  Surgeon: Lamin Seo MD;  Location: [facility];  Service: General;  Laterality: Bilateral;    BREAST SURGERY      Breast biopsy    INSERTION OF BREAST IMPLANT Bilateral 11/30/2020    Procedure: INSERTION, BREAST TISSUE EXPANDER - BILATERAL BREAST RECONSTRUCTION WITH TISSUE EXPANDERS AND ACELLULAR DERMAL MATRIX;  Surgeon: Lamin Seo MD;  Location: [facility];  Service: General;  Laterality: Bilateral;    INSERTION OF TUNNELED CENTRAL VENOUS CATHETER (CVC) WITH SUBCUTANEOUS PORT N/A 05/28/2020    Procedure: [id number];  [facility];  Location: [address];  Service: Radiology;  Laterality: N/A;  189 port placement    MEDIPORT REMOVAL Right 11/09/2021    Procedure: REMOVAL, CATHETER, CENTRAL VENOUS, TUNNELED, WITH PORT;  Surgeon: Сергей Rowan MD;  Location: [facility] CATH LAB;  Service: Radiology;  Laterality: Right;    SENTINEL LYMPH NODE BIOPSY Left 11/30/2020    Procedure: BIOPSY, LYMPH NODE, SENTINEL;  Surgeon: Jessica Dumont MD;  Location: [facility];  Service: General;  Laterality: Left;    WISDOM TOOTH EXTRACTION       Social History     Socioeconomic History    Marital status:      Spouse name: Elio   Occupational History    Occupation:    Tobacco Use    Smoking status: Former    Smokeless tobacco: Former    Tobacco comments:     occasional past while drinking   Substance and Sexual Activity    Alcohol use: Yes     Comment: socially    Drug use: Never    Sexual activity: Yes     Partners: Male     Birth control/protection: I.U.D., Other-see comments     Comment: spouse   Social History Narrative    , no children, construction , originally from [place]  Determinants of Health     Financial Resource Strain: Low Risk  (2024)    Overall Financial Resource Strain (CARDIA)     Difficulty of Paying Living Expenses: Not hard at all   Food Insecurity: No Food Insecurity (2024)    Hunger Vital Sign     Worried About Running Out of Food in the Last Year: Never true     Ran Out of Food in the Last Year: Never true   Transportation Needs: No Transportation Needs (2024)    PRAPARE - Transportation     Lack of Transportation (Medical): No     Lack of Transportation (Non-Medical): No   Physical Activity: Sufficiently Active (2024)    Exercise Vital Sign     Days of Exercise per Week: 5 days     Minutes of Exercise per Session: 30 min   Stress: No Stress Concern Present (2024)    [employer] - Occupational Stress Questionnaire     Feeling of Stress : Only a little   Social Connections: Unknown (2024)    Social Connection and Isolation Panel [NHANES]     Frequency of Communication with Friends and Family: More than three times a week     Frequency of Social Gatherings with Friends and Family: Twice a week     Active Member of Clubs or Organizations: No     Attends Club or Organization Meetings: Never     Marital Status:    Housing Stability: Low Risk  (2024)    Housing Stability Vital Sign     Unable to Pay for Housing in the Last Year: No     Number of Places Lived in the Last Year: 1     Unstable Housing in the Last Year: No     Family History   Problem Relation Age of Onset    Osteoporosis Mother     No Known Problems Father     Alzheimer's disease Maternal Grandmother     Bipolar disorder Maternal Grandmother     Alcohol abuse Maternal Grandmother     Diabetes Maternal Grandfather     Breast cancer Paternal Grandmother         bilat noncurrent, @ 50 & 62    Diabetes Paternal Grandfather     Breast cancer Other     Breast cancer Other     Colon cancer Neg Hx     Ovarian cancer Neg Hx      OB History     Para Term  AB Living   0 0 0 0 0 0   SAB IAB Ectopic Multiple Live Births   0 0 0 0 0           Current Outpatient Medications   Medication Sig Dispense Refill    ALPRAZolam (XANAX) 0.5 MG tablet Take 1 tablet (0.5 mg total) by mouth daily as needed for Anxiety. TAKE 1 TABLET(0.5 MG) BY MOUTH THREE TIMES DAILY AS NEEDED FOR INSOMNIA OR ANXIETY Strength: 0.5 mg 30 tablet 0    ATRALIN 0.05 % gel 2 (two) times daily as needed.       methylphenidate HCl (RITALIN) 10 MG tablet Take 1 tablet (10 mg total) by mouth 2 (two) times daily with meals. 60 tablet 0    tamoxifen (NOLVADEX) 20 MG Tab TAKE 1 TABLET(20 MG) BY MOUTH EVERY DAY 90 tablet 3    valACYclovir (VALTREX) 1000 MG tablet Take 2 tablets by mouth once and again in 12 hours 4 tablet 1    venlafaxine (EFFEXOR-XR) 75 MG 24 hr capsule TAKE 1 CAPSULE(75 MG) BY MOUTH EVERY DAY 90 capsule 2     Current Facility-Administered Medications   Medication Dose Route Frequency Provider Last Rate Last Admin    copper intrauterine device 380 square mm  mm  380 mm Intrauterine  Lyndsay Warren MD   380 mm at 20 1030     Facility-Administered Medications Ordered in Other Visits   Medication Dose Route Frequency Provider Last Rate Last Admin    ceFAZolin 1 g, gentamicin 80 mg in sodium chloride 0.9% 500 mL irrigation   Irrigation On Call Procedure Lamin Seo MD        ceFAZolin 1 g, gentamicin 80 mg in sodium chloride 0.9% 500 mL irrigation   Irrigation On Call Procedure Lamin Seo MD        ceFAZolin 1 g, gentamicin 80 mg in sodium chloride 0.9% 500 mL irrigation   Irrigation On Call Procedure Lamin Seo MD        ceFAZolin 1 g, gentamicin 80 mg in sodium chloride 0.9% 500 mL irrigation   Irrigation On Call Procedure Lamin Seo MD         Allergies: Patient has no known allergies.     The ASCVD Risk score (Nancy RAYA, et al., 2019) failed to calculate for the following reasons:    The 2019 ASCVD risk score is only valid for  ages 40 to 79      ROS:  Constitutional: no weight loss, weight gain, fever, fatigue    Gastrointestinal: No diarrhea, bloody stool, nausea/vomiting, constipation, gas, hemorrhoids  Genitourinary: No blood in urine, painful urination, urgency of urination, frequency of urination, incomplete emptying, incontinence,+abnormal bleeding, painful periods, heavy periods, vaginal discharge, vaginal odor, painful intercourse, sexual problems, bleeding after intercourse.  Musculoskeletal: No muscle weakness  Skin/Breast: No painful breasts, nipple discharge, masses, rash, ulcers  Neurological: No passing out, seizures, numbness, headache  Endocrine: No diabetes, hypothyroid, hyperthyroid, hot flashes, hair loss, abnormal hair growth, acne  Psychiatric: No depression, crying  Hematologic: No bruises, bleeding, swollen lymph nodes, anemia.      Physical Exam  deferred    ASSESSMENT:   1. Malignant neoplasm of upper-outer quadrant of left breast in female, estrogen receptor positive        2. SERM use (selective estrogen receptor modulator)        3. Menorrhagia with regular cycle              PLAN:   Counseled her that I would not recommend hormone levels. She is having regular cycles and her Estradiol level can fluctuate. She is no longer on Testosterone   Estradiol levels can be high or low at times depending on where she is in her cycle  She voiced understanding  Reviewed labs with patient  Counseled her on use of Vitamin D3 1000 IUS daily

## 2024-01-19 NOTE — NURSING
RN García conducted chart review for clinic preparations including intake, barriers to care and opportunities for greater evaluation/recommendations by provider, Augusta Desouza RN.

## 2024-01-24 ENCOUNTER — ANESTHESIA (OUTPATIENT)
Dept: SURGERY | Facility: OTHER | Age: 39
End: 2024-01-24
Payer: COMMERCIAL

## 2024-02-05 DIAGNOSIS — C50.412 MALIGNANT NEOPLASM OF UPPER-OUTER QUADRANT OF LEFT BREAST IN FEMALE, ESTROGEN RECEPTOR POSITIVE: ICD-10-CM

## 2024-02-05 DIAGNOSIS — Z17.0 MALIGNANT NEOPLASM OF UPPER-OUTER QUADRANT OF LEFT BREAST IN FEMALE, ESTROGEN RECEPTOR POSITIVE: ICD-10-CM

## 2024-02-05 RX ORDER — TAMOXIFEN CITRATE 20 MG/1
20 TABLET ORAL DAILY
Qty: 90 TABLET | Refills: 3 | Status: CANCELLED | OUTPATIENT
Start: 2024-02-05

## 2024-02-05 RX ORDER — TAMOXIFEN CITRATE 20 MG/1
20 TABLET ORAL DAILY
Qty: 90 TABLET | Refills: 3 | Status: SHIPPED | OUTPATIENT
Start: 2024-02-05

## 2024-02-06 DIAGNOSIS — B00.1 FEVER BLISTER: ICD-10-CM

## 2024-02-06 DIAGNOSIS — F41.8 DEPRESSION WITH ANXIETY: ICD-10-CM

## 2024-02-06 RX ORDER — VENLAFAXINE HYDROCHLORIDE 75 MG/1
CAPSULE, EXTENDED RELEASE ORAL
Qty: 90 CAPSULE | Refills: 2 | Status: SHIPPED | OUTPATIENT
Start: 2024-02-06

## 2024-02-06 RX ORDER — VALACYCLOVIR HYDROCHLORIDE 1 G/1
TABLET, FILM COATED ORAL
Qty: 4 TABLET | Refills: 1 | Status: SHIPPED | OUTPATIENT
Start: 2024-02-06

## 2024-02-06 NOTE — TELEPHONE ENCOUNTER
No care due was identified.  Health [facility] Embedded Care Due Messages. Reference number: [id number].   2/06/2024 11:35:15 AM CST

## 2024-02-20 RX ORDER — PREGABALIN 75 MG/1
75 CAPSULE ORAL ONCE
Status: CANCELLED | OUTPATIENT
Start: 2024-02-20 | End: 2024-02-20

## 2024-02-20 RX ORDER — ACETAMINOPHEN 500 MG
1000 TABLET ORAL
Status: CANCELLED | OUTPATIENT
Start: 2024-02-20 | End: 2024-02-20

## 2024-02-20 RX ORDER — LIDOCAINE HYDROCHLORIDE 10 MG/ML
0.5 INJECTION, SOLUTION EPIDURAL; INFILTRATION; INTRACAUDAL; PERINEURAL ONCE
Status: CANCELLED | OUTPATIENT
Start: 2024-02-20 | End: 2024-02-20

## 2024-02-20 RX ORDER — SODIUM CHLORIDE, SODIUM LACTATE, POTASSIUM CHLORIDE, CALCIUM CHLORIDE 600; 310; 30; 20 MG/100ML; MG/100ML; MG/100ML; MG/100ML
INJECTION, SOLUTION INTRAVENOUS CONTINUOUS
Status: CANCELLED | OUTPATIENT
Start: 2024-02-20

## 2024-02-22 ENCOUNTER — HOSPITAL ENCOUNTER (OUTPATIENT)
Dept: PREADMISSION TESTING | Facility: OTHER | Age: 39
Discharge: HOME | End: 2024-02-22
Attending: OBSTETRICS & GYNECOLOGY
Payer: COMMERCIAL

## 2024-02-22 ENCOUNTER — OFFICE VISIT (OUTPATIENT)
Dept: OBSTETRICS AND GYNECOLOGY | Facility: CLINIC | Age: 39
End: 2024-02-22
Attending: OBSTETRICS & GYNECOLOGY
Payer: COMMERCIAL

## 2024-02-22 ENCOUNTER — PATIENT MESSAGE (OUTPATIENT)
Dept: OBSTETRICS AND GYNECOLOGY | Facility: CLINIC | Age: 39
End: 2024-02-22

## 2024-02-22 VITALS
HEART RATE: 64 BPM | HEIGHT: 65 IN | BODY MASS INDEX: 18.33 KG/M2 | RESPIRATION RATE: 16 BRPM | TEMPERATURE: 97 F | OXYGEN SATURATION: 100 % | WEIGHT: 110 LBS | SYSTOLIC BLOOD PRESSURE: 110 MMHG | DIASTOLIC BLOOD PRESSURE: 67 MMHG

## 2024-02-22 VITALS
HEART RATE: 69 BPM | SYSTOLIC BLOOD PRESSURE: 114 MMHG | WEIGHT: 114 LBS | HEIGHT: 65 IN | BODY MASS INDEX: 18.99 KG/M2 | DIASTOLIC BLOOD PRESSURE: 79 MMHG

## 2024-02-22 DIAGNOSIS — Z79.810 SERM USE (SELECTIVE ESTROGEN RECEPTOR MODULATOR): ICD-10-CM

## 2024-02-22 DIAGNOSIS — C50.412 MALIGNANT NEOPLASM OF UPPER-OUTER QUADRANT OF LEFT BREAST IN FEMALE, ESTROGEN RECEPTOR POSITIVE: ICD-10-CM

## 2024-02-22 DIAGNOSIS — N92.1 MENORRHAGIA WITH IRREGULAR CYCLE: Primary | ICD-10-CM

## 2024-02-22 DIAGNOSIS — Z01.818 PREOP TESTING: Primary | ICD-10-CM

## 2024-02-22 DIAGNOSIS — Z17.0 MALIGNANT NEOPLASM OF UPPER-OUTER QUADRANT OF LEFT BREAST IN FEMALE, ESTROGEN RECEPTOR POSITIVE: ICD-10-CM

## 2024-02-22 LAB
BASOPHILS # BLD AUTO: 0.05 K/UL (ref 0–0.2)
BASOPHILS NFR BLD: 0.9 % (ref 0–1.9)
DIFFERENTIAL METHOD BLD: ABNORMAL
EOSINOPHIL # BLD AUTO: 0.2 K/UL (ref 0–0.5)
EOSINOPHIL NFR BLD: 3.5 % (ref 0–8)
ERYTHROCYTE [DISTWIDTH] IN BLOOD BY AUTOMATED COUNT: 14.6 % (ref 11.5–14.5)
HCT VFR BLD AUTO: 39.7 % (ref 37–48.5)
HGB BLD-MCNC: 13.3 G/DL (ref 12–16)
IMM GRANULOCYTES # BLD AUTO: 0.06 K/UL (ref 0–0.04)
IMM GRANULOCYTES NFR BLD AUTO: 1 % (ref 0–0.5)
LYMPHOCYTES # BLD AUTO: 2.7 K/UL (ref 1–4.8)
LYMPHOCYTES NFR BLD: 46.4 % (ref 18–48)
MCH RBC QN AUTO: 30.9 PG (ref 27–31)
MCHC RBC AUTO-ENTMCNC: 33.5 G/DL (ref 32–36)
MCV RBC AUTO: 92 FL (ref 82–98)
MONOCYTES # BLD AUTO: 0.5 K/UL (ref 0.3–1)
MONOCYTES NFR BLD: 7.9 % (ref 4–15)
NEUTROPHILS # BLD AUTO: 2.3 K/UL (ref 1.8–7.7)
NEUTROPHILS NFR BLD: 40.3 % (ref 38–73)
NRBC BLD-RTO: 0 /100 WBC
PLATELET # BLD AUTO: 119 K/UL (ref 150–450)
PLATELET BLD QL SMEAR: ABNORMAL
PMV BLD AUTO: 12.4 FL (ref 9.2–12.9)
RBC # BLD AUTO: 4.31 M/UL (ref 4–5.4)
WBC # BLD AUTO: 5.73 K/UL (ref 3.9–12.7)

## 2024-02-22 PROCEDURE — 3008F BODY MASS INDEX DOCD: CPT | Mod: CPTII,S$GLB,, | Performed by: OBSTETRICS & GYNECOLOGY

## 2024-02-22 PROCEDURE — 85025 COMPLETE CBC W/AUTO DIFF WBC: CPT | Performed by: ANESTHESIOLOGY

## 2024-02-22 PROCEDURE — 99214 OFFICE O/P EST MOD 30 MIN: CPT | Mod: S$GLB,,, | Performed by: OBSTETRICS & GYNECOLOGY

## 2024-02-22 PROCEDURE — 36415 COLL VENOUS BLD VENIPUNCTURE: CPT | Performed by: ANESTHESIOLOGY

## 2024-02-22 PROCEDURE — 99999 PR PBB SHADOW E&M-EST. PATIENT-LVL III: CPT | Mod: PBBFAC,,, | Performed by: OBSTETRICS & GYNECOLOGY

## 2024-02-22 PROCEDURE — 1159F MED LIST DOCD IN RCRD: CPT | Mod: CPTII,S$GLB,, | Performed by: OBSTETRICS & GYNECOLOGY

## 2024-02-22 PROCEDURE — 3074F SYST BP LT 130 MM HG: CPT | Mod: CPTII,S$GLB,, | Performed by: OBSTETRICS & GYNECOLOGY

## 2024-02-22 PROCEDURE — 3078F DIAST BP <80 MM HG: CPT | Mod: CPTII,S$GLB,, | Performed by: OBSTETRICS & GYNECOLOGY

## 2024-02-22 NOTE — DISCHARGE INSTRUCTIONS
Information to Prepare you for your Surgery    PRE-ADMIT TESTING -  [phone]    [address] [facility]          Your surgery has been scheduled at [facility]. We are pleased to have the opportunity to serve you. For Further Information please call [phone].    On the day of surgery please report to the Information Desk on the 1st floor.    CONTACT YOUR PHYSICIAN'S OFFICE THE DAY PRIOR TO YOUR SURGERY TO OBTAIN YOUR ARRIVAL TIME.     The evening before surgery do not eat anything after 9 p.m. ( this includes hard candy, chewing gum and mints).  You may only have GATORADE, POWERADE AND WATER  from 9 p.m. until you leave your home.   DO NOT DRINK ANY LIQUIDS ON THE WAY TO THE HOSPITAL.      Why does your anesthesiologist allow you to drink Gatorade/Powerade before surgery?  Gatorade/Powerade helps to increase your comfort before surgery and to decrease your nausea after surgery. The carbohydrates in Gatorade/Powerade help reduce your body's stress response to surgery.  If you are a diabetic-drink only water prior to surgery.    Outpatient Surgery- May allow 2 adult (18 and older) Support Persons (1 being the designated ) for all surgical/procedural patients. A breastfeeding mother will be allowed her infant and 2 adult Support Persons. No one under the age of 18 will be allowed in the building.      SPECIAL MEDICATION INSTRUCTIONS: TAKE medications checked off by the Anesthesiologist on your Medication List.    Angiogram Patients: Take medications as instructed by your physician, including aspirin.     Surgery Patients:    If you take ASPIRIN - Your PHYSICIAN/SURGEON will need to inform you IF/OR when you need to stop taking aspirin prior to your surgery.     The week prior to surgery do not ot take any medications containing IBUPROFEN or NSAIDS ( Advil, Motrin, Goodys, BC, Aleve, Naproxen etc) If you are not sure if you should take a medicine  please call your surgeon's office.  Ok to take Tylenol    Do Not Wear any make-up (especially eye make-up) to surgery. Please remove any false eyelashes or eyelash extensions. If you arrive the day of surgery with makeup/eyelashes on you will be required to remove prior to surgery. (There is a risk of corneal abrasions if eye makeup/eyelash extensions are not removed)      Leave all valuables at home.   Do Not wear any jewelry or watches, including any metal in body piercings. Jewelry must be removed prior to coming to the hospital.  There is a possibility that rings that are unable to be removed may be cut off if they are on the surgical extremity.    Please remove all hair extensions, wigs, clips and any other metal accessories/ ornaments from your hair.  These items may pose a flammable/fire risk in Surgery and must be removed.    Do not shave your surgical area at least 5 days prior to your surgery. The surgical prep will be performed at the hospital according to Infection Control regulations.    Contact Lens must be removed before surgery. Either do not wear the contact lens or bring a case and solution for storage.  Please bring a container for eyeglasses or dentures as required.  Bring any paperwork your physician has provided, such as consent forms,  history and physicals, doctor's orders, etc.   Bring comfortable clothes that are loose fitting to wear upon discharge. Take into consideration the type of surgery being performed.  Maintain your diet as advised per your physician the day prior to surgery.      Adequate rest the night before surgery is advised.   Park in the Parking lot behind the hospital or in the [facility] across the street from the parking lot. Parking is complimentary.  If you will be discharged the same day as your procedure, please arrange for a responsible adult to drive you home or to accompany you if traveling by taxi.   YOU WILL NOT BE PERMITTED TO DRIVE OR TO LEAVE THE  HOSPITAL ALONE AFTER SURGERY.   If you are being discharged the same day, it is strongly recommended that you arrange for someone to remain with you for the first 24 hrs following your surgery.    The Surgeon will speak to your family/visitor after your surgery regarding the outcome of your surgery and post op care.  The Surgeon may speak to you after your surgery, but there is a possibility you may not remember the details.  Please check with your family members regarding the conversation with the Surgeon.    We strongly recommend whoever is bringing you home be present for discharge instructions.  This will ensure a thorough understanding for your post op home care.          Thank you for your cooperation.  The Staff of [facility].            Bathing Instructions with Hibiclens    Shower the evening before and morning of your procedure with Chlorhexidine (Hibiclens)  do not use Chlorhexidine on your face or genitals. Do not get in your eyes.  Wash your face with water and your regular face wash/soap  Use your regular shampoo  Apply Chlorhexidine (Hibiclens) directly on your skin or on a wet washcloth and wash gently. When showering: Move away from the shower stream when applying Chlorhexidine (Hibiclens) to avoid rinsing off too soon.  Rinse thoroughly with warm water  Do not dilute Chlorhexidine (Hibiclens)   Dry off as usual, do not use any deodorant, powder, body lotions, perfume, after shave or cologne.

## 2024-02-22 NOTE — ANESTHESIA PREPROCEDURE EVALUATION
02/22/2024  Michelle Gage is a 38 y.o., female.      Pre-op Assessment    I have reviewed the Patient Summary Reports.     I have reviewed the Nursing Notes. I have reviewed the NPO Status.   I have reviewed the Medications.     Review of Systems  Anesthesia Hx:  No problems with previous Anesthesia             Denies Family Hx of Anesthesia complications.    Denies Personal Hx of Anesthesia complications.                    Social:  Non-Smoker       Hematology/Oncology:  Hematology Normal                       --  Cancer in past history:       Breast    left   surgery       EENT/Dental:  EENT/Dental Normal           Cardiovascular:  Cardiovascular Normal                                            Pulmonary:  Pulmonary Normal                       Renal/:  Renal/ Normal                 Hepatic/GI:  Hepatic/GI Normal                 Musculoskeletal:  Musculoskeletal Normal                Neurological:  Neurology Normal                                      Endocrine:  Endocrine Normal            Dermatological:  Skin Normal    Psych:  Psychiatric Normal                    Physical Exam  General: Well nourished and Alert    Airway:  Mallampati: II   Mouth Opening: Normal  Tongue: Normal    Dental:  Intact        Anesthesia Plan  Type of Anesthesia, risks & benefits discussed:    Anesthesia Type: Gen Supraglottic Airway  Intra-op Monitoring Plan: Standard ASA Monitors  Post Op Pain Control Plan: multimodal analgesia  Induction:  IV  Informed Consent: Informed consent signed with the Patient and all parties understand the risks and agree with anesthesia plan.  All questions answered.   ASA Score: 2  Anesthesia Plan Notes: IV on Right    Ready For Surgery From Anesthesia Perspective.     .

## 2024-02-24 RX ORDER — CEFAZOLIN SODIUM 2 G/50ML
2 SOLUTION INTRAVENOUS
Status: CANCELLED | OUTPATIENT
Start: 2024-02-24

## 2024-02-24 RX ORDER — FAMOTIDINE 20 MG/1
20 TABLET, FILM COATED ORAL
Status: CANCELLED | OUTPATIENT
Start: 2024-02-24

## 2024-02-24 RX ORDER — SODIUM CHLORIDE 9 MG/ML
INJECTION, SOLUTION INTRAVENOUS CONTINUOUS
Status: CANCELLED | OUTPATIENT
Start: 2024-02-24

## 2024-02-24 RX ORDER — HYDROCODONE BITARTRATE AND ACETAMINOPHEN 5; 325 MG/1; MG/1
1 TABLET ORAL EVERY 6 HOURS PRN
Qty: 8 TABLET | Refills: 0 | Status: SHIPPED | OUTPATIENT
Start: 2024-02-24

## 2024-02-25 NOTE — PROGRESS NOTES
History & Physical            OBGYN    C.C Pre-op Exam      HPI : Michelle Gaeg is a 38 y.o. female  for evaluation and discussion of treatment options for Pre-op Exam   . She is having heavy cycles and is on Tamoxifen   Patient did not tolerate endometrial biopsy in the office. She desires IUD removal  FINDINGS:  Uterus:     Measures: 7.9 x 3.0 x 4.4cm     Appearance: Subserosal fibroid measuring 2.6 x 2.2 x 2.6 cm previously 2.9 x 1.8 x 2.9 cm.     Endometrial thickness is 4mm, normal in this pre-menopausal female.  Redemonstration of lower lying intrauterine device with bilateral arms similarly extending into myometrium.  There is no endometrial fluid.     Right ovary:     Measures: 3.2 x 2.1 x 3.2 cm     Appearance: Cystic structure with solid component measuring 2.2 x 1.9 x 1.8 cm, likely representing corpus luteal cyst versus hemorrhagic cyst     Flow: Normal.     Left ovary:     Measures: 3.1 x 2.9 x 2.7 cm     Appearance: Prominent follicles measuring up to 1.5 x 1.8 x 1.7 cm     Flow: Normal.     Free fluid:     Small volume right adnexal free fluid noted.     Impression:     Redemonstration of low lying IUD in the lower uterine segment with the arms extending into the myometrium.     Bilateral follicles with interval resolution of previously identified left hemorrhagic cyst.     Small volume right adnexal free fluid.       Past Medical History:   Diagnosis Date    Anxiety     Back pain     Brain fog     Constipation     Depression     Genetic testing 2020    BRCA+myRisk NEGATIVE with APC VUS c.3875C>T (p.[id number]), aka V2147F (3875C>T)    Hx of psychiatric care     Xanax    Malignant neoplasm of upper-outer quadrant of left breast in female, estrogen receptor positive 5/15/2020     Past Surgical History:   Procedure Laterality Date    BILATERAL MASTECTOMY Bilateral 2020    Procedure: MASTECTOMY, BILATERAL;  Surgeon: Jessica Dumont MD;  Location: [facility];  Service:  General;  Laterality: Bilateral;    BREAST RECONSTRUCTION Bilateral 2020    Procedure: RECONSTRUCTION, BREAST;  Surgeon: Lamin Seo MD;  Location: [facility];  Service: General;  Laterality: Bilateral;    BREAST SURGERY      Breast biopsy    egg retreival      INSERTION OF BREAST IMPLANT Bilateral 2020    Procedure: INSERTION, BREAST TISSUE EXPANDER - BILATERAL BREAST RECONSTRUCTION WITH TISSUE EXPANDERS AND ACELLULAR DERMAL MATRIX;  Surgeon: Lamin Seo MD;  Location: [facility];  Service: General;  Laterality: Bilateral;    INSERTION OF TUNNELED CENTRAL VENOUS CATHETER (CVC) WITH SUBCUTANEOUS PORT N/A 2020    Procedure: [id number];  Surgeon: Dosxochitl Diagnostic Provider;  [facility]: [facility];  Service: Radiology;  Laterality: N/A;  189 port placement    MEDIPORT REMOVAL Right 2021    Procedure: REMOVAL, CATHETER, CENTRAL VENOUS, TUNNELED, WITH PORT;  Surgeon: Сергей Rowan MD;  Location: [facility] CATH LAB;  Service: Radiology;  Laterality: Right;    SENTINEL LYMPH NODE BIOPSY Left 2020    Procedure: BIOPSY, LYMPH NODE, SENTINEL;  Surgeon: Jessica Dumont MD;  Location: [facility];  Service: General;  Laterality: Left;    WISDOM TOOTH EXTRACTION       Family History   Problem Relation Age of Onset    Osteoporosis Mother     No Known Problems Father     Alzheimer's disease Maternal Grandmother     Bipolar disorder Maternal Grandmother     Alcohol abuse Maternal Grandmother     Diabetes Maternal Grandfather     Breast cancer Paternal Grandmother         bilat noncurrent, @ 50 & 62    Diabetes Paternal Grandfather     Breast cancer Other     Breast cancer Other     Colon cancer Neg Hx     Ovarian cancer Neg Hx      Social History     Tobacco Use    Smoking status: Former    Smokeless tobacco: Former    Tobacco comments:     occasional past while drinking   Substance Use Topics    Alcohol use: Yes     Comment: socially    Drug use: Never     OB History    Para  "Term  AB Living   0 0 0 0 0 0   SAB IAB Ectopic Multiple Live Births   0 0 0 0 0       /79   Pulse 69   Ht 5' 5" (1.651 m)   Wt 51.7 kg (114 lb)   LMP 2024 (Approximate)   BMI 18.97 kg/m²     ROS:    GENERAL: Denies weight gain or weight loss. Feeling well overall.   SKIN: Denies rash or lesions.   HEAD: Denies head injury or headache.   NODES: Denies enlarged lymph nodes.   CHEST: Denies chest pain or shortness of breath.   CARDIOVASCULAR: Denies palpitations or left sided chest pain.   ABDOMEN: No abdominal pain, constipation, diarrhea, nausea, vomiting or rectal bleeding.   URINARY: No frequency, dysuria, hematuria, or burning on urination.  REPRODUCTIVE: See HPI.   BREASTS: The patient performs breast self-examination and denies pain, lumps, or nipple discharge.   HEMATOLOGIC: No easy bruisability or excessive bleeding with the exception of menstrual cycles.  MUSCULOSKELETAL: Denies joint pain or swelling.   NEUROLOGIC: Denies syncope or weakness.   PSYCHIATRIC: Denies depression, anxiety or mood swings.    PHYSICAL EXAM:    APPEARANCE: Well nourished, well developed, in no acute distress.  AFFECT: WNL, alert and oriented x 3  SKIN: No acne or hirsutism  NECK: Neck symmetric without masses or thyromegaly  NODES: No inguinal, cervical, axillary, or femoral lymph node enlargement  CHEST: Good respiratory effect  ABDOMEN: Soft.  No tenderness or masses.  No hepatosplenomegaly.  No hernias.  PELVIC: Normal external genitalia without lesions.  Normal hair distribution.  Adequate perineal body, normal urethral meatus.  Vagina moist and well rugated without lesions or discharge.  Cervix pink, without lesions, discharge or tenderness.  No significant cystocele or rectocele.  Bimanual exam shows uterus to be normal size, regular, mobile and nontender.  Adnexa without masses or tenderness.    EXTREMITIES: No edema.    ASSESSMENT & PLAN:    Heavy menses    She desires IUD removal. She does not want " to replace IUD. She understands that she will need to use barrier method for contraception.     I have discussed the risks, benefits, indications, and alternatives of the procedure in detail.  The patient verbalizes her understanding.  All questions answered.  Consents signed.  The patient agrees to proceed to proceed as planned.

## 2024-02-25 NOTE — H&P (VIEW-ONLY)
History & Physical            OBGYN    C.C Pre-op Exam      HPI : Michelle Gage is a 38 y.o. female  for evaluation and discussion of treatment options for Pre-op Exam   . She is having heavy cycles and is on Tamoxifen   Patient did not tolerate endometrial biopsy in the office. She desires IUD removal  FINDINGS:  Uterus:     Measures: 7.9 x 3.0 x 4.4cm     Appearance: Subserosal fibroid measuring 2.6 x 2.2 x 2.6 cm previously 2.9 x 1.8 x 2.9 cm.     Endometrial thickness is 4mm, normal in this pre-menopausal female.  Redemonstration of lower lying intrauterine device with bilateral arms similarly extending into myometrium.  There is no endometrial fluid.     Right ovary:     Measures: 3.2 x 2.1 x 3.2 cm     Appearance: Cystic structure with solid component measuring 2.2 x 1.9 x 1.8 cm, likely representing corpus luteal cyst versus hemorrhagic cyst     Flow: Normal.     Left ovary:     Measures: 3.1 x 2.9 x 2.7 cm     Appearance: Prominent follicles measuring up to 1.5 x 1.8 x 1.7 cm     Flow: Normal.     Free fluid:     Small volume right adnexal free fluid noted.     Impression:     Redemonstration of low lying IUD in the lower uterine segment with the arms extending into the myometrium.     Bilateral follicles with interval resolution of previously identified left hemorrhagic cyst.     Small volume right adnexal free fluid.       Past Medical History:   Diagnosis Date    Anxiety     Back pain     Brain fog     Constipation     Depression     Genetic testing 2020    BRCA+myRisk NEGATIVE with APC VUS c.3875C>T (p.[id number]), aka [id number] (3875C>T)    Hx of psychiatric care     Xanax    Malignant neoplasm of upper-outer quadrant of left breast in female, estrogen receptor positive 5/15/2020     Past Surgical History:   Procedure Laterality Date    BILATERAL MASTECTOMY Bilateral 2020    Procedure: MASTECTOMY, BILATERAL;  Surgeon: Jessica Dumont MD;  [facility];  Service:  General;  Laterality: Bilateral;    BREAST RECONSTRUCTION Bilateral 2020    Procedure: RECONSTRUCTION, BREAST;  Surgeon: Lamin Seo MD;  Location: [facility];  Service: General;  Laterality: Bilateral;    BREAST SURGERY      Breast biopsy    egg retreival      INSERTION OF BREAST IMPLANT Bilateral 2020    Procedure: INSERTION, BREAST TISSUE EXPANDER - BILATERAL BREAST RECONSTRUCTION WITH TISSUE EXPANDERS AND ACELLULAR DERMAL MATRIX;  Surgeon: Lamin Seo MD;  Location: [facility];  Service: General;  Laterality: Bilateral;    INSERTION OF TUNNELED CENTRAL VENOUS CATHETER (CVC) WITH SUBCUTANEOUS PORT N/A 2020    Procedure: [id number];  Surgeon: Dosxochitl Diagnostic Provider;  Location: [facility];  Service: Radiology;  Laterality: N/A;  189 port placement    MEDIPORT REMOVAL Right 2021    Procedure: REMOVAL, CATHETER, CENTRAL VENOUS, TUNNELED, WITH PORT;  Surgeon: Сергей Rowan MD;  Location: [facility] [place] CATH LAB;  Service: Radiology;  Laterality: Right;    SENTINEL LYMPH NODE BIOPSY Left 2020    Procedure: BIOPSY, LYMPH NODE, SENTINEL;  Surgeon: Jessica Dumont MD;  Location: [facility];  Service: General;  Laterality: Left;    WISDOM TOOTH EXTRACTION       Family History   Problem Relation Age of Onset    Osteoporosis Mother     No Known Problems Father     Alzheimer's disease Maternal Grandmother     Bipolar disorder Maternal Grandmother     Alcohol abuse Maternal Grandmother     Diabetes Maternal Grandfather     Breast cancer Paternal Grandmother         bilat noncurrent, @ 50 & 62    Diabetes Paternal Grandfather     Breast cancer Other     Breast cancer Other     Colon cancer Neg Hx     Ovarian cancer Neg Hx      Social History     Tobacco Use    Smoking status: Former    Smokeless tobacco: Former    Tobacco comments:     occasional past while drinking   Substance Use Topics    Alcohol use: Yes     Comment: socially    Drug use: Never     OB History    Para  "Term  AB Living   0 0 0 0 0 0   SAB IAB Ectopic Multiple Live Births   0 0 0 0 0       /79   Pulse 69   Ht 5' 5" (1.651 m)   Wt 51.7 kg (114 lb)   LMP 2024 (Approximate)   BMI 18.97 kg/m²     ROS:    GENERAL: Denies weight gain or weight loss. Feeling well overall.   SKIN: Denies rash or lesions.   HEAD: Denies head injury or headache.   NODES: Denies enlarged lymph nodes.   CHEST: Denies chest pain or shortness of breath.   CARDIOVASCULAR: Denies palpitations or left sided chest pain.   ABDOMEN: No abdominal pain, constipation, diarrhea, nausea, vomiting or rectal bleeding.   URINARY: No frequency, dysuria, hematuria, or burning on urination.  REPRODUCTIVE: See HPI.   BREASTS: The patient performs breast self-examination and denies pain, lumps, or nipple discharge.   HEMATOLOGIC: No easy bruisability or excessive bleeding with the exception of menstrual cycles.  MUSCULOSKELETAL: Denies joint pain or swelling.   NEUROLOGIC: Denies syncope or weakness.   PSYCHIATRIC: Denies depression, anxiety or mood swings.    PHYSICAL EXAM:    APPEARANCE: Well nourished, well developed, in no acute distress.  AFFECT: WNL, alert and oriented x 3  SKIN: No acne or hirsutism  NECK: Neck symmetric without masses or thyromegaly  NODES: No inguinal, cervical, axillary, or femoral lymph node enlargement  CHEST: Good respiratory effect  ABDOMEN: Soft.  No tenderness or masses.  No hepatosplenomegaly.  No hernias.  PELVIC: Normal external genitalia without lesions.  Normal hair distribution.  Adequate perineal body, normal urethral meatus.  Vagina moist and well rugated without lesions or discharge.  Cervix pink, without lesions, discharge or tenderness.  No significant cystocele or rectocele.  Bimanual exam shows uterus to be normal size, regular, mobile and nontender.  Adnexa without masses or tenderness.    EXTREMITIES: No edema.    ASSESSMENT & PLAN:    Heavy menses    She desires IUD removal. She does not want " to replace IUD. She understands that she will need to use barrier method for contraception.     I have discussed the risks, benefits, indications, and alternatives of the procedure in detail.  The patient verbalizes her understanding.  All questions answered.  Consents signed.  The patient agrees to proceed to proceed as planned.

## 2024-02-28 ENCOUNTER — HOSPITAL ENCOUNTER (OUTPATIENT)
Facility: OTHER | Age: 39
Discharge: HOME | End: 2024-02-28
Attending: OBSTETRICS & GYNECOLOGY | Admitting: OBSTETRICS & GYNECOLOGY
Payer: COMMERCIAL

## 2024-02-28 DIAGNOSIS — Z98.890 STATUS POST HYSTEROSCOPY: Primary | ICD-10-CM

## 2024-02-28 DIAGNOSIS — N92.1 MENORRHAGIA WITH IRREGULAR CYCLE: ICD-10-CM

## 2024-02-28 DIAGNOSIS — R53.0 NEOPLASTIC MALIGNANT RELATED FATIGUE: ICD-10-CM

## 2024-02-28 LAB
B-HCG UR QL: NEGATIVE
CTP QC/QA: YES

## 2024-02-28 PROCEDURE — 25000003 PHARM REV CODE 250: Performed by: ANESTHESIOLOGY

## 2024-02-28 PROCEDURE — 63600175 PHARM REV CODE 636 W HCPCS: Performed by: STUDENT IN AN ORGANIZED HEALTH CARE EDUCATION/TRAINING PROGRAM

## 2024-02-28 PROCEDURE — 25000003 PHARM REV CODE 250: Performed by: STUDENT IN AN ORGANIZED HEALTH CARE EDUCATION/TRAINING PROGRAM

## 2024-02-28 PROCEDURE — 88305 TISSUE EXAM BY PATHOLOGIST: CPT | Mod: 26,,, | Performed by: PATHOLOGY

## 2024-02-28 PROCEDURE — 88300 SURGICAL PATH GROSS: CPT | Mod: 26,,, | Performed by: PATHOLOGY

## 2024-02-28 PROCEDURE — 58558 HYSTEROSCOPY BIOPSY: CPT | Mod: ,,, | Performed by: OBSTETRICS & GYNECOLOGY

## 2024-02-28 PROCEDURE — D9220A PRA ANESTHESIA: Mod: ANES,,, | Performed by: ANESTHESIOLOGY

## 2024-02-28 PROCEDURE — 36000707: Performed by: OBSTETRICS & GYNECOLOGY

## 2024-02-28 PROCEDURE — 88300 SURGICAL PATH GROSS: CPT | Performed by: PATHOLOGY

## 2024-02-28 PROCEDURE — 27201423 OPTIME MED/SURG SUP & DEVICES STERILE SUPPLY: Performed by: OBSTETRICS & GYNECOLOGY

## 2024-02-28 PROCEDURE — 88305 TISSUE EXAM BY PATHOLOGIST: CPT | Performed by: PATHOLOGY

## 2024-02-28 PROCEDURE — 63600175 PHARM REV CODE 636 W HCPCS: Performed by: ANESTHESIOLOGY

## 2024-02-28 PROCEDURE — 37000009 HC ANESTHESIA EA ADD 15 MINS: Performed by: OBSTETRICS & GYNECOLOGY

## 2024-02-28 PROCEDURE — 25000003 PHARM REV CODE 250: Performed by: OBSTETRICS & GYNECOLOGY

## 2024-02-28 PROCEDURE — 58301 REMOVE INTRAUTERINE DEVICE: CPT | Mod: 51,,, | Performed by: OBSTETRICS & GYNECOLOGY

## 2024-02-28 PROCEDURE — D9220A PRA ANESTHESIA: Mod: CRNA,,, | Performed by: STUDENT IN AN ORGANIZED HEALTH CARE EDUCATION/TRAINING PROGRAM

## 2024-02-28 PROCEDURE — 71000033 HC RECOVERY, INTIAL HOUR: Performed by: OBSTETRICS & GYNECOLOGY

## 2024-02-28 PROCEDURE — 37000008 HC ANESTHESIA 1ST 15 MINUTES: Performed by: OBSTETRICS & GYNECOLOGY

## 2024-02-28 PROCEDURE — 71000016 HC POSTOP RECOV ADDL HR: Performed by: OBSTETRICS & GYNECOLOGY

## 2024-02-28 PROCEDURE — 36000706: Performed by: OBSTETRICS & GYNECOLOGY

## 2024-02-28 PROCEDURE — 81025 URINE PREGNANCY TEST: CPT | Performed by: ANESTHESIOLOGY

## 2024-02-28 PROCEDURE — 71000015 HC POSTOP RECOV 1ST HR: Performed by: OBSTETRICS & GYNECOLOGY

## 2024-02-28 RX ORDER — IBUPROFEN 600 MG/1
600 TABLET ORAL EVERY 6 HOURS
Qty: 28 TABLET | Refills: 0 | Status: SHIPPED | OUTPATIENT
Start: 2024-02-28 | End: 2024-03-06

## 2024-02-28 RX ORDER — PREGABALIN 75 MG/1
75 CAPSULE ORAL ONCE
Status: COMPLETED | OUTPATIENT
Start: 2024-02-28 | End: 2024-02-28

## 2024-02-28 RX ORDER — FAMOTIDINE 20 MG/1
20 TABLET, FILM COATED ORAL
Status: COMPLETED | OUTPATIENT
Start: 2024-02-28 | End: 2024-02-28

## 2024-02-28 RX ORDER — SILVER NITRATE 38.21; 12.74 MG/1; MG/1
STICK TOPICAL
Status: DISCONTINUED | OUTPATIENT
Start: 2024-02-28 | End: 2024-02-28 | Stop reason: HOSPADM

## 2024-02-28 RX ORDER — ONDANSETRON HYDROCHLORIDE 2 MG/ML
4 INJECTION, SOLUTION INTRAVENOUS EVERY 4 HOURS PRN
Status: CANCELLED | OUTPATIENT
Start: 2024-02-28

## 2024-02-28 RX ORDER — SODIUM CHLORIDE, SODIUM LACTATE, POTASSIUM CHLORIDE, CALCIUM CHLORIDE 600; 310; 30; 20 MG/100ML; MG/100ML; MG/100ML; MG/100ML
INJECTION, SOLUTION INTRAVENOUS CONTINUOUS
Status: DISCONTINUED | OUTPATIENT
Start: 2024-02-28 | End: 2024-02-28 | Stop reason: HOSPADM

## 2024-02-28 RX ORDER — KETOROLAC TROMETHAMINE 30 MG/ML
INJECTION, SOLUTION INTRAMUSCULAR; INTRAVENOUS
Status: DISCONTINUED | OUTPATIENT
Start: 2024-02-28 | End: 2024-02-28

## 2024-02-28 RX ORDER — LIDOCAINE HYDROCHLORIDE 10 MG/ML
0.5 INJECTION, SOLUTION EPIDURAL; INFILTRATION; INTRACAUDAL; PERINEURAL ONCE
Status: DISCONTINUED | OUTPATIENT
Start: 2024-02-28 | End: 2024-02-28 | Stop reason: HOSPADM

## 2024-02-28 RX ORDER — HYDROMORPHONE HYDROCHLORIDE 2 MG/ML
0.2 INJECTION, SOLUTION INTRAMUSCULAR; INTRAVENOUS; SUBCUTANEOUS
Status: CANCELLED | OUTPATIENT
Start: 2024-02-28

## 2024-02-28 RX ORDER — PHENYLEPHRINE HYDROCHLORIDE 10 MG/ML
INJECTION INTRAVENOUS
Status: DISCONTINUED | OUTPATIENT
Start: 2024-02-28 | End: 2024-02-28

## 2024-02-28 RX ORDER — HYDROMORPHONE HYDROCHLORIDE 2 MG/ML
0.4 INJECTION, SOLUTION INTRAMUSCULAR; INTRAVENOUS; SUBCUTANEOUS EVERY 5 MIN PRN
Status: DISCONTINUED | OUTPATIENT
Start: 2024-02-28 | End: 2024-02-28 | Stop reason: HOSPADM

## 2024-02-28 RX ORDER — ACETAMINOPHEN 500 MG
1000 TABLET ORAL EVERY 6 HOURS PRN
Status: CANCELLED | OUTPATIENT
Start: 2024-02-28

## 2024-02-28 RX ORDER — AMOXICILLIN 250 MG
1 CAPSULE ORAL DAILY
Qty: 7 TABLET | Refills: 0 | Status: SHIPPED | OUTPATIENT
Start: 2024-02-28 | End: 2024-03-06

## 2024-02-28 RX ORDER — SODIUM CHLORIDE 0.9 % (FLUSH) 0.9 %
3 SYRINGE (ML) INJECTION
Status: DISCONTINUED | OUTPATIENT
Start: 2024-02-28 | End: 2024-02-28 | Stop reason: HOSPADM

## 2024-02-28 RX ORDER — MEPERIDINE HYDROCHLORIDE 25 MG/ML
12.5 INJECTION INTRAMUSCULAR; INTRAVENOUS; SUBCUTANEOUS ONCE AS NEEDED
Status: DISCONTINUED | OUTPATIENT
Start: 2024-02-28 | End: 2024-02-28 | Stop reason: HOSPADM

## 2024-02-28 RX ORDER — LIDOCAINE HYDROCHLORIDE 20 MG/ML
INJECTION INTRAVENOUS
Status: DISCONTINUED | OUTPATIENT
Start: 2024-02-28 | End: 2024-02-28

## 2024-02-28 RX ORDER — PROPOFOL 10 MG/ML
VIAL (ML) INTRAVENOUS
Status: DISCONTINUED | OUTPATIENT
Start: 2024-02-28 | End: 2024-02-28

## 2024-02-28 RX ORDER — DEXTROMETHORPHAN HYDROBROMIDE, GUAIFENESIN 5; 100 MG/5ML; MG/5ML
650 LIQUID ORAL EVERY 6 HOURS
Qty: 28 TABLET | Refills: 0 | Status: SHIPPED | OUTPATIENT
Start: 2024-02-28 | End: 2024-03-06

## 2024-02-28 RX ORDER — ACETAMINOPHEN 500 MG
1000 TABLET ORAL
Status: COMPLETED | OUTPATIENT
Start: 2024-02-28 | End: 2024-02-28

## 2024-02-28 RX ORDER — SODIUM CHLORIDE 9 MG/ML
INJECTION, SOLUTION INTRAVENOUS CONTINUOUS
Status: DISCONTINUED | OUTPATIENT
Start: 2024-02-28 | End: 2024-02-28 | Stop reason: HOSPADM

## 2024-02-28 RX ORDER — ONDANSETRON HYDROCHLORIDE 2 MG/ML
INJECTION, SOLUTION INTRAVENOUS
Status: DISCONTINUED | OUTPATIENT
Start: 2024-02-28 | End: 2024-02-28

## 2024-02-28 RX ORDER — OXYCODONE HYDROCHLORIDE 5 MG/1
5 TABLET ORAL
Status: DISCONTINUED | OUTPATIENT
Start: 2024-02-28 | End: 2024-02-28 | Stop reason: HOSPADM

## 2024-02-28 RX ORDER — FENTANYL CITRATE 50 UG/ML
INJECTION, SOLUTION INTRAMUSCULAR; INTRAVENOUS
Status: DISCONTINUED | OUTPATIENT
Start: 2024-02-28 | End: 2024-02-28

## 2024-02-28 RX ORDER — PROCHLORPERAZINE EDISYLATE 5 MG/ML
5 INJECTION INTRAMUSCULAR; INTRAVENOUS EVERY 30 MIN PRN
Status: DISCONTINUED | OUTPATIENT
Start: 2024-02-28 | End: 2024-02-28 | Stop reason: HOSPADM

## 2024-02-28 RX ORDER — DEXAMETHASONE SODIUM PHOSPHATE 4 MG/ML
INJECTION, SOLUTION INTRA-ARTICULAR; INTRALESIONAL; INTRAMUSCULAR; INTRAVENOUS; SOFT TISSUE
Status: DISCONTINUED | OUTPATIENT
Start: 2024-02-28 | End: 2024-02-28

## 2024-02-28 RX ADMIN — FENTANYL CITRATE 100 MCG: 50 INJECTION, SOLUTION INTRAMUSCULAR; INTRAVENOUS at 08:02

## 2024-02-28 RX ADMIN — DEXAMETHASONE SODIUM PHOSPHATE 8 MG: 4 INJECTION, SOLUTION INTRAMUSCULAR; INTRAVENOUS at 08:02

## 2024-02-28 RX ADMIN — ACETAMINOPHEN 1000 MG: 500 TABLET ORAL at 08:02

## 2024-02-28 RX ADMIN — PROPOFOL 200 MG: 10 INJECTION, EMULSION INTRAVENOUS at 08:02

## 2024-02-28 RX ADMIN — PHENYLEPHRINE HYDROCHLORIDE 100 MCG: 10 INJECTION INTRAVENOUS at 08:02

## 2024-02-28 RX ADMIN — SODIUM CHLORIDE, SODIUM LACTATE, POTASSIUM CHLORIDE, AND CALCIUM CHLORIDE: 600; 310; 30; 20 INJECTION, SOLUTION INTRAVENOUS at 08:02

## 2024-02-28 RX ADMIN — LIDOCAINE HYDROCHLORIDE 100 MG: 20 INJECTION, SOLUTION INTRAVENOUS at 08:02

## 2024-02-28 RX ADMIN — KETOROLAC TROMETHAMINE 30 MG: 30 INJECTION, SOLUTION INTRAMUSCULAR; INTRAVENOUS at 08:02

## 2024-02-28 RX ADMIN — FAMOTIDINE 20 MG: 20 TABLET ORAL at 08:02

## 2024-02-28 RX ADMIN — OXYCODONE HYDROCHLORIDE 5 MG: 5 TABLET ORAL at 09:02

## 2024-02-28 RX ADMIN — ONDANSETRON HYDROCHLORIDE 4 MG: 2 INJECTION INTRAMUSCULAR; INTRAVENOUS at 08:02

## 2024-02-28 RX ADMIN — PREGABALIN 75 MG: 75 CAPSULE ORAL at 08:02

## 2024-02-28 NOTE — ANESTHESIA PROCEDURE NOTES
Intubation    Date/Time: 2/28/2024 8:40 AM    Performed by: Jesus Ness CRNA  Authorized by: Jesus Ness CRNA    Intubation:     Induction:  Intravenous    Intubated:  Postinduction    Mask Ventilation:  Not attempted    Attempts:  1    Attempted By:  CRNA    Difficult Airway Encountered?: No      Complications:  None    Airway Device:  Supraglottic airway/LMA    Airway Device Size:  4.0    Style/Cuff Inflation:  Uncuffed    Placement Verified By:  Capnometry    Complicating Factors:  None    Findings Post-Intubation:  BS equal bilateral and atraumatic/condition of teeth unchanged

## 2024-02-28 NOTE — DISCHARGE SUMMARY
[facility], [place], operated by [facility] - Surgery ([place])  Brief Operative Note    Surgery Date: 2/28/2024     Surgeon(s) and Role:     * Lyndsay Warren MD - Primary    Assisting Surgeon: None    Pre-op Diagnosis:  Menorrhagia with regular cycle [N92.0]    Post-op Diagnosis:  Post-Op Diagnosis Codes:     * Menorrhagia with regular cycle [N92.0]    Procedure(s) (LRB):  HYSTEROSCOPY, WITH DILATION AND CURETTAGE OF UTERUS (N/A)  IUD removal    Anesthesia: General    Operative Findings: See op note, no complications    Estimated Blood Loss: 5cc         Specimens:   Specimen (24h ago, onward)       Start     Ordered    02/28/24 0858  Specimen to Pathology, Surgery Gynecology and Obstetrics  Once        Comments: Pre-op Diagnosis: Menorrhagia with regular cycle [N92.0]Procedure(s):HYSTEROSCOPY, WITH DILATION AND CURETTAGE OF UTERUS Number of specimens: 2Name of specimens: 1) IUD (gross ID only)2) endometrial curettage     References:    Click here for ordering Quick Tip   Question Answer Comment   Procedure Type: Gynecology and Obstetrics    Specimen Class: Routine/Screening    Which provider would you like to cc? LYNDSAY WARREN    Release to patient Immediate        02/28/24 0902                      Discharge Note    OUTCOME: Patient tolerated treatment/procedure well without complication and is now ready for discharge.    DISPOSITION: Home or Self Care    FINAL DIAGNOSIS:  Status post hysteroscopy    FOLLOWUP: In clinic    DISCHARGE INSTRUCTIONS:    Discharge Procedure Orders   Diet general     Lifting restrictions   Order Comments: LIFTING:  No lifting greater than 15 pounds for six weeks.     PELVIC REST:  No douching, tampons, or intercourse for 6 weeks.  If prescribed, vaginal estrogen cream may be used during the postoperative period.     Other restrictions (specify):   Order Comments: DRIVING:  No driving while on narcotics. Driving may be resumed initially with a competent passenger one to two weeks after surgery if no longer  taking narcotics.     EXERCISE:  For six weeks your exercise should be limited to walking. You may walk as far as you wish, as long as you increase your level of exertion gradually and avoid slippery surfaces. You may climb stairs as needed to get around, but should not use stair climbing for exercise.     Leave dressing on - Keep it clean, dry, and intact until clinic visit     Remove dressing in 24 hours   Order Comments: If you have a bandage on wound, you may remove it the day after dismissal.  If you had steri-strips remove them once they begin to peel off (usually 2 weeks). Keep incision clean and dry.  Inspect the incision daily for signs and symptoms of infection.     Call MD for:  temperature >100.4     Call MD for:  persistent nausea and vomiting     Call MD for:  severe uncontrolled pain     Call MD for:  difficulty breathing, headache or visual disturbances     Call MD for:  redness, tenderness, or signs of infection (pain, swelling, redness, odor or green/yellow discharge around incision site)     Call MD for:  hives     Call MD for:   Order Comments: inability to void,urine is ketchup colored or you have large clots, vaginal bleeding is heavier than a period.    VAGINAL DISCHARGE: You may develop a vaginal discharge and intermittent vaginal spotting after surgery and up to 6 weeks postoperatively.  The discharge may have an odor and may change in color but it is normal.  This is due to dissolving stiches.  Contact your surgical team if you develop vaginal or vulvar irritation along with a discharge.  Also contact your surgical team if you have vaginal discharge that smells like urine or stool.    PAIN MEDICATIONS:     Take your pain medications as instructed. It is best to take pain medications before your pain becomes severe. This will allow you to take less medication yet have better pain relief. For the first 2 or 3 days it may be helpful to take your pain medications on a regular schedule (e.g.  every 4 to 6 hours). This will help you to keep your pain under better control. You should then begin to take fewer medications each day until you no longer need them. Do not take pain medication on an empty stomach. This may lead to nausea and vomiting.    CONSTIPATION REMEDIES: Patients are often constipated after surgery or with use of oral narcotic medicine. You should continue to take the stool softener, Senokot-S during the next six weeks, and consume adequate amounts of water.  If you have not had a bowel movement for 3 days after dismissal, or are uncomfortable and unable to pass stool, please try one or all of the following measures:  1.  Milk of Magnesia - 30 cc by mouth every 12 hours   2.  Dulcolax suppository - One suppository per rectum every 4-6 hours   3.  Metamucil, Fibercon or other bulk former - use as directed  4.  Fleets Enema  5.  Prunes or Prune juice    If you continue to have constipation after trying the above remedies, you should contact your surgical team using the contact information listed above     Activity as tolerated   Order Comments: Return to normal activity slowly as you feel able.  For 6 weeks your exercise should be limited to walking.  You may walk as far as you wish, as long as you increase your level of exertion gradually and avoid slippery surfaces.     Shower on day dressing removed (No bath)   Order Comments: You may shower at any time but should avoid immersing any abdominal incisions in water for at least 2 weeks after surgery or until the wound is completely healed.  If given, please shower with Hibaclens soap until bottle is completely finish        Medication List        START taking these medications      acetaminophen 650 MG Tbsr  Commonly known as: TYLENOL  Take 1 tablet (650 mg total) by mouth every 6 (six) hours. Alternate between ibuprofen and tylenol every 3 hours. For example: @0800: ibuprofen 600mg @1100: tylenol 650mg @1400: ibuprofen 600mg @1700: tylenol  650 mg @2000: ibuprofen 600mg for 7 days     ibuprofen 600 MG tablet  Commonly known as: ADVIL,MOTRIN  Take 1 tablet (600 mg total) by mouth every 6 (six) hours. Alternate between ibuprofen and tylenol every 3 hours. For example: @0800: ibuprofen 600mg @1100: tylenol 650mg @1400: ibuprofen 600mg @1700: tylenol 650 mg @2000: ibuprofen 600mg for 7 days     senna-docusate 8.6-50 mg 8.6-50 mg per tablet  Commonly known as: SENNA WITH DOCUSATE SODIUM  Take 1 tablet by mouth once daily. for 7 days            CONTINUE taking these medications      ALPRAZolam 0.5 MG tablet  Commonly known as: XANAX  Take 1 tablet (0.5 mg total) by mouth daily as needed for Anxiety. TAKE 1 TABLET(0.5 MG) BY MOUTH THREE TIMES DAILY AS NEEDED FOR INSOMNIA OR ANXIETY Strength: 0.5 mg     ATRALIN 0.05 % gel  Generic drug: tretinoin     HYDROcodone-acetaminophen 5-325 mg per tablet  Commonly known as: NORCO  Take 1 tablet by mouth every 6 (six) hours as needed for Pain.     methylphenidate HCl 10 MG tablet  Commonly known as: RITALIN  Take 1 tablet (10 mg total) by mouth 2 (two) times daily with meals.     tamoxifen 20 MG Tab  Commonly known as: NOLVADEX  Take 1 tablet (20 mg total) by mouth once daily.     valACYclovir 1000 MG tablet  Commonly known as: VALTREX  Take 2 tablets by mouth once and again in 12 hours     venlafaxine 75 MG 24 hr capsule  Commonly known as: EFFEXOR-XR  TAKE 1 CAPSULE(75 MG) BY MOUTH EVERY DAY               Where to Get Your Medications        These medications were sent to [facility]  [address] [address]      Hours: Mon-Fri, 8a-5:30p Phone: [phone]   acetaminophen 650 MG Tbsr  ibuprofen 600 MG tablet  senna-docusate 8.6-50 mg 8.6-50 mg per tablet         Nell Rouse MD   OB/GYN PGY-2

## 2024-02-28 NOTE — ANESTHESIA POSTPROCEDURE EVALUATION
Anesthesia Post Evaluation    Patient: Michelle Gage    Procedure(s) Performed: Procedure(s) (LRB):  HYSTEROSCOPY, WITH DILATION AND CURETTAGE OF UTERUS (N/A)    Final Anesthesia Type: general      Patient location during evaluation: PACU  Patient participation: Yes- Able to Participate  Level of consciousness: awake and alert  Post-procedure vital signs: reviewed and stable  Pain management: adequate  Airway patency: patent    PONV status at discharge: No PONV  Anesthetic complications: no      Cardiovascular status: blood pressure returned to baseline  Respiratory status: unassisted  Hydration status: euvolemic  Follow-up not needed.              Vitals Value Taken Time   /65 02/28/24 1000   Temp 37 °C (98.6 °F) 02/28/24 0910   Pulse 60 02/28/24 1000   Resp 17 02/28/24 1000   SpO2 100 % 02/28/24 1000         Event Time   Out of Recovery 09:49:34         Pain/Veronica Score: Pain Rating Prior to Med Admin: 4 (2/28/2024  9:18 AM)  Pain Rating Post Med Admin: 3 (2/28/2024  9:40 AM)  Veronica Score: 10 (2/28/2024 10:00 AM)

## 2024-02-28 NOTE — DISCHARGE INSTRUCTIONS
Home Care Instruction D&C Hysteroscopy             ACTIVITY LEVEL:  If you received sedation and/or an anesthetic, you may feel sleepy for several hours. Rest until you feel more  awake. Gradually resume your normal activities.    DIET:  At home, continue with liquids. If there is no nausea, you may eat a soft diet and gradually resume a regular diet.    BATHING:  You may shower  as desired in one day.  You should avoid tub baths, hot tubs and swimming pools until seen by your physician for a post-op follow up.    CARE:  You can expect watery or bloody vaginal discharge for several days. Do not use tampons, please only use pads. Sexual activity is restricted as advised by your doctor.    MEDICATIONS:  You will receive instructions for any pain and/or antibiotic prescriptions. Pain medication should be taken only if needed and as directed. Antibiotics, if ordered, should be taken as directed until the entire prescription is completed.    ADDITIONAL INFORMATION:  __________________________________________________________________________________________  WHEN TO CALL THE DOCTOR:   For any heavy vaginal bleeding   Fever over 101°F (38.4°C)   Any lower abdominal pain not relieved by the pain mediation        Anesthesia: After Your Surgery  Youve just had surgery. During surgery, you received medication called anesthesia to keep you comfortable and pain-free. After surgery, you may experience some pain or nausea. This is common. Here are some tips for feeling better and recovering after surgery.    Going home  Your doctor or nurse will show you how to take care of yourself when you go home. He or she will also answer your questions. Have an adult family member or friend drive you home. For the first 24 hours after your surgery:  Do not drive or use heavy equipment.  Do not make important decisions or sign legal documents.  Avoid alcohol.  Have someone stay with you, if needed. He or she can watch for problems and help  keep you safe.  Take your time getting up from a seated or lying position. You may experience dizziness for 24 hours  Be sure to keep all follow-up appointments with your doctor. And rest after your procedure for as long as your doctor tells you to.    Coping with pain  If you have pain after surgery, pain medication will help you feel better. Take it as directed, before pain becomes severe. Also, ask your doctor or pharmacist about other ways to control pain, such as with heat, ice, and relaxation. And follow any other instructions your surgeon or nurse gives you.    URINARY RETENTION  Should you experience a decrease in your urine output or are unable to urinate following surgery, this can be due to the medications given during surgery.  We recommend you going to the nearest Emergency Department.    Tips for taking pain medication  To get the best relief possible, remember these points:  Pain medications can upset your stomach. Taking them with a little food may help.  Most pain relievers taken by mouth need at least 20 to 30 minutes to take effect.  Taking medication on a schedule can help you remember to take it. Try to time your medication so that you can take it before beginning an activity, such as dressing, walking, or sitting down for dinner.  Constipation is a common side effect of pain medications. Contact your doctor before taking any medications like laxatives or stool softeners to help relieve constipation. Also ask about any dietary restrictions, because drinking lots of fluids and eating foods like fruits and vegetables that are high in fiber can also help. Remember, dont take laxatives unless your surgeon has prescribed them.  Mixing alcohol and pain medication can cause dizziness and slow your breathing. It can even be fatal. Dont drink alcohol while taking pain medication.  Pain medication can slow your reflexes. Dont drive or operate machinery while taking pain medication.  If your health care  provider tells you to take acetaminophen to help relieve your pain, ask him or her how much you are supposed to take each day. (Acetaminophen is the generic name for Tylenol and other brand-name pain relievers.) Acetaminophen or other pain relievers may interact with your prescription medicines or other over-the-counter (OTC) drugs. Some prescription medications contain acetaminophen along with other active ingredients. Using both prescription and OTC acetaminophen for pain can cause you to overdose. The FDA recommends that you read the labels on your OTC medications carefully. This will help you to clearly understand the list of active ingredients, dosing instructions, and any warnings. It may also help you avoid taking too much acetaminophen. If you have questions or don't understand the information, ask your pharmacist or health care provider to explain it to you before you take the OTC medication.    Managing nausea  Some people have an upset stomach after surgery. This is often due to anesthesia, pain, pain medications, or the stress of surgery. The following tips will help you manage nausea and get good nutrition as you recover. If you were on a special diet before surgery, ask your doctor if you should follow it during recovery. These tips may help:  Dont push yourself to eat. Your body will tell you when to eat and how much.  Start off with clear liquids and soup. They are easier to digest.  Progress to semi-solid foods (mashed potatoes, applesauce, and gelatin) as you feel ready.  Slowly move to solid foods. Dont eat fatty, rich, or spicy foods at first.  Dont force yourself to have three large meals a day. Instead, eat smaller amounts more often.  Take pain medications with a small amount of solid food, such as crackers or toast to avoid nausea.      Call your surgeon if    You feel too sleepy, dizzy, or groggy (medication may be too strong).  You have side effects like nausea, vomiting, or skin  changes (rash, itching, or hives).   © 7028-6629 [employer]. [address] [place] [address], [place] [address]. All rights reserved. This information is not intended as a substitute for professional medical care. Always follow your healthcare professional's instructions.

## 2024-02-28 NOTE — OP NOTE
OPERATIVE REPORT    PREOPERATIVE DIAGNOSIS  AUB  Desires IUD removal    POSTOPERATIVE DIAGNOSIS  S/p IUD removal  S/P Hysteroscopy D&C    PROCEDURE:  IUD removal  Hysteroscopy with Dilation and curettage     SURGEON: Lyndsay Warren MD    ASSISTANT: Nell Rouse MD PGY2     ANESTHESIA: General    COMPLICATIONS: None    EBL: 5 cc    IV FLUIDS: see anesthesia note    URINE OUTPUT: 20 cc drained via in-and-out catheterization prior to procedure    Hysteroscopy fluid deficit: 80 cc    FINDINGS:    Normal external genitalia  IUD removal performed without difficulty   Uterus sounded to 8 cm  Cervix without descensus    Cavitary assessment showed overall normal appearing endometrial tissue, no endometrial polyps, no submucous fibroids  Bilateral ostia visualized with hysteroscope  Sharp curettage used in all four quadrants of the uterus, specimen sent to pathology.     SPECIMENS:  Endometrial curettage  IUD    PROCEDURE:   Patient was taken to the operating room where general anesthesia was administered and found to be adequate. She was placed in the dorsal lithotomy position using Malik stirrups, then prepped and draped in the usual sterile fashion. Bladder was drained via in-and-out catheterization prior to procedure. A surgical timeout was performed with patient's name, date of birth, procedure to be performed, and allergies verbalized. All OR staff in agreement. No preoperative antibiotics were administered as none were indicated.    Attention was then turned to the vagina where a right angle retractor was placed in the posterior aspect, and a right angle retractor was placed in the anterior aspect.  The anterior lip of the cervix was grasped with a single tooth tenaculum. The IUD strings were visualized, grasped with a jania clamp, and removed without difficulty; specimen sent to pathology. The uterus was sounded to approximately 8 cm. The cervix was serially dilated to #18 Aiden dilator. The 5.5mm hysteroscope  was advanced through the cervical os and the uterus was distended with normal saline. The uterine cavity was inspected and found to have normal appearing endometrial tissue. No polyps or fibroids were visualized. The tubal ostia were identified without difficulty and appeared normal. The hysteroscope was withdrawn without difficulty.     The uterus was then gently curetted in a clockwise fashion until gritty feeling was noted in all aspects of the uterus. The endometrial scrapings were sent to pathology. The tenaculum was removed and hemostasis was noted at the puncture sites in the cervix.     Sponge and instrument counts were correct x 2. The patient tolerated the procedure well and was awakened without difficulty. She was taken to the recovery room in stable condition.      Nell Rouse MD   OB/GYN PGY-2

## 2024-02-28 NOTE — TRANSFER OF CARE
"Anesthesia Transfer of Care Note    Patient: Michelle Gage    Procedure(s) Performed: Procedure(s) (LRB):  HYSTEROSCOPY, WITH DILATION AND CURETTAGE OF UTERUS (N/A)    Patient location: PACU    Anesthesia Type: general    Transport from OR: Transported from OR on room air with adequate spontaneous ventilation    Post pain: adequate analgesia    Post assessment: no apparent anesthetic complications and tolerated procedure well    Post vital signs: stable    Level of consciousness: awake and alert    Nausea/Vomiting: no nausea/vomiting    Complications: none    Transfer of care protocol was followed      Last vitals: Visit Vitals  /60 (BP Location: Right arm, Patient Position: Sitting)   Pulse (!) 57   Temp 36.7 °C (98 °F) (Oral)   Resp 18   Ht 5' 5" (1.651 m)   Wt 51.7 kg (114 lb)   LMP 02/05/2024 (Approximate)   SpO2 100%   Breastfeeding No   BMI 18.97 kg/m²     "

## 2024-02-28 NOTE — INTERVAL H&P NOTE
The patient has been examined and the H&P has been reviewed:    I concur with the findings and no changes have occurred since H&P was written.    Surgery risks, benefits and alternative options discussed and understood by patient/family.      Patient does not desire ablation.     There are no hospital problems to display for this patient.

## 2024-02-29 VITALS
HEIGHT: 65 IN | DIASTOLIC BLOOD PRESSURE: 65 MMHG | WEIGHT: 114 LBS | BODY MASS INDEX: 18.99 KG/M2 | OXYGEN SATURATION: 99 % | RESPIRATION RATE: 17 BRPM | TEMPERATURE: 99 F | HEART RATE: 63 BPM | SYSTOLIC BLOOD PRESSURE: 103 MMHG

## 2024-03-01 LAB
FINAL PATHOLOGIC DIAGNOSIS: NORMAL
GROSS: NORMAL
Lab: NORMAL

## 2024-03-01 RX ORDER — METHYLPHENIDATE HYDROCHLORIDE 10 MG/1
10 TABLET ORAL 2 TIMES DAILY WITH MEALS
Qty: 60 TABLET | Refills: 0 | Status: SHIPPED | OUTPATIENT
Start: 2024-03-01 | End: 2024-03-11 | Stop reason: SDUPTHER

## 2024-03-11 DIAGNOSIS — R53.0 NEOPLASTIC MALIGNANT RELATED FATIGUE: ICD-10-CM

## 2024-03-11 NOTE — TELEPHONE ENCOUNTER
No care due was identified.  [facility] Embedded Care Due Messages. Reference number: [id number].   3/11/2024 10:14:08 AM CDT

## 2024-03-12 RX ORDER — METHYLPHENIDATE HYDROCHLORIDE 10 MG/1
10 TABLET ORAL 2 TIMES DAILY WITH MEALS
Qty: 60 TABLET | Refills: 0 | Status: SHIPPED | OUTPATIENT
Start: 2024-03-12 | End: 2025-03-12

## 2024-04-29 ENCOUNTER — LAB VISIT (OUTPATIENT)
Dept: LAB | Facility: HOSPITAL | Age: 39
End: 2024-04-29
Attending: INTERNAL MEDICINE
Payer: COMMERCIAL

## 2024-04-29 DIAGNOSIS — C50.412 MALIGNANT NEOPLASM OF UPPER-OUTER QUADRANT OF LEFT BREAST IN FEMALE, ESTROGEN RECEPTOR POSITIVE: ICD-10-CM

## 2024-04-29 DIAGNOSIS — Z17.0 MALIGNANT NEOPLASM OF UPPER-OUTER QUADRANT OF LEFT BREAST IN FEMALE, ESTROGEN RECEPTOR POSITIVE: ICD-10-CM

## 2024-04-29 LAB
ALBUMIN SERPL BCP-MCNC: 3.8 G/DL (ref 3.5–5.2)
ALP SERPL-CCNC: 52 U/L (ref 55–135)
ALT SERPL W/O P-5'-P-CCNC: 16 U/L (ref 10–44)
ANION GAP SERPL CALC-SCNC: 7 MMOL/L (ref 8–16)
AST SERPL-CCNC: 21 U/L (ref 10–40)
BASOPHILS # BLD AUTO: 0.04 K/UL (ref 0–0.2)
BASOPHILS NFR BLD: 0.6 % (ref 0–1.9)
BILIRUB SERPL-MCNC: 0.2 MG/DL (ref 0.1–1)
BUN SERPL-MCNC: 9 MG/DL (ref 6–20)
CALCIUM SERPL-MCNC: 9 MG/DL (ref 8.7–10.5)
CHLORIDE SERPL-SCNC: 109 MMOL/L (ref 95–110)
CO2 SERPL-SCNC: 23 MMOL/L (ref 23–29)
CREAT SERPL-MCNC: 0.7 MG/DL (ref 0.5–1.4)
DIFFERENTIAL METHOD BLD: ABNORMAL
EOSINOPHIL # BLD AUTO: 0.3 K/UL (ref 0–0.5)
EOSINOPHIL NFR BLD: 4 % (ref 0–8)
ERYTHROCYTE [DISTWIDTH] IN BLOOD BY AUTOMATED COUNT: 14.8 % (ref 11.5–14.5)
EST. GFR  (NO RACE VARIABLE): >60 ML/MIN/1.73 M^2
GLUCOSE SERPL-MCNC: 85 MG/DL (ref 70–110)
HCT VFR BLD AUTO: 39.5 % (ref 37–48.5)
HGB BLD-MCNC: 12.9 G/DL (ref 12–16)
IMM GRANULOCYTES # BLD AUTO: 0.01 K/UL (ref 0–0.04)
IMM GRANULOCYTES NFR BLD AUTO: 0.1 % (ref 0–0.5)
LYMPHOCYTES # BLD AUTO: 3 K/UL (ref 1–4.8)
LYMPHOCYTES NFR BLD: 42.9 % (ref 18–48)
MCH RBC QN AUTO: 30.3 PG (ref 27–31)
MCHC RBC AUTO-ENTMCNC: 32.7 G/DL (ref 32–36)
MCV RBC AUTO: 93 FL (ref 82–98)
MONOCYTES # BLD AUTO: 0.5 K/UL (ref 0.3–1)
MONOCYTES NFR BLD: 6.9 % (ref 4–15)
NEUTROPHILS # BLD AUTO: 3.2 K/UL (ref 1.8–7.7)
NEUTROPHILS NFR BLD: 45.5 % (ref 38–73)
NRBC BLD-RTO: 0 /100 WBC
PLATELET # BLD AUTO: 128 K/UL (ref 150–450)
PMV BLD AUTO: 12.1 FL (ref 9.2–12.9)
POTASSIUM SERPL-SCNC: 4.4 MMOL/L (ref 3.5–5.1)
PROT SERPL-MCNC: 6.6 G/DL (ref 6–8.4)
RBC # BLD AUTO: 4.26 M/UL (ref 4–5.4)
SODIUM SERPL-SCNC: 139 MMOL/L (ref 136–145)
WBC # BLD AUTO: 7 K/UL (ref 3.9–12.7)

## 2024-04-29 PROCEDURE — 36415 COLL VENOUS BLD VENIPUNCTURE: CPT | Performed by: INTERNAL MEDICINE

## 2024-04-29 PROCEDURE — 80053 COMPREHEN METABOLIC PANEL: CPT | Performed by: INTERNAL MEDICINE

## 2024-04-29 PROCEDURE — 85025 COMPLETE CBC W/AUTO DIFF WBC: CPT | Performed by: INTERNAL MEDICINE

## 2024-04-30 ENCOUNTER — OFFICE VISIT (OUTPATIENT)
Dept: HEMATOLOGY/ONCOLOGY | Facility: CLINIC | Age: 39
End: 2024-04-30
Payer: COMMERCIAL

## 2024-04-30 VITALS
RESPIRATION RATE: 17 BRPM | WEIGHT: 114.63 LBS | OXYGEN SATURATION: 100 % | BODY MASS INDEX: 19.1 KG/M2 | DIASTOLIC BLOOD PRESSURE: 63 MMHG | HEART RATE: 79 BPM | SYSTOLIC BLOOD PRESSURE: 102 MMHG | HEIGHT: 65 IN | TEMPERATURE: 97 F

## 2024-04-30 DIAGNOSIS — C50.412 MALIGNANT NEOPLASM OF UPPER-OUTER QUADRANT OF LEFT BREAST IN FEMALE, ESTROGEN RECEPTOR POSITIVE: Primary | ICD-10-CM

## 2024-04-30 DIAGNOSIS — D69.6 THROMBOCYTOPENIA: ICD-10-CM

## 2024-04-30 DIAGNOSIS — Z17.0 MALIGNANT NEOPLASM OF UPPER-OUTER QUADRANT OF LEFT BREAST IN FEMALE, ESTROGEN RECEPTOR POSITIVE: Primary | ICD-10-CM

## 2024-04-30 PROCEDURE — 99999 PR PBB SHADOW E&M-EST. PATIENT-LVL IV: CPT | Mod: PBBFAC,,, | Performed by: INTERNAL MEDICINE

## 2024-04-30 PROCEDURE — 99213 OFFICE O/P EST LOW 20 MIN: CPT | Mod: S$GLB,,, | Performed by: INTERNAL MEDICINE

## 2024-04-30 PROCEDURE — 3078F DIAST BP <80 MM HG: CPT | Mod: CPTII,S$GLB,, | Performed by: INTERNAL MEDICINE

## 2024-04-30 PROCEDURE — 3008F BODY MASS INDEX DOCD: CPT | Mod: CPTII,S$GLB,, | Performed by: INTERNAL MEDICINE

## 2024-04-30 PROCEDURE — 1160F RVW MEDS BY RX/DR IN RCRD: CPT | Mod: CPTII,S$GLB,, | Performed by: INTERNAL MEDICINE

## 2024-04-30 PROCEDURE — 1159F MED LIST DOCD IN RCRD: CPT | Mod: CPTII,S$GLB,, | Performed by: INTERNAL MEDICINE

## 2024-04-30 PROCEDURE — 3074F SYST BP LT 130 MM HG: CPT | Mod: CPTII,S$GLB,, | Performed by: INTERNAL MEDICINE

## 2024-04-30 NOTE — PROGRESS NOTES
Subjective     Patient ID: Michelle Gage is a 38 y.o. female.    Chief Complaint: Malignant neoplasm of upper-outer quadrant of left breast i    HPI    Returns for routine follow-up  States overall feeling good- states she stopped Tamoxifen in mid March  States it occurred after she ran out and then after 2 weeks it was noted her sweats at night stopped and she felt that she wasn't as sensitive  She had stopped Zoladex in 2/2023  Now that she made decision to stop she overall continues to feel better  Sleeping better  Weight is stable  Skin is better     Diagnosis: Stage I ([id number]) invasive ductal carcinoma of left breast, upper outer quadrant, ER 95%, [place] 90%, Her2 3+, Grade 3, Ki67 25%  Premenopausal status.     Oncologic History:  Presentation  - [id number] - presented for palpable left breast mass around 2018 - was being watched on imaging at outside hospital.    - 5/11/2020 - Diagnostic bilateral mammogram and left breast US at [facility] showed left breast 1:00 mass, 1.4 cm, 8 CFN, mild left axillary adenopathy  - 5/11/2020 - Biopsy - Grade 3 IDC, estrogen receptor 95%, progesterone receptor 90%, Her2 delphine 3+, Ki67 25%. LN biopsy negative  Surgery consultation with Dr Dumont on 5/14. Breast cancer is far in axillary tail and felt to be difficult to obtain clear margins without neoadjuvant therapy.               - 5/14/2020 - Genetics [facility] Peoplesis neg; [facility]             Medical oncology consultation on 5/15/2020 -  This case was discussed with Dr. Dumont.  She does feel like the patient will benefit from neoadjuvant therapy to help improve her surgical margins.  Since the tumor is less than 2 cm and clinically lymph node negative (MRI pending), I recommended treatment with Taxotere, carboplatin and Herceptin without Perjeta.  Discussed with her that there is data in tumors less than 2 cm to consider Taxol/Herceptin however would not typically use this in a neoadjuvant setting  for concern for under treatment.                - Eggs Retrieval - has 2 embryos.               * 6/16/2020 started neoadjuvant TCH (no perjeta since < 2 cm and LN negative). Neoadjuvant therapy chosen due to location of tumor.     11/6/2020 Imaging Significant Findings     Breast MRI  Impression:     The previously described upper outer quadrant known malignancy in the left breast is no longer visualized consistent with complete imaging response to therapy.      BI-RADS Category:   Overall: 6 - Known Biopsy-Proven Malignancy     Recommendation:  Continued breast surgery and oncology management.             11/30/2020 Breast Surgery     Surgery: Dr Jessica Dumont, [phone] performed bilateral mastectomy with sentinel lymph node biopsy with pathology showing grade 1 invasive ductal carcinoma,  3 mm with 0 positive lymph nodes.          11/30/2020 Cancer Staged     [id number]      12/15/2020 Tumor Conference       Post mastectomy radiation not recommended but patient will meet with radiation oncology. Systemically,standard of care would be Kadcyla followed by endocrine therapy.      - Adjuvant Kadcyla started 12/28/2020     - Tamoxifen holiday 4/4/2021- 7/12/2021 due to side effects.      - Kadcyla stopped due to difficulty recovering counts- last dose 8/9/2021     - Zoladex and Tamoxifen.     Review of Systems   Constitutional:  Negative for activity change, appetite change, fatigue and fever.   HENT:  Negative for nasal congestion, postnasal drip and sore throat.    Eyes:  Negative for visual disturbance.   Respiratory:  Negative for cough and shortness of breath.    Cardiovascular:  Negative for chest pain, palpitations and leg swelling.   Gastrointestinal:  Negative for abdominal distention, abdominal pain, change in bowel habit, constipation, diarrhea, nausea and vomiting.   Genitourinary:  Negative for bladder incontinence, decreased urine volume, difficulty urinating, dysuria and urgency.   Musculoskeletal:   Negative for arthralgias, back pain and myalgias.   Integumentary:  Negative for breast mass and breast tenderness.   Neurological:  Negative for dizziness, weakness, numbness and headaches.   Hematological:  Negative for adenopathy. Does not bruise/bleed easily.   Psychiatric/Behavioral:  Positive for sleep disturbance. Negative for dysphoric mood. The patient is not nervous/anxious.    Breast: Negative for mass and tenderness         Objective     Physical Exam  Vitals and nursing note reviewed.   Constitutional:       General: She is not in acute distress.     Appearance: Normal appearance. She is well-developed and normal weight. She is not ill-appearing.      Comments: Presents alone  Very pleasant   HENT:      Head: Normocephalic and atraumatic.   Eyes:      General: No scleral icterus.     Extraocular Movements: Extraocular movements intact.      Conjunctiva/sclera: Conjunctivae normal.      Pupils: Pupils are equal, round, and reactive to light.      Right eye: Pupil is round and reactive.      Left eye: Pupil is round and reactive.   Neck:      Thyroid: No thyromegaly.      Vascular: No JVD.      Trachea: No tracheal deviation.   Cardiovascular:      Rate and Rhythm: Normal rate and regular rhythm.      Heart sounds: Normal heart sounds. No murmur heard.     No friction rub. No gallop.   Pulmonary:      Effort: Pulmonary effort is normal. No respiratory distress.      Breath sounds: Normal breath sounds. No wheezing, rhonchi or rales.      Comments: Breast- bilateral implants  No chest wall abnormalities or LAD  Abdominal:      General: Abdomen is flat. Bowel sounds are normal. There is no distension.      Palpations: Abdomen is soft. There is no mass.      Tenderness: There is no abdominal tenderness. There is no guarding or rebound.      Comments: No organomegaly   Musculoskeletal:         General: No swelling or tenderness. Normal range of motion.      Cervical back: Normal range of motion and neck  supple.      Right lower leg: No edema.      Left lower leg: No edema.   Lymphadenopathy:      Head:      Right side of head: No submandibular adenopathy.      Left side of head: No submandibular adenopathy.      Cervical: No cervical adenopathy.      Right cervical: No superficial, deep or posterior cervical adenopathy.     Left cervical: No superficial, deep or posterior cervical adenopathy.      Upper Body:      Right upper body: No supraclavicular adenopathy.      Left upper body: No supraclavicular adenopathy.   Skin:     General: Skin is warm and dry.      Coloration: Skin is not jaundiced or pale.      Findings: No bruising, erythema, lesion, petechiae or rash.   Neurological:      General: No focal deficit present.      Mental Status: She is alert and oriented to person, place, and time. Mental status is at baseline.      Sensory: No sensory deficit.      Motor: No weakness.      Coordination: Coordination normal.      Gait: Gait normal.      Deep Tendon Reflexes: Reflexes are normal and symmetric.   Psychiatric:         Mood and Affect: Mood normal. Mood is not anxious or depressed.         Behavior: Behavior normal.         Thought Content: Thought content normal.         Judgment: Judgment normal.   Interval endometrial bx- negative       Assessment and Plan     1. Malignant neoplasm of upper-outer quadrant of left breast in female, estrogen receptor positive  Overview:  Stage I ([id number]) invasive ductal carcinoma of left breast, upper outer quadrant, ER 95%, [place] 90%, Her2 3+, Grade 3, Ki67 25% dx on mmg 5.11.2020, did neoadjuvant ctx followed by bilateral mastectomy   curently on tamoxifen and monthly zoladex(gnrh agonist)  injxn - sees DINORA Crum and       2. Thrombocytopenia      Breast cancer  Opted to stop Tamoxifen at 3 years  Has stored eggs she would like to consider using as well  We will discuss further and [place] back with her     Thrombocytopenia  Long standing > 5 years, likely ITP and  monitored    Route Chart for Scheduling    Med Onc Chart Routing      Follow up with physician . Virtual visit 8 weeks   Follow up with YUMIKO    Infusion scheduling note    Injection scheduling note    Labs    Imaging    Pharmacy appointment    Other referrals              Supportive Plan Information  OP BREAST GOSERELIN Q4W   Christy Salazar MD   Upcoming Treatment Dates - OP BREAST GOSERELIN Q4W    1/13/2023       Chemotherapy       goserelin (ZOLADEX) injection 3.6 mg

## 2024-07-11 DIAGNOSIS — R53.0 NEOPLASTIC MALIGNANT RELATED FATIGUE: ICD-10-CM

## 2024-07-11 NOTE — TELEPHONE ENCOUNTER
No care due was identified.  [facility] Embedded Care Due Messages. Reference number: [id number].   7/11/2024 6:27:47 PM CDT

## 2024-07-12 RX ORDER — METHYLPHENIDATE HYDROCHLORIDE 10 MG/1
10 TABLET ORAL 2 TIMES DAILY WITH MEALS
Qty: 60 TABLET | Refills: 0 | Status: SHIPPED | OUTPATIENT
Start: 2024-07-12 | End: 2025-07-12

## 2024-07-30 ENCOUNTER — OFFICE VISIT (OUTPATIENT)
Dept: HEMATOLOGY/ONCOLOGY | Facility: CLINIC | Age: 39
End: 2024-07-30
Payer: COMMERCIAL

## 2024-07-30 DIAGNOSIS — C50.412 MALIGNANT NEOPLASM OF UPPER-OUTER QUADRANT OF LEFT BREAST IN FEMALE, ESTROGEN RECEPTOR POSITIVE: Primary | ICD-10-CM

## 2024-07-30 DIAGNOSIS — Z17.0 MALIGNANT NEOPLASM OF UPPER-OUTER QUADRANT OF LEFT BREAST IN FEMALE, ESTROGEN RECEPTOR POSITIVE: Primary | ICD-10-CM

## 2024-07-30 PROCEDURE — 99214 OFFICE O/P EST MOD 30 MIN: CPT | Mod: 95,,, | Performed by: INTERNAL MEDICINE

## 2024-07-30 PROCEDURE — 1159F MED LIST DOCD IN RCRD: CPT | Mod: CPTII,95,, | Performed by: INTERNAL MEDICINE

## 2024-07-30 PROCEDURE — G2211 COMPLEX E/M VISIT ADD ON: HCPCS | Mod: 95,,, | Performed by: INTERNAL MEDICINE

## 2024-07-30 PROCEDURE — 1160F RVW MEDS BY RX/DR IN RCRD: CPT | Mod: CPTII,95,, | Performed by: INTERNAL MEDICINE

## 2024-07-30 NOTE — PROGRESS NOTES
Subjective     Patient ID: Michelle Gage is a 38 y.o. female.    Chief Complaint: Breast Cancer    HPI    The patient location is: home  The chief complaint leading to consultation is: follow up  Visit type: audiovisual  Face to Face time with patient: 8 minutes  10 minutes of total time spent on the encounter, which includes face to face time and non-face to face time preparing to see the patient (eg, review of tests), Obtaining and/or reviewing separately obtained history, Documenting clinical information in the electronic or other health record, Independently interpreting results (not separately reported) and communicating results to the patient/family/caregiver, or Care coordination (not separately reported).   Each patient to whom he or she provides medical services by telemedicine is:  (1) informed of the relationship between the physician and patient and the respective role of any other health care provider with respect to management of the patient; and (2) notified that he or she may decline to receive medical services by telemedicine and may withdraw from such care at any time.    Notes:     Returns for routine follow-up  Stopped Tamoxifen in March 2024 due to side effects  Does note feeling better- hot flashes better and fatigue better  Sleeping better  Weight is stable     Diagnosis: Stage I ([id number]) invasive ductal carcinoma of left breast, upper outer quadrant, ER 95%, [place] 90%, Her2 3+, Grade 3, Ki67 25%  Premenopausal status.     Oncologic History:  Presentation  - [phone] - presented for palpable left breast mass around 2018 - was being watched on imaging at outside hospital.    - 5/11/2020 - Diagnostic bilateral mammogram and left breast US at [facility] showed left breast 1:00 mass, 1.4 cm, 8 CFN, mild left axillary adenopathy  - 5/11/2020 - Biopsy - Grade 3 IDC, estrogen receptor 95%, progesterone receptor 90%, Her2 delphine 3+, Ki67 25%. LN biopsy negative  Surgery consultation  with Dr Dumont on 5/14. Breast cancer is far in axillary tail and felt to be difficult to obtain clear margins without neoadjuvant therapy.               - 5/14/2020 - Genetics [facility] neg; [facility]             Medical oncology consultation on 5/15/2020 -  This case was discussed with Dr. Dumont.  She does feel like the patient will benefit from neoadjuvant therapy to help improve her surgical margins.  Since the tumor is less than 2 cm and clinically lymph node negative (MRI pending), I recommended treatment with Taxotere, carboplatin and Herceptin without Perjeta.  Discussed with her that there is data in tumors less than 2 cm to consider Taxol/Herceptin however would not typically use this in a neoadjuvant setting for concern for under treatment.                - Eggs Retrieval - has 2 embryos.               * 6/16/2020 started neoadjuvant TCH (no perjeta since < 2 cm and LN negative). Neoadjuvant therapy chosen due to location of tumor.     11/6/2020 Imaging Significant Findings     Breast MRI  Impression:     The previously described upper outer quadrant known malignancy in the left breast is no longer visualized consistent with complete imaging response to therapy.      BI-RADS Category:   Overall: 6 - Known Biopsy-Proven Malignancy     Recommendation:  Continued breast surgery and oncology management.             11/30/2020 Breast Surgery     Surgery: Dr Jessica Dumont, [phone] performed bilateral mastectomy with sentinel lymph node biopsy with pathology showing grade 1 invasive ductal carcinoma,  3 mm with 0 positive lymph nodes.          11/30/2020 Cancer Staged     [id number]      12/15/2020 Tumor Conference       Post mastectomy radiation not recommended but patient will meet with radiation oncology. Systemically,standard of care would be Kadcyla followed by endocrine therapy.      - Adjuvant Kadcyla started 12/28/2020     - Tamoxifen holiday 4/4/2021- 7/12/2021 due to side effects.       - Kadcyla stopped due to difficulty recovering counts- last dose 8/9/2021     - Zoladex and Tamoxifen.     Review of Systems   Constitutional:  Negative for activity change, appetite change, fatigue, fever and unexpected weight change.   HENT:  Negative for nasal congestion, postnasal drip and sore throat.    Eyes:  Negative for visual disturbance.   Respiratory:  Negative for cough and shortness of breath.    Cardiovascular:  Negative for chest pain, palpitations and leg swelling.   Gastrointestinal:  Negative for abdominal distention, abdominal pain, change in bowel habit, constipation, diarrhea, nausea and vomiting.   Genitourinary:  Negative for bladder incontinence, decreased urine volume, difficulty urinating, dysuria and urgency.   Musculoskeletal:  Negative for arthralgias, back pain and myalgias.   Integumentary:  Negative for breast mass and breast tenderness.   Neurological:  Negative for dizziness, weakness, numbness and headaches.   Hematological:  Negative for adenopathy. Does not bruise/bleed easily.   Psychiatric/Behavioral:  Positive for sleep disturbance. Negative for dysphoric mood. The patient is not nervous/anxious.    Breast: Negative for mass and tenderness         Objective     Physical Exam  Virtual visit     Assessment and Plan     1. Malignant neoplasm of upper-outer quadrant of left breast in female, estrogen receptor positive  Overview:  Stage I ([id number]) invasive ductal carcinoma of left breast, upper outer quadrant, ER 95%, [place] 90%, Her2 3+, Grade 3, Ki67 25% dx on mm 5.11.2020, did neoadjuvant ctx followed by bilateral mastectomy   curently on tamoxifen and monthly zoladex(gnrh agonist)  injxn - sees HO Howes and         Breast cancer  Opted to stop Tamoxifen at 3 years  RTC 6 months   Knows to call for any issues in the interval    Route Chart for Scheduling    Med Onc Chart Routing      Follow up with physician 6 months. cbc, cmp and EP   Follow up with YUMIKO    Infusion scheduling  note    Injection scheduling note    Labs    Imaging    Pharmacy appointment    Other referrals

## 2024-08-24 DIAGNOSIS — B00.1 FEVER BLISTER: ICD-10-CM

## 2024-08-24 DIAGNOSIS — R45.89 ANXIETY ABOUT HEALTH: ICD-10-CM

## 2024-08-24 DIAGNOSIS — C50.412 MALIGNANT NEOPLASM OF UPPER-OUTER QUADRANT OF LEFT BREAST IN FEMALE, ESTROGEN RECEPTOR POSITIVE: ICD-10-CM

## 2024-08-24 DIAGNOSIS — Z17.0 MALIGNANT NEOPLASM OF UPPER-OUTER QUADRANT OF LEFT BREAST IN FEMALE, ESTROGEN RECEPTOR POSITIVE: ICD-10-CM

## 2024-08-24 NOTE — TELEPHONE ENCOUNTER
No care due was identified.  [facility] Embedded Care Due Messages. Reference number: [id number].   8/24/2024 6:13:09 PM CDT

## 2024-08-26 RX ORDER — ALPRAZOLAM 0.5 MG/1
0.5 TABLET ORAL DAILY PRN
Qty: 30 TABLET | Refills: 0 | Status: SHIPPED | OUTPATIENT
Start: 2024-08-26

## 2024-08-26 RX ORDER — VALACYCLOVIR HYDROCHLORIDE 1 G/1
TABLET, FILM COATED ORAL
Qty: 4 TABLET | Refills: 1 | Status: SHIPPED | OUTPATIENT
Start: 2024-08-26

## 2024-08-26 NOTE — TELEPHONE ENCOUNTER
Can we update Xanax script, pharmacy received fax with 2 different sigs.     Old sig: take 1 tab TID prn sleep/anxiety

## 2024-09-12 ENCOUNTER — LAB VISIT (OUTPATIENT)
Dept: LAB | Facility: HOSPITAL | Age: 39
End: 2024-09-12
Attending: INTERNAL MEDICINE
Payer: COMMERCIAL

## 2024-09-12 ENCOUNTER — OFFICE VISIT (OUTPATIENT)
Dept: PRIMARY CARE CLINIC | Facility: CLINIC | Age: 39
End: 2024-09-12
Payer: COMMERCIAL

## 2024-09-12 VITALS
SYSTOLIC BLOOD PRESSURE: 110 MMHG | WEIGHT: 111.56 LBS | HEART RATE: 72 BPM | DIASTOLIC BLOOD PRESSURE: 66 MMHG | HEIGHT: 65 IN | OXYGEN SATURATION: 99 % | BODY MASS INDEX: 18.59 KG/M2

## 2024-09-12 DIAGNOSIS — B00.1 FEVER BLISTER: ICD-10-CM

## 2024-09-12 DIAGNOSIS — Z00.00 WELLNESS EXAMINATION: ICD-10-CM

## 2024-09-12 DIAGNOSIS — Z00.00 WELLNESS EXAMINATION: Primary | ICD-10-CM

## 2024-09-12 LAB
ALBUMIN SERPL BCP-MCNC: 4.2 G/DL (ref 3.5–5.2)
ALP SERPL-CCNC: 58 U/L (ref 55–135)
ALT SERPL W/O P-5'-P-CCNC: 28 U/L (ref 10–44)
ANION GAP SERPL CALC-SCNC: 7 MMOL/L (ref 8–16)
AST SERPL-CCNC: 28 U/L (ref 10–40)
BILIRUB SERPL-MCNC: 0.4 MG/DL (ref 0.1–1)
BUN SERPL-MCNC: 7 MG/DL (ref 6–20)
CALCIUM SERPL-MCNC: 9.5 MG/DL (ref 8.7–10.5)
CHLORIDE SERPL-SCNC: 108 MMOL/L (ref 95–110)
CHOLEST SERPL-MCNC: 206 MG/DL (ref 120–199)
CHOLEST/HDLC SERPL: 2.4 {RATIO} (ref 2–5)
CO2 SERPL-SCNC: 23 MMOL/L (ref 23–29)
CREAT SERPL-MCNC: 0.8 MG/DL (ref 0.5–1.4)
ERYTHROCYTE [DISTWIDTH] IN BLOOD BY AUTOMATED COUNT: 15 % (ref 11.5–14.5)
EST. GFR  (NO RACE VARIABLE): >60 ML/MIN/1.73 M^2
GLUCOSE SERPL-MCNC: 95 MG/DL (ref 70–110)
HCT VFR BLD AUTO: 42.6 % (ref 37–48.5)
HDLC SERPL-MCNC: 87 MG/DL (ref 40–75)
HDLC SERPL: 42.2 % (ref 20–50)
HGB BLD-MCNC: 14.2 G/DL (ref 12–16)
LDLC SERPL CALC-MCNC: 109.6 MG/DL (ref 63–159)
MCH RBC QN AUTO: 31.3 PG (ref 27–31)
MCHC RBC AUTO-ENTMCNC: 33.3 G/DL (ref 32–36)
MCV RBC AUTO: 94 FL (ref 82–98)
NONHDLC SERPL-MCNC: 119 MG/DL
PLATELET # BLD AUTO: 142 K/UL (ref 150–450)
PMV BLD AUTO: 13 FL (ref 9.2–12.9)
POTASSIUM SERPL-SCNC: 4.6 MMOL/L (ref 3.5–5.1)
PROT SERPL-MCNC: 6.9 G/DL (ref 6–8.4)
RBC # BLD AUTO: 4.54 M/UL (ref 4–5.4)
SODIUM SERPL-SCNC: 138 MMOL/L (ref 136–145)
TRIGL SERPL-MCNC: 47 MG/DL (ref 30–150)
WBC # BLD AUTO: 6.87 K/UL (ref 3.9–12.7)

## 2024-09-12 PROCEDURE — 80053 COMPREHEN METABOLIC PANEL: CPT | Performed by: INTERNAL MEDICINE

## 2024-09-12 PROCEDURE — 1159F MED LIST DOCD IN RCRD: CPT | Mod: CPTII,S$GLB,, | Performed by: INTERNAL MEDICINE

## 2024-09-12 PROCEDURE — 3074F SYST BP LT 130 MM HG: CPT | Mod: CPTII,S$GLB,, | Performed by: INTERNAL MEDICINE

## 2024-09-12 PROCEDURE — 99395 PREV VISIT EST AGE 18-39: CPT | Mod: S$GLB,,, | Performed by: INTERNAL MEDICINE

## 2024-09-12 PROCEDURE — 3008F BODY MASS INDEX DOCD: CPT | Mod: CPTII,S$GLB,, | Performed by: INTERNAL MEDICINE

## 2024-09-12 PROCEDURE — 3078F DIAST BP <80 MM HG: CPT | Mod: CPTII,S$GLB,, | Performed by: INTERNAL MEDICINE

## 2024-09-12 PROCEDURE — 80061 LIPID PANEL: CPT | Performed by: INTERNAL MEDICINE

## 2024-09-12 PROCEDURE — 85027 COMPLETE CBC AUTOMATED: CPT | Performed by: INTERNAL MEDICINE

## 2024-09-12 PROCEDURE — 99999 PR PBB SHADOW E&M-EST. PATIENT-LVL III: CPT | Mod: PBBFAC,,, | Performed by: INTERNAL MEDICINE

## 2024-09-12 PROCEDURE — 36415 COLL VENOUS BLD VENIPUNCTURE: CPT | Performed by: INTERNAL MEDICINE

## 2024-09-12 RX ORDER — VALACYCLOVIR HYDROCHLORIDE 1 G/1
TABLET, FILM COATED ORAL
Qty: 28 TABLET | Refills: 1 | Status: SHIPPED | OUTPATIENT
Start: 2024-09-12

## 2024-09-12 RX ORDER — ACYCLOVIR 50 MG/G
OINTMENT TOPICAL EVERY 4 HOURS
Qty: 30 G | Refills: 1 | Status: SHIPPED | OUTPATIENT
Start: 2024-09-12

## 2024-09-12 NOTE — PROGRESS NOTES
[facility] Note    Chief Complaint      Chief Complaint   Patient presents with    Annual Exam     History of Present Illness      Michelle Gage is a 38 y.o. female who presents today for chief complaint annual wellness   HPI   Annual feels well last seen 6.23  Last labs 4.24 CMP CBC, lipids 9.23  Current HSV lesion  - she was in [place]   Having some thinck nasal discharge   Thrombocytopenia stable last labs 128- no bruising. Has been having menorrhagia  H/o breast cancer, sees Dr Crum q3 mo currently,taking prn xanax, ritalin, effexor for hot flashes   Initially dx as axillary mass by Dr Chandler, was having a mmg and did not have axillary LN bx, was monitored as surgeon as well. minimum 5 years tamoxifen,  monthly zoladex dc'd in Feb, does have frozen embryos, also has IUD in place. She has had a return of menstrual cycles, 2 so far, have been heavy. Is seeing breast surgery annually. She is s/p bilateral nipple sparing mastectomies with reconstruction   Genetic counseling was negative, PGM with breast cancer. Saw Skyla in the end of July, she is on longer on tamoxifen - completed about 4 years   Her periods are back since stopping tamoxifen having metrorrhagia, 21d cycles and heavy, Briana is aware and she had a cervical and endometrial biopsy   Doing high dose vit c which feels like gives her some mental sharpness   situational anxiety using prn xanax 4-5x a month, last fill in sept, using effexor for mood and vasomotor,  No mood disorder prior to dx - was started on methylphenidate by DINORA Salazar taking 1/2-1 daily prn. - was seeing Dr Jd Hines which helps        Pap: Brinaa 6.21  MMG: s/p bilateral mastectomy   DEXA: 11.21 nl  Colonoscopy: not  Had   Td: 2017  Flu: n/a  COVID:utd  Shingles:n/a  PNA: no on active immunuspuuressing ctx   Tobacco: never habitual   Other MDs:Tim Crum [place], [facility]ae derm, Gabbi plastic surgeon, [facility]  [facility]   I personally reviewed all past medical, surgical, social and family history.  Mom and dad alive  Dad new dx Parkinsons   Works in construction law, arthur guy   Walks in neighborhood a few times a week and does pilates 1x a week   Watching diet closely   She is from [facility]   Active Problem List with Overview Notes    Diagnosis Date Noted    Status post hysteroscopy D&C 02/28/2024    Thrombocytopenia 10/17/2022    Constipation 06/22/2022    Chronic bilateral low back pain without sciatica 01/11/2022    Vitamin D deficiency 02/09/2021    SERM use (selective estrogen receptor modulator) 02/09/2021    Insomnia 01/19/2021    Chemotherapy-induced thrombocytopenia 01/19/2021    Anxiety associated with cancer diagnosis 06/16/2020    Adjustment disorder with mixed anxiety and depressed mood 06/05/2020    Malignant neoplasm of upper-outer quadrant of left breast in female, estrogen receptor positive 05/15/2020     Stage I ([id number]) invasive ductal carcinoma of left breast, upper outer quadrant, ER 95%, ND 90%, Her2 3+, Grade 3, Ki67 25% dx on mmg 5.11.2020, did neoadjuvant ctx followed by bilateral mastectomy   curently on tamoxifen and monthly zoladex(gnrh agonist)  injxn - sees HO [place] and       Suppression of ovarian secretion 05/15/2020       Health Maintenance   Topic Date Due    TETANUS VACCINE  03/22/2027    Hepatitis C Screening  Completed    Lipid Panel  Completed       Past Medical History:   Diagnosis Date    Anxiety     Back pain     Brain fog     Constipation     Depression     Genetic testing 05/2020    BRCA+myRisk NEGATIVE with APC VUS c.3875C>T ([id number]), aka K5259O (3875C>T)    Hx of psychiatric care     Xanax    Malignant neoplasm of upper-outer quadrant of left breast in female, estrogen receptor positive 5/15/2020    Status post hysteroscopy D&C; Paraguard insertion 2/28/2024       Past Surgical History:   Procedure Laterality Date    BILATERAL MASTECTOMY Bilateral  11/30/2020    Procedure: MASTECTOMY, BILATERAL;  Surgeon: Jessica Dumont MD;  Location: [facility];  Service: General;  Laterality: Bilateral;    BREAST RECONSTRUCTION Bilateral 11/30/2020    Procedure: RECONSTRUCTION, BREAST;  Surgeon: Lamin Seo MD;  Location: [facility];  Service: General;  Laterality: Bilateral;    BREAST SURGERY      Breast biopsy    egg retreival  2020    HYSTEROSCOPY WITH DILATION AND CURETTAGE OF UTERUS N/A 2/28/2024    Procedure: HYSTEROSCOPY, WITH DILATION AND CURETTAGE OF UTERUS;  Surgeon: Lyndsay Warren MD;  Location: [facility];  Service: OB/GYN;  Laterality: N/A;  PARAGUARD IUD PER PRANAY GALE    INSERTION OF BREAST IMPLANT Bilateral 11/30/2020    Procedure: INSERTION, BREAST TISSUE EXPANDER - BILATERAL BREAST RECONSTRUCTION WITH TISSUE EXPANDERS AND ACELLULAR DERMAL MATRIX;  Surgeon: Lamin Seo MD;  Location: [facility];  Service: General;  Laterality: Bilateral;    INSERTION OF TUNNELED CENTRAL VENOUS CATHETER (CVC) WITH SUBCUTANEOUS PORT N/A 05/28/2020    Procedure: [id number];  Surgeon: [facility] Diagnostic Provider;  Location: [address];  Service: Radiology;  Laterality: N/A;  189 port placement    MEDIPORT REMOVAL Right 11/09/2021    Procedure: REMOVAL, CATHETER, CENTRAL VENOUS, TUNNELED, WITH PORT;  Surgeon: Сергей Rowan MD;  Location: [facility] CATH LAB;  Service: Radiology;  Laterality: Right;    REMOVAL OF INTRAUTERINE DEVICE (IUD) N/A 2/28/2024    Procedure: REMOVAL, INTRAUTERINE DEVICE;  Surgeon: Lyndsay Warren MD;  Location: [facility];  Service: OB/GYN;  Laterality: N/A;    SENTINEL LYMPH NODE BIOPSY Left 11/30/2020    Procedure: BIOPSY, LYMPH NODE, SENTINEL;  Surgeon: Jessica Dumont MD;  Location: [facility];  Service: General;  Laterality: Left;    WISDOM TOOTH EXTRACTION         family history includes Alcohol abuse in her maternal grandmother; Alzheimer's disease in her maternal grandmother; Bipolar disorder in her maternal grandmother; Breast  cancer in her paternal grandmother and other family members; Diabetes in her maternal grandfather and paternal grandfather; No Known Problems in her father; Osteoporosis in her mother.    Social History     Tobacco Use    Smoking status: Former    Smokeless tobacco: Former    Tobacco comments:     occasional past while drinking   Substance Use Topics    Alcohol use: Yes     Comment: socially    Drug use: Never       Review of Systems   Constitutional:  Negative for chills, fever, malaise/fatigue and weight loss.   Respiratory:  Negative for cough, sputum production, shortness of breath and wheezing.    Cardiovascular:  Negative for chest pain, palpitations, orthopnea and leg swelling.   Gastrointestinal:  Negative for constipation, diarrhea, nausea and vomiting.   Genitourinary:  Negative for dysuria, frequency, hematuria and urgency.        Outpatient Encounter Medications as of 9/12/2024   Medication Sig Note Dispense Refill    ALPRAZolam (XANAX) 0.5 MG tablet Take 1 tablet (0.5 mg total) by mouth daily as needed for Anxiety. TAKE 1 TABLET(0.5 MG) BY MOUTH THREE TIMES DAILY AS NEEDED FOR INSOMNIA OR ANXIETY Strength: 0.5 mg  30 tablet 0    methylphenidate HCl (RITALIN) 10 MG tablet Take 1 tablet (10 mg total) by mouth 2 (two) times daily with meals.  60 tablet 0    venlafaxine (EFFEXOR-XR) 75 MG 24 hr capsule TAKE 1 CAPSULE(75 MG) BY MOUTH EVERY DAY  90 capsule 2    [DISCONTINUED] valACYclovir (VALTREX) 1000 MG tablet Take 2 tablets by mouth once and again in 12 hours  4 tablet 1    acyclovir 5% (ZOVIRAX) 5 % ointment Apply topically every 4 (four) hours.  30 g 1    ATRALIN 0.05 % gel 2 (two) times daily as needed.  (Patient not taking: Reported on 9/12/2024)       valACYclovir (VALTREX) 1000 MG tablet Take 2 tablets by mouth once and again in 12 hours  28 tablet 1    [DISCONTINUED] ALPRAZolam (XANAX) 0.5 MG tablet Take 1 tablet (0.5 mg total) by mouth daily as needed for Anxiety. TAKE 1 TABLET(0.5 MG) BY MOUTH  "THREE TIMES DAILY AS NEEDED FOR INSOMNIA OR ANXIETY Strength: 0.5 mg 2/22/2024: prn 30 tablet 0    [DISCONTINUED] HYDROcodone-acetaminophen (NORCO) 5-325 mg per tablet Take 1 tablet by mouth every 6 (six) hours as needed for Pain.  8 tablet 0    [DISCONTINUED] tamoxifen (NOLVADEX) 20 MG Tab Take 1 tablet (20 mg total) by mouth once daily.  90 tablet 3    [DISCONTINUED] valACYclovir (VALTREX) 1000 MG tablet Take 2 tablets by mouth once and again in 12 hours 2/22/2024: prn 4 tablet 1     Facility-Administered Encounter Medications as of 9/12/2024   Medication Dose Route Frequency Provider Last Rate Last Admin    ceFAZolin 1 g, gentamicin 80 mg in sodium chloride 0.9% 500 mL irrigation   Irrigation On Call Procedure Lamin Seo MD        ceFAZolin 1 g, gentamicin 80 mg in sodium chloride 0.9% 500 mL irrigation   Irrigation On Call Procedure Lamin Seo MD        ceFAZolin 1 g, gentamicin 80 mg in sodium chloride 0.9% 500 mL irrigation   Irrigation On Call Procedure Lamin Seo MD        ceFAZolin 1 g, gentamicin 80 mg in sodium chloride 0.9% 500 mL irrigation   Irrigation On Call Procedure Lamin Seo MD        copper intrauterine device 380 square mm IUD   Intrauterine Once Lyndsay Warren MD        [DISCONTINUED] copper intrauterine device 380 square mm  mm  380 mm Intrauterine  Lyndsay Warren MD   380 mm at 06/25/20 1030        Review of patient's allergies indicates:  No Known Allergies        Physical Exam      Vital Signs  Pulse: 72  SpO2: 99 %  BP: 110/66  BP Location: Right arm  Patient Position: Sitting  Height and Weight  Height: 5' 5" (165.1 cm)  Weight: 50.6 kg (111 lb 8.8 oz)  BSA (Calculated - sq m): 1.52 sq meters  BMI (Calculated): 18.6  Weight in (lb) to have BMI = 25: 149.9]    Physical Exam  Vitals reviewed.   Constitutional:       General: She is not in acute distress.     Appearance: She is well-developed. She is not diaphoretic.   HENT:      Head: " "Normocephalic and atraumatic.      Right Ear: External ear normal.      Left Ear: External ear normal.      Nose: Nose normal.   Eyes:      General:         Right eye: No discharge.         Left eye: No discharge.      Conjunctiva/sclera: Conjunctivae normal.   Cardiovascular:      Rate and Rhythm: Normal rate and regular rhythm.      Heart sounds: Normal heart sounds.   Pulmonary:      Effort: Pulmonary effort is normal. No respiratory distress.      Breath sounds: Normal breath sounds.   Musculoskeletal:         General: Normal range of motion.      Cervical back: Normal range of motion.   Skin:     Coloration: Skin is not pale.      Findings: No rash.   Neurological:      Mental Status: She is alert and oriented to person, place, and time.   Psychiatric:         Behavior: Behavior normal.         Thought Content: Thought content normal.          Laboratory:  CBC:  No results for input(s): "WBC", "RBC", "HGB", "HCT", "PLT", "MCV", "MCH", "MCHC" in the last 2160 hours.  CMP:  No results for input(s): "GLU", "CALCIUM", "ALBUMIN", "PROT", "NA", "K", "CO2", "CL", "BUN", "ALKPHOS", "ALT", "AST", "BILITOT" in the last 2160 hours.    Invalid input(s): "CREATININ"  URINALYSIS:  No results for input(s): "COLORU", "CLARITYU", "SPECGRAV", "PHUR", "PROTEINUA", "GLUCOSEU", "BILIRUBINCON", "BLOODU", "WBCU", "RBCU", "BACTERIA", "MUCUS", "NITRITE", "LEUKOCYTESUR", "UROBILINOGEN", "HYALINECASTS" in the last 2160 hours.   LIPIDS:  No results for input(s): "TSH", "HDL", "CHOL", "TRIG", "LDLCALC", "CHOLHDL", "NONHDLCHOL", "TOTALCHOLEST" in the last 2160 hours.  TSH:  No results for input(s): "TSH" in the last 2160 hours.  A1C:  No results for input(s): "HGBA1C" in the last 2160 hours.    Radiology:      Assessment/Plan     Michelle Lyndsay Lambert is a 38 y.o.female with:    1. Wellness examination  -     Lipid Panel; Future; Expected date: 09/12/2024  -     Comprehensive Metabolic Panel; Future; Expected date: 09/12/2024  -     CBC " Without Differential; Future; Expected date: 09/12/2024    2. Fever blister  -     valACYclovir (VALTREX) 1000 MG tablet; Take 2 tablets by mouth once and again in 12 hours  Dispense: 28 tablet; Refill: 1  -     acyclovir 5% (ZOVIRAX) 5 % ointment; Apply topically every 4 (four) hours.  Dispense: 30 g; Refill: 1         Use of the [employer] Patient Portal discussed and encouraged during today's visit  -Continue current medications and maintain follow up with specialists.  Return to clinic in .  No future appointments.    Madonna Jones MD  9/12/2024 11:03 AM    Primary Care Internal Medicine

## 2024-10-21 DIAGNOSIS — R53.0 NEOPLASTIC MALIGNANT RELATED FATIGUE: ICD-10-CM

## 2024-10-21 RX ORDER — METHYLPHENIDATE HYDROCHLORIDE 10 MG/1
10 TABLET ORAL 2 TIMES DAILY WITH MEALS
Qty: 60 TABLET | Refills: 0 | Status: SHIPPED | OUTPATIENT
Start: 2024-10-21 | End: 2025-10-21

## 2024-10-21 NOTE — TELEPHONE ENCOUNTER
No care due was identified.  [facility] Embedded Care Due Messages. Reference number: [id number].   10/21/2024 10:25:22 AM CDT

## 2024-10-24 NOTE — PROGRESS NOTES
Here for hormone therapy injection, no complaints at this time. Injection given as ordered, tolerated well no reports of pain prior to or post injection. Advised to return to clinic as scheduled      Site:RB    Testerone:50MG    CLINIC SUPPLIED MEDICATION          Detail Level: Simple Detail Level: Detailed Detail Level: Generalized Detail Level: Zone

## 2024-11-14 ENCOUNTER — PATIENT MESSAGE (OUTPATIENT)
Dept: PRIMARY CARE CLINIC | Facility: CLINIC | Age: 39
End: 2024-11-14
Payer: COMMERCIAL

## 2024-11-14 DIAGNOSIS — F41.8 DEPRESSION WITH ANXIETY: ICD-10-CM

## 2024-11-15 ENCOUNTER — TELEPHONE (OUTPATIENT)
Dept: PRIMARY CARE CLINIC | Facility: CLINIC | Age: 39
End: 2024-11-15
Payer: COMMERCIAL

## 2024-11-15 RX ORDER — VENLAFAXINE HYDROCHLORIDE 75 MG/1
CAPSULE, EXTENDED RELEASE ORAL
Qty: 90 CAPSULE | Refills: 3 | Status: SHIPPED | OUTPATIENT
Start: 2024-11-15

## 2024-11-15 NOTE — TELEPHONE ENCOUNTER
----- Message from Sapna sent at 11/15/2024  8:24 AM CST -----  Contact: Self/[phone]  .[email or web address]     Patient is calling for Medical Advice regarding:status check on a rx     Pharmacy name and phone#: [id number] - [place]  [address] AT [facility] [address] [place] [address] [address]   [place] [address]  Phone: [phone] Fax: [phone]         Patient wants a call back or thru [facility]: call back     Comments: pt said that she is calling in regards to needing to check the status of a refill request for venlafaxine (EFFEXOR-XR) 75 MG 24 hr capsule, pt stated that she is out of her medication . Please advise

## 2024-11-15 NOTE — TELEPHONE ENCOUNTER
No care due was identified.  [facility] Embedded Care Due Messages. Reference number: [id number].   11/15/2024 8:29:36 AM CST

## 2024-12-31 DIAGNOSIS — R53.0 NEOPLASTIC MALIGNANT RELATED FATIGUE: ICD-10-CM

## 2024-12-31 NOTE — TELEPHONE ENCOUNTER
No care due was identified.  [facility] Embedded Care Due Messages. Reference number: [id number].   12/31/2024 11:27:30 AM CST

## 2025-01-02 RX ORDER — METHYLPHENIDATE HYDROCHLORIDE 10 MG/1
10 TABLET ORAL 2 TIMES DAILY WITH MEALS
Qty: 60 TABLET | Refills: 0 | Status: SHIPPED | OUTPATIENT
Start: 2025-01-02 | End: 2026-01-02

## 2025-02-03 ENCOUNTER — LAB VISIT (OUTPATIENT)
Dept: LAB | Facility: HOSPITAL | Age: 40
End: 2025-02-03
Attending: INTERNAL MEDICINE
Payer: COMMERCIAL

## 2025-02-03 ENCOUNTER — OFFICE VISIT (OUTPATIENT)
Dept: HEMATOLOGY/ONCOLOGY | Facility: CLINIC | Age: 40
End: 2025-02-03
Payer: COMMERCIAL

## 2025-02-03 VITALS
BODY MASS INDEX: 18.47 KG/M2 | RESPIRATION RATE: 17 BRPM | HEART RATE: 61 BPM | WEIGHT: 110.88 LBS | SYSTOLIC BLOOD PRESSURE: 108 MMHG | DIASTOLIC BLOOD PRESSURE: 61 MMHG | OXYGEN SATURATION: 99 % | HEIGHT: 65 IN | TEMPERATURE: 98 F

## 2025-02-03 DIAGNOSIS — Z17.0 MALIGNANT NEOPLASM OF UPPER-OUTER QUADRANT OF LEFT BREAST IN FEMALE, ESTROGEN RECEPTOR POSITIVE: ICD-10-CM

## 2025-02-03 DIAGNOSIS — N95.1 SWEATS, MENOPAUSAL: Primary | ICD-10-CM

## 2025-02-03 DIAGNOSIS — E55.9 VITAMIN D DEFICIENCY: ICD-10-CM

## 2025-02-03 DIAGNOSIS — L70.9 ACNE, UNSPECIFIED ACNE TYPE: ICD-10-CM

## 2025-02-03 DIAGNOSIS — C50.412 MALIGNANT NEOPLASM OF UPPER-OUTER QUADRANT OF LEFT BREAST IN FEMALE, ESTROGEN RECEPTOR POSITIVE: ICD-10-CM

## 2025-02-03 DIAGNOSIS — N95.1 SWEATS, MENOPAUSAL: ICD-10-CM

## 2025-02-03 LAB
ALBUMIN SERPL BCP-MCNC: 4 G/DL (ref 3.5–5.2)
ALP SERPL-CCNC: 52 U/L (ref 40–150)
ALT SERPL W/O P-5'-P-CCNC: 12 U/L (ref 10–44)
ANION GAP SERPL CALC-SCNC: 6 MMOL/L (ref 8–16)
AST SERPL-CCNC: 16 U/L (ref 10–40)
BASOPHILS # BLD AUTO: 0.06 K/UL (ref 0–0.2)
BASOPHILS NFR BLD: 1.1 % (ref 0–1.9)
BILIRUB SERPL-MCNC: 0.4 MG/DL (ref 0.1–1)
BUN SERPL-MCNC: 8 MG/DL (ref 6–20)
CALCIUM SERPL-MCNC: 9.1 MG/DL (ref 8.7–10.5)
CHLORIDE SERPL-SCNC: 109 MMOL/L (ref 95–110)
CO2 SERPL-SCNC: 22 MMOL/L (ref 23–29)
CREAT SERPL-MCNC: 0.7 MG/DL (ref 0.5–1.4)
DIFFERENTIAL METHOD BLD: ABNORMAL
EOSINOPHIL # BLD AUTO: 0.3 K/UL (ref 0–0.5)
EOSINOPHIL NFR BLD: 5 % (ref 0–8)
ERYTHROCYTE [DISTWIDTH] IN BLOOD BY AUTOMATED COUNT: 14.2 % (ref 11.5–14.5)
EST. GFR  (NO RACE VARIABLE): >60 ML/MIN/1.73 M^2
ESTRADIOL SERPL-MCNC: 209 PG/ML
FSH SERPL-ACNC: 6.33 MIU/ML
GLUCOSE SERPL-MCNC: 86 MG/DL (ref 70–110)
HCT VFR BLD AUTO: 40.9 % (ref 37–48.5)
HGB BLD-MCNC: 13.9 G/DL (ref 12–16)
IMM GRANULOCYTES # BLD AUTO: 0.01 K/UL (ref 0–0.04)
IMM GRANULOCYTES NFR BLD AUTO: 0.2 % (ref 0–0.5)
LYMPHOCYTES # BLD AUTO: 2 K/UL (ref 1–4.8)
LYMPHOCYTES NFR BLD: 37 % (ref 18–48)
MCH RBC QN AUTO: 31.6 PG (ref 27–31)
MCHC RBC AUTO-ENTMCNC: 34 G/DL (ref 32–36)
MCV RBC AUTO: 93 FL (ref 82–98)
MONOCYTES # BLD AUTO: 0.5 K/UL (ref 0.3–1)
MONOCYTES NFR BLD: 8.9 % (ref 4–15)
NEUTROPHILS # BLD AUTO: 2.6 K/UL (ref 1.8–7.7)
NEUTROPHILS NFR BLD: 47.8 % (ref 38–73)
NRBC BLD-RTO: 0 /100 WBC
PLATELET # BLD AUTO: 138 K/UL (ref 150–450)
PMV BLD AUTO: 12 FL (ref 9.2–12.9)
POTASSIUM SERPL-SCNC: 4.9 MMOL/L (ref 3.5–5.1)
PROT SERPL-MCNC: 6.9 G/DL (ref 6–8.4)
RBC # BLD AUTO: 4.4 M/UL (ref 4–5.4)
SODIUM SERPL-SCNC: 137 MMOL/L (ref 136–145)
T4 FREE SERPL-MCNC: 0.75 NG/DL (ref 0.71–1.51)
TSH SERPL DL<=0.005 MIU/L-ACNC: 0.57 UIU/ML (ref 0.4–4)
WBC # BLD AUTO: 5.4 K/UL (ref 3.9–12.7)

## 2025-02-03 PROCEDURE — 82670 ASSAY OF TOTAL ESTRADIOL: CPT | Performed by: INTERNAL MEDICINE

## 2025-02-03 PROCEDURE — 3008F BODY MASS INDEX DOCD: CPT | Mod: CPTII,S$GLB,, | Performed by: INTERNAL MEDICINE

## 2025-02-03 PROCEDURE — 3078F DIAST BP <80 MM HG: CPT | Mod: CPTII,S$GLB,, | Performed by: INTERNAL MEDICINE

## 2025-02-03 PROCEDURE — 1159F MED LIST DOCD IN RCRD: CPT | Mod: CPTII,S$GLB,, | Performed by: INTERNAL MEDICINE

## 2025-02-03 PROCEDURE — 99214 OFFICE O/P EST MOD 30 MIN: CPT | Mod: S$GLB,,, | Performed by: INTERNAL MEDICINE

## 2025-02-03 PROCEDURE — 3074F SYST BP LT 130 MM HG: CPT | Mod: CPTII,S$GLB,, | Performed by: INTERNAL MEDICINE

## 2025-02-03 PROCEDURE — 84443 ASSAY THYROID STIM HORMONE: CPT | Performed by: INTERNAL MEDICINE

## 2025-02-03 PROCEDURE — 80053 COMPREHEN METABOLIC PANEL: CPT | Performed by: INTERNAL MEDICINE

## 2025-02-03 PROCEDURE — 85025 COMPLETE CBC W/AUTO DIFF WBC: CPT | Performed by: INTERNAL MEDICINE

## 2025-02-03 PROCEDURE — 99999 PR PBB SHADOW E&M-EST. PATIENT-LVL III: CPT | Mod: PBBFAC,,, | Performed by: INTERNAL MEDICINE

## 2025-02-03 PROCEDURE — 84439 ASSAY OF FREE THYROXINE: CPT | Performed by: INTERNAL MEDICINE

## 2025-02-03 PROCEDURE — 83001 ASSAY OF GONADOTROPIN (FSH): CPT | Performed by: INTERNAL MEDICINE

## 2025-02-03 PROCEDURE — 36415 COLL VENOUS BLD VENIPUNCTURE: CPT | Performed by: INTERNAL MEDICINE

## 2025-02-03 RX ORDER — DOXYCYCLINE 100 MG/1
100 CAPSULE ORAL 2 TIMES DAILY
Qty: 14 CAPSULE | Refills: 0 | Status: SHIPPED | OUTPATIENT
Start: 2025-02-03

## 2025-02-03 NOTE — PROGRESS NOTES
"Subjective     Patient ID: Michelle Gage is a 39 y.o. female.    Chief Complaint: Malignant neoplasm of upper-outer quadrant of left breast i    HPI    Returns for routine follow-up  Notes sweating a lot- feels different than hot flashes on Zoladex  States she has broken out in a sweat and "sweat stinks"- calls it stress sweat she is having to change her clothes  Wears a natural deodorant that no longer carries her  States not really sweating during the day  Above changes noted since Meredith    No weight changes  Acne - facial noted now  Not noting emotional changes, maybe mild brain fog  Better off Tamoxifen    Period medium heavy  1-2 days cramping  Duration 5-7 days    Stopped Tamoxifen in March 2024 due to side effects     Diagnosis: Stage I ([id number]) invasive ductal carcinoma of left breast, upper outer quadrant, ER 95%, FL 90%, Her2 3+, Grade 3, Ki67 25%  Premenopausal status.     Oncologic History:  Presentation  - [id number] - presented for palpable left breast mass around 2018 - was being watched on imaging at outside hospital.    - 5/11/2020 - Diagnostic bilateral mammogram and left breast US at [facility] showed left breast 1:00 mass, 1.4 cm, 8 CFN, mild left axillary adenopathy  - 5/11/2020 - Biopsy - Grade 3 IDC, estrogen receptor 95%, progesterone receptor 90%, Her2 delphine 3+, Ki67 25%. LN biopsy negative  Surgery consultation with Dr Dumont on 5/14. Breast cancer is far in axillary tail and felt to be difficult to obtain clear margins without neoadjuvant therapy.               - 5/14/2020 - Genetics [facility] BRACAnalysis neg; VUS AP             Medical oncology consultation on 5/15/2020 -  This case was discussed with Dr. Dumont.  She does feel like the patient will benefit from neoadjuvant therapy to help improve her surgical margins.  Since the tumor is less than 2 cm and clinically lymph node negative (MRI pending), I recommended treatment with Taxotere, carboplatin and " Herceptin without Perjeta.  Discussed with her that there is data in tumors less than 2 cm to consider Taxol/Herceptin however would not typically use this in a neoadjuvant setting for concern for under treatment.                - Eggs Retrieval - has 2 embryos.               * 6/16/2020 started neoadjuvant TCH (no perjeta since < 2 cm and LN negative). Neoadjuvant therapy chosen due to location of tumor.     11/6/2020 Imaging Significant Findings     Breast MRI  Impression:     The previously described upper outer quadrant known malignancy in the left breast is no longer visualized consistent with complete imaging response to therapy.      BI-RADS Category:   Overall: 6 - Known Biopsy-Proven Malignancy     Recommendation:  Continued breast surgery and oncology management.             11/30/2020 Breast Surgery     Surgery: Dr Jessica Dumont, [phone] performed bilateral mastectomy with sentinel lymph node biopsy with pathology showing grade 1 invasive ductal carcinoma,  3 mm with 0 positive lymph nodes.          11/30/2020 Cancer Staged     [id number]      12/15/2020 Tumor Conference       Post mastectomy radiation not recommended but patient will meet with radiation oncology. Systemically,standard of care would be Kadcyla followed by endocrine therapy.      - Adjuvant Kadcyla started 12/28/2020     - Tamoxifen holiday 4/4/2021- 7/12/2021 due to side effects.      - Kadcyla stopped due to difficulty recovering counts- last dose 8/9/2021     - Zoladex and Tamoxifen.        Review of Systems   Constitutional:  Negative for activity change, appetite change, fatigue, fever and unexpected weight change.   HENT:  Negative for nasal congestion, postnasal drip and sore throat.    Eyes:  Negative for visual disturbance.   Respiratory:  Negative for cough and shortness of breath.    Cardiovascular:  Negative for chest pain, palpitations and leg swelling.   Gastrointestinal:  Negative for abdominal distention, abdominal pain,  change in bowel habit, constipation, diarrhea, nausea and vomiting.   Endocrine: Positive for heat intolerance.   Genitourinary:  Negative for bladder incontinence, decreased urine volume, difficulty urinating, dysuria and urgency.   Musculoskeletal:  Negative for arthralgias, back pain and myalgias.   Integumentary:  Negative for breast mass and breast tenderness.   Neurological:  Negative for dizziness, weakness, numbness and headaches.   Hematological:  Negative for adenopathy. Does not bruise/bleed easily.   Psychiatric/Behavioral:  Positive for sleep disturbance. Negative for dysphoric mood. The patient is not nervous/anxious.    Breast: Negative for mass and tenderness         Objective     Physical Exam  Vitals and nursing note reviewed.   Constitutional:       General: She is not in acute distress.     Appearance: Normal appearance. She is well-developed and normal weight. She is not ill-appearing.      Comments: Presents alone  Very pleasant   HENT:      Head: Normocephalic and atraumatic.   Eyes:      General: No scleral icterus.     Extraocular Movements: Extraocular movements intact.      Conjunctiva/sclera: Conjunctivae normal.      Pupils: Pupils are equal, round, and reactive to light.      Right eye: Pupil is round and reactive.      Left eye: Pupil is round and reactive.   Neck:      Thyroid: No thyromegaly.      Vascular: No JVD.      Trachea: No tracheal deviation.   Cardiovascular:      Rate and Rhythm: Normal rate and regular rhythm.      Heart sounds: Normal heart sounds. No murmur heard.     No friction rub. No gallop.   Pulmonary:      Effort: Pulmonary effort is normal. No respiratory distress.      Breath sounds: Normal breath sounds. No wheezing, rhonchi or rales.      Comments: Breast- bilateral implants  No chest wall abnormalities or LAD  Abdominal:      General: Abdomen is flat. Bowel sounds are normal. There is no distension.      Palpations: Abdomen is soft. There is no mass.       Tenderness: There is no abdominal tenderness. There is no guarding or rebound.      Comments: No organomegaly   Musculoskeletal:         General: No swelling or tenderness. Normal range of motion.      Cervical back: Normal range of motion and neck supple.      Right lower leg: No edema.      Left lower leg: No edema.   Lymphadenopathy:      Head:      Right side of head: No submandibular adenopathy.      Left side of head: No submandibular adenopathy.      Cervical: No cervical adenopathy.      Right cervical: No superficial, deep or posterior cervical adenopathy.     Left cervical: No superficial, deep or posterior cervical adenopathy.      Upper Body:      Right upper body: No supraclavicular adenopathy.      Left upper body: No supraclavicular adenopathy.   Skin:     General: Skin is warm and dry.      Coloration: Skin is not jaundiced or pale.      Findings: No bruising, erythema, lesion, petechiae or rash.   Neurological:      General: No focal deficit present.      Mental Status: She is alert and oriented to person, place, and time. Mental status is at baseline.      Sensory: No sensory deficit.      Motor: No weakness.      Coordination: Coordination normal.      Gait: Gait normal.      Deep Tendon Reflexes: Reflexes are normal and symmetric.   Psychiatric:         Mood and Affect: Mood normal. Mood is not anxious or depressed.         Behavior: Behavior normal.         Thought Content: Thought content normal.         Judgment: Judgment normal.     Labs- reviewed     Assessment and Plan     1. Sweats, menopausal  -     ESTRADIOL; Future; Expected date: 02/03/2025  -     FOLLICLE STIMULATING HORMONE; Future; Expected date: 02/03/2025  -     TSH; Future; Expected date: 02/03/2025  -     T4, FREE; Future; Expected date: 02/03/2025    2. Acne, unspecified acne type  -     doxycycline (VIBRAMYCIN) 100 MG Cap; Take 1 capsule (100 mg total) by mouth 2 (two) times daily.  Dispense: 14 capsule; Refill: 0    3.  Malignant neoplasm of upper-outer quadrant of left breast in female, estrogen receptor positive  Overview:  Stage I (L1rL0K0) invasive ductal carcinoma of left breast, upper outer quadrant, ER 95%, SD 90%, Her2 3+, Grade 3, Ki67 25% dx on mmg 5.11.2020, did neoadjuvant ctx followed by bilateral mastectomy   curently on tamoxifen and monthly zoladex(gnrh agonist)  injxn - sees HO [place] and       4. Vitamin D deficiency        1. Malignant neoplasm of upper-outer quadrant of left breast in female, estrogen receptor positive  Overview:  Stage I ([id number]) invasive ductal carcinoma of left breast, upper outer quadrant, ER 95%, SD 90%, Her2 3+, Grade 3, Ki67 25% dx on mmg 5.11.2020, did neoadjuvant ctx followed by bilateral mastectomy   curently on tamoxifen and monthly zoladex(gnrh agonist)  injxn - sees HO Roll[place] and            Breast cancer  Opted to stop Tamoxifen at 3 years    Now with hormonal changes- labs today    Counseled on above- await labs and next steps pending    Route Chart for Scheduling    Med Onc Chart Routing      Follow up with physician    Follow up with YUMIKO 6 months.   Infusion scheduling note    Injection scheduling note    Labs    Imaging    Pharmacy appointment    Other referrals

## 2025-03-20 ENCOUNTER — PATIENT MESSAGE (OUTPATIENT)
Dept: PRIMARY CARE CLINIC | Facility: CLINIC | Age: 40
End: 2025-03-20

## 2025-03-20 ENCOUNTER — OFFICE VISIT (OUTPATIENT)
Dept: PRIMARY CARE CLINIC | Facility: CLINIC | Age: 40
End: 2025-03-20
Payer: COMMERCIAL

## 2025-03-20 VITALS
OXYGEN SATURATION: 98 % | BODY MASS INDEX: 18.92 KG/M2 | HEIGHT: 65 IN | WEIGHT: 113.56 LBS | SYSTOLIC BLOOD PRESSURE: 110 MMHG | HEART RATE: 72 BPM | DIASTOLIC BLOOD PRESSURE: 76 MMHG

## 2025-03-20 DIAGNOSIS — F41.1 ANXIETY ASSOCIATED WITH CANCER DIAGNOSIS: ICD-10-CM

## 2025-03-20 DIAGNOSIS — Z87.42 HISTORY OF ABNORMAL CERVICAL PAP SMEAR: ICD-10-CM

## 2025-03-20 DIAGNOSIS — F90.2 ATTENTION DEFICIT HYPERACTIVITY DISORDER (ADHD), COMBINED TYPE: ICD-10-CM

## 2025-03-20 DIAGNOSIS — Z98.890 STATUS POST HYSTEROSCOPY: ICD-10-CM

## 2025-03-20 DIAGNOSIS — L70.0 ACNE VULGARIS: ICD-10-CM

## 2025-03-20 DIAGNOSIS — T45.1X5A CHEMOTHERAPY-INDUCED THROMBOCYTOPENIA: ICD-10-CM

## 2025-03-20 DIAGNOSIS — Z17.0 MALIGNANT NEOPLASM OF UPPER-OUTER QUADRANT OF LEFT BREAST IN FEMALE, ESTROGEN RECEPTOR POSITIVE: ICD-10-CM

## 2025-03-20 DIAGNOSIS — T45.1X5A HOT FLASHES DUE TO TAMOXIFEN: ICD-10-CM

## 2025-03-20 DIAGNOSIS — C50.412 MALIGNANT NEOPLASM OF UPPER-OUTER QUADRANT OF LEFT BREAST IN FEMALE, ESTROGEN RECEPTOR POSITIVE: ICD-10-CM

## 2025-03-20 DIAGNOSIS — R23.2 HOT FLASHES DUE TO TAMOXIFEN: ICD-10-CM

## 2025-03-20 DIAGNOSIS — C80.1 ANXIETY ASSOCIATED WITH CANCER DIAGNOSIS: ICD-10-CM

## 2025-03-20 DIAGNOSIS — N92.1 MENORRHAGIA WITH IRREGULAR CYCLE: ICD-10-CM

## 2025-03-20 DIAGNOSIS — B00.9 HSV INFECTION: ICD-10-CM

## 2025-03-20 DIAGNOSIS — D69.59 CHEMOTHERAPY-INDUCED THROMBOCYTOPENIA: ICD-10-CM

## 2025-03-20 DIAGNOSIS — Z76.89 ESTABLISHING CARE WITH NEW DOCTOR, ENCOUNTER FOR: Primary | ICD-10-CM

## 2025-03-20 DIAGNOSIS — D69.6 THROMBOCYTOPENIA: ICD-10-CM

## 2025-03-20 PROBLEM — B00.1 FEVER BLISTER: Status: ACTIVE | Noted: 2025-03-20

## 2025-03-20 PROCEDURE — 1160F RVW MEDS BY RX/DR IN RCRD: CPT | Mod: CPTII,S$GLB,, | Performed by: STUDENT IN AN ORGANIZED HEALTH CARE EDUCATION/TRAINING PROGRAM

## 2025-03-20 PROCEDURE — 1159F MED LIST DOCD IN RCRD: CPT | Mod: CPTII,S$GLB,, | Performed by: STUDENT IN AN ORGANIZED HEALTH CARE EDUCATION/TRAINING PROGRAM

## 2025-03-20 PROCEDURE — 99999 PR PBB SHADOW E&M-EST. PATIENT-LVL IV: CPT | Mod: PBBFAC,,, | Performed by: STUDENT IN AN ORGANIZED HEALTH CARE EDUCATION/TRAINING PROGRAM

## 2025-03-20 PROCEDURE — 3078F DIAST BP <80 MM HG: CPT | Mod: CPTII,S$GLB,, | Performed by: STUDENT IN AN ORGANIZED HEALTH CARE EDUCATION/TRAINING PROGRAM

## 2025-03-20 PROCEDURE — 99214 OFFICE O/P EST MOD 30 MIN: CPT | Mod: S$GLB,,, | Performed by: STUDENT IN AN ORGANIZED HEALTH CARE EDUCATION/TRAINING PROGRAM

## 2025-03-20 PROCEDURE — 3074F SYST BP LT 130 MM HG: CPT | Mod: CPTII,S$GLB,, | Performed by: STUDENT IN AN ORGANIZED HEALTH CARE EDUCATION/TRAINING PROGRAM

## 2025-03-20 PROCEDURE — 3008F BODY MASS INDEX DOCD: CPT | Mod: CPTII,S$GLB,, | Performed by: STUDENT IN AN ORGANIZED HEALTH CARE EDUCATION/TRAINING PROGRAM

## 2025-03-20 RX ORDER — VALACYCLOVIR HYDROCHLORIDE 1 G/1
1000 TABLET, FILM COATED ORAL DAILY
Qty: 30 TABLET | Refills: 3 | Status: SHIPPED | OUTPATIENT
Start: 2025-03-20

## 2025-03-20 NOTE — Clinical Note
Hi, hope you are doing well! I believe she is due to see you soon for an annual and repeat pap smear. She is still having pretty severe night sweats even on Effexor. Unsure if possibly related to the chemo (has been off Tamoxifen 1/2024.  Is Veozah a potential option? She is due to see you soon so I was passing along the message. Let me know if we can help! Thanks, Mellisa

## 2025-05-02 ENCOUNTER — PATIENT MESSAGE (OUTPATIENT)
Dept: PRIMARY CARE CLINIC | Facility: CLINIC | Age: 40
End: 2025-05-02
Payer: COMMERCIAL

## 2025-05-02 DIAGNOSIS — R53.0 NEOPLASTIC MALIGNANT RELATED FATIGUE: ICD-10-CM

## 2025-05-02 NOTE — TELEPHONE ENCOUNTER
Patient requesting refill on Ritalin 10 mg  Pt's  LOV with Mellisa Pacheco MD , 3/20/2025 establish care  Medication pending

## 2025-05-02 NOTE — TELEPHONE ENCOUNTER
No care due was identified.  [facility] Embedded Care Due Messages. Reference number: [id number].   5/02/2025 4:03:49 PM CDT  
Patient requesting refill on Ritalin 10 mg  Pt's  LOV with Mellisa Pacheco MD , 3/20/2025 establish care  Medication pending    Patient states taking over medication was discussed at visit dated 3/20/25  
negative...

## 2025-05-05 RX ORDER — METHYLPHENIDATE HYDROCHLORIDE 10 MG/1
10 TABLET ORAL 2 TIMES DAILY WITH MEALS
Qty: 60 TABLET | Refills: 0 | Status: SHIPPED | OUTPATIENT
Start: 2025-05-05 | End: 2026-05-05

## 2025-07-30 ENCOUNTER — PATIENT MESSAGE (OUTPATIENT)
Dept: PRIMARY CARE CLINIC | Facility: CLINIC | Age: 40
End: 2025-07-30
Payer: COMMERCIAL

## 2025-07-30 DIAGNOSIS — R53.0 NEOPLASTIC MALIGNANT RELATED FATIGUE: ICD-10-CM

## 2025-07-31 RX ORDER — METHYLPHENIDATE HYDROCHLORIDE 10 MG/1
10 TABLET ORAL 2 TIMES DAILY WITH MEALS
Qty: 60 TABLET | Refills: 0 | Status: SHIPPED | OUTPATIENT
Start: 2025-07-31 | End: 2026-07-31

## 2025-08-18 ENCOUNTER — OFFICE VISIT (OUTPATIENT)
Dept: PRIMARY CARE CLINIC | Facility: CLINIC | Age: 40
End: 2025-08-18
Payer: COMMERCIAL

## 2025-08-18 ENCOUNTER — PATIENT MESSAGE (OUTPATIENT)
Dept: PRIMARY CARE CLINIC | Facility: CLINIC | Age: 40
End: 2025-08-18

## 2025-08-18 DIAGNOSIS — C50.412 MALIGNANT NEOPLASM OF UPPER-OUTER QUADRANT OF LEFT BREAST IN FEMALE, ESTROGEN RECEPTOR POSITIVE: ICD-10-CM

## 2025-08-18 DIAGNOSIS — Z17.0 MALIGNANT NEOPLASM OF UPPER-OUTER QUADRANT OF LEFT BREAST IN FEMALE, ESTROGEN RECEPTOR POSITIVE: ICD-10-CM

## 2025-08-18 DIAGNOSIS — R53.0 NEOPLASTIC MALIGNANT RELATED FATIGUE: ICD-10-CM

## 2025-08-18 DIAGNOSIS — B00.9 HSV INFECTION: ICD-10-CM

## 2025-08-18 DIAGNOSIS — F41.8 DEPRESSION WITH ANXIETY: ICD-10-CM

## 2025-08-18 DIAGNOSIS — Z86.19 HISTORY OF SHINGLES: ICD-10-CM

## 2025-08-18 DIAGNOSIS — F90.2 ATTENTION DEFICIT HYPERACTIVITY DISORDER (ADHD), COMBINED TYPE: Primary | ICD-10-CM

## 2025-08-18 PROCEDURE — 1160F RVW MEDS BY RX/DR IN RCRD: CPT | Mod: CPTII,95,, | Performed by: STUDENT IN AN ORGANIZED HEALTH CARE EDUCATION/TRAINING PROGRAM

## 2025-08-18 PROCEDURE — 1159F MED LIST DOCD IN RCRD: CPT | Mod: CPTII,95,, | Performed by: STUDENT IN AN ORGANIZED HEALTH CARE EDUCATION/TRAINING PROGRAM

## 2025-08-18 PROCEDURE — 98006 SYNCH AUDIO-VIDEO EST MOD 30: CPT | Mod: 95,,, | Performed by: STUDENT IN AN ORGANIZED HEALTH CARE EDUCATION/TRAINING PROGRAM

## 2025-08-18 RX ORDER — METHYLPHENIDATE HYDROCHLORIDE 10 MG/1
10 TABLET ORAL 2 TIMES DAILY WITH MEALS
Qty: 60 TABLET | Refills: 0 | Status: SHIPPED | OUTPATIENT
Start: 2025-08-18

## 2025-08-18 RX ORDER — VENLAFAXINE HYDROCHLORIDE 75 MG/1
CAPSULE, EXTENDED RELEASE ORAL
Qty: 90 CAPSULE | Refills: 3 | Status: SHIPPED | OUTPATIENT
Start: 2025-08-18

## (undated) DEVICE — SCALPEL #15 BLADE STRL DISP.

## (undated) DEVICE — GLOVE BIOGEL SKINSENSE PI 7.0

## (undated) DEVICE — SOL IRR SOD CHL .9% POUR

## (undated) DEVICE — GLOVE BIOGEL SKINSENSE PI 7.5

## (undated) DEVICE — MARKER SKIN STND TIP BLUE BARR

## (undated) DEVICE — COVER PROBE SHEATH SURG 6X3IN

## (undated) DEVICE — SUT PROLENE 0 MO6 30IN BLUE

## (undated) DEVICE — SUT 2/0 27IN PDS II VIO MO

## (undated) DEVICE — GLOVE BIOGEL SKINSENSE PI 6.0

## (undated) DEVICE — BLADE SURG CARBON STEEL SZ11

## (undated) DEVICE — POSITIONER IV ARMBOARD FOAM

## (undated) DEVICE — PACK FLUENT DISPOSABLE

## (undated) DEVICE — APPLIER LIGACLIP MED 11IN

## (undated) DEVICE — BRA CLASSIC COMFORM BLK SZ 36

## (undated) DEVICE — SET BASIN 48X48IN 6000ML RING

## (undated) DEVICE — SUT VICRYL 3-0 27 SH

## (undated) DEVICE — DRESSING TRANS 4X4 TEGADERM

## (undated) DEVICE — SEAL LENS SCOPE MYOSURE

## (undated) DEVICE — DRAIN CHANNEL ROUND 15FR

## (undated) DEVICE — CLOSURE SKIN STERI STRIP 1/2X4

## (undated) DEVICE — BLADE SURG STAINLESS STEEL #15

## (undated) DEVICE — SPONGE SUPER KERLIX 6X6.75IN

## (undated) DEVICE — DRESSING ANTIMICROBIAL 1 INCH

## (undated) DEVICE — APPLICATOR CHLORAPREP ORN 26ML

## (undated) DEVICE — SUT MCRYL PLUS 4-0 PS2 27IN

## (undated) DEVICE — ELECTRODE REM PLYHSV RETURN 9

## (undated) DEVICE — SCRUB HIBICLENS 4% CHG 4OZ

## (undated) DEVICE — SCRUB 10% POVIDONE IODINE 4OZ

## (undated) DEVICE — TRAY MINOR GEN SURG

## (undated) DEVICE — SOL POVIDONE PREP IODINE 4 OZ

## (undated) DEVICE — SUT 3-0 VICRYL / SH (J416)

## (undated) DEVICE — GLOVE BIOGEL SKINSENSE PI 6.5

## (undated) DEVICE — DRAPE THYROID WITH ARMBOARD

## (undated) DEVICE — TRAY FOLEY 16FR INFECTION CONT

## (undated) DEVICE — KIT DYE SPY ELITE

## (undated) DEVICE — GOWN SMART IMP BREATHABLE XXLG

## (undated) DEVICE — SPONGE DERMACEA GAUZE 4X4

## (undated) DEVICE — ADHESIVE DERMABOND ADVANCED

## (undated) DEVICE — SYR 20ML BLUE LUER LOCK

## (undated) DEVICE — SHEATH GUIDE SCOUT SURG RADAR

## (undated) DEVICE — UNDERGLOVE BIOGEL PI SZ 6.5 LF

## (undated) DEVICE — SOL POVIDONE SCRUB IODINE 4 OZ

## (undated) DEVICE — SOL NACL IRR 3000ML

## (undated) DEVICE — Device

## (undated) DEVICE — SEE MEDLINE ITEM 152622

## (undated) DEVICE — WAVEGUIDE BRITEFIELD DISP

## (undated) DEVICE — SUT VICRYL PLUS 4-0 PS2 27

## (undated) DEVICE — DRESSING MEPORE ISLAND 31/2X4

## (undated) DEVICE — SUT MONOCRYL 3-0 PS-2 UND

## (undated) DEVICE — SEE MEDLINE ITEM 157117

## (undated) DEVICE — SPONGE LAP 18X18 PREWASHED

## (undated) DEVICE — DRAPE STERI INSTRUMENT 1018

## (undated) DEVICE — CONTAINER SPECIMEN STRL 4OZ

## (undated) DEVICE — STAPLER SKIN ROTATING HEAD

## (undated) DEVICE — ELECTRODE BLADE INSULATED 1 IN

## (undated) DEVICE — FORCEP ADSON SATIN WITH TEETH

## (undated) DEVICE — SEE MEDLINE ITEM 157110

## (undated) DEVICE — CUP MEDICINE STERILE 2OZ

## (undated) DEVICE — SUT STRATAFIX PGAPCL 3 FS-1

## (undated) DEVICE — GOWN SURG 2XL DISP TIE BACK

## (undated) DEVICE — ELECTRODE BLADE TEFLON 6

## (undated) DEVICE — SUT 2/0 30IN SILK BLK BRAI

## (undated) DEVICE — SUT MONOCRYL 4-0 PS-2

## (undated) DEVICE — EVACUATOR WOUND BULB 100CC

## (undated) DEVICE — SOL NACL 0.9% INJ PF/50151

## (undated) DEVICE — SEE MEDLINE ITEM 157131

## (undated) DEVICE — SYS PRINEO SKIN CLOSURE

## (undated) DEVICE — JELLY SURGILUBE 5GR

## (undated) DEVICE — SYR DISP LL 5CC

## (undated) DEVICE — SYR 10CC LUER LOCK

## (undated) DEVICE — COVER PROBE US 5.5X58L NON LTX

## (undated) DEVICE — TRAY SUT REM SCISSOR FORCEP

## (undated) DEVICE — SYR ONLY LUER LOCK 20CC

## (undated) DEVICE — SKIN MARKER DEVON 160

## (undated) DEVICE — ELECTRODE BLD EXT INSUL 1

## (undated) DEVICE — SEE MEDLINE ITEM 146417

## (undated) DEVICE — DRAPE C ARM 42 X 120 10/BX

## (undated) DEVICE — PAD ABD 8X10 STERILE

## (undated) DEVICE — FUNNEL KELLER STERILE

## (undated) DEVICE — SET DECANTER MEDICHOICE

## (undated) DEVICE — SEE MEDLINE ITEM 156918

## (undated) DEVICE — NDL HYPO REG 25G X 1 1/2

## (undated) DEVICE — CHLORAPREP W TINT 26ML APPL

## (undated) DEVICE — NDL SAFETY HYPO BVL 22G 1IN

## (undated) DEVICE — SUT 3-0 12-18IN SILK